# Patient Record
Sex: FEMALE | Race: BLACK OR AFRICAN AMERICAN | NOT HISPANIC OR LATINO | Employment: OTHER | URBAN - METROPOLITAN AREA
[De-identification: names, ages, dates, MRNs, and addresses within clinical notes are randomized per-mention and may not be internally consistent; named-entity substitution may affect disease eponyms.]

---

## 2017-07-10 ENCOUNTER — TELEPHONE (OUTPATIENT)
Dept: CARDIOLOGY | Facility: CLINIC | Age: 72
End: 2017-07-10

## 2017-07-10 ENCOUNTER — APPOINTMENT (OUTPATIENT)
Dept: ULTRASOUND IMAGING | Facility: HOSPITAL | Age: 72
End: 2017-07-10

## 2017-07-10 ENCOUNTER — HOSPITAL ENCOUNTER (EMERGENCY)
Facility: HOSPITAL | Age: 72
Discharge: HOME | End: 2017-07-10
Attending: FAMILY MEDICINE | Admitting: FAMILY MEDICINE

## 2017-07-10 ENCOUNTER — APPOINTMENT (OUTPATIENT)
Dept: GENERAL RADIOLOGY | Facility: HOSPITAL | Age: 72
End: 2017-07-10

## 2017-07-10 VITALS
OXYGEN SATURATION: 97 % | DIASTOLIC BLOOD PRESSURE: 75 MMHG | SYSTOLIC BLOOD PRESSURE: 149 MMHG | HEART RATE: 57 BPM | HEIGHT: 60 IN | TEMPERATURE: 98.1 F | BODY MASS INDEX: 29.06 KG/M2 | RESPIRATION RATE: 16 BRPM | WEIGHT: 148 LBS

## 2017-07-10 DIAGNOSIS — R07.89 ATYPICAL CHEST PAIN: Primary | ICD-10-CM

## 2017-07-10 LAB
ALBUMIN SERPL-MCNC: 4.5 G/DL (ref 3.5–5.2)
ALBUMIN/GLOB SERPL: 1.3 G/DL
ALP SERPL-CCNC: 119 U/L (ref 39–117)
ALT SERPL W P-5'-P-CCNC: 13 U/L (ref 1–33)
ANION GAP SERPL CALCULATED.3IONS-SCNC: 17 MMOL/L
AST SERPL-CCNC: 16 U/L (ref 1–32)
BASOPHILS # BLD AUTO: 0.01 10*3/MM3 (ref 0–0.2)
BASOPHILS NFR BLD AUTO: 0.2 % (ref 0–1.5)
BILIRUB SERPL-MCNC: 0.8 MG/DL (ref 0.1–1.2)
BUN BLD-MCNC: 14 MG/DL (ref 8–23)
BUN/CREAT SERPL: 21.5 (ref 7–25)
CALCIUM SPEC-SCNC: 9.9 MG/DL (ref 8.6–10.5)
CHLORIDE SERPL-SCNC: 102 MMOL/L (ref 98–107)
CO2 SERPL-SCNC: 25 MMOL/L (ref 22–29)
CREAT BLD-MCNC: 0.65 MG/DL (ref 0.57–1)
D DIMER PPP FEU-MCNC: 0.37 MCGFEU/ML (ref 0–0.49)
DEPRECATED RDW RBC AUTO: 43.1 FL (ref 37–54)
EOSINOPHIL # BLD AUTO: 0.04 10*3/MM3 (ref 0–0.7)
EOSINOPHIL NFR BLD AUTO: 0.9 % (ref 0.3–6.2)
ERYTHROCYTE [DISTWIDTH] IN BLOOD BY AUTOMATED COUNT: 13.3 % (ref 11.7–13)
GFR SERPL CREATININE-BSD FRML MDRD: 108 ML/MIN/1.73
GLOBULIN UR ELPH-MCNC: 3.4 GM/DL
GLUCOSE BLD-MCNC: 90 MG/DL (ref 65–99)
HCT VFR BLD AUTO: 38.4 % (ref 35.6–45.5)
HGB BLD-MCNC: 13.1 G/DL (ref 11.9–15.5)
HOLD SPECIMEN: NORMAL
HOLD SPECIMEN: NORMAL
IMM GRANULOCYTES # BLD: 0 10*3/MM3 (ref 0–0.03)
IMM GRANULOCYTES NFR BLD: 0 % (ref 0–0.5)
LIPASE SERPL-CCNC: 10 U/L (ref 13–60)
LYMPHOCYTES # BLD AUTO: 1.54 10*3/MM3 (ref 0.9–4.8)
LYMPHOCYTES NFR BLD AUTO: 33.4 % (ref 19.6–45.3)
MCH RBC QN AUTO: 30 PG (ref 26.9–32)
MCHC RBC AUTO-ENTMCNC: 34.1 G/DL (ref 32.4–36.3)
MCV RBC AUTO: 88.1 FL (ref 80.5–98.2)
MONOCYTES # BLD AUTO: 0.32 10*3/MM3 (ref 0.2–1.2)
MONOCYTES NFR BLD AUTO: 6.9 % (ref 5–12)
NEUTROPHILS # BLD AUTO: 2.7 10*3/MM3 (ref 1.9–8.1)
NEUTROPHILS NFR BLD AUTO: 58.6 % (ref 42.7–76)
PLATELET # BLD AUTO: 241 10*3/MM3 (ref 140–500)
PMV BLD AUTO: 10.5 FL (ref 6–12)
POTASSIUM BLD-SCNC: 4 MMOL/L (ref 3.5–5.2)
PROT SERPL-MCNC: 7.9 G/DL (ref 6–8.5)
RBC # BLD AUTO: 4.36 10*6/MM3 (ref 3.9–5.2)
SODIUM BLD-SCNC: 144 MMOL/L (ref 136–145)
TROPONIN T SERPL-MCNC: <0.01 NG/ML (ref 0–0.03)
WBC NRBC COR # BLD: 4.61 10*3/MM3 (ref 4.5–10.7)
WHOLE BLOOD HOLD SPECIMEN: NORMAL
WHOLE BLOOD HOLD SPECIMEN: NORMAL

## 2017-07-10 PROCEDURE — 83690 ASSAY OF LIPASE: CPT | Performed by: FAMILY MEDICINE

## 2017-07-10 PROCEDURE — 99284 EMERGENCY DEPT VISIT MOD MDM: CPT

## 2017-07-10 PROCEDURE — 84484 ASSAY OF TROPONIN QUANT: CPT | Performed by: FAMILY MEDICINE

## 2017-07-10 PROCEDURE — 85379 FIBRIN DEGRADATION QUANT: CPT | Performed by: FAMILY MEDICINE

## 2017-07-10 PROCEDURE — 93010 ELECTROCARDIOGRAM REPORT: CPT | Performed by: INTERNAL MEDICINE

## 2017-07-10 PROCEDURE — 80053 COMPREHEN METABOLIC PANEL: CPT | Performed by: FAMILY MEDICINE

## 2017-07-10 PROCEDURE — 76705 ECHO EXAM OF ABDOMEN: CPT

## 2017-07-10 PROCEDURE — 93005 ELECTROCARDIOGRAM TRACING: CPT

## 2017-07-10 PROCEDURE — 85025 COMPLETE CBC W/AUTO DIFF WBC: CPT | Performed by: FAMILY MEDICINE

## 2017-07-10 PROCEDURE — 71020 HC CHEST PA AND LATERAL: CPT

## 2017-07-10 RX ORDER — ASPIRIN 325 MG
325 TABLET ORAL ONCE
Status: DISCONTINUED | OUTPATIENT
Start: 2017-07-10 | End: 2017-07-10

## 2017-07-10 RX ORDER — MELOXICAM 15 MG/1
15 TABLET ORAL DAILY
COMMUNITY
End: 2019-11-20

## 2017-07-10 RX ORDER — SODIUM CHLORIDE 0.9 % (FLUSH) 0.9 %
10 SYRINGE (ML) INJECTION AS NEEDED
Status: DISCONTINUED | OUTPATIENT
Start: 2017-07-10 | End: 2017-07-10 | Stop reason: HOSPADM

## 2017-07-10 NOTE — TELEPHONE ENCOUNTER
07/10/17  4:07 PM  Isabel Carlson  1945    Ms. Carlson calls to schedule ER follow-up. I offered her an appts this Wednesday, 7/12 and on Thursday, 7/13 at [place] with Dr. Bob. She declined both of those. I scheduled her to see Trang Che on Tuesday, 7/18 at 1000.    Eileen HAHN RN

## 2017-07-10 NOTE — ED PROVIDER NOTES
EMERGENCY DEPARTMENT ENCOUNTER    CHIEF COMPLAINT  Chief Complaint: Chest Pain  History given by: Patient  History limited by: Nothing  Room Number: 25/25  PMD: Bacilio Dowell MD      HPI:  Pt is a 71 y.o. female who presents to the ED complaining of non-radiating, constant right sided chest discomfort, she describes as a burning sensation, which began around 19:00 last night. The patient states that she woke up with the discomfort this morning so she came to the ED for further evaluation. Patient notes that the pain is not positional or pleuritic in nature. She notes that she was slightly nauseated last night and she may have had some vague sweats.  The pain was constant but now subsiding. She denies any SOA, abdominal pain, vomiting, diarrhea, numbness or tingling. Patient has family h/o CAD (Mom in her 50's).   Duration:  18 hours  Onset: Gradual  Timing: Constant  Location: Right chest wall  Radiation: None  Quality: Burning  Intensity/Severity: Moderate  Progression: No Change  Associated Symptoms: Chest pain, nausea, diaphoresis   Aggravating Factors: Unknown  Alleviating Factors: Time  Previous Episodes: None  Treatment before arrival: None mentioned     PAST MEDICAL HISTORY  Active Ambulatory Problems     Diagnosis Date Noted   • No Active Ambulatory Problems     Resolved Ambulatory Problems     Diagnosis Date Noted   • No Resolved Ambulatory Problems     Past Medical History:   Diagnosis Date   • GERD (gastroesophageal reflux disease)    • Hypertension    • Stroke        PAST SURGICAL HISTORY  Past Surgical History:   Procedure Laterality Date   • PARTIAL HYSTERECTOMY         FAMILY HISTORY  History reviewed. No pertinent family history.    SOCIAL HISTORY  Social History     Social History   • Marital status:      Spouse name: N/A   • Number of children: N/A   • Years of education: N/A     Occupational History   • Not on file.     Social History Main Topics   • Smoking status: Never Smoker   •  Smokeless tobacco: Not on file   • Alcohol use No   • Drug use: No   • Sexual activity: Not on file     Other Topics Concern   • Not on file     Social History Narrative   • No narrative on file       ALLERGIES  Review of patient's allergies indicates no known allergies.    REVIEW OF SYSTEMS  Review of Systems   Constitutional: Positive for diaphoresis. Negative for chills.   HENT: Negative for congestion, rhinorrhea and sore throat.    Eyes: Negative for pain.   Respiratory: Negative for cough and shortness of breath.    Cardiovascular: Positive for chest pain. Negative for palpitations and leg swelling.   Gastrointestinal: Positive for nausea. Negative for abdominal pain, diarrhea and vomiting.   Genitourinary: Negative for difficulty urinating, dysuria, flank pain and frequency.   Musculoskeletal: Negative for myalgias, neck pain and neck stiffness.   Skin: Negative for rash.   Neurological: Negative for dizziness, speech difficulty, weakness, light-headedness, numbness and headaches.   Psychiatric/Behavioral: Negative.    All other systems reviewed and are negative.      PHYSICAL EXAM  ED Triage Vitals   Temp Heart Rate Resp BP SpO2   07/10/17 1135 07/10/17 1127 07/10/17 1127 07/10/17 1133 07/10/17 1127   97.3 °F (36.3 °C) 61 18 144/78 99 %      Temp src Heart Rate Source Patient Position BP Location FiO2 (%)   07/10/17 1135 07/10/17 1127 07/10/17 1133 -- --   Tympanic Monitor Sitting         Physical Exam   Constitutional: She is oriented to person, place, and time and well-developed, well-nourished, and in no distress. No distress.   HENT:   Head: Normocephalic.   Eyes: EOM are normal. Pupils are equal, round, and reactive to light.   Neck: Normal range of motion.   Cardiovascular: Normal rate and regular rhythm.    No murmur heard.  Pulmonary/Chest: Effort normal and breath sounds normal. No respiratory distress.   Abdominal: Soft. There is no tenderness. There is no rebound and no guarding.    Musculoskeletal: Normal range of motion. She exhibits no edema.   Neurological: She is alert and oriented to person, place, and time.   Skin: Skin is warm and dry. No rash noted.   Psychiatric: Mood and affect normal.   Nursing note and vitals reviewed.      LAB RESULTS  Lab Results (last 24 hours)     Procedure Component Value Units Date/Time    CBC & Differential [[id number]] Collected:  07/10/17 1142    Specimen:  Blood Updated:  07/10/17 1153    Narrative:       The following orders were created for panel order CBC & Differential.  Procedure                               Abnormality         Status                     ---------                               -----------         ------                     CBC Auto Differential[[id number]]        Abnormal            Final result                 Please view results for these tests on the individual orders.    Comprehensive Metabolic Panel [[id number]]  (Abnormal) Collected:  07/10/17 1142    Specimen:  Blood Updated:  07/10/17 1216     Glucose 90 mg/dL      BUN 14 mg/dL      Creatinine 0.65 mg/dL      Sodium 144 mmol/L      Potassium 4.0 mmol/L      Chloride 102 mmol/L      CO2 25.0 mmol/L      Calcium 9.9 mg/dL      Total Protein 7.9 g/dL      Albumin 4.50 g/dL      ALT (SGPT) 13 U/L      AST (SGOT) 16 U/L      Alkaline Phosphatase 119 (H) U/L      Total Bilirubin 0.8 mg/dL      eGFR  African Amer 108 mL/min/1.73      Globulin 3.4 gm/dL      A/G Ratio 1.3 g/dL      BUN/Creatinine Ratio 21.5     Anion Gap 17.0 mmol/L     Narrative:       The MDRD GFR formula is only valid for adults with stable renal function between ages 18 and 70.    Troponin [[id number]]  (Normal) Collected:  07/10/17 1142    Specimen:  Blood Updated:  07/10/17 1216     Troponin T <0.010 ng/mL     Narrative:       Troponin T Reference Ranges:  Less than 0.03 ng/mL:    Negative for AMI  0.03 to 0.09 ng/mL:      Indeterminant for AMI  Greater than 0.09 ng/mL: Positive for AMI    CBC Auto Differential  [[id number]]  (Abnormal) Collected:  07/10/17 1142    Specimen:  Blood Updated:  07/10/17 1153     WBC 4.61 10*3/mm3      RBC 4.36 10*6/mm3      Hemoglobin 13.1 g/dL      Hematocrit 38.4 %      MCV 88.1 fL      MCH 30.0 pg      MCHC 34.1 g/dL      RDW 13.3 (H) %      RDW-SD 43.1 fl      MPV 10.5 fL      Platelets 241 10*3/mm3      Neutrophil % 58.6 %      Lymphocyte % 33.4 %      Monocyte % 6.9 %      Eosinophil % 0.9 %      Basophil % 0.2 %      Immature Grans % 0.0 %      Neutrophils, Absolute 2.70 10*3/mm3      Lymphocytes, Absolute 1.54 10*3/mm3      Monocytes, Absolute 0.32 10*3/mm3      Eosinophils, Absolute 0.04 10*3/mm3      Basophils, Absolute 0.01 10*3/mm3      Immature Grans, Absolute 0.00 10*3/mm3     Lipase [[id number]]  (Abnormal) Collected:  07/10/17 1142    Specimen:  Blood Updated:  07/10/17 1321     Lipase 10 (L) U/L     D-dimer, Quantitative [[id number]] Collected:  07/10/17 1142    Specimen:  Blood Updated:  07/10/17 1409          I ordered the above labs and reviewed the results    RADIOLOGY  XR Chest 2 View   Final Result      US Gallbladder    (Results Pending)      12:38  Reviewed CXR - The lungs are well-expanded and clear. The heart size is normal with slight tortuosity of the aorta. No acute abnormality is seen.. Independently viewed by me. Interpreted by radiologist.     13:45  Reviewed US Gallbladder - Negative acute. Independently viewed by me. Interpreted by radiologist.      I ordered the above noted radiological studies. Interpreted by radiologist. Reviewed by me in PACS.       PROCEDURES  Procedures    EKG           EKG time: 11:17  Rhythm/Rate: NSR 60  P waves and AL: Normal  QRS, axis: LVH  ST and T waves: Normal     Interpreted Contemporaneously by me, independently viewed  Unchanged compared to prior 10/15/13      PROGRESS AND CONSULTS  ED Course     11:38  Ordered Labs, EKG and CXR for further evaluation. Also ordered ASA for patient's CP.     12:57  Rechecked the patient and she  is still resting comfortably. Discussed pertinent lab and imaging results, including Labs, EKG and CXR shows nothing acute. Discussed the plan to perform an US of her gallbladder and consult Cardiology. Patient agrees with plan and all questions were addressed.     13:01  Placed call out to Cardiology.    13:55  Discussed case with Dr Bob (Cardiology). Reviewed history, exam, results and treatments.  Discussed concerns and plan of care.  would like the patient to call the office to check for the next available appointment with the NP.     14:03  Rechecked the patient and she is resting comfortably. Discussed the patient's pertinent labs and imaging results, including US gallbladder showed nothing acute. Discussed plan to discharge the patient home and recommended follow up with her Cardiologist's office as directed.  also recommended that she start a daily ASA. Patient agrees with the plan and all questions were addressed.     Latest vital signs   BP- 150/81 HR- 57 Temp- 97.3 °F (36.3 °C) (Tympanic) O2 sat- 97%      MEDICAL DECISION MAKING  Results were reviewed/discussed with the patient and they were also made aware of online access. Pt also made aware that some labs, such as cultures, will not be resulted during ER visit and follow up with PMD is necessary.     MDM  Number of Diagnoses or Management Options  Atypical chest pain:      Amount and/or Complexity of Data Reviewed  Clinical lab tests: reviewed and ordered (Troponin <0.010)  Tests in the radiology section of CPT®: reviewed and ordered ( CXR - The lungs are well-expanded and clear. The heart size is normal with slight tortuosity of the aorta. No acute abnormality is seen.    US Gallbladder - Negative acute)  Tests in the medicine section of CPT®: ordered and reviewed (See EKG)    Patient Progress  Patient progress: stable    HEART Score  History: Slightly suspicious (+0)  ECG: Normal (+0)  Age: Greater than or equal to 65 (+2)  Risk  Factors: 1 - 2 risk factors (+1)  Troponin: Normal limit or lower (+0)  Total: 3        DIAGNOSIS  Final diagnoses:   Atypical chest pain       DISPOSITION  DISCHARGE    Patient discharged in stable condition.    Reviewed implications of results, diagnosis, meds, responsibility to follow up, warning signs and symptoms of possible worsening, potential complications and reasons to return to ER, including worsening chest pain or development of SOA.     Patient/Family voiced understanding of above instructions.    Discussed plan for discharge, as there is no emergent indication for admission.  Pt/family is agreeable and understands need for follow up and repeat testing.  Pt is aware that discharge does not mean that nothing is wrong but it indicates no emergency is present that requires admission and they must continue care with follow-up as given below or physician of their choice.     FOLLOW-UP  Shannen Bob MD  [id number] [id number] [phone]      Call the office for an appt to see the Nurse Practitioner the next openning.  Begin a daily aspirin.         Medication List      Notice     No changes were made to your prescriptions during this visit.          Latest Documented Vital Signs:  As of 2:01 PM  BP- 150/81 HR- 57 Temp- 97.3 °F (36.3 °C) (Tympanic) O2 sat- 97%    --  Documentation assistance provided by dorian Hidalgo for .  Information recorded by the dorian was done at my direction and has been verified and validated by me.           Anselmo Hidalgo  07/10/17 [id number]       Julio César Roche MD  07/10/17 [id number]

## 2017-07-12 ENCOUNTER — PROCEDURE VISIT (OUTPATIENT)
Dept: OBSTETRICS AND GYNECOLOGY | Facility: CLINIC | Age: 72
End: 2017-07-12

## 2017-07-12 ENCOUNTER — OFFICE VISIT (OUTPATIENT)
Dept: OBSTETRICS AND GYNECOLOGY | Facility: CLINIC | Age: 72
End: 2017-07-12

## 2017-07-12 VITALS
HEIGHT: 60 IN | SYSTOLIC BLOOD PRESSURE: 128 MMHG | BODY MASS INDEX: 28.82 KG/M2 | DIASTOLIC BLOOD PRESSURE: 80 MMHG | WEIGHT: 146.8 LBS

## 2017-07-12 DIAGNOSIS — M81.0 AGE-RELATED OSTEOPOROSIS WITHOUT CURRENT PATHOLOGICAL FRACTURE: ICD-10-CM

## 2017-07-12 DIAGNOSIS — Z78.0 POSTMENOPAUSAL: ICD-10-CM

## 2017-07-12 DIAGNOSIS — M81.0 OSTEOPOROSIS: ICD-10-CM

## 2017-07-12 DIAGNOSIS — Z13.820 SCREENING FOR OSTEOPOROSIS: Primary | ICD-10-CM

## 2017-07-12 DIAGNOSIS — M85.80 OSTEOPENIA: ICD-10-CM

## 2017-07-12 DIAGNOSIS — Z01.419 WELL FEMALE EXAM WITH ROUTINE GYNECOLOGICAL EXAM: Primary | ICD-10-CM

## 2017-07-12 PROCEDURE — 77080 DXA BONE DENSITY AXIAL: CPT | Performed by: OBSTETRICS & GYNECOLOGY

## 2017-07-12 PROCEDURE — G0101 CA SCREEN;PELVIC/BREAST EXAM: HCPCS | Performed by: OBSTETRICS & GYNECOLOGY

## 2017-07-12 RX ORDER — ASPIRIN 81 MG/1
81 TABLET ORAL DAILY
COMMUNITY
End: 2020-06-14 | Stop reason: HOSPADM

## 2017-07-12 NOTE — PROGRESS NOTES
Subjective:    Patient Isabel Carlson is a 71 y.o. female.   Chief Complaint   Patient presents with   • Gynecologic Exam     Hyst. Pt is here for annual. mammo done today, Colonoscopy done 5 yrs ago. Dexa due.     CCPatient had an episode of severe indigestion last week was worked up for heart issues increased her acid blocker to 2 a day and it helpedPatient's new problem is she has osteoporosis without any bone fractures cannot use Prolia cannot use Forteo    HPI  's postmenopausal patient is having no major issues      The following portions of the patient's history were reviewed and updated as appropriate: allergies, current medications, past family history, past medical history, past social history, past surgical history and problem list.      Review of Systems   Constitutional: Negative.    HENT: Negative.    Eyes: Negative.    Respiratory: Negative.    Cardiovascular: Negative.    Gastrointestinal: Negative for abdominal distention, abdominal pain, anal bleeding, blood in stool, constipation, diarrhea, nausea, rectal pain and vomiting.        Patient had the indigestion a week ago worked up for heart of her GI issues she doubled up on her acid blocker and it improved the pain   Endocrine: Negative for cold intolerance, heat intolerance, polydipsia, polyphagia and polyuria.   Genitourinary: Negative.  Negative for decreased urine volume, dyspareunia, dysuria, enuresis, flank pain, frequency, genital sores, hematuria, menstrual problem, pelvic pain, urgency, vaginal bleeding, vaginal discharge and vaginal pain.   Musculoskeletal: Negative.    Skin: Negative.    Allergic/Immunologic: Negative.    Neurological: Negative.    Hematological: Negative for adenopathy. Does not bruise/bleed easily.   Psychiatric/Behavioral: Negative for agitation, confusion and sleep disturbance. The patient is not nervous/anxious.          Objective:      Physical Exam   Constitutional: She appears well-developed and well-nourished.  She is not intubated.   HENT:   Head: Hair is normal.   Nose: Nose normal.   Mouth/Throat: Oropharynx is clear and moist.   Eyes: Conjunctivae are normal.   Neck: Normal carotid pulses and no JVD present. No tracheal tenderness, no spinous process tenderness and no muscular tenderness present. Carotid bruit is not present. No rigidity. No edema, no erythema and normal range of motion present. No thyroid mass and no thyromegaly present.   Cardiovascular: Normal rate, regular rhythm, S1 normal and normal heart sounds.  Exam reveals no gallop.    No murmur heard.  Pulmonary/Chest: Effort normal. No accessory muscle usage or stridor. No apnea, no tachypnea and no bradypnea. She is not intubated. No respiratory distress. She has no wheezes. She has no rales. She exhibits no tenderness. Right breast exhibits no inverted nipple, no mass, no nipple discharge, no skin change and no tenderness. Left breast exhibits no inverted nipple, no mass, no nipple discharge, no skin change and no tenderness.   Abdominal: Soft. Bowel sounds are normal. She exhibits no distension and no mass. There is no tenderness. There is no rebound and no guarding. No hernia.   Genitourinary: Vagina normal. Rectal exam shows no external hemorrhoid, no internal hemorrhoid, no fissure, no mass, no tenderness and anal tone normal. There is no rash, tenderness, lesion or injury on the right labia. There is no rash, tenderness, lesion or injury on the left labia. No erythema, tenderness or bleeding in the vagina. No foreign body in the vagina. No signs of injury around the vagina. No vaginal discharge found.   Genitourinary Comments: Uterus has been removed   Musculoskeletal: She exhibits no edema or tenderness.        Right shoulder: She exhibits no tenderness, no swelling, no pain and no spasm.   Lymphadenopathy:        Head (right side): No submental, no submandibular, no tonsillar, no preauricular, no posterior auricular and no occipital adenopathy  present.        Head (left side): No submental, no submandibular, no tonsillar, no preauricular, no posterior auricular and no occipital adenopathy present.     She has no cervical adenopathy.        Right cervical: No superficial cervical, no deep cervical and no posterior cervical adenopathy present.       Left cervical: No superficial cervical, no deep cervical and no posterior cervical adenopathy present.        Right axillary: No pectoral and no lateral adenopathy present.        Left axillary: No pectoral and no lateral adenopathy present.       Right: No inguinal, no supraclavicular and no epitrochlear adenopathy present.        Left: No inguinal, no supraclavicular and no epitrochlear adenopathy present.   Neurological: No cranial nerve deficit. Coordination normal.   Skin: Skin is warm and dry. No abrasion, no bruising, no burn, no lesion, no petechiae, no purpura and no rash noted. Rash is not macular, not maculopapular, not nodular and not urticarial. No cyanosis or erythema. No pallor. Nails show no clubbing.   Psychiatric: She has a normal mood and affect. Her behavior is normal.         Assessment and Plan: Vaccines are discussed patient needs a bone density calcium and vitamin D patient had a colonoscopy no history of diabetes patient does not want hormones  bone density obtained shows osteoporosis the patient had tried Prolia which caused severe leg pain  Forteo also causes severe pain  patient's given Fosamax 70 mg weekly vitamin D discussed calcium discussed patient has had her pneumonia and shingles shot of the bone density of the repeated next year    Isabel was seen today for gynecologic exam.    Diagnoses and all orders for this visit:    Well female exam with routine gynecological exam  -     Pap IG, Rfx HPV ASCU

## 2017-07-14 LAB
CONV .: NORMAL
CYTOLOGIST CVX/VAG CYTO: NORMAL
CYTOLOGY CVX/VAG DOC THIN PREP: NORMAL
DX ICD CODE: NORMAL
HIV 1 & 2 AB SER-IMP: NORMAL
OTHER STN SPEC: NORMAL
PATH REPORT.FINAL DX SPEC: NORMAL
STAT OF ADQ CVX/VAG CYTO-IMP: NORMAL

## 2017-07-18 ENCOUNTER — HOSPITAL ENCOUNTER (OUTPATIENT)
Dept: CARDIOLOGY | Facility: HOSPITAL | Age: 72
Discharge: HOME | End: 2017-07-18
Admitting: NURSE PRACTITIONER

## 2017-07-18 ENCOUNTER — OFFICE VISIT (OUTPATIENT)
Dept: CARDIOLOGY | Facility: CLINIC | Age: 72
End: 2017-07-18

## 2017-07-18 ENCOUNTER — TELEPHONE (OUTPATIENT)
Dept: CARDIOLOGY | Facility: CLINIC | Age: 72
End: 2017-07-18

## 2017-07-18 VITALS
SYSTOLIC BLOOD PRESSURE: 110 MMHG | HEIGHT: 60 IN | DIASTOLIC BLOOD PRESSURE: 68 MMHG | BODY MASS INDEX: 29.06 KG/M2 | WEIGHT: 148 LBS | HEART RATE: 59 BPM

## 2017-07-18 VITALS
SYSTOLIC BLOOD PRESSURE: 110 MMHG | DIASTOLIC BLOOD PRESSURE: 68 MMHG | BODY MASS INDEX: 29.06 KG/M2 | WEIGHT: 148 LBS | HEART RATE: 57 BPM | HEIGHT: 60 IN

## 2017-07-18 DIAGNOSIS — R01.1 HEART MURMUR: ICD-10-CM

## 2017-07-18 DIAGNOSIS — E78.2 MIXED HYPERLIPIDEMIA: ICD-10-CM

## 2017-07-18 DIAGNOSIS — R07.89 BURNING CHEST PAIN: ICD-10-CM

## 2017-07-18 DIAGNOSIS — I34.0 NON-RHEUMATIC MITRAL REGURGITATION: ICD-10-CM

## 2017-07-18 DIAGNOSIS — K21.9 GASTROESOPHAGEAL REFLUX DISEASE, ESOPHAGITIS PRESENCE NOT SPECIFIED: ICD-10-CM

## 2017-07-18 DIAGNOSIS — I49.1 APC (ATRIAL PREMATURE CONTRACTIONS): ICD-10-CM

## 2017-07-18 DIAGNOSIS — R07.89 BURNING CHEST PAIN: Primary | ICD-10-CM

## 2017-07-18 DIAGNOSIS — R14.2 BELCHING: ICD-10-CM

## 2017-07-18 DIAGNOSIS — R94.31 ABNORMAL EKG: ICD-10-CM

## 2017-07-18 DIAGNOSIS — I36.1 NON-RHEUMATIC TRICUSPID VALVE INSUFFICIENCY: ICD-10-CM

## 2017-07-18 DIAGNOSIS — I10 ESSENTIAL HYPERTENSION: ICD-10-CM

## 2017-07-18 DIAGNOSIS — I51.7 LVH (LEFT VENTRICULAR HYPERTROPHY): ICD-10-CM

## 2017-07-18 LAB
BH CV ECHO MEAS - ACS: 1.8 CM
BH CV ECHO MEAS - AO MAX PG: 9.6 MMHG
BH CV ECHO MEAS - AO ROOT AREA (BSA CORRECTED): 2
BH CV ECHO MEAS - AO ROOT AREA: 8.3 CM^2
BH CV ECHO MEAS - AO ROOT DIAM: 3.3 CM
BH CV ECHO MEAS - AO V2 MAX: 154.7 CM/SEC
BH CV ECHO MEAS - BSA(HAYCOCK): 1.7 M^2
BH CV ECHO MEAS - BSA: 1.6 M^2
BH CV ECHO MEAS - BZI_BMI: 28.9 KILOGRAMS/M^2
BH CV ECHO MEAS - BZI_METRIC_HEIGHT: 152.4 CM
BH CV ECHO MEAS - BZI_METRIC_WEIGHT: 67.1 KG
BH CV ECHO MEAS - CONTRAST EF 4CH: 77.3 ML/M^2
BH CV ECHO MEAS - EDV(MOD-SP4): 44 ML
BH CV ECHO MEAS - EDV(TEICH): 64.5 ML
BH CV ECHO MEAS - EF(CUBED): 81.1 %
BH CV ECHO MEAS - EF(MOD-SP4): 60 %
BH CV ECHO MEAS - EF(TEICH): 74.3 %
BH CV ECHO MEAS - ESV(MOD-SP4): 10 ML
BH CV ECHO MEAS - ESV(TEICH): 16.6 ML
BH CV ECHO MEAS - FS: 42.6 %
BH CV ECHO MEAS - IVS/LVPW: 0.96
BH CV ECHO MEAS - IVSD: 0.97 CM
BH CV ECHO MEAS - LAT PEAK E' VEL: 10 CM/SEC
BH CV ECHO MEAS - LV DIASTOLIC VOL/BSA (35-75): 26.8 ML/M^2
BH CV ECHO MEAS - LV MASS(C)D: 117.8 GRAMS
BH CV ECHO MEAS - LV MASS(C)DI: 71.7 GRAMS/M^2
BH CV ECHO MEAS - LV SYSTOLIC VOL/BSA (12-30): 6.1 ML/M^2
BH CV ECHO MEAS - LVIDD: 3.9 CM
BH CV ECHO MEAS - LVIDS: 2.2 CM
BH CV ECHO MEAS - LVLD AP4: 6.3 CM
BH CV ECHO MEAS - LVLS AP4: 4.7 CM
BH CV ECHO MEAS - LVPWD: 1 CM
BH CV ECHO MEAS - MED PEAK E' VEL: 9 CM/SEC
BH CV ECHO MEAS - MR MAX PG: 156.5 MMHG
BH CV ECHO MEAS - MR MAX VEL: 625.5 CM/SEC
BH CV ECHO MEAS - MV A DUR: 0.11 SEC
BH CV ECHO MEAS - MV A MAX VEL: 65.2 CM/SEC
BH CV ECHO MEAS - MV DEC SLOPE: 447.5 CM/SEC^2
BH CV ECHO MEAS - MV DEC TIME: 0.17 SEC
BH CV ECHO MEAS - MV E MAX VEL: 74.8 CM/SEC
BH CV ECHO MEAS - MV E/A: 1.1
BH CV ECHO MEAS - MV P1/2T MAX VEL: 76 CM/SEC
BH CV ECHO MEAS - MV P1/2T: 49.7 MSEC
BH CV ECHO MEAS - MVA P1/2T LCG: 2.9 CM^2
BH CV ECHO MEAS - MVA(P1/2T): 4.4 CM^2
BH CV ECHO MEAS - PULM A REVS DUR: 0.1 SEC
BH CV ECHO MEAS - PULM A REVS VEL: 28.1 CM/SEC
BH CV ECHO MEAS - PULM DIAS VEL: 35.3 CM/SEC
BH CV ECHO MEAS - PULM S/D: 0.83
BH CV ECHO MEAS - PULM SYS VEL: 29.1 CM/SEC
BH CV ECHO MEAS - RAP SYSTOLE: 15 MMHG
BH CV ECHO MEAS - RVSP: 56.4 MMHG
BH CV ECHO MEAS - SI(CUBED): 28.5 ML/M^2
BH CV ECHO MEAS - SI(MOD-SP4): 20.7 ML/M^2
BH CV ECHO MEAS - SI(TEICH): 29.2 ML/M^2
BH CV ECHO MEAS - SV(CUBED): 46.8 ML
BH CV ECHO MEAS - SV(MOD-SP4): 34 ML
BH CV ECHO MEAS - SV(TEICH): 48 ML
BH CV ECHO MEAS - TAPSE (>1.6): 2.3 CM2
BH CV ECHO MEAS - TR MAX VEL: 321.7 CM/SEC
BH CV STRESS BP STAGE 1: NORMAL
BH CV STRESS BP STAGE 2: NORMAL
BH CV STRESS DURATION MIN STAGE 1: 3
BH CV STRESS DURATION MIN STAGE 2: 2
BH CV STRESS DURATION SEC STAGE 1: 0
BH CV STRESS DURATION SEC STAGE 2: 0
BH CV STRESS ECHO POST STRESS EJECTION FRACTION EF: 77 %
BH CV STRESS GRADE STAGE 1: 10
BH CV STRESS GRADE STAGE 2: 12
BH CV STRESS HR STAGE 1: 117
BH CV STRESS HR STAGE 2: 129
BH CV STRESS METS STAGE 1: 5
BH CV STRESS METS STAGE 2: 7.5
BH CV STRESS PROTOCOL 1: NORMAL
BH CV STRESS SPEED STAGE 1: 1.7
BH CV STRESS SPEED STAGE 2: 2.5
BH CV STRESS STAGE 1: 1
BH CV STRESS STAGE 2: 2
BH CV XLRA - RV BASE: 2.6 CM
BH CV XLRA - TDI S': 10 CM/SEC
E/E' RATIO: 7.5
LEFT ATRIUM VOLUME INDEX: 13 ML/M2
MAXIMAL PREDICTED HEART RATE: 147 BPM
PERCENT MAX PREDICTED HR: 87.76 %
STRESS BASELINE BP: NORMAL MMHG
STRESS BASELINE HR: 59 BPM
STRESS PERCENT HR: 103 %
STRESS POST ESTIMATED WORKLOAD: 6 METS
STRESS POST EXERCISE DUR MIN: 5 MIN
STRESS POST EXERCISE DUR SEC: 0 SEC
STRESS POST PEAK BP: NORMAL MMHG
STRESS POST PEAK HR: 129 BPM
STRESS TARGET HR: 125 BPM

## 2017-07-18 PROCEDURE — 93320 DOPPLER ECHO COMPLETE: CPT

## 2017-07-18 PROCEDURE — 93018 CV STRESS TEST I&R ONLY: CPT | Performed by: INTERNAL MEDICINE

## 2017-07-18 PROCEDURE — 93017 CV STRESS TEST TRACING ONLY: CPT

## 2017-07-18 PROCEDURE — 93000 ELECTROCARDIOGRAM COMPLETE: CPT | Performed by: NURSE PRACTITIONER

## 2017-07-18 PROCEDURE — 93325 DOPPLER ECHO COLOR FLOW MAPG: CPT | Performed by: INTERNAL MEDICINE

## 2017-07-18 PROCEDURE — 93350 STRESS TTE ONLY: CPT | Performed by: INTERNAL MEDICINE

## 2017-07-18 PROCEDURE — 93320 DOPPLER ECHO COMPLETE: CPT | Performed by: INTERNAL MEDICINE

## 2017-07-18 PROCEDURE — 99203 OFFICE O/P NEW LOW 30 MIN: CPT | Performed by: NURSE PRACTITIONER

## 2017-07-18 PROCEDURE — 93325 DOPPLER ECHO COLOR FLOW MAPG: CPT

## 2017-07-18 PROCEDURE — 93016 CV STRESS TEST SUPVJ ONLY: CPT | Performed by: INTERNAL MEDICINE

## 2017-07-18 PROCEDURE — 93350 STRESS TTE ONLY: CPT

## 2017-07-18 RX ORDER — PANTOPRAZOLE SODIUM 40 MG/1
40 TABLET, DELAYED RELEASE ORAL DAILY
COMMUNITY
Start: 2017-05-02 | End: 2019-09-17 | Stop reason: SDUPTHER

## 2017-07-18 RX ORDER — AMLODIPINE BESYLATE AND BENAZEPRIL HYDROCHLORIDE 10; 20 MG/1; MG/1
1 CAPSULE ORAL DAILY
COMMUNITY
Start: 2017-07-13 | End: 2019-11-20 | Stop reason: SDUPTHER

## 2017-07-18 RX ORDER — ALENDRONATE SODIUM 70 MG/1
70 TABLET ORAL WEEKLY
Refills: 4 | COMMUNITY
Start: 2017-07-12 | End: 2017-12-02 | Stop reason: SDUPTHER

## 2017-07-18 RX ORDER — GABAPENTIN 300 MG/1
300 CAPSULE ORAL DAILY
COMMUNITY
Start: 2017-05-02 | End: 2019-07-31

## 2017-07-18 NOTE — PROGRESS NOTES
Date of Office Visit: 2017  Encounter Provider: FRANK Crews  Place of Service: [facility] CARDIOLOGY  Patient Name: Isabel Carlson  :1945    Chief Complaint   Patient presents with   • Chest Pain   :     HPI: Isabel Carlson is a 71 y.o. female who presents today for evaluation of chest pain. She reports a past medical history of GERD, essential hypertension (well-controlled) and transient ischemic attack.  Upon review of the patient's records, she presented to [facility] 10/15/2013 with right hand and right face paresthesia.  CT exam showed no acute pathology and MRI showed evidence of a great deal small vessel disease, but no definite acute infarct.  Carotid Dopplers showed plaquing on both internal carotid arteries with no stenosis.  Echocardiogram showed normal ejection fraction, mild left ventricular hypertrophy, grade 1 diastolic dysfunction, right ventricle moderately enlarged, right atrium mildly dilated, left atrium mildly dilated, mild aortic root sclerosis, mild to moderate mitral regurgitation and moderate to severe tricuspid regurgitation.  She was noted to have hyperlipidemia and was recommended to continue with atorvastatin.  She wore a 24-hour Holter monitor which showed no evidence of atrial fibrillation.     She recently had an episode of chest pain.  She said she had eaten macaroni salad, turkey wing, and 3 potatoes.  She started experiencing a right-sided chest burning at 7 PM accompanying with acid reflux, belching, and nausea.  She rated the pain as 6 out of 10.  She did not try any medication to relieve the chest burning.  The pain does not worsen with exertion.  She went to bed and woke up with the same pain the following morning and rated the pain as 7 out of 10.  She presented to the [facility] emergency department for further evaluation.    Upon review of the ED records, blood pressure is 144/78 and heart rate 61.   CMP was normal except for alkaline phosphatase elevated at 119.  CBC was normal except for RDW 13.3 high.  Lipase was low at 10 and quantitative d-dimer was within normal limits..  She ruled out for acute myocardial infarction with normal troponin level.  Check stat x-ray showed no acute abnormality in gallbladder ultrasound was negative.  EKG interpreted as normal sinus rhythm with left ventricular hypertrophy.  The plan care was discussed with Dr. Shannen Bob and she recommended the patient follow-up in our office for further evaluation.    Mrs. Carlson denies any further chest pain.  She remains fatigued and reports occasional ankle edema..  She denies shortness of air, paroxysmal nocturnal dyspnea, orthopnea, cough, palpitations, dizziness, or syncope.      Past Medical History:   Diagnosis Date   • APC (atrial premature contractions) 7/20/2017   • Chest pain    • GERD (gastroesophageal reflux disease)    • Heart murmur 7/20/2017   • Hypertension    • LVH (left ventricular hypertrophy) 7/20/2017   • Mixed hyperlipidemia 7/20/2017   • Non-rheumatic mitral regurgitation 7/20/2017   • Non-rheumatic tricuspid valve insufficiency 7/20/2017   • TIA (transient ischemic attack) 10/15/2013    numbness on right side of face and hand; went to [facility] [place]       Past Surgical History:   Procedure Laterality Date   • APPENDECTOMY     • PARTIAL HYSTERECTOMY         Social History     Social History   • Marital status:      Spouse name: N/A   • Number of children: N/A   • Years of education: N/A     Occupational History   • Not on file.     Social History Main Topics   • Smoking status: Never Smoker   • Smokeless tobacco: Not on file   • Alcohol use No      Comment: occ caffeine use   • Drug use: No   • Sexual activity: No     Other Topics Concern   • Not on file     Social History Narrative       Family History   Problem Relation Age of Onset   • Heart disease Mother    • Hypertension Mother    • Stroke Mother    • Heart  disease Maternal Aunt    • Hypertension Maternal Aunt    • Hypertension Maternal Uncle        Review of Systems   Constitution: Positive for malaise/fatigue. Negative for chills, diaphoresis, fever, night sweats, weight gain and weight loss.   HENT: Positive for ear pain and hearing loss. Negative for nosebleeds, sore throat and tinnitus.    Eyes: Negative for blurred vision, double vision, pain and visual disturbance.   Cardiovascular: Positive for leg swelling. Negative for chest pain, claudication, cyanosis, dyspnea on exertion, irregular heartbeat, near-syncope, orthopnea, palpitations, paroxysmal nocturnal dyspnea and syncope.   Respiratory: Negative for cough, hemoptysis, shortness of breath, snoring and wheezing.    Endocrine: Negative for cold intolerance, heat intolerance and polyuria.   Hematologic/Lymphatic: Negative for bleeding problem. Does not bruise/bleed easily.   Skin: Negative for color change, dry skin, flushing and itching.   Musculoskeletal: Positive for joint pain. Negative for falls, joint swelling, muscle cramps, muscle weakness and myalgias.   Gastrointestinal: Negative for abdominal pain, constipation, heartburn, melena, nausea and vomiting.   Genitourinary: Negative for dysuria and hematuria.   Neurological: Negative for excessive daytime sleepiness, dizziness, light-headedness, loss of balance, numbness, paresthesias, seizures and vertigo.   Psychiatric/Behavioral: Negative for altered mental status, depression, memory loss and substance abuse. The patient does not have insomnia and is not nervous/anxious.    Allergic/Immunologic: Negative for environmental allergies.       No Known Allergies      Current Outpatient Prescriptions:   •  alendronate (FOSAMAX) 70 MG tablet, Take 70 mg by mouth Every 7 (Seven) Days., Disp: , Rfl: 4  •  amLODIPine-benazepril (LOTREL) 10-20 MG per capsule, Daily., Disp: , Rfl:   •  aspirin 81 MG EC tablet, Take 81 mg by mouth Daily., Disp: , Rfl:   •   "gabapentin (NEURONTIN) 300 MG capsule, Take 300 mg by mouth Daily., Disp: , Rfl:   •  meloxicam (MOBIC) 15 MG tablet, Take 15 mg by mouth Daily., Disp: , Rfl:   •  pantoprazole (PROTONIX) 40 MG EC tablet, Take 40 mg by mouth Daily., Disp: , Rfl:      Objective:     Vitals:    07/18/17 1005 07/18/17 1006   BP: 114/68 110/68   BP Location: Left arm Right arm   Pulse: 57    Weight: 148 lb (67.1 kg)    Height: 60\" (152.4 cm)      Body mass index is 28.9 kg/(m^2).    PHYSICAL EXAM:    Vitals Reviewed.   General Appearance: No acute distress, well developed and well nourished.   Eyes: Conjunctiva and lids: No erythema, swelling, or discharge. Sclera non-icteric.   HENT: Atraumatic, normocephalic. External eyes, ears, and nose normal. No hearing loss noted. Mucous membranes normal. Lips not cyanotic. Neck supple with no tenderness.  Respiratory: No signs of respiratory distress. Respiration rhythm and depth normal.   Clear to auscultation. No rales, crackles, rhonchi, or wheezing auscultated.   Cardiovascular:  Jugular Venous Pressure: Normal  Heart Rate and Rhythm: Normal, Heart Sounds: Normal S1 and S2. No S3 or S4 noted.  Murmurs: LLSB grade 2/6 murmur noted. No rubs, thrills, or gallops.   Arterial Pulses: Carotid pulses normal. No carotid bruit noted. Posterior tibialis and dorsalis pedis pulses normal.   Lower Extremities: No edema noted.  Gastrointestinal:  Abdomen soft, non-distended, non-tender. Normal bowel sounds. No hepatomegaly.   Musculoskeletal: Normal movement of extremities  Skin and Nails: General appearance normal. No pallor, cyanosis, diaphoresis. Skin temperature normal. No clubbing of fingernails.   Psychiatric: Patient alert and oriented to person, place, and time. Speech and behavior appropriate. Normal mood and affect.       ECG 12 Lead  Date/Time: 7/18/2017 10:02 AM  Performed by: IZABELA METCALF  Authorized by: IZABELA METCALF   Comparison: compared with previous ECG from 7/10/2017  Similar to " previous ECG  Rhythm: sinus bradycardia  Ectopy: atrial premature contractions  Rate: bradycardic  BPM: 57  Conduction: conduction normal  ST Segments: ST segments normal  T Waves: T waves normal  QRS axis: normal  Other findings: LVH  Q waves: V1, V2 and V3  Clinical impression: abnormal ECG              Assessment:       Diagnosis Plan   1. Burning chest pain  Adult Stress Echo With Color & Doppler    ECG 12 Lead   2. Abnormal EKG  Adult Stress Echo With Color & Doppler    ECG 12 Lead   3. APC (atrial premature contractions)  Adult Stress Echo With Color & Doppler    ECG 12 Lead   4. Heart murmur  Adult Stress Echo With Color & Doppler   5. Non-rheumatic mitral regurgitation     6. Non-rheumatic tricuspid valve insufficiency     7. LVH (left ventricular hypertrophy)     8. Gastroesophageal reflux disease, esophagitis presence not specified     9. Belching  Adult Stress Echo With Color & Doppler   10. Essential hypertension     11. Mixed hyperlipidemia            Plan:       1. Chest Pain: Patient had an episode of chest pain that occurred after eating dinner and continued until the following morning.  She described the pain as a burning sensation and the pain did not worsen with exertion.  Her risk factors for coronary artery disease include hyperlipidemia, hypertension, and family history (mother had coronary artery disease in her 50s).  I've recommended that she have a stress echocardiogram to be completed.  She ruled out for an acute myocardial infarction with normal troponin level in the emergency department.    2.  Abnormal EKG: Her EKG tracing is abnormal showing left ventricular hypertrophy, atrial premature contraction, and Q waves in anterior leads.  Further evaluation with a stress echocardiogram.    3.  Heart Murmur: The patient was noted to have a grade 2/6 left lower sternal border murmur present.  Upon review of her records she had a 2-D echocardiogram in 2013 which revealed moderate to severe  tricuspid valve insufficiency and mild to moderate mitral regurgitation.  We will re-evaluate with a 2-D echocardiogram.    4.  GERD: The patient did describe chest burning and belching.  If her stress echocardiogram comes back normal this may have been related to GERD.  I will then recommend follow-up with her PCP.    5.  Essential Hypertension: Blood pressure is well-controlled today.    6.  Hyperlipidemia: She was noted to have an LDL above 150 and total cholesterol above 250 in 2013.  At that time she was on atorvastatin 10 mg daily.  She is not taking any statin therapy at this time.  I will defer to her primary care physician.    7.  Follow Up: I will plan to follow up on the results of the stress echocardiogram and further recommendations will be made.    Addendum 7/25/17: I ordered a stress echocardiogram which was normal showing no evidence of myocardial ischemia.  Resting 2-D echocardiogram revealed an ejection fraction of 60%, mild to moderate mitral valve regurgitation, mild to moderate tricuspid valve regurgitation, RVSP 56.4 mmHg consistent with severe pulmonary hypertension, and right atrium and ventricle borderline enlarged.  I reviewed the findings with Dr. Spencer.  The patient walked 5 minutes and had borderline EKG changes.  He recommended that she not have a repeat walking stress test in the future.  As far as the severe pontine hypertension, Dr. Spencer S I speak with Dr. Holland about the findings. Dr. Holland recommended a referral to pulmonologist, Dr. Durga Carvalho.  I have placed a ambulatory referral order.  I explained all this to the patient via telephone.    As always, it has been a pleasure to participate in your patient's care.      Sincerely,         FRANK Corley

## 2017-07-18 NOTE — TELEPHONE ENCOUNTER
Stress Echocardiogram Results:    · Calculated EF = 60%  · Mild-to-moderate mitral valve regurgitation is present with a centrally-directed jet noted.  · Mild to moderate tricuspid valve regurgitation is present.  · Estimated right ventricular systolic pressure from tricuspid regurgitation is markedly elevated (>55 mmHg). Calculated right ventricular systolic pressure from tricuspid regurgitation is 56.4 mmHg.  · Right heart structures borderline enlarged  · STRESS ECHO  · Segment augmentation had a normal response to stress. Cavity size behaved normally in response to stress. Left ventricular function is increased hyperdynamic (>70%).  · Normal stress echo with no significant echocardiographic evidence for myocardial ischemia.  · Only walked 5 min with borderline ecg changes    Abnormal study.  I will further discuss results with Dr. Cali Spencer.

## 2017-07-20 PROBLEM — R01.1 HEART MURMUR: Status: ACTIVE | Noted: 2017-07-20

## 2017-07-20 PROBLEM — I36.1 NON-RHEUMATIC TRICUSPID VALVE INSUFFICIENCY: Status: ACTIVE | Noted: 2017-07-20

## 2017-07-20 PROBLEM — R94.31 ABNORMAL EKG: Status: ACTIVE | Noted: 2017-07-20

## 2017-07-20 PROBLEM — K21.9 GASTROESOPHAGEAL REFLUX DISEASE: Status: ACTIVE | Noted: 2017-07-20

## 2017-07-20 PROBLEM — I51.7 LVH (LEFT VENTRICULAR HYPERTROPHY): Status: ACTIVE | Noted: 2017-07-20

## 2017-07-20 PROBLEM — I10 ESSENTIAL HYPERTENSION: Status: ACTIVE | Noted: 2017-07-20

## 2017-07-20 PROBLEM — E78.2 MIXED HYPERLIPIDEMIA: Status: ACTIVE | Noted: 2017-07-20

## 2017-07-20 PROBLEM — I34.0 NON-RHEUMATIC MITRAL REGURGITATION: Status: ACTIVE | Noted: 2017-07-20

## 2017-07-20 PROBLEM — I49.1 APC (ATRIAL PREMATURE CONTRACTIONS): Status: ACTIVE | Noted: 2017-07-20

## 2017-07-21 NOTE — TELEPHONE ENCOUNTER
I discussed the plan of care with Dr. Cali Spencer.  He said the probability of coronary artery disease is low.  With her EKG changes on the recent stress echo he does not recommend doing another she walking stress test in the future.    Her diastolic function was normal.  She was noted to have pulmonary hypertension.  Dr. Spencer recommended the patient follow-up with him about the valves in about 6 months.  He also asked that I speak with Dr. Holland about the newly diagnosed pulmonary hypertension.    I just spoke with patient via telephone about results.  I told her I will speak to her family physician and call her back next week.  I've asked her to follow-up with Dr. Spencer in 6 months.  I've recommended good blood pressure control.  She denies any further chest pain or shortness of breath.

## 2017-07-24 ENCOUNTER — TELEPHONE (OUTPATIENT)
Dept: CARDIOLOGY | Facility: CLINIC | Age: 72
End: 2017-07-24

## 2017-07-24 DIAGNOSIS — I27.20 PULMONARY HYPERTENSION (HCC): Primary | ICD-10-CM

## 2017-07-24 NOTE — TELEPHONE ENCOUNTER
Patient called and has a question.  She would like to know what is causing the b/p in her lungs to be high.  Patient said she does not see Dr. Spencre for 6 months and would like to know what to do in the mean time.  Please advise.  Niki

## 2017-07-25 NOTE — TELEPHONE ENCOUNTER
I discussed the plan of care via phone with Dr. Moy Holland. I explained the findings on the echocardiogram. Dr. Holland agrees that she should be referred to a pulmonologist and he has recommended Dr. Durga Carvalho.     I called the patient and explained the recommendations from Dr. Holland. I explained that the we do not know the etiology of pulmonary hypertension at this time. She verbalized understanding.

## 2017-08-14 ENCOUNTER — HOSPITAL ENCOUNTER (EMERGENCY)
Facility: HOSPITAL | Age: 72
Discharge: HOME | End: 2017-08-14
Attending: EMERGENCY MEDICINE | Admitting: EMERGENCY MEDICINE

## 2017-08-14 ENCOUNTER — APPOINTMENT (OUTPATIENT)
Dept: GENERAL RADIOLOGY | Facility: HOSPITAL | Age: 72
End: 2017-08-14

## 2017-08-14 VITALS
HEART RATE: 72 BPM | TEMPERATURE: 97.7 F | WEIGHT: 145 LBS | HEIGHT: 60 IN | SYSTOLIC BLOOD PRESSURE: 153 MMHG | RESPIRATION RATE: 16 BRPM | DIASTOLIC BLOOD PRESSURE: 79 MMHG | BODY MASS INDEX: 28.47 KG/M2 | OXYGEN SATURATION: 96 %
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
## 2017-08-14 DIAGNOSIS — S93.602A FOOT SPRAIN, LEFT, INITIAL ENCOUNTER: Primary | ICD-10-CM
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
## 2017-08-14 PROCEDURE — 99283 EMERGENCY DEPT VISIT LOW MDM: CPT

## 2017-08-14 PROCEDURE — 73630 X-RAY EXAM OF FOOT: CPT

## 2017-08-14 NOTE — ED PROVIDER NOTES
EMERGENCY DEPARTMENT ENCOUNTER    CHIEF COMPLAINT  Chief Complaint: L foot pain  History given by: pt   History limited by: none   Room Number: 27/27  PMD: Cordell Holland MD      HPI:  Pt is a 72 y.o. female who presents with L foot pain s/p trip and stumble down 2 steps PTA today. Pt denies fall, and states that she rolled her foot, and that she was able to ambulate after her fall. Pt denies ankle or knee pain, or head, neck, or back injuries.     Duration: since just PTA   Timing: constant   Location:L foot   Radiation: none stated   Quality: pain   Intensity/Severity: moderate   Progression: unchanged   Associated Symptoms: none   Aggravating Factors: none stated   Alleviating Factors: none stated   Previous Episodes: none   Treatment before arrival: none     MEDICAL RECORD REVIEW  Pt reports s/p falling down steps, with L foot pain. Pt has a Hx of hypertension.     PAST MEDICAL HISTORY  Active Ambulatory Problems     Diagnosis Date Noted   • Heart murmur 07/20/2017   • Abnormal EKG 07/20/2017   • APC (atrial premature contractions) 07/20/2017   • Non-rheumatic mitral regurgitation 07/20/2017   • Non-rheumatic tricuspid valve insufficiency 07/20/2017   • LVH (left ventricular hypertrophy) 07/20/2017   • Gastroesophageal reflux disease 07/20/2017   • Essential hypertension 07/20/2017   • Mixed hyperlipidemia 07/20/2017     Resolved Ambulatory Problems     Diagnosis Date Noted   • No Resolved Ambulatory Problems     Past Medical History:   Diagnosis Date   • Abnormal stress echocardiogram 07/20/2017   • APC (atrial premature contractions) 7/20/2017   • Chest pain    • GERD (gastroesophageal reflux disease)    • Heart murmur 7/20/2017   • Hypertension    • LVH (left ventricular hypertrophy) 7/20/2017   • Mixed hyperlipidemia 7/20/2017   • Non-rheumatic mitral regurgitation 07/20/2017   • Non-rheumatic tricuspid valve insufficiency 07/20/2017   • Pulmonary hypertension 07/20/2017   • TIA (transient ischemic  attack) 10/15/2013       PAST SURGICAL HISTORY  Past Surgical History:   Procedure Laterality Date   • APPENDECTOMY     • PARTIAL HYSTERECTOMY         FAMILY HISTORY  Family History   Problem Relation Age of Onset   • Heart disease Mother    • Hypertension Mother    • Stroke Mother    • Heart disease Maternal Aunt    • Hypertension Maternal Aunt    • Hypertension Maternal Uncle        SOCIAL HISTORY  Social History     Social History   • Marital status:      Spouse name: N/A   • Number of children: N/A   • Years of education: N/A     Occupational History   • Not on file.     Social History Main Topics   • Smoking status: Never Smoker   • Smokeless tobacco: Not on file   • Alcohol use No      Comment: occ caffeine use   • Drug use: No   • Sexual activity: No     Other Topics Concern   • Not on file     Social History Narrative       ALLERGIES  Review of patient's allergies indicates no known allergies.    REVIEW OF SYSTEMS  Review of Systems   Constitutional: Negative for fever.   HENT: Negative for sore throat.    Eyes: Negative.    Respiratory: Negative for cough and shortness of breath.    Cardiovascular: Negative for chest pain.   Gastrointestinal: Negative for abdominal pain, diarrhea and vomiting.   Genitourinary: Negative for dysuria.   Musculoskeletal: Negative for neck pain.        L foot pain   Skin: Negative for rash.   Allergic/Immunologic: Negative.    Neurological: Negative for weakness, numbness and headaches.   Hematological: Negative.    Psychiatric/Behavioral: Negative.    All other systems reviewed and are negative.      PHYSICAL EXAM  ED Triage Vitals   Temp Heart Rate Resp BP SpO2   08/14/17 1446 08/14/17 1446 08/14/17 1511 08/14/17 1705 08/14/17 1446   97.7 °F (36.5 °C) 72 16 141/81 96 %      Temp src Heart Rate Source Patient Position BP Location FiO2 (%)   -- -- 08/14/17 1705 08/14/17 1741 --     Sitting Right arm        Physical Exam   Constitutional: She is oriented to person, place,  and time and well-developed, well-nourished, and in no distress. No distress.   HENT:   Head: Normocephalic and atraumatic.   Eyes: EOM are normal.   Neck: Normal range of motion.   Pulmonary/Chest: Effort normal. No respiratory distress.   Musculoskeletal: She exhibits tenderness (2nd, 3rd, and 4th dorsal metacarpals ).   Neurological: She is alert and oriented to person, place, and time.   Neurovascularly intact distally   Skin: Skin is warm and dry. No ecchymosis noted. No pallor.   Psychiatric: Mood, memory, affect and judgment normal.   Nursing note and vitals reviewed.      RADIOLOGY  XR Foot 3+ View Left   Final Result   FINDINGS:  1. Joint space narrowing at the first metatarsophalangeal joint.  2. No fracture nor other acute abnormality.       I ordered the above noted radiological studies and reviewed the images on the PACS system.    PROCEDURES      COURSE & MEDICAL DECISION MAKING  Pertinent Labs and Imaging studies that were ordered and reviewed are noted above.  Results were reviewed/discussed with the patient and they were also made aware of online assess.  Pt also made aware that some labs, such as cultures, will not be resulted during ER visit and follow up with PMD is necessary.       PROGRESS AND CONSULTS    Progress Notes:    1906  Discussed plan to d/c pt with an ACE wrap to use to control swelling. Pt understands and agrees with the plan, and all questions were answered.     1909  Ordered ice and elevation to the pt's L foot.     1915  Reviewed pt's history and workup with Dr. Elias.  After a bedside evaluation; Dr Elias agrees with the plan of care    1916  The patient's history, physical exam, and lab findings were discussed with the physician, who also performed a face to face history and physical exam.  I discussed all results and noted any abnormalities with patient.  Discussed absoute need to recheck abnormalities with their family physician.  I answered any of the patient's  "questions.  Discussed plan for discharge, as there is no emergent indication for admission.  Pt is agreeable and understands need for follow up and repeat testing.  Pt is aware that discharge does not mean that nothing is wrong but it indicates no emergency is present and they must continue care with their family physician.  Pt is discharged with instructions to follow up with primary care doctor to have their blood pressure rechecked.     MEDICATIONS GIVEN IN ER  Medications - No data to display    /79  Pulse 72  Temp 97.7 °F (36.5 °C)  Resp 16  Ht 60\" (152.4 cm)  Wt 145 lb (65.8 kg)  SpO2 96%  BMI 28.32 kg/m2      DIAGNOSIS  Final diagnoses:   Foot sprain, left, initial encounter   Fall at home, initial encounter       FOLLOW UP   Cordell Holland MD  [address] [id number] [phone]          Nazario Amanda Jr., MD  [address] [facility] [address]  [place] [id number] [phone]            RX     Medication List      Notice     No changes were made to your prescriptions during this visit.        Documentation assistance provided by dorian Ray for Corina Lopez PA-C.  Information recorded by the dorian was done at my direction and has been verified and validated by me.     Anatoliy Ray  08/14/17 1919       Corina Lopez PA-C  08/14/17 1921       Corina Lopez PA-C  09/30/17 1829    "

## 2017-08-14 NOTE — ED PROVIDER NOTES
I supervised care provided by the midlevel provider.    We have discussed this patient's history, physical exam, and treatment plan.   I have reviewed the note and personally saw and examined the patient and agree with the plan of care.        Pt presents to the ED c/o left foot pain s/p mechanical fall in which pt tripped on the stair steps earlier today. Pt denies head injury, LOC, abd pain, CP, dyspnea, nausea, vomiting, neck pain, and focal weakness/numbness. On physical exam, there is tenderness to the dorsal aspect of the left foot. There is no erythema present. There is no left achilles heel tenderness. Pt's left foot X-Ray shows no acute fracture. Pt was advised to use ACE wrap as needed.              Documentation assistance provided by Tamiko Schmitz. Information recorded by the scribe was done at my direction and has been verified and validated by me.     Entered by Tamiko Schmitz, acting as scribe for Dr. Jada MD.        Tamiko Schmitz  08/14/17 1922       Francisco Elias MD  08/14/17 2537

## 2017-09-07 ENCOUNTER — TELEPHONE (OUTPATIENT)
Dept: CARDIOLOGY | Facility: CLINIC | Age: 72
End: 2017-09-07

## 2017-09-08 ENCOUNTER — HOSPITAL ENCOUNTER (OUTPATIENT)
Dept: NUCLEAR MEDICINE | Facility: HOSPITAL | Age: 72
Discharge: HOME | End: 2017-09-08
Attending: INTERNAL MEDICINE

## 2017-09-08 ENCOUNTER — TRANSCRIBE ORDERS (OUTPATIENT)
Dept: ADMINISTRATIVE | Facility: HOSPITAL | Age: 72
End: 2017-09-08

## 2017-09-08 ENCOUNTER — HOSPITAL ENCOUNTER (OUTPATIENT)
Dept: CT IMAGING | Facility: HOSPITAL | Age: 72
Discharge: HOME | End: 2017-09-08
Attending: INTERNAL MEDICINE | Admitting: INTERNAL MEDICINE

## 2017-09-08 DIAGNOSIS — J84.9 ILD (INTERSTITIAL LUNG DISEASE) (HCC): ICD-10-CM

## 2017-09-08 DIAGNOSIS — I74.9 CHRONIC THROMBOEMBOLIC DISEASE (HCC): Primary | ICD-10-CM

## 2017-09-08 DIAGNOSIS — I74.9 CHRONIC THROMBOEMBOLIC DISEASE (HCC): ICD-10-CM

## 2017-09-08 PROCEDURE — A9540 TC99M MAA: HCPCS | Performed by: INTERNAL MEDICINE

## 2017-09-08 PROCEDURE — 0 XENON XE 133: Performed by: INTERNAL MEDICINE

## 2017-09-08 PROCEDURE — 0 TECHNETIUM ALBUMIN AGGREGATED: Performed by: INTERNAL MEDICINE

## 2017-09-08 PROCEDURE — A9558 XE133 XENON 10MCI: HCPCS | Performed by: INTERNAL MEDICINE

## 2017-09-08 PROCEDURE — 71250 CT THORAX DX C-: CPT

## 2017-09-08 PROCEDURE — 78582 LUNG VENTILAT&PERFUS IMAGING: CPT

## 2017-09-08 RX ADMIN — XENON XE-133 8.3 MILLICURIE: 10 GAS RESPIRATORY (INHALATION) at 14:11

## 2017-09-08 RX ADMIN — Medication 1 DOSE: at 14:10

## 2017-09-08 NOTE — NURSING NOTE
The results of VQ scan called to Dr. Carvalho, very low probability of PE per Dr. Jones. Patient ambulated to car per self.

## 2017-09-11 ENCOUNTER — APPOINTMENT (OUTPATIENT)
Dept: CT IMAGING | Facility: HOSPITAL | Age: 72
End: 2017-09-11
Attending: INTERNAL MEDICINE

## 2017-09-11 ENCOUNTER — APPOINTMENT (OUTPATIENT)
Dept: NUCLEAR MEDICINE | Facility: HOSPITAL | Age: 72
End: 2017-09-11
Attending: INTERNAL MEDICINE

## 2017-09-15 ENCOUNTER — TELEPHONE (OUTPATIENT)
Dept: CARDIOLOGY | Facility: CLINIC | Age: 72
End: 2017-09-15

## 2017-09-15 NOTE — TELEPHONE ENCOUNTER
Trang reviewed patient's last office visit from [facility] and said pt should follow up with Dr. Spencer in October.  Can you please call pt to schedule.  Thanks!  Niki

## 2017-10-06 ENCOUNTER — OFFICE VISIT (OUTPATIENT)
Dept: CARDIOLOGY | Facility: CLINIC | Age: 72
End: 2017-10-06

## 2017-10-06 VITALS
BODY MASS INDEX: 29.06 KG/M2 | DIASTOLIC BLOOD PRESSURE: 78 MMHG | WEIGHT: 148 LBS | HEIGHT: 60 IN | HEART RATE: 80 BPM | SYSTOLIC BLOOD PRESSURE: 130 MMHG

## 2017-10-06 DIAGNOSIS — I34.0 NON-RHEUMATIC MITRAL REGURGITATION: ICD-10-CM

## 2017-10-06 DIAGNOSIS — I10 ESSENTIAL HYPERTENSION: ICD-10-CM

## 2017-10-06 DIAGNOSIS — I27.20 PULMONARY HTN (HCC): Primary | ICD-10-CM

## 2017-10-06 PROCEDURE — 99214 OFFICE O/P EST MOD 30 MIN: CPT | Performed by: INTERNAL MEDICINE

## 2017-10-06 NOTE — PROGRESS NOTES
Date of Office Visit: 10/6/2017  Encounter Provider: Cali Spencer III, MD  Place of Service: [facility] CARDIOLOGY  Patient Name: Isabel Carlson  :1945    CC: Pulmonary HTN     HPI:   Dear Dr. Carvalho,    I had the pleasure of seeing today for the first time this patient.  She comes in to follow-up on your request for a right heart catheterization.    Recently the patient was seen in our cardiac evaluation clinic.  She was seen by Gutierrez MA.  She had a stress echocardiogram; this demonstrated pulmonary hypertension:  Interpretation Summary   · Calculated EF = 60%  · Mild-to-moderate mitral valve regurgitation is present with a centrally-directed jet noted.  · Mild to moderate tricuspid valve regurgitation is present.  · Estimated right ventricular systolic pressure from tricuspid regurgitation is markedly elevated (>55 mmHg). Calculated right ventricular systolic pressure from tricuspid regurgitation is 56.4 mmHg.  · Right heart structures borderline enlarged  · STRESS ECHO  · Segment augmentation had a normal response to stress. Cavity size behaved normally in response to stress. Left ventricular function is increased hyperdynamic (>70%).  · Normal stress echo with no significant echocardiographic evidence for myocardial ischemia.  · Only walked 5 min with borderline ecg aurelio Holland Then arrange for her to see you for evaluation of her pulmonary hypertension.  N/A she was in to see you recently after she had a high-resolution chest CT.  Borderline bronchiectasis was seen.  Sleep apnea with his screening for and no abnormality was seen.  HIV test is been negative.  As per your no right heart catheterization is requested.    Patient states that she continues to get dyspneic when she goes up steps.  No shortness of breath at rest.  No lower extremity edema.  She denies any dizziness or lightheadedness.  No chest pain, pressure, tightness, squeezing, or  heartburn.  She is fatigue consistently.    Past Medical History:   Diagnosis Date   • Abnormal stress echocardiogram 07/20/2017    Patient had borderline EKG changes 5 minutes into walking and Dr. Cali Spencer recommended the patient not to have that same type of stress test in the future   • APC (atrial premature contractions) 7/20/2017   • Chest pain    • GERD (gastroesophageal reflux disease)    • Heart murmur 7/20/2017   • Hypertension    • LVH (left ventricular hypertrophy) 7/20/2017   • Mixed hyperlipidemia 7/20/2017   • Non-rheumatic mitral regurgitation 07/20/2017    Mild to moderate per 2-D echocardiogram   • Non-rheumatic tricuspid valve insufficiency 07/20/2017    Mild to moderate per 2-D echocardiogram   • Pulmonary hypertension 07/20/2017    RVSP 56.4 mmHg; right heart structure borderline enlarged per echocardiogram   • TIA (transient ischemic attack) 10/15/2013    numbness on right side of face and hand; went to [facility]       Past Surgical History:   Procedure Laterality Date   • APPENDECTOMY     • PARTIAL HYSTERECTOMY         Social History     Social History   • Marital status:      Spouse name: N/A   • Number of children: N/A   • Years of education: N/A     Occupational History   • Not on file.     Social History Main Topics   • Smoking status: Never Smoker   • Smokeless tobacco: Not on file   • Alcohol use No      Comment: occ caffeine use   • Drug use: No   • Sexual activity: No     Other Topics Concern   • Not on file     Social History Narrative       Family History   Problem Relation Age of Onset   • Heart disease Mother    • Hypertension Mother    • Stroke Mother    • Heart disease Maternal Aunt    • Hypertension Maternal Aunt    • Hypertension Maternal Uncle        Review of Systems   Constitution: Positive for malaise/fatigue. Negative for chills, diaphoresis, fever, night sweats, weight gain and weight loss.   HENT: Positive for ear pain and hearing loss. Negative for nosebleeds,  sore throat and tinnitus.    Eyes: Negative for blurred vision, double vision, pain and visual disturbance.   Cardiovascular: Positive for leg swelling. Negative for chest pain, claudication, cyanosis, dyspnea on exertion, irregular heartbeat, near-syncope, orthopnea, palpitations, paroxysmal nocturnal dyspnea and syncope.   Respiratory: Negative for cough, hemoptysis, shortness of breath, snoring and wheezing.    Endocrine: Negative for cold intolerance, heat intolerance and polyuria.   Hematologic/Lymphatic: Negative for bleeding problem. Does not bruise/bleed easily.   Skin: Negative for color change, dry skin, flushing and itching.   Musculoskeletal: Positive for joint pain. Negative for falls, joint swelling, muscle cramps, muscle weakness and myalgias.   Gastrointestinal: Negative for abdominal pain, constipation, heartburn, melena, nausea and vomiting.   Genitourinary: Negative for dysuria and hematuria.   Neurological: Negative for excessive daytime sleepiness, dizziness, light-headedness, loss of balance, numbness, paresthesias, seizures and vertigo.   Psychiatric/Behavioral: Negative for altered mental status, depression, memory loss and substance abuse. The patient does not have insomnia and is not nervous/anxious.    Allergic/Immunologic: Negative for environmental allergies.       No Known Allergies      Current Outpatient Prescriptions:   •  alendronate (FOSAMAX) 70 MG tablet, Take 70 mg by mouth 1 (One) Time Per Week., Disp: , Rfl: 4  •  amLODIPine-benazepril (LOTREL) 10-20 MG per capsule, Daily., Disp: , Rfl:   •  aspirin 81 MG EC tablet, Take 81 mg by mouth Daily., Disp: , Rfl:   •  gabapentin (NEURONTIN) 300 MG capsule, Take 300 mg by mouth Daily., Disp: , Rfl:   •  meloxicam (MOBIC) 15 MG tablet, Take 15 mg by mouth Daily., Disp: , Rfl:   •  pantoprazole (PROTONIX) 40 MG EC tablet, Take 40 mg by mouth Daily., Disp: , Rfl:      Objective:     Vitals:    10/06/17 1429   BP: 130/78   Pulse: 80  "  Weight: 148 lb (67.1 kg)   Height: 60\" (152.4 cm)     Body mass index is 28.9 kg/(m^2).    PHYSICAL EXAM:    General Appearance:    Alert, cooperative, in no acute distress   Head:    Normocephalic, without obvious abnormality, atraumatic   Eyes:            Lids and lashes normal, conjunctivae and sclerae normal, no   icterus, no pallor, corneas clear, PERRLA   Ears:    Ears appear intact with no abnormalities noted   Throat:   No oral lesions, no thrush, oral mucosa moist   Neck:   No adenopathy, supple, trachea midline, no thyromegaly, no   carotid bruit, no JVD   Back:     No kyphosis present, no scoliosis present, no skin lesions, erythema or scars, no tenderness to percussion or palpation, range of motion normal   Lungs:     Clear to auscultation,respirations regular, even and unlabored    Heart:    Regular rhythm and normal rate, normal S1 and S2, no murmur, no gallop, no rub, no click   Chest Wall:    No abnormalities observed   Abdomen:     Normal bowel sounds, no masses, no organomegaly, soft        non-tender, non-distended, no guarding, no rebound  tenderness   Extremities:   Moves all extremities well, no edema, no cyanosis, no redness   Pulses:   Pulses palpable and equal bilaterally. Normal radial, carotid, femoral, dorsalis pedis and posterior tibial pulses bilaterally. Normal abdominal aorta   Skin:  Psychiatric:   No bleeding, bruising or rash    Alert and oriented x 3, normal mood and affect       Procedures      Assessment:       Diagnosis Plan   1. Pulmonary HTN  Case Request Cath Lab: Right Heart Cath   2. Essential hypertension     3. Non-rheumatic mitral regurgitation            Plan:         Stress echocardiogram recently performed demonstrated pulmonary hypertension.  He also demonstrated mild to moderate mitral regurgitation and mild right atrial and right ventricular enlargement, but normal left atrial dimensions and normal left atrial pressure by the echocardiogram.  Accordingly we will " arrange for a right heart catheterization to be performed for further evaluation.  Reversibility will be assessed as desired.  Further evaluation and treatment will be predicated on the results.      Current Outpatient Prescriptions:   •  alendronate (FOSAMAX) 70 MG tablet, Take 70 mg by mouth 1 (One) Time Per Week., Disp: , Rfl: 4  •  amLODIPine-benazepril (LOTREL) 10-20 MG per capsule, Daily., Disp: , Rfl:   •  aspirin 81 MG EC tablet, Take 81 mg by mouth Daily., Disp: , Rfl:   •  gabapentin (NEURONTIN) 300 MG capsule, Take 300 mg by mouth Daily., Disp: , Rfl:   •  meloxicam (MOBIC) 15 MG tablet, Take 15 mg by mouth Daily., Disp: , Rfl:   •  pantoprazole (PROTONIX) 40 MG EC tablet, Take 40 mg by mouth Daily., Disp: , Rfl:

## 2017-10-09 PROBLEM — I27.20 PULMONARY HTN (HCC): Status: ACTIVE | Noted: 2017-10-09

## 2017-10-16 ENCOUNTER — LAB (OUTPATIENT)
Dept: LAB | Facility: HOSPITAL | Age: 72
End: 2017-10-16
Attending: INTERNAL MEDICINE

## 2017-10-16 ENCOUNTER — TRANSCRIBE ORDERS (OUTPATIENT)
Dept: CARDIOLOGY | Facility: CLINIC | Age: 72
End: 2017-10-16

## 2017-10-16 DIAGNOSIS — Z01.810 PRE-OPERATIVE CARDIOVASCULAR EXAMINATION: Primary | ICD-10-CM

## 2017-10-16 DIAGNOSIS — I27.20 PHT (PULMONARY HYPERTENSION) (HCC): ICD-10-CM

## 2017-10-16 DIAGNOSIS — Z13.6 SCREENING FOR ISCHEMIC HEART DISEASE: ICD-10-CM

## 2017-10-16 DIAGNOSIS — Z01.810 PRE-OPERATIVE CARDIOVASCULAR EXAMINATION: ICD-10-CM

## 2017-10-16 LAB
ANION GAP SERPL CALCULATED.3IONS-SCNC: 15.2 MMOL/L
BASOPHILS # BLD AUTO: 0.01 10*3/MM3 (ref 0–0.2)
BASOPHILS NFR BLD AUTO: 0.2 % (ref 0–1.5)
BUN BLD-MCNC: 13 MG/DL (ref 8–23)
BUN/CREAT SERPL: 19.7 (ref 7–25)
CALCIUM SPEC-SCNC: 8.4 MG/DL (ref 8.6–10.5)
CHLORIDE SERPL-SCNC: 102 MMOL/L (ref 98–107)
CO2 SERPL-SCNC: 21.8 MMOL/L (ref 22–29)
CREAT BLD-MCNC: 0.66 MG/DL (ref 0.57–1)
DEPRECATED RDW RBC AUTO: 44.3 FL (ref 37–54)
EOSINOPHIL # BLD AUTO: 0.02 10*3/MM3 (ref 0–0.7)
EOSINOPHIL NFR BLD AUTO: 0.5 % (ref 0.3–6.2)
ERYTHROCYTE [DISTWIDTH] IN BLOOD BY AUTOMATED COUNT: 13.3 % (ref 11.7–13)
GFR SERPL CREATININE-BSD FRML MDRD: 106 ML/MIN/1.73
GLUCOSE BLD-MCNC: 86 MG/DL (ref 65–99)
HCT VFR BLD AUTO: 40.3 % (ref 35.6–45.5)
HGB BLD-MCNC: 13.3 G/DL (ref 11.9–15.5)
IMM GRANULOCYTES # BLD: 0 10*3/MM3 (ref 0–0.03)
IMM GRANULOCYTES NFR BLD: 0 % (ref 0–0.5)
LYMPHOCYTES # BLD AUTO: 1.07 10*3/MM3 (ref 0.9–4.8)
LYMPHOCYTES NFR BLD AUTO: 25.7 % (ref 19.6–45.3)
MCH RBC QN AUTO: 30.3 PG (ref 26.9–32)
MCHC RBC AUTO-ENTMCNC: 33 G/DL (ref 32.4–36.3)
MCV RBC AUTO: 91.8 FL (ref 80.5–98.2)
MONOCYTES # BLD AUTO: 0.34 10*3/MM3 (ref 0.2–1.2)
MONOCYTES NFR BLD AUTO: 8.2 % (ref 5–12)
NEUTROPHILS # BLD AUTO: 2.73 10*3/MM3 (ref 1.9–8.1)
NEUTROPHILS NFR BLD AUTO: 65.4 % (ref 42.7–76)
PLATELET # BLD AUTO: 183 10*3/MM3 (ref 140–500)
PMV BLD AUTO: 10.9 FL (ref 6–12)
POTASSIUM BLD-SCNC: 4 MMOL/L (ref 3.5–5.2)
RBC # BLD AUTO: 4.39 10*6/MM3 (ref 3.9–5.2)
SODIUM BLD-SCNC: 139 MMOL/L (ref 136–145)
WBC NRBC COR # BLD: 4.17 10*3/MM3 (ref 4.5–10.7)

## 2017-10-16 PROCEDURE — 36415 COLL VENOUS BLD VENIPUNCTURE: CPT

## 2017-10-16 PROCEDURE — 85025 COMPLETE CBC W/AUTO DIFF WBC: CPT

## 2017-10-16 PROCEDURE — 80048 BASIC METABOLIC PNL TOTAL CA: CPT

## 2017-10-19 NOTE — PERIOPERATIVE NURSING NOTE
CALLED AND LEFT MSG ON VM REMINDING OF ARRIVAL TIME AND NEED TO BE NPO AFTER MN. MAY TAKE AM MEDS WITH A SIP OF WATER EXCLUDING ANY DIABETIC/DIURETIC MEDS. TOLD TO HAVE A RESPONSIBLE  IN THE EVENT OF D/C HOME

## 2017-10-20 ENCOUNTER — HOSPITAL ENCOUNTER (OUTPATIENT)
Facility: HOSPITAL | Age: 72
Setting detail: HOSPITAL OUTPATIENT SURGERY
Discharge: HOME | End: 2017-10-20
Attending: INTERNAL MEDICINE | Admitting: INTERNAL MEDICINE

## 2017-10-20 VITALS
WEIGHT: 143 LBS | TEMPERATURE: 98 F | SYSTOLIC BLOOD PRESSURE: 125 MMHG | HEART RATE: 60 BPM | DIASTOLIC BLOOD PRESSURE: 74 MMHG | RESPIRATION RATE: 16 BRPM | HEIGHT: 60 IN | BODY MASS INDEX: 28.07 KG/M2 | OXYGEN SATURATION: 99 %

## 2017-10-20 DIAGNOSIS — I27.20 PULMONARY HTN (HCC): ICD-10-CM

## 2017-10-20 LAB
HCT VFR BLDA CALC: 35 % (ref 38–51)
HGB BLDA-MCNC: 11.9 G/DL (ref 12–17)
SAO2 % BLDA: 61 % (ref 95–98)

## 2017-10-20 PROCEDURE — 85014 HEMATOCRIT: CPT

## 2017-10-20 PROCEDURE — 93451 RIGHT HEART CATH: CPT | Performed by: INTERNAL MEDICINE

## 2017-10-20 PROCEDURE — 25010000002 FENTANYL CITRATE (PF) 100 MCG/2ML SOLUTION: Performed by: INTERNAL MEDICINE

## 2017-10-20 PROCEDURE — 25010000002 MIDAZOLAM PER 1 MG: Performed by: INTERNAL MEDICINE

## 2017-10-20 PROCEDURE — C1894 INTRO/SHEATH, NON-LASER: HCPCS | Performed by: INTERNAL MEDICINE

## 2017-10-20 PROCEDURE — 85018 HEMOGLOBIN: CPT

## 2017-10-20 RX ORDER — LIDOCAINE HYDROCHLORIDE 20 MG/ML
INJECTION, SOLUTION INFILTRATION; PERINEURAL AS NEEDED
Status: DISCONTINUED | OUTPATIENT
Start: 2017-10-20 | End: 2017-10-20 | Stop reason: HOSPADM

## 2017-10-20 RX ORDER — LIDOCAINE HYDROCHLORIDE 10 MG/ML
0.1 INJECTION, SOLUTION EPIDURAL; INFILTRATION; INTRACAUDAL; PERINEURAL ONCE AS NEEDED
Status: DISCONTINUED | OUTPATIENT
Start: 2017-10-20 | End: 2017-10-20 | Stop reason: HOSPADM

## 2017-10-20 RX ORDER — SODIUM CHLORIDE 0.9 % (FLUSH) 0.9 %
1-10 SYRINGE (ML) INJECTION AS NEEDED
Status: DISCONTINUED | OUTPATIENT
Start: 2017-10-20 | End: 2017-10-20 | Stop reason: HOSPADM

## 2017-10-20 RX ORDER — FENTANYL CITRATE 50 UG/ML
INJECTION, SOLUTION INTRAMUSCULAR; INTRAVENOUS AS NEEDED
Status: DISCONTINUED | OUTPATIENT
Start: 2017-10-20 | End: 2017-10-20 | Stop reason: HOSPADM

## 2017-10-20 RX ORDER — SODIUM CHLORIDE 0.9 % (FLUSH) 0.9 %
1-10 SYRINGE (ML) INJECTION AS NEEDED
Status: CANCELLED | OUTPATIENT
Start: 2017-10-20

## 2017-10-20 RX ORDER — SODIUM CHLORIDE 9 MG/ML
75 INJECTION, SOLUTION INTRAVENOUS CONTINUOUS
Status: DISCONTINUED | OUTPATIENT
Start: 2017-10-20 | End: 2017-10-20 | Stop reason: HOSPADM

## 2017-10-20 RX ORDER — MIDAZOLAM HYDROCHLORIDE 1 MG/ML
INJECTION INTRAMUSCULAR; INTRAVENOUS AS NEEDED
Status: DISCONTINUED | OUTPATIENT
Start: 2017-10-20 | End: 2017-10-20 | Stop reason: HOSPADM

## 2017-10-20 RX ADMIN — SODIUM CHLORIDE 75 ML/HR: 9 INJECTION, SOLUTION INTRAVENOUS at 08:42

## 2017-10-20 NOTE — DISCHARGE INSTRUCTIONS
[facility]  [address] [place] [address]    Coronary Angiogram After Care    Refer to this sheet in the next few weeks. These instructions provide you with information on caring for yourself after your procedure. Your caregiver may also give you more specific instructions. Your treatment has been planned according to current medical practices, but problems sometimes occur. Call your caregiver if you have any problems or questions after your procedure.    Home Care Instructions:  · You may shower the day after the procedure. Remove the bandage (dressing) and gently wash the site with plain soap and water. Gently pat the site dry. You may apply a band aid daily for 2 days if desired.    · Do not apply powder or lotion to the site.  · Do not submerge the affected site in water for 3 to 5 days or until the site is completely healed.   · Do not lift, push or pull anything over 10 pounds for 2 days after your procedure.  · Inspect the site at least twice daily. You may notice some bruising at the site and it may be tender for 1 to 2 weeks.     · Increase your fluid intake for the next 2 days.     · You may drive 24 hours after the procedure unless otherwise instructed by your caregiver.  · Do not operate machinery or power tools for 24 hours.  · A responsible adult should be with you for the first 24 hours after you arrive home. Do not make any important legal decisions or sign legal papers for 24 hours.      Call Your Doctor if:   · You have unusual pain at the site.  · You have redness, warmth, swelling, or pain at the site.  · You have drainage (other than a small amount of blood on the dressing).  · You have chills or a fever > 101.  · You have heavy bleeding from the site, hold pressure on the site for 20 minutes.  If the bleeding stops, apply a fresh bandage and call your cardiologist.  However, if you continue to have bleeding, call 911.        Outpatient Surgery Guidelines,  Adult  Outpatient procedures are those for which the person having the procedure is allowed to go home the same day as the procedure. Various procedures are done on an outpatient basis. You should follow some general guidelines if you will be having an outpatient procedure.  AFTER THE  PROCEDURE  After surgery, you will be taken to a recovery area, where your progress will be monitored. If there are no complications, you will be allowed to go home when you are awake, stable, and taking fluids well. You may have numbness around the surgical site. Healing will take some time. You will have tenderness at the surgical site and may have some swelling and bruising. You may also have some nausea.  HOME CARE INSTRUCTIONS  · Do not drive for 24 hours, or as directed by your health care provider. Do not drive while taking prescription pain medicines.  · Do not drink alcohol for 24 hours.  · Do not make important decisions or sign legal documents for 24 hours.  · Plan on having a responsible adult stay with your for 24 hours following your procedure.  · You may resume a normal diet and activities as directed.  · Do not lift anything heavier than 10 pounds (4.5 kg) or play contact sports until your health care provider says it is okay.  · Only take over-the-counter or prescription medicines as directed by your health care provider.  · Follow up with your health care provider as directed.  SEEK MEDICAL CARE IF:  · You have increased bleeding (more than a small spot) from the surgical site.  · You have redness, swelling, or increasing pain in the wound.  · You see pus coming from the wound.  · You have a fever > 101.  · You notice a bad smell coming from the wound or dressing.  · You feel lightheaded or faint.  · You develop a rash.  · You have trouble breathing.  · You develop allergies.  MAKE SURE YOU:  · Understand these instructions.  · Will watch your condition.  · Will get help right away if you are not doing well or get  worse.

## 2017-10-20 NOTE — H&P (VIEW-ONLY)
Date of Office Visit: 10/6/2017  Encounter Provider: Cali Spencer III, MD  Place of Service: [facility] CARDIOLOGY  Patient Name: Isabel Carlson  :1945    CC: Pulmonary HTN     HPI:   Dear Dr. Carvalho,    I had the pleasure of seeing today for the first time this patient.  She comes in to follow-up on your request for a right heart catheterization.    Recently the patient was seen in our cardiac evaluation clinic.  She was seen by Gutierrez MA.  She had a stress echocardiogram; this demonstrated pulmonary hypertension:  Interpretation Summary   · Calculated EF = 60%  · Mild-to-moderate mitral valve regurgitation is present with a centrally-directed jet noted.  · Mild to moderate tricuspid valve regurgitation is present.  · Estimated right ventricular systolic pressure from tricuspid regurgitation is markedly elevated (>55 mmHg). Calculated right ventricular systolic pressure from tricuspid regurgitation is 56.4 mmHg.  · Right heart structures borderline enlarged  · STRESS ECHO  · Segment augmentation had a normal response to stress. Cavity size behaved normally in response to stress. Left ventricular function is increased hyperdynamic (>70%).  · Normal stress echo with no significant echocardiographic evidence for myocardial ischemia.  · Only walked 5 min with borderline ecg aurelio Holland Then arrange for her to see you for evaluation of her pulmonary hypertension.  N/A she was in to see you recently after she had a high-resolution chest CT.  Borderline bronchiectasis was seen.  Sleep apnea with his screening for and no abnormality was seen.  HIV test is been negative.  As per your no right heart catheterization is requested.    Patient states that she continues to get dyspneic when she goes up steps.  No shortness of breath at rest.  No lower extremity edema.  She denies any dizziness or lightheadedness.  No chest pain, pressure, tightness, squeezing, or  heartburn.  She is fatigue consistently.    Past Medical History:   Diagnosis Date   • Abnormal stress echocardiogram 07/20/2017    Patient had borderline EKG changes 5 minutes into walking and Dr. Cali Spencer recommended the patient not to have that same type of stress test in the future   • APC (atrial premature contractions) 7/20/2017   • Chest pain    • GERD (gastroesophageal reflux disease)    • Heart murmur 7/20/2017   • Hypertension    • LVH (left ventricular hypertrophy) 7/20/2017   • Mixed hyperlipidemia 7/20/2017   • Non-rheumatic mitral regurgitation 07/20/2017    Mild to moderate per 2-D echocardiogram   • Non-rheumatic tricuspid valve insufficiency 07/20/2017    Mild to moderate per 2-D echocardiogram   • Pulmonary hypertension 07/20/2017    RVSP 56.4 mmHg; right heart structure borderline enlarged per echocardiogram   • TIA (transient ischemic attack) 10/15/2013    numbness on right side of face and hand; went to [facility]       Past Surgical History:   Procedure Laterality Date   • APPENDECTOMY     • PARTIAL HYSTERECTOMY         Social History     Social History   • Marital status:      Spouse name: N/A   • Number of children: N/A   • Years of education: N/A     Occupational History   • Not on file.     Social History Main Topics   • Smoking status: Never Smoker   • Smokeless tobacco: Not on file   • Alcohol use No      Comment: occ caffeine use   • Drug use: No   • Sexual activity: No     Other Topics Concern   • Not on file     Social History Narrative       Family History   Problem Relation Age of Onset   • Heart disease Mother    • Hypertension Mother    • Stroke Mother    • Heart disease Maternal Aunt    • Hypertension Maternal Aunt    • Hypertension Maternal Uncle        Review of Systems   Constitution: Positive for malaise/fatigue. Negative for chills, diaphoresis, fever, night sweats, weight gain and weight loss.   HENT: Positive for ear pain and hearing loss. Negative for nosebleeds,  sore throat and tinnitus.    Eyes: Negative for blurred vision, double vision, pain and visual disturbance.   Cardiovascular: Positive for leg swelling. Negative for chest pain, claudication, cyanosis, dyspnea on exertion, irregular heartbeat, near-syncope, orthopnea, palpitations, paroxysmal nocturnal dyspnea and syncope.   Respiratory: Negative for cough, hemoptysis, shortness of breath, snoring and wheezing.    Endocrine: Negative for cold intolerance, heat intolerance and polyuria.   Hematologic/Lymphatic: Negative for bleeding problem. Does not bruise/bleed easily.   Skin: Negative for color change, dry skin, flushing and itching.   Musculoskeletal: Positive for joint pain. Negative for falls, joint swelling, muscle cramps, muscle weakness and myalgias.   Gastrointestinal: Negative for abdominal pain, constipation, heartburn, melena, nausea and vomiting.   Genitourinary: Negative for dysuria and hematuria.   Neurological: Negative for excessive daytime sleepiness, dizziness, light-headedness, loss of balance, numbness, paresthesias, seizures and vertigo.   Psychiatric/Behavioral: Negative for altered mental status, depression, memory loss and substance abuse. The patient does not have insomnia and is not nervous/anxious.    Allergic/Immunologic: Negative for environmental allergies.       No Known Allergies      Current Outpatient Prescriptions:   •  alendronate (FOSAMAX) 70 MG tablet, Take 70 mg by mouth 1 (One) Time Per Week., Disp: , Rfl: 4  •  amLODIPine-benazepril (LOTREL) 10-20 MG per capsule, Daily., Disp: , Rfl:   •  aspirin 81 MG EC tablet, Take 81 mg by mouth Daily., Disp: , Rfl:   •  gabapentin (NEURONTIN) 300 MG capsule, Take 300 mg by mouth Daily., Disp: , Rfl:   •  meloxicam (MOBIC) 15 MG tablet, Take 15 mg by mouth Daily., Disp: , Rfl:   •  pantoprazole (PROTONIX) 40 MG EC tablet, Take 40 mg by mouth Daily., Disp: , Rfl:      Objective:     Vitals:    10/06/17 1429   BP: 130/78   Pulse: 80  "  Weight: 148 lb (67.1 kg)   Height: 60\" (152.4 cm)     Body mass index is 28.9 kg/(m^2).    PHYSICAL EXAM:    General Appearance:    Alert, cooperative, in no acute distress   Head:    Normocephalic, without obvious abnormality, atraumatic   Eyes:            Lids and lashes normal, conjunctivae and sclerae normal, no   icterus, no pallor, corneas clear, PERRLA   Ears:    Ears appear intact with no abnormalities noted   Throat:   No oral lesions, no thrush, oral mucosa moist   Neck:   No adenopathy, supple, trachea midline, no thyromegaly, no   carotid bruit, no JVD   Back:     No kyphosis present, no scoliosis present, no skin lesions, erythema or scars, no tenderness to percussion or palpation, range of motion normal   Lungs:     Clear to auscultation,respirations regular, even and unlabored    Heart:    Regular rhythm and normal rate, normal S1 and S2, no murmur, no gallop, no rub, no click   Chest Wall:    No abnormalities observed   Abdomen:     Normal bowel sounds, no masses, no organomegaly, soft        non-tender, non-distended, no guarding, no rebound  tenderness   Extremities:   Moves all extremities well, no edema, no cyanosis, no redness   Pulses:   Pulses palpable and equal bilaterally. Normal radial, carotid, femoral, dorsalis pedis and posterior tibial pulses bilaterally. Normal abdominal aorta   Skin:  Psychiatric:   No bleeding, bruising or rash    Alert and oriented x 3, normal mood and affect       Procedures      Assessment:       Diagnosis Plan   1. Pulmonary HTN  Case Request Cath Lab: Right Heart Cath   2. Essential hypertension     3. Non-rheumatic mitral regurgitation            Plan:         Stress echocardiogram recently performed demonstrated pulmonary hypertension.  He also demonstrated mild to moderate mitral regurgitation and mild right atrial and right ventricular enlargement, but normal left atrial dimensions and normal left atrial pressure by the echocardiogram.  Accordingly we will " arrange for a right heart catheterization to be performed for further evaluation.  Reversibility will be assessed as desired.  Further evaluation and treatment will be predicated on the results.      Current Outpatient Prescriptions:   •  alendronate (FOSAMAX) 70 MG tablet, Take 70 mg by mouth 1 (One) Time Per Week., Disp: , Rfl: 4  •  amLODIPine-benazepril (LOTREL) 10-20 MG per capsule, Daily., Disp: , Rfl:   •  aspirin 81 MG EC tablet, Take 81 mg by mouth Daily., Disp: , Rfl:   •  gabapentin (NEURONTIN) 300 MG capsule, Take 300 mg by mouth Daily., Disp: , Rfl:   •  meloxicam (MOBIC) 15 MG tablet, Take 15 mg by mouth Daily., Disp: , Rfl:   •  pantoprazole (PROTONIX) 40 MG EC tablet, Take 40 mg by mouth Daily., Disp: , Rfl:

## 2017-10-20 NOTE — PLAN OF CARE
Problem: Patient Care Overview (Adult)  Goal: Plan of Care Review  Outcome: Outcome(s) achieved Date Met:  10/20/17    10/20/17 1157   Coping/Psychosocial Response Interventions   Plan Of Care Reviewed With patient;daughter   Patient Care Overview   Progress improving   Outcome Evaluation   Outcome Summary/Follow up Plan READY FOR DISCHARGE       Goal: Adult Individualization and Mutuality  Outcome: Outcome(s) achieved Date Met:  10/20/17  Goal: Discharge Needs Assessment  Outcome: Outcome(s) achieved Date Met:  10/20/17    Problem: Cardiac Catheterization with/without PCI (Adult)  Goal: Signs and Symptoms of Listed Potential Problems Will be Absent or Manageable (Cardiac Catheterization with/without PCI)  Outcome: Outcome(s) achieved Date Met:  10/20/17

## 2017-12-04 RX ORDER — ALENDRONATE SODIUM 70 MG/1
TABLET ORAL
Qty: 4 TABLET | Refills: 0 | Status: SHIPPED | OUTPATIENT
Start: 2017-12-04 | End: 2018-05-21

## 2017-12-05 RX ORDER — ALENDRONATE SODIUM 70 MG/1
TABLET ORAL
Qty: 4 TABLET | Refills: 0 | Status: SHIPPED | OUTPATIENT
Start: 2017-12-05 | End: 2017-12-30 | Stop reason: SDUPTHER

## 2018-01-03 RX ORDER — ALENDRONATE SODIUM 70 MG/1
TABLET ORAL
Qty: 4 TABLET | Refills: 0 | Status: SHIPPED | OUTPATIENT
Start: 2018-01-03 | End: 2018-05-21

## 2018-03-09 ENCOUNTER — OFFICE VISIT (OUTPATIENT)
Dept: OBSTETRICS AND GYNECOLOGY | Facility: CLINIC | Age: 73
End: 2018-03-09

## 2018-03-09 VITALS
SYSTOLIC BLOOD PRESSURE: 140 MMHG | BODY MASS INDEX: 28.07 KG/M2 | HEIGHT: 60 IN | WEIGHT: 143 LBS | DIASTOLIC BLOOD PRESSURE: 72 MMHG

## 2018-03-09 DIAGNOSIS — M85.80 OSTEOPENIA, UNSPECIFIED LOCATION: ICD-10-CM

## 2018-03-09 DIAGNOSIS — Z78.0 POSTMENOPAUSAL: Primary | ICD-10-CM

## 2018-03-09 PROCEDURE — 99213 OFFICE O/P EST LOW 20 MIN: CPT | Performed by: OBSTETRICS & GYNECOLOGY

## 2018-03-09 RX ORDER — ROSUVASTATIN CALCIUM 20 MG/1
TABLET, COATED ORAL
Refills: 1 | COMMUNITY
Start: 2018-02-08 | End: 2019-07-31

## 2018-03-09 NOTE — PROGRESS NOTES
Subjective:    Patient Isabel Carlson is a 72 y.o. female.   Chief Complaint   Patient presents with   • Med Refill   Patient is not having any bone issues she is due to have a bone density next year she needs to have her mammogram      HPI      The following portions of the patient's history were reviewed and updated as appropriate: allergies, current medications, past family history, past medical history, past social history, past surgical history and problem list.      Review of Systems   Constitutional: Negative.    HENT: Negative.    Eyes: Negative.    Respiratory: Negative.    Cardiovascular: Negative.    Gastrointestinal: Negative for abdominal distention, abdominal pain, anal bleeding, blood in stool, constipation, diarrhea, nausea, rectal pain and vomiting.   Endocrine: Negative for cold intolerance, heat intolerance, polydipsia, polyphagia and polyuria.   Genitourinary: Negative.  Negative for decreased urine volume, dyspareunia, dysuria, enuresis, flank pain, frequency, genital sores, hematuria, menstrual problem, pelvic pain, urgency, vaginal bleeding, vaginal discharge and vaginal pain.   Musculoskeletal: Negative.    Skin: Negative.    Allergic/Immunologic: Negative.    Neurological: Negative.    Hematological: Negative for adenopathy. Does not bruise/bleed easily.   Psychiatric/Behavioral: Negative for agitation, confusion and sleep disturbance. The patient is not nervous/anxious.          Objective:      Physical Exam   Cardiovascular: Normal rate, regular rhythm, normal heart sounds and intact distal pulses.    Pulmonary/Chest: Effort normal and breath sounds normal.         Assessment and Plan: The bones are discussed and Fosamax is discussed patient is given a prescription for Fosamax 70 mg once weekly given a 3 month supply    Patient has been instructed to perform a self breast exam on a weekly basis, a yearly mammogram, pap smear yearly unless instructed otherwise and bone density every 2  years.  I recommended that the patient not smoke, and discussed smoking cessation when appropriate.     There are no diagnoses linked to this encounter.

## 2018-05-21 ENCOUNTER — APPOINTMENT (OUTPATIENT)
Dept: GENERAL RADIOLOGY | Facility: HOSPITAL | Age: 73
End: 2018-05-21

## 2018-05-21 ENCOUNTER — HOSPITAL ENCOUNTER (EMERGENCY)
Facility: HOSPITAL | Age: 73
Discharge: HOME | End: 2018-05-21
Attending: EMERGENCY MEDICINE | Admitting: EMERGENCY MEDICINE

## 2018-05-21 VITALS
RESPIRATION RATE: 16 BRPM | HEART RATE: 81 BPM | BODY MASS INDEX: 29.06 KG/M2 | WEIGHT: 148 LBS | HEIGHT: 60 IN | SYSTOLIC BLOOD PRESSURE: 130 MMHG | DIASTOLIC BLOOD PRESSURE: 83 MMHG | OXYGEN SATURATION: 98 % | TEMPERATURE: 97.6 F

## 2018-05-21 DIAGNOSIS — S62.629A AVULSION FRACTURE OF MIDDLE PHALANX OF FINGER, CLOSED, INITIAL ENCOUNTER: Primary | ICD-10-CM

## 2018-05-21 DIAGNOSIS — S63.632A SPRAIN OF INTERPHALANGEAL JOINT OF RIGHT MIDDLE FINGER, INITIAL ENCOUNTER: ICD-10-CM

## 2018-05-21 PROCEDURE — 73140 X-RAY EXAM OF FINGER(S): CPT

## 2018-05-21 PROCEDURE — 99283 EMERGENCY DEPT VISIT LOW MDM: CPT

## 2018-05-21 RX ORDER — LANSOPRAZOLE 15 MG/1
15 CAPSULE, DELAYED RELEASE ORAL DAILY
COMMUNITY
End: 2019-07-31

## 2018-05-21 NOTE — ED NOTES
Pt states she was hanging something on the wall at home and she slipped and fell. Pt states she caught herself with her hands. Pt c/o pain and swelling in the right 3rd digit. Pt denies hitting her head. Pt denies LOC. Pts finger is swollen.     Christina Acuna RN  05/21/18 5310

## 2018-05-21 NOTE — ED TRIAGE NOTES
"Pt states \"I missed a step coming up the steps and fell onto right hand. I think I broke my middle finger.\"  "

## 2018-05-21 NOTE — ED PROVIDER NOTES
"[facility] EMERGENCY DEPARTMENT ENCOUNTER    CHIEF COMPLAINT  Chief Complaint: Finger injury  History given by: pt  History limited by: N/A  Room Number: 46/46  PMD: Cordell Holland MD      HPI:  Pt is a 72 y.o. female who presents complaining of R middle finger injury with constant associated pain s/p mechanical fall that occurred PTA. Pt states she fell onto her R hand and is concerned she \"jammed\" her finger upon impact. Pt denies any other injuries obtained from the fall. Pt has no other complaints at this time and denies any other pertinent symptoms.     Duration:  PTA  Onset: acute  Timing: constant pain  Location: R middle finger    Radiation: None reported  Quality: injury with pain   Intensity/Severity: mild  Progression: unchanged   Associated Symptoms: None reported   Aggravating Factors: None reported   Alleviating Factors: None reported   Previous Episodes: None reported   Treatment before arrival: None reported     PAST MEDICAL HISTORY  Active Ambulatory Problems     Diagnosis Date Noted   • Heart murmur 07/20/2017   • Abnormal EKG 07/20/2017   • APC (atrial premature contractions) 07/20/2017   • Non-rheumatic mitral regurgitation 07/20/2017   • Non-rheumatic tricuspid valve insufficiency 07/20/2017   • LVH (left ventricular hypertrophy) 07/20/2017   • Gastroesophageal reflux disease 07/20/2017   • Essential hypertension 07/20/2017   • Mixed hyperlipidemia 07/20/2017   • Pulmonary HTN 10/09/2017     Resolved Ambulatory Problems     Diagnosis Date Noted   • No Resolved Ambulatory Problems     Past Medical History:   Diagnosis Date   • Abnormal stress echocardiogram 07/20/2017   • APC (atrial premature contractions) 7/20/2017   • Chest pain    • GERD (gastroesophageal reflux disease)    • Heart murmur 7/20/2017   • Hypertension    • LVH (left ventricular hypertrophy) 7/20/2017   • Mixed hyperlipidemia 7/20/2017   • Non-rheumatic mitral regurgitation 07/20/2017   • Non-rheumatic tricuspid valve insufficiency " 07/20/2017   • Pulmonary hypertension 07/20/2017   • Stroke    • TIA (transient ischemic attack) 10/15/2013       PAST SURGICAL HISTORY  Past Surgical History:   Procedure Laterality Date   • APPENDECTOMY     • CARDIAC CATHETERIZATION N/A 10/20/2017    Procedure: Right Heart Cath;  Surgeon: Christopher Kan MD;  Location:  [facility];  Service:    • PARTIAL HYSTERECTOMY         FAMILY HISTORY  Family History   Problem Relation Age of Onset   • Heart disease Mother    • Hypertension Mother    • Stroke Mother    • Heart disease Maternal Aunt    • Hypertension Maternal Aunt    • Hypertension Maternal Uncle        SOCIAL HISTORY  Social History     Social History   • Marital status:      Spouse name: N/A   • Number of children: N/A   • Years of education: N/A     Occupational History   • Not on file.     Social History Main Topics   • Smoking status: Never Smoker   • Smokeless tobacco: Never Used   • Alcohol use No      Comment: rarely   • Drug use: No   • Sexual activity: No     Other Topics Concern   • Not on file     Social History Narrative   • No narrative on file       ALLERGIES  Patient has no known allergies.    REVIEW OF SYSTEMS  Review of Systems   Constitutional: Negative for fever.   Respiratory: Negative for shortness of breath.    Cardiovascular: Negative for chest pain.   Musculoskeletal: Positive for arthralgias (R middle finger injury with pain ).       PHYSICAL EXAM  ED Triage Vitals   Temp Heart Rate Resp BP SpO2   05/21/18 1051 05/21/18 1013 05/21/18 1051 05/21/18 1051 05/21/18 1013   97.6 °F (36.4 °C) 81 16 130/83 98 %      Temp src Heart Rate Source Patient Position BP Location FiO2 (%)   05/21/18 1051 -- -- -- --   Tympanic           Physical Exam   Constitutional: She is oriented to person, place, and time. No distress.   HENT:   Head: Normocephalic and atraumatic.   Eyes: EOM are normal.   Neck: Normal range of motion.   Cardiovascular: Normal rate, regular rhythm  "and intact distal pulses.    Pulmonary/Chest: Effort normal and breath sounds normal. No respiratory distress.   Musculoskeletal: She exhibits tenderness.   tenderness to palpation to the dorsum PIP joint to R middle finger   FROM of all fingers to R hand   Pt was able to perform extension/ flexion   Good cap refill    Neurological: She is alert and oriented to person, place, and time. She has normal sensation and normal strength.   Motor/ sensory intact    Skin: Skin is warm and dry.   Psychiatric: Affect normal.   Nursing note and vitals reviewed.    RADIOLOGY  XR Finger 2+ View Right   Final Result       Possible tiny avulsion fracture fragment dorsally adjacent to the base   of the third middle phalanx.       This report was finalized on 5/21/2018 12:08 PM by Dr. Varinder Benitez M.D.               I ordered the above noted radiological studies. Interpreted by radiologist. Reviewed by me in PACS.       PROCEDURES  Procedures      PROGRESS AND CONSULTS      1054  Triage ordered R Finger XR for further evaluation.     1230  Evaluated pt and discussed radiology results with the pt. Discussed there may an avulsion fracture present. Radiologist also expressed concern for a potential tendon injury. Discussed that pt's current episode does not require any surgical intervention at this time, but recommend pt f/u with hand specialist for further evaluation. Recommend pt apply ice to the affected site. Discussed plan to apply splint and discharge pt. Pt understands and agrees to plan, all questions addressed at this time.     1233  Ordered \"Obtain and Apply\" splint to affected site and discussed with RN to apply aluminum foam splint to R 3rd finger in full extension.    MEDICAL DECISION MAKING  Results were reviewed/discussed with the patient and they were also made aware of online access. Pt also made aware that some labs, such as cultures, will not be resulted during ER visit and follow up with PMD is necessary. "     MDM  Number of Diagnoses or Management Options  Avulsion fracture of middle phalanx of finger, closed, initial encounter:      Amount and/or Complexity of Data Reviewed  Tests in the radiology section of CPT®: ordered and reviewed (XR Finger 2+ View Right  Final Result     Possible tiny avulsion fracture fragment dorsally adjacent to the base of the third middle phalanx.  )  Independent visualization of images, tracings, or specimens: yes           DIAGNOSIS  Final diagnoses:   Avulsion fracture of middle phalanx of finger, closed, initial encounter       DISPOSITION  DISCHARGE    Patient discharged in stable condition.    Reviewed implications of results, diagnosis, meds, responsibility to follow up, warning signs and symptoms of possible worsening, potential complications and reasons to return to ER.    Patient/Family voiced understanding of above instructions.    Discussed plan for discharge, as there is no emergent indication for admission. Patient referred to primary care provider for BP management due to today's BP. Pt/family is agreeable and understands need for follow up and repeat testing.  Pt is aware that discharge does not mean that nothing is wrong but it indicates no emergency is present that requires admission and they must continue care with follow-up as given below or physician of their choice.     FOLLOW-UP  Eric Sinclair MD  [address] [place] [address]  [phone]    Schedule an appointment as soon as possible for a visit in 1 week  EVEN IF WELL         Medication List      Stop    alendronate 70 MG tablet  Commonly known as:  FOSAMAX              Latest Documented Vital Signs:  As of 12:38 PM  BP- 130/83 HR- 81 Temp- 97.6 °F (36.4 °C) (Tympanic) O2 sat- 98%    --  Documentation assistance provided by dorian Williamson for Dr. Sow.  Information recorded by the dorian was done at my direction and has been verified and validated by me.     Benson  Clay  05/21/18 1239       Trang Sow MD  05/24/18 1128

## 2018-05-21 NOTE — DISCHARGE INSTRUCTIONS
You are advised to follow closely with [facility] in approx 1 week for recheck, final results of imaging testing, and further testing/treatment as needed.    Keep splint clean and dry  Ice/elevate finger at least 3 times daily  Take mobic OR aleve - please do not take both for your pain.    Please return to the emergency department immediately with chest pain different than usual for you, shortness of air, abdominal pain, persistent vomiting/fever, blood in emesis or stool, lightheadedness/fainting, problems with speech, one sided weakness/numbness, new incontinence, problems with vision, new weakness/numbness or for worsening of symptoms or other concerns.

## 2019-07-31 ENCOUNTER — PROCEDURE VISIT (OUTPATIENT)
Dept: OBSTETRICS AND GYNECOLOGY | Facility: CLINIC | Age: 74
End: 2019-07-31

## 2019-07-31 ENCOUNTER — OFFICE VISIT (OUTPATIENT)
Dept: OBSTETRICS AND GYNECOLOGY | Facility: CLINIC | Age: 74
End: 2019-07-31

## 2019-07-31 ENCOUNTER — APPOINTMENT (OUTPATIENT)
Dept: WOMENS IMAGING | Facility: HOSPITAL | Age: 74
End: 2019-07-31

## 2019-07-31 VITALS
DIASTOLIC BLOOD PRESSURE: 80 MMHG | HEIGHT: 60 IN | SYSTOLIC BLOOD PRESSURE: 120 MMHG | BODY MASS INDEX: 28.27 KG/M2 | WEIGHT: 144 LBS

## 2019-07-31 DIAGNOSIS — Z78.0 POST-MENOPAUSAL: ICD-10-CM

## 2019-07-31 DIAGNOSIS — Z00.00 PREVENTATIVE HEALTH CARE: Primary | ICD-10-CM

## 2019-07-31 DIAGNOSIS — Z01.419 ENCOUNTER FOR GYNECOLOGICAL EXAMINATION WITHOUT ABNORMAL FINDING: Primary | ICD-10-CM

## 2019-07-31 DIAGNOSIS — Z78.0 MENOPAUSE: ICD-10-CM

## 2019-07-31 DIAGNOSIS — M81.0 OSTEOPOROSIS OF LUMBAR SPINE: ICD-10-CM

## 2019-07-31 DIAGNOSIS — Z12.31 VISIT FOR SCREENING MAMMOGRAM: Primary | ICD-10-CM

## 2019-07-31 PROCEDURE — 77067 SCR MAMMO BI INCL CAD: CPT | Performed by: OBSTETRICS & GYNECOLOGY

## 2019-07-31 PROCEDURE — 77067 SCR MAMMO BI INCL CAD: CPT | Performed by: RADIOLOGY

## 2019-07-31 PROCEDURE — 77080 DXA BONE DENSITY AXIAL: CPT | Performed by: OBSTETRICS & GYNECOLOGY

## 2019-07-31 PROCEDURE — G0101 CA SCREEN;PELVIC/BREAST EXAM: HCPCS | Performed by: OBSTETRICS & GYNECOLOGY

## 2019-07-31 RX ORDER — CETIRIZINE HYDROCHLORIDE 10 MG/1
10 TABLET ORAL DAILY PRN
Refills: 4 | COMMUNITY
Start: 2019-05-31

## 2019-07-31 NOTE — PROGRESS NOTES
Matty Carlson is a 73 y.o. female for annual gyn exam    History of Present Illness   73-year-old postmenopausal black female presents for routine gynecologic exam.  She is status post NINFA with right S&O at age 32 for ovarian cysts.  She does not use hormone replacement therapy.  She has a history of osteoporosis and has tried multiple regimens but had issues with both dysphagia and joint problems.  She now just uses vitamin D supplements and weightbearing exercise.  She is current on her screening mammograms and colonoscopies.  A bone density assessment was performed today in the office which showed improvement of the osteoporosis of her spine to -3.3 and her hip revealed osteopenia.  She has no complaints.  Is no family history of breast or colon cancer.      The following portions of the patient's history were reviewed and updated as appropriate: allergies, current medications, past family history, past medical history, past social history, past surgical history and problem list.      Review of Systems   Constitutional: Negative for activity change, appetite change, fatigue and unexpected weight change.   HENT: Negative.    Eyes: Negative.    Respiratory: Negative.    Cardiovascular: Negative.    Gastrointestinal: Negative for abdominal distention, abdominal pain, anal bleeding, constipation, diarrhea, nausea and vomiting.   Endocrine: Negative for cold intolerance, heat intolerance, polydipsia, polyphagia and polyuria.   Genitourinary: Negative for difficulty urinating, dysuria, flank pain, frequency, hematuria, pelvic pain, urgency, vaginal bleeding and vaginal discharge.   Musculoskeletal: Negative.    Skin: Negative.    Allergic/Immunologic: Negative.    Neurological: Negative.    Hematological: Negative.    Psychiatric/Behavioral: Negative.            Physical Exam   Constitutional: She is oriented to person, place, and time. Vital signs are normal. She appears well-developed and  well-nourished. She is cooperative.   HENT:   Head: Normocephalic.   Eyes: Conjunctivae are normal. Pupils are equal, round, and reactive to light.   Neck: Normal range of motion. Neck supple. No tracheal deviation present. No thyromegaly present.   Cardiovascular: Normal rate, regular rhythm and normal heart sounds. Exam reveals no gallop and no friction rub.   No murmur heard.  Pulmonary/Chest: Effort normal and breath sounds normal. No respiratory distress. She has no wheezes. Right breast exhibits no inverted nipple, no mass, no nipple discharge, no skin change and no tenderness. Left breast exhibits no inverted nipple, no mass, no nipple discharge, no skin change and no tenderness. Breasts are symmetrical. There is no breast swelling.   Abdominal: Soft. Bowel sounds are normal. She exhibits no distension, no ascites and no mass. There is no hepatosplenomegaly. There is no tenderness. There is no rebound, no guarding and no CVA tenderness. No hernia. Hernia confirmed negative in the right inguinal area and confirmed negative in the left inguinal area.   Genitourinary: Rectal exam shows no fissure, no mass, no tenderness and anal tone normal. Pelvic exam was performed with patient supine. No labial fusion. There is no rash, tenderness, lesion or injury on the right labia. There is no rash, tenderness, lesion or injury on the left labia. Left adnexum displays no mass, no tenderness and no fullness. No erythema, tenderness or bleeding in the vagina. No foreign body in the vagina. No signs of injury around the vagina. No vaginal discharge found.   Genitourinary Comments: There is vulvovaginal atrophy.  The vaginal cuff is well-healed.  Bimanual exam is without mass or tenderness or any induration.   Musculoskeletal: Normal range of motion. She exhibits no edema or deformity.   Lymphadenopathy:     She has no cervical adenopathy.        Right: No inguinal adenopathy present.        Left: No inguinal adenopathy  present.   Neurological: She is alert and oriented to person, place, and time. She has normal reflexes. No cranial nerve deficit. Coordination normal.   Skin: Skin is warm and dry. No rash noted. No erythema.   Psychiatric: She has a normal mood and affect. Her behavior is normal.         Isabel was seen today for gynecologic exam.    Diagnoses and all orders for this visit:    Encounter for gynecological examination without abnormal finding  -     Pap IG, Rfx HPV ASCU    Menopause    Osteoporosis of lumbar spine    The patient is doing well.  We discussed the pros and cons of bisphosphonate therapy and I recommended because of the improvement seen that she just continue her nutritional supplementation and weightbearing exercise for bone health.  Periodic gynecologic exams were recommended.

## 2019-08-02 LAB
CONV .: NORMAL
CYTOLOGIST CVX/VAG CYTO: NORMAL
CYTOLOGY CVX/VAG DOC CYTO: NORMAL
CYTOLOGY CVX/VAG DOC THIN PREP: NORMAL
DX ICD CODE: NORMAL
HIV 1 & 2 AB SER-IMP: NORMAL
OTHER STN SPEC: NORMAL
STAT OF ADQ CVX/VAG CYTO-IMP: NORMAL

## 2019-09-17 RX ORDER — PANTOPRAZOLE SODIUM 40 MG/1
TABLET, DELAYED RELEASE ORAL
Qty: 90 TABLET | Refills: 0 | Status: SHIPPED | OUTPATIENT
Start: 2019-09-17 | End: 2019-11-20 | Stop reason: SDUPTHER

## 2019-11-11 RX ORDER — PANTOPRAZOLE SODIUM 40 MG/1
TABLET, DELAYED RELEASE ORAL
Qty: 90 TABLET | Refills: 0 | OUTPATIENT
Start: 2019-11-11

## 2019-11-20 ENCOUNTER — OFFICE VISIT (OUTPATIENT)
Dept: FAMILY MEDICINE CLINIC | Facility: CLINIC | Age: 74
End: 2019-11-20

## 2019-11-20 VITALS
WEIGHT: 144 LBS | DIASTOLIC BLOOD PRESSURE: 70 MMHG | TEMPERATURE: 97.6 F | HEART RATE: 74 BPM | SYSTOLIC BLOOD PRESSURE: 132 MMHG | BODY MASS INDEX: 28.12 KG/M2 | OXYGEN SATURATION: 98 %

## 2019-11-20 DIAGNOSIS — M15.9 PRIMARY OSTEOARTHRITIS INVOLVING MULTIPLE JOINTS: ICD-10-CM

## 2019-11-20 DIAGNOSIS — I10 ESSENTIAL HYPERTENSION: Primary | Chronic | ICD-10-CM

## 2019-11-20 DIAGNOSIS — E78.2 MIXED HYPERLIPIDEMIA: ICD-10-CM

## 2019-11-20 DIAGNOSIS — E66.3 OVERWEIGHT (BMI 25.0-29.9): ICD-10-CM

## 2019-11-20 DIAGNOSIS — K21.9 GASTROESOPHAGEAL REFLUX DISEASE, ESOPHAGITIS PRESENCE NOT SPECIFIED: ICD-10-CM

## 2019-11-20 PROBLEM — M19.90 OSTEOARTHRITIS: Status: ACTIVE | Noted: 2019-11-20

## 2019-11-20 PROCEDURE — 99214 OFFICE O/P EST MOD 30 MIN: CPT | Performed by: INTERNAL MEDICINE

## 2019-11-20 RX ORDER — ROSUVASTATIN CALCIUM 20 MG/1
20 TABLET, COATED ORAL DAILY
Qty: 90 TABLET | Refills: 1 | Status: SHIPPED | OUTPATIENT
Start: 2019-11-20 | End: 2020-03-11 | Stop reason: SDUPTHER

## 2019-11-20 RX ORDER — FLUTICASONE PROPIONATE 50 MCG
2 SPRAY, SUSPENSION (ML) NASAL DAILY PRN
COMMUNITY
Start: 2019-05-21

## 2019-11-20 RX ORDER — PANTOPRAZOLE SODIUM 40 MG/1
40 TABLET, DELAYED RELEASE ORAL DAILY
Qty: 90 TABLET | Refills: 3 | Status: SHIPPED | OUTPATIENT
Start: 2019-11-20 | End: 2020-03-11 | Stop reason: SDUPTHER

## 2019-11-20 RX ORDER — ROSUVASTATIN CALCIUM 20 MG/1
20 TABLET, COATED ORAL DAILY
Refills: 1 | COMMUNITY
Start: 2019-08-27 | End: 2019-11-20 | Stop reason: SDUPTHER

## 2019-11-20 RX ORDER — GABAPENTIN 300 MG/1
300 CAPSULE ORAL 3 TIMES DAILY
COMMUNITY
End: 2019-11-20 | Stop reason: SDUPTHER

## 2019-11-20 RX ORDER — GABAPENTIN 300 MG/1
300 CAPSULE ORAL 3 TIMES DAILY
Qty: 270 CAPSULE | Refills: 1 | Status: SHIPPED | OUTPATIENT
Start: 2019-11-20 | End: 2020-03-24

## 2019-11-20 RX ORDER — AMLODIPINE BESYLATE AND BENAZEPRIL HYDROCHLORIDE 10; 20 MG/1; MG/1
1 CAPSULE ORAL DAILY
Qty: 90 CAPSULE | Refills: 1 | Status: SHIPPED | OUTPATIENT
Start: 2019-11-20 | End: 2020-03-11 | Stop reason: SDUPTHER

## 2019-11-20 RX ORDER — ACYCLOVIR 400 MG/1
400 TABLET ORAL
COMMUNITY
End: 2020-03-24

## 2019-11-20 RX ORDER — DICLOFENAC SODIUM 75 MG/1
75 TABLET, DELAYED RELEASE ORAL 2 TIMES DAILY
COMMUNITY
End: 2020-03-11

## 2019-11-20 NOTE — PATIENT INSTRUCTIONS
Exercising to Lose Weight  Exercise is structured, repetitive physical activity to improve fitness and health. Getting regular exercise is important for everyone. It is especially important if you are overweight. Being overweight increases your risk of heart disease, stroke, diabetes, high blood pressure, and several types of cancer. Reducing your calorie intake and exercising can help you lose weight.  Exercise is usually categorized as moderate or vigorous intensity. To lose weight, most people need to do a certain amount of moderate-intensity or vigorous-intensity exercise each week.  Moderate-intensity exercise    Moderate-intensity exercise is any activity that gets you moving enough to burn at least three times more energy (calories) than if you were sitting.  Examples of moderate exercise include:  · Walking a mile in 15 minutes.  · Doing light yard work.  · Biking at an easy pace.  Most people should get at least 150 minutes (2 hours and 30 minutes) a week of moderate-intensity exercise to maintain their body weight.  Vigorous-intensity exercise  Vigorous-intensity exercise is any activity that gets you moving enough to burn at least six times more calories than if you were sitting. When you exercise at this intensity, you should be working hard enough that you are not able to carry on a conversation.  Examples of vigorous exercise include:  · Running.  · Playing a team sport, such as football, basketball, and soccer.  · Jumping rope.  Most people should get at least 75 minutes (1 hour and 15 minutes) a week of vigorous-intensity exercise to maintain their body weight.  How can exercise affect me?  When you exercise enough to burn more calories than you eat, you lose weight. Exercise also reduces body fat and builds muscle. The more muscle you have, the more calories you burn. Exercise also:  · Improves mood.  · Reduces stress and tension.  · Improves your overall fitness, flexibility, and  endurance.  · Increases bone strength.  The amount of exercise you need to lose weight depends on:  · Your age.  · The type of exercise.  · Any health conditions you have.  · Your overall physical ability.  Talk to your health care provider about how much exercise you need and what types of activities are safe for you.  What actions can I take to lose weight?  Nutrition    · Make changes to your diet as told by your health care provider or diet and nutrition specialist (dietitian). This may include:  ? Eating fewer calories.  ? Eating more protein.  ? Eating less unhealthy fats.  ? Eating a diet that includes fresh fruits and vegetables, whole grains, low-fat dairy products, and lean protein.  ? Avoiding foods with added fat, salt, and sugar.  · Drink plenty of water while you exercise to prevent dehydration or heat stroke.  Activity  · Choose an activity that you enjoy and set realistic goals. Your health care provider can help you make an exercise plan that works for you.  · Exercise at a moderate or vigorous intensity most days of the week.  ? The intensity of exercise may vary from person to person. You can tell how intense a workout is for you by paying attention to your breathing and heartbeat. Most people will notice their breathing and heartbeat get faster with more intense exercise.  · Do resistance training twice each week, such as:  ? Push-ups.  ? Sit-ups.  ? Lifting weights.  ? Using resistance bands.  · Getting short amounts of exercise can be just as helpful as long structured periods of exercise. If you have trouble finding time to exercise, try to include exercise in your daily routine.  ? Get up, stretch, and walk around every 30 minutes throughout the day.  ? Go for a walk during your lunch break.  ? Park your car farther away from your destination.  ? If you take public transportation, get off one stop early and walk the rest of the way.  ? Make phone calls while standing up and walking  around.  ? Take the stairs instead of elevators or escalators.  · Wear comfortable clothes and shoes with good support.  · Do not exercise so much that you hurt yourself, feel dizzy, or get very short of breath.  Where to find more information  · U.[place]. Department of Health and Human Services: www.hhs.gov  · Centers for Disease Control and Prevention (CDC): www.cdc.gov  Contact a health care provider:  · Before starting a new exercise program.  · If you have questions or concerns about your weight.  · If you have a medical problem that keeps you from exercising.  Get help right away if you have any of the following while exercising:  · Injury.  · Dizziness.  · Difficulty breathing or shortness of breath that does not go away when you stop exercising.  · Chest pain.  · Rapid heartbeat.  Summary  · Being overweight increases your risk of heart disease, stroke, diabetes, high blood pressure, and several types of cancer.  · Losing weight happens when you burn more calories than you eat.  · Reducing the amount of calories you eat in addition to getting regular moderate or vigorous exercise each week helps you lose weight.  This information is not intended to replace advice given to you by your health care provider. Make sure you discuss any questions you have with your health care provider.  Document Released: 01/20/2012 Document Revised: 12/31/2018 Document Reviewed: 12/31/2018  [email or web address] Interactive Patient Education © 2019 [email or web address]      MyPlate from [employer]    MyPlate is an outline of a general healthy diet based on the 2010 Dietary Guidelines for Americans, from the U.S. Department of Agriculture (USDA). It sets guidelines for how much food you should eat from each food group based on your age, sex, and level of physical activity.  What are tips for following MyPlate?  To follow MyPlate recommendations:  · Eat a wide variety of fruits and vegetables, grains, and protein foods.  · Serve smaller portions and eat less  food throughout the day.  · Limit portion sizes to avoid overeating.  · Enjoy your food.  · Get at least 150 minutes of exercise every week. This is about 30 minutes each day, 5 or more days per week.  It can be difficult to have every meal look like MyPlate. Think about MyPlate as eating guidelines for an entire day, rather than each individual meal.  Fruits and vegetables  · Make half of your plate fruits and vegetables.  · Eat many different colors of fruits and vegetables each day.  · For a 2,000 calorie daily food plan, eat:  ? 2½ cups of vegetables every day.  ? 2 cups of fruit every day.  · 1 cup is equal to:  ? 1 cup raw or cooked vegetables.  ? 1 cup raw fruit.  ? 1 medium-sized orange, apple, or banana.  ? 1 cup 100% fruit or vegetable juice.  ? 2 cups raw leafy greens, such as lettuce, spinach, or kale.  ? ½ cup dried fruit.  Grains  · One fourth of your plate should be grains.  · Make at least half of the grains you eat each day whole grains.  · For a 2,000 calorie daily food plan, eat 6 oz of grains every day.  · 1 oz is equal to:  ? 1 slice bread.  ? 1 cup cereal.  ? ½ cup cooked rice, cereal, or pasta.  Protein  · One fourth of your plate should be protein.  · Eat a wide variety of protein foods, including meat, poultry, fish, eggs, beans, nuts, and tofu.  · For a 2,000 calorie daily food plan, eat 5½ oz of protein every day.  · 1 oz is equal to:  ? 1 oz meat, poultry, or fish.  ? ¼ cup cooked beans.  ? 1 egg.  ? ½ oz nuts or seeds.  ? 1 Tbsp peanut butter.  Dairy  · Drink fat-free or low-fat (1%) milk.  · Eat or drink dairy as a side to meals.  · For a 2,000 calorie daily food plan, eat or drink 3 cups of dairy every day.  · 1 cup is equal to:  ? 1 cup milk, yogurt, cottage cheese, or soy milk (soy beverage).  ? 2 oz processed cheese.  ? 1½ oz natural cheese.  Fats, oils, salt, and sugars  · Only small amounts of oils are recommended.  · Avoid foods that are high in calories and low in nutritional  value (empty calories), like foods high in fat or added sugars.  · Choose foods that are low in salt (sodium). Choose foods that have less than 140 milligrams (mg) of sodium per serving.  · Drink water instead of sugary drinks. Drink enough water each day to keep your urine pale yellow.  Where to find support  · Work with your health care provider or a nutrition specialist (dietitian) to develop a customized eating plan that is right for you.  · Download an alejandro (mobile application) to help you track your daily food intake.  Where to find more information  · Go to ChooseMyPlate.gov for more information.  · Learn more and log your daily food intake according to MyPlate using [employer]'s [employer]: www.[employer].tver.[employer].com.gov  Summary  · MyPlate is a general guideline for healthy eating from the USDA. It is based on the 2010 Dietary Guidelines for Americans.  · In general, fruits and vegetables should take up ½ of your plate, grains should take up ¼ of your plate, and protein should take up ¼ of your plate.  This information is not intended to replace advice given to you by your health care provider. Make sure you discuss any questions you have with your health care provider.  Document Released: 01/06/2009 Document Revised: 03/19/2018 Document Reviewed: 03/19/2018  [employer] Interactive Patient Education © 2019 [employer]

## 2019-11-20 NOTE — PROGRESS NOTES
Subjective   Isabel Carlson is a 74 y.o. female.     Chief Complaint   Patient presents with   • Hyperlipidemia   • Hypertension       History of Present Illness   Follow-up for cholesterol.  Currently has been feeling well without any myalgias, muscle aches, weakness, numbness, chest pain, short of breath or other issues.  Currently is adherent with medication regimen and denies medication side effects. Is due for lab follow-up.  Follow-up for hypertension.  Currently has been feeling well and asymptomatic without any headaches,vision changes, cough, chest pain, shortness of breath, swelling, focal neurologic deficit, memory loss or syncope.  Has been taking the medications regularly and adherent with the regimen, Denies medication side effects and no significant interval events.    Follow-up for GERD.  Patient is currently using the Protonix but uses it 14 days on 14 days off when she stops after a few days she starts to have the reflux symptoms return and eventually has to go back on her Protonix.  Complains of chronic pain in the hips and joints on for which she has been taking gabapentin for many many years for over 15 years actually.  If she does not take it she actually feels worse.  She is following up with Dr. Roach of orthopedics and has been getting injections of steroids and gel in the knee but continues to have problems.  She does not want surgery.  Current providers:   Dr Roach - orthopedics    The following portions of the patient's history were reviewed and updated as appropriate: allergies, current medications, past family history, past medical history, past social history, past surgical history and problem list.    Past Medical History:   Diagnosis Date   • Abnormal stress echocardiogram 07/20/2017    Patient had borderline EKG changes 5 minutes into walking and Dr. Cali Spencer recommended the patient not to have that same type of stress test in the future   • Adhesive capsulitis of right shoulder     • Age-related osteoporosis without fracture    • APC (atrial premature contractions) 7/20/2017   • Arthritis    • BMI 27.0-27.9,adult    • Carpal tunnel syndrome    • Cataract    • Chest pain    • Chronic hip pain    • Encounter for immunization    • Fatigue    • Hearing loss    • Heart murmur 7/20/2017   • Itching    • Left knee pain    • Left serous otitis media    • LVH (left ventricular hypertrophy) 7/20/2017   • Medicare annual wellness visit, subsequent    • Mixed hyperlipidemia 7/20/2017   • Non-rheumatic mitral regurgitation 07/20/2017    Mild to moderate per 2-D echocardiogram   • Non-rheumatic tricuspid valve insufficiency 07/20/2017    Mild to moderate per 2-D echocardiogram   • Osteoarthritis    • Osteoarthritis of both knees    • Osteoporosis    • Shoulder pain    • Stroke (CMS/HCC)    • TIA (transient ischemic attack) 10/15/2013    numbness on right side of face and hand; went to [facility]   • Visit for screening mammogram        Past Surgical History:   Procedure Laterality Date   • APPENDECTOMY     • CARDIAC CATHETERIZATION N/A 10/20/2017    Procedure: Right Heart Cath;  Surgeon: Christopher Kan MD;  Location: [facility] INVASIVE LOCATION;  Service:    • COLONOSCOPY     • PARTIAL HYSTERECTOMY         Family History   Problem Relation Age of Onset   • Heart disease Mother    • Hypertension Mother    • Stroke Mother    • Arthritis Mother    • Heart disease Maternal Aunt    • Hypertension Maternal Aunt    • Hypertension Maternal Uncle        Social History     Socioeconomic History   • Marital status:      Spouse name: Not on file   • Number of children: Not on file   • Years of education: Not on file   • Highest education level: Not on file   Tobacco Use   • Smoking status: Never Smoker   • Smokeless tobacco: Never Used   Substance and Sexual Activity   • Alcohol use: No     Comment: rarely   • Drug use: No   • Sexual activity: No       Current Outpatient Medications   Medication Sig  Dispense Refill   • acyclovir (ZOVIRAX) 400 MG tablet Take 400 mg by mouth 5 (Five) Times a Day.     • amLODIPine-benazepril (LOTREL) 10-20 MG per capsule Take 1 capsule by mouth Daily. 90 capsule 1   • aspirin 81 MG EC tablet Take 81 mg by mouth Daily.     • cetirizine (zyrTEC) 10 MG tablet Take  by mouth Daily.  4   • diclofenac (VOLTAREN) 75 MG EC tablet Take 75 mg by mouth 2 (Two) Times a Day.     • fluticasone (FLONASE) 50 MCG/ACT nasal spray 2 sprays into the nostril(s) as directed by provider.     • gabapentin (NEURONTIN) 300 MG capsule Take 1 capsule by mouth 3 (Three) Times a Day. 270 capsule 1   • pantoprazole (PROTONIX) 40 MG EC tablet Take 1 tablet by mouth Daily. 90 tablet 3   • rosuvastatin (CRESTOR) 20 MG tablet Take 1 tablet by mouth Daily. 90 tablet 1     No current facility-administered medications for this visit.        Review of Systems   Constitutional: Negative for activity change, appetite change, fatigue, fever, unexpected weight gain and unexpected weight loss.   HENT: Negative for nosebleeds, rhinorrhea, trouble swallowing and voice change.    Eyes: Negative for visual disturbance.   Respiratory: Negative for cough, chest tightness and wheezing.         Short of breath with stairs but stable and chronic   Cardiovascular: Negative for chest pain, palpitations and leg swelling.   Gastrointestinal: Negative for abdominal pain, blood in stool, constipation, diarrhea, nausea, vomiting, GERD and indigestion.   Genitourinary: Negative for dysuria, frequency and hematuria.   Musculoskeletal: Negative for arthralgias, back pain and myalgias.   Skin: Negative for rash and bruise.   Neurological: Negative for dizziness, tremors, weakness, light-headedness, numbness, headache and memory problem.   Hematological: Negative for adenopathy. Does not bruise/bleed easily.   Psychiatric/Behavioral: Negative for sleep disturbance and depressed mood. The patient is not nervous/anxious.        Objective    Vitals:    11/20/19 1533   BP: 132/70   Pulse: 74   Temp: 97.6 °F (36.4 °C)   SpO2: 98%     Body mass index is 28.12 kg/m².  Physical Exam   Constitutional: She is oriented to person, place, and time. She appears well-developed and well-nourished. No distress.   HENT:   Head: Normocephalic and atraumatic.   Right Ear: External ear normal.   Left Ear: External ear normal.   Nose: Nose normal.   Mouth/Throat: Oropharynx is clear and moist.   Eyes: Conjunctivae and EOM are normal. Pupils are equal, round, and reactive to light.   Neck: Normal range of motion. Neck supple. No tracheal deviation present. No thyromegaly present.   Cardiovascular: Normal rate, regular rhythm and intact distal pulses. Exam reveals no gallop and no friction rub.   Murmur heard.   Systolic murmur is present with a grade of 1/6.  Apical 1+systolic murmur   Pulmonary/Chest: Effort normal and breath sounds normal. No respiratory distress.   Abdominal: Soft. Bowel sounds are normal. She exhibits no mass. There is no tenderness. There is no guarding.   Musculoskeletal: Normal range of motion. She exhibits no edema.   Lymphadenopathy:     She has no cervical adenopathy.   Neurological: She is alert and oriented to person, place, and time. She displays normal reflexes. She exhibits normal muscle tone.   Skin: Skin is warm and dry. Capillary refill takes less than 2 seconds. No rash noted. She is not diaphoretic.   Psychiatric: She has a normal mood and affect. Her behavior is normal. Judgment and thought content normal.   Nursing note and vitals reviewed.      No results found for this or any previous visit (from the past 2016 hour(s)).  Assessment/Plan   Isabel was seen today for hyperlipidemia and hypertension.    Diagnoses and all orders for this visit:    Essential hypertension  -     amLODIPine-benazepril (LOTREL) 10-20 MG per capsule; Take 1 capsule by mouth Daily.  -     Comprehensive Metabolic Panel  -     Lipid Panel    Mixed  hyperlipidemia  -     rosuvastatin (CRESTOR) 20 MG tablet; Take 1 tablet by mouth Daily.  -     Comprehensive Metabolic Panel  -     Lipid Panel    Gastroesophageal reflux disease, esophagitis presence not specified  -     pantoprazole (PROTONIX) 40 MG EC tablet; Take 1 tablet by mouth Daily.    Primary osteoarthritis involving multiple joints  -     gabapentin (NEURONTIN) 300 MG capsule; Take 1 capsule by mouth 3 (Three) Times a Day.    Discussed and reviewed all medications and past history.  Blood pressure currently is controlled using the amlodipine benazepril 10 per 20 and recommend to continue this once a day.  We will check her complete metabolic panel and lipid panel secondary to her medications and continue the rosuvastatin.  GERD symptoms persist and patient requires a continue on the medication.  Chronic pain controlled with gabapentin.  Follow-up in 3 to 6 months at which time we will do the Medicare wellness visit.    Discussed the knee OA and follow up with orthopedics.  Continue the gabapentin for chronic pain as given unchanged.  Controlled substance agreement reviewed and signed in office.  SONAL run and reviewed.  Risks of the medication include but are not limited to fatigue, somnolence, increased risk of falls, constipation, allergic reaction, dependence, and addiction.

## 2019-12-17 ENCOUNTER — TELEPHONE (OUTPATIENT)
Dept: FAMILY MEDICINE CLINIC | Facility: CLINIC | Age: 74
End: 2019-12-17

## 2019-12-18 NOTE — TELEPHONE ENCOUNTER
Called patient but not able to leave message due to mail box full. Patient needs to come in for her labs, order is in cart.

## 2019-12-31 LAB
ALBUMIN SERPL-MCNC: 4.4 G/DL (ref 3.5–5.2)
ALBUMIN/GLOB SERPL: 1.9 G/DL
ALP SERPL-CCNC: 82 U/L (ref 39–117)
ALT SERPL-CCNC: 17 U/L (ref 1–33)
AST SERPL-CCNC: 14 U/L (ref 1–32)
BILIRUB SERPL-MCNC: 0.8 MG/DL (ref 0.2–1.2)
BUN SERPL-MCNC: 12 MG/DL (ref 8–23)
BUN/CREAT SERPL: 20 (ref 7–25)
CALCIUM SERPL-MCNC: 9.2 MG/DL (ref 8.6–10.5)
CHLORIDE SERPL-SCNC: 108 MMOL/L (ref 98–107)
CHOLEST SERPL-MCNC: 137 MG/DL (ref 0–200)
CO2 SERPL-SCNC: 26.2 MMOL/L (ref 22–29)
CREAT SERPL-MCNC: 0.6 MG/DL (ref 0.57–1)
GLOBULIN SER CALC-MCNC: 2.3 GM/DL
GLUCOSE SERPL-MCNC: 97 MG/DL (ref 65–99)
HDLC SERPL-MCNC: 62 MG/DL (ref 40–60)
LDLC SERPL CALC-MCNC: 61 MG/DL (ref 0–100)
POTASSIUM SERPL-SCNC: 4.7 MMOL/L (ref 3.5–5.2)
PROT SERPL-MCNC: 6.7 G/DL (ref 6–8.5)
SODIUM SERPL-SCNC: 147 MMOL/L (ref 136–145)
TRIGL SERPL-MCNC: 71 MG/DL (ref 0–150)
VLDLC SERPL CALC-MCNC: 14.2 MG/DL

## 2020-03-03 ENCOUNTER — HOSPITAL ENCOUNTER (EMERGENCY)
Facility: HOSPITAL | Age: 75
Discharge: HOME | End: 2020-03-03
Attending: EMERGENCY MEDICINE | Admitting: EMERGENCY MEDICINE

## 2020-03-03 VITALS
OXYGEN SATURATION: 97 % | HEIGHT: 60 IN | WEIGHT: 150 LBS | SYSTOLIC BLOOD PRESSURE: 158 MMHG | TEMPERATURE: 98.8 F | BODY MASS INDEX: 29.45 KG/M2 | HEART RATE: 82 BPM | RESPIRATION RATE: 16 BRPM | DIASTOLIC BLOOD PRESSURE: 79 MMHG

## 2020-03-03 DIAGNOSIS — S01.511A LIP LACERATION, INITIAL ENCOUNTER: Primary | ICD-10-CM

## 2020-03-03 PROCEDURE — 25010000002 TDAP 5-2.5-18.5 LF-MCG/0.5 SUSPENSION: Performed by: PHYSICIAN ASSISTANT

## 2020-03-03 PROCEDURE — 90471 IMMUNIZATION ADMIN: CPT | Performed by: PHYSICIAN ASSISTANT

## 2020-03-03 PROCEDURE — 90715 TDAP VACCINE 7 YRS/> IM: CPT | Performed by: PHYSICIAN ASSISTANT

## 2020-03-03 PROCEDURE — 99283 EMERGENCY DEPT VISIT LOW MDM: CPT

## 2020-03-03 PROCEDURE — 25010000003 LIDOCAINE 1 % SOLUTION: Performed by: PHYSICIAN ASSISTANT

## 2020-03-03 RX ORDER — LIDOCAINE HYDROCHLORIDE 10 MG/ML
10 INJECTION, SOLUTION INFILTRATION; PERINEURAL ONCE
Status: COMPLETED | OUTPATIENT
Start: 2020-03-03 | End: 2020-03-03

## 2020-03-03 RX ADMIN — LIDOCAINE HYDROCHLORIDE 10 ML: 10 INJECTION, SOLUTION INFILTRATION; PERINEURAL at 18:23

## 2020-03-03 RX ADMIN — TETANUS TOXOID, REDUCED DIPHTHERIA TOXOID AND ACELLULAR PERTUSSIS VACCINE, ADSORBED 0.5 ML: 5; 2.5; 8; 8; 2.5 SUSPENSION INTRAMUSCULAR at 18:24

## 2020-03-03 NOTE — ED PROVIDER NOTES
EMERGENCY DEPARTMENT ENCOUNTER    Room Number:  34/34  PCP: Cordell Holland MD  Historian: patient      HPI:  Chief Complaint: lip laceration  Context: Isabel Carlson is a 74 y.o. female who presents to the ED c/o a laceration to her upper lip that was sustained during an unwitnessed mechanical fall PTA. Patient tripped in her yard. She admits to hitting her head but denies LOC. She denies dental pain, neck pain, hip pain, back pain and abdominal pain. Last Tdap was 7 years ago. No other complaints.     Pain Location: upper lip  Radiation: none  Character: laceration  Duration: a few hours  Severity: moderate  Progression: unchanged  Aggravating Factors: none  Alleviating Factors: none      ALLERGIES  Patient has no known allergies.    PAST MEDICAL HISTORY  Active Ambulatory Problems     Diagnosis Date Noted   • Heart murmur 07/20/2017   • Abnormal EKG 07/20/2017   • APC (atrial premature contractions) 07/20/2017   • Non-rheumatic mitral regurgitation 07/20/2017   • Non-rheumatic tricuspid valve insufficiency 07/20/2017   • LVH (left ventricular hypertrophy) 07/20/2017   • Gastroesophageal reflux disease 07/20/2017   • Essential hypertension 07/20/2017   • Mixed hyperlipidemia 07/20/2017   • Pulmonary HTN (CMS/HCC) 10/09/2017   • Osteoporosis of lumbar spine 07/31/2019   • Menopause 07/31/2019   • Osteoarthritis 11/20/2019     Resolved Ambulatory Problems     Diagnosis Date Noted   • No Resolved Ambulatory Problems     Past Medical History:   Diagnosis Date   • Abnormal stress echocardiogram 07/20/2017   • Adhesive capsulitis of right shoulder    • Age-related osteoporosis without fracture    • Arthritis    • BMI 27.0-27.9,adult    • Carpal tunnel syndrome    • Cataract    • Chest pain    • Chronic hip pain    • Encounter for immunization    • Fatigue    • Hearing loss    • Itching    • Left knee pain    • Left serous otitis media    • Medicare annual wellness visit, subsequent    • Osteoarthritis of both knees     • Osteoporosis    • Shoulder pain    • Stroke (CMS/HCC)    • TIA (transient ischemic attack) 10/15/2013   • Visit for screening mammogram        PAST SURGICAL HISTORY  Past Surgical History:   Procedure Laterality Date   • APPENDECTOMY     • CARDIAC CATHETERIZATION N/A 10/20/2017    Procedure: Right Heart Cath;  Surgeon: Christopher Kan MD;  Location:  [facility];  Service:    • COLONOSCOPY     • SUBTOTAL HYSTERECTOMY         FAMILY HISTORY  Family History   Problem Relation Age of Onset   • Heart disease Mother    • Hypertension Mother    • Stroke Mother    • Arthritis Mother    • Heart disease Maternal Aunt    • Hypertension Maternal Aunt    • Hypertension Maternal Uncle        SOCIAL HISTORY  Social History     Socioeconomic History   • Marital status:      Spouse name: Not on file   • Number of children: Not on file   • Years of education: Not on file   • Highest education level: Not on file   Tobacco Use   • Smoking status: Never Smoker   • Smokeless tobacco: Never Used   Substance and Sexual Activity   • Alcohol use: No     Comment: rarely   • Drug use: No   • Sexual activity: Never           REVIEW OF SYSTEMS  Review of Systems   Constitutional: Negative for fever.   HENT: Negative for sore throat.    Eyes: Negative.    Respiratory: Negative for cough and shortness of breath.    Cardiovascular: Negative for chest pain.   Gastrointestinal: Negative for abdominal pain, diarrhea and vomiting.   Genitourinary: Negative for dysuria.   Musculoskeletal: Negative for neck pain.   Skin: Positive for wound (upper lip laceration). Negative for rash.   Allergic/Immunologic: Negative.    Neurological: Negative for weakness, numbness and headaches.   Hematological: Negative.    Psychiatric/Behavioral: Negative.    All other systems reviewed and are negative.        PHYSICAL EXAM  ED Triage Vitals [03/03/20 1520]   Temp Heart Rate Resp BP SpO2   98.8 °F (37.1 °C) 88 16 -- 99 %       Physical  Exam   Constitutional: She is oriented to person, place, and time. No distress.   HENT:   Head: Normocephalic.   1.5cm laceration to upper inner lip. No dental abnormalities.   Eyes: Pupils are equal, round, and reactive to light. EOM are normal.   Neck: Normal range of motion. Neck supple.   Cardiovascular: Normal rate, regular rhythm and normal heart sounds.   Pulmonary/Chest: Effort normal and breath sounds normal. No respiratory distress.   Abdominal: Soft. There is no tenderness. There is no rebound and no guarding.   Musculoskeletal: Normal range of motion. She exhibits no edema.   Neurological: She is alert and oriented to person, place, and time. She has normal sensation and normal strength.   Skin: Skin is warm and dry. No rash noted.   1.5cm laceration to upper inner lip   Psychiatric: Mood and affect normal.   Nursing note and vitals reviewed.        MEDICATIONS GIVEN IN ER  Medications   lidocaine (XYLOCAINE) 1 % injection 10 mL (has no administration in time range)   Tdap (BOOSTRIX) injection 0.5 mL (has no administration in time range)          PROCEDURES  Laceration Repair  Date/Time: 3/3/2020 5:44 PM  Performed by: Anali Cheng PA-C  Authorized by: Herb Bautista MD     Consent:     Consent obtained:  Verbal    Consent given by:  Patient  Anesthesia (see MAR for exact dosages):     Anesthesia method:  Local infiltration    Local anesthetic:  Lidocaine 1% WITH epi  Laceration details:     Location:  Lip    Lip location:  Upper interior lip    Length (cm):  1.5  Repair type:     Repair type:  Simple  Pre-procedure details:     Preparation:  Patient was prepped and draped in usual sterile fashion  Exploration:     Wound exploration: wound explored through full range of motion and entire depth of wound probed and visualized    Treatment:     Irrigation solution:  Sterile saline    Irrigation method:  Syringe  Skin repair:     Repair method:  Sutures    Suture size:  5-0    Wound skin closure  "material used: Vicryl.    Suture technique:  Simple interrupted    Number of sutures:  5  Approximation:     Approximation:  Close    Vermilion border: well-aligned    Post-procedure details:     Dressing:  Open (no dressing)    Patient tolerance of procedure:  Tolerated well, no immediate complications        PROGRESS AND CONSULTS    Progress Notes:       1725. Discussed plan to discharge pending laceration repair. Advised patient to follow-up in 7-10 days for suture removal.      1811. Reviewed pt's history and workup with Dr. Herb Bautista (ER physician). After a bedside evaluation, Dr. Bautista agrees with the plan of care.      Disposition vitals:  /84 (BP Location: Right arm, Patient Position: Sitting)   Pulse 88   Temp 98.8 °F (37.1 °C) (Tympanic)   Resp 16   Ht 152.4 cm (60\")   Wt 68 kg (150 lb)   SpO2 95%   BMI 29.29 kg/m²       DIAGNOSIS  Final diagnoses:   Lip laceration, initial encounter       DISPOSITION  DISCHARGE    Patient discharged in stable condition.    Reviewed implications of results, diagnosis, meds, responsibility to follow up, warning signs and symptoms of possible worsening, potential complications and reasons to return to ER.    Patient/Family voiced understanding of above instructions.    Discussed plan for discharge, as there is no emergent indication for admission. Patient referred to primary care provider for BP management due to today's BP. Pt/family is agreeable and understands need for follow up and repeat testing.  Pt is aware that discharge does not mean that nothing is wrong but it indicates no emergency is present that requires admission and they must continue care with follow-up as given below or physician of their choice.     FOLLOW-UP  Cordell Holland MD  [address] [address] [address] [phone]    Schedule an appointment as soon as possible for a visit in 1 week  For suture removal    [facility] " [address] [id number] [phone]    As needed, If symptoms worsen         Medication List      No changes were made to your prescriptions during this visit.           --  Documentation assistance provided by dorian Araujo for Ms. Anali Cheng PA-C.  Information recorded by the scribe was done at my direction and has been verified and validated by me.       Theresa Araujo  03/03/20 1815       Anali Cheng PA-C  03/03/20 2030

## 2020-03-03 NOTE — ED PROVIDER NOTES
Pt presents to the ED c/o  lip laceration after mechanical fall.  She states she tripped in her yard.  There was no loss of consciousness.  This was not a syncopal episode.     On exam,   There is a 1.5 cm laceration to the buccal mucosa of the upper lip.  It does not involve the vermilion border.  There is no malocclusion or dental fracture.     Plan: Laceration repair by physician assistant.     Attestation:  The DELIA and I have discussed this patient's history, physical exam, and treatment plan.  I have reviewed the documentation and personally had a face to face interaction with the patient. I affirm the documentation and agree with the treatment and plan.  The attached note describes my personal findings.       Herb Bautista MD  03/03/20 0170

## 2020-03-11 ENCOUNTER — OFFICE VISIT (OUTPATIENT)
Dept: FAMILY MEDICINE CLINIC | Facility: CLINIC | Age: 75
End: 2020-03-11

## 2020-03-11 VITALS
BODY MASS INDEX: 29.72 KG/M2 | WEIGHT: 151.4 LBS | HEART RATE: 62 BPM | RESPIRATION RATE: 16 BRPM | OXYGEN SATURATION: 95 % | TEMPERATURE: 98.4 F | HEIGHT: 60 IN | SYSTOLIC BLOOD PRESSURE: 142 MMHG | DIASTOLIC BLOOD PRESSURE: 82 MMHG

## 2020-03-11 DIAGNOSIS — E78.2 MIXED HYPERLIPIDEMIA: ICD-10-CM

## 2020-03-11 DIAGNOSIS — I10 ESSENTIAL HYPERTENSION: Chronic | ICD-10-CM

## 2020-03-11 DIAGNOSIS — M15.9 PRIMARY OSTEOARTHRITIS INVOLVING MULTIPLE JOINTS: ICD-10-CM

## 2020-03-11 DIAGNOSIS — S01.511A LIP LACERATION, INITIAL ENCOUNTER: Primary | ICD-10-CM

## 2020-03-11 DIAGNOSIS — K21.9 GASTROESOPHAGEAL REFLUX DISEASE, ESOPHAGITIS PRESENCE NOT SPECIFIED: ICD-10-CM

## 2020-03-11 PROCEDURE — 99214 OFFICE O/P EST MOD 30 MIN: CPT | Performed by: NURSE PRACTITIONER

## 2020-03-11 RX ORDER — TRAMADOL HYDROCHLORIDE 50 MG/1
TABLET ORAL
COMMUNITY
Start: 2020-02-08 | End: 2020-03-24 | Stop reason: SDUPTHER

## 2020-03-11 RX ORDER — MELOXICAM 7.5 MG/1
7.5 TABLET ORAL DAILY PRN
Qty: 90 TABLET | Refills: 0 | Status: SHIPPED | OUTPATIENT
Start: 2020-03-11 | End: 2020-07-14

## 2020-03-11 RX ORDER — AMLODIPINE BESYLATE AND BENAZEPRIL HYDROCHLORIDE 10; 20 MG/1; MG/1
1 CAPSULE ORAL DAILY
Qty: 90 CAPSULE | Refills: 1 | Status: SHIPPED | OUTPATIENT
Start: 2020-03-11 | End: 2020-07-14 | Stop reason: SDUPTHER

## 2020-03-11 RX ORDER — ROSUVASTATIN CALCIUM 20 MG/1
20 TABLET, COATED ORAL DAILY
Qty: 90 TABLET | Refills: 1 | Status: SHIPPED | OUTPATIENT
Start: 2020-03-11 | End: 2020-07-14 | Stop reason: SDUPTHER

## 2020-03-11 RX ORDER — PANTOPRAZOLE SODIUM 40 MG/1
40 TABLET, DELAYED RELEASE ORAL DAILY
Qty: 90 TABLET | Refills: 1 | Status: SHIPPED | OUTPATIENT
Start: 2020-03-11 | End: 2020-07-14 | Stop reason: SDUPTHER

## 2020-03-11 NOTE — PROGRESS NOTES
Matty Carlson is a 74 y.o. female.     Chief Complaint   Patient presents with   • Med Refill   • Follow-up     from a fall/stitches     This is my first time seeing this patient.   History of Present Illness   Lip laceration: Patient was evaluated at [facility], [place], operated by [facility] emergency department on 3/3/2020.  Treatment included laceration repair and Tdap.  Patient denies any fever, pain or drainage.    Hypertension: Patient here for follow-up of essential hypertension. Blood pressure is well controlled at home.She is not exercising and is adherent to low salt diet. Home monitoring: The patient is not checking blood pressure at home. Symptoms: none. Medications: The patient is adherent with their medication regimen. Medication(s): ACE Inhibitor and Calcium Channel Blocker. The patient is due for nothing at this time.    Hyperlipidemia: Symptoms: none. Medications:The patient is adherent with their medication regimen. Medication(s): statin. Her last labs showed total cholesterol 137, HDL 62, LDL 61,  triglycerides 71. The patient is due for nothing at this time.    GERD: The patient reports doing well.  The patient is currently asymptomatic.  Medications include pantoprazole.  The patient is adherent to her medication regimen.    The following portions of the patient's history were reviewed and updated as appropriate: allergies, current medications, past family history, past medical history, past social history, past surgical history and problem list.    Past Medical History:   Diagnosis Date   • Abnormal stress echocardiogram 07/20/2017    Patient had borderline EKG changes 5 minutes into walking and Dr. Cali Spencer recommended the patient not to have that same type of stress test in the future   • Adhesive capsulitis of right shoulder    • Age-related osteoporosis without fracture    • APC (atrial premature contractions) 7/20/2017   • Arthritis    • BMI 27.0-27.9,adult    • Carpal tunnel syndrome    • Cataract    •  Chest pain    • Chronic hip pain    • Encounter for immunization    • Fatigue    • Hearing loss    • Heart murmur 7/20/2017   • Itching    • Left knee pain    • Left serous otitis media    • LVH (left ventricular hypertrophy) 7/20/2017   • Medicare annual wellness visit, subsequent    • Mixed hyperlipidemia 7/20/2017   • Non-rheumatic mitral regurgitation 07/20/2017    Mild to moderate per 2-D echocardiogram   • Non-rheumatic tricuspid valve insufficiency 07/20/2017    Mild to moderate per 2-D echocardiogram   • Osteoarthritis    • Osteoarthritis of both knees    • Osteoporosis    • Shoulder pain    • Stroke (CMS/HCC)    • TIA (transient ischemic attack) 10/15/2013    numbness on right side of face and hand; went to [facility]   • Visit for screening mammogram        Past Surgical History:   Procedure Laterality Date   • APPENDECTOMY     • CARDIAC CATHETERIZATION N/A 10/20/2017    Procedure: Right Heart Cath;  Surgeon: Christopher Kan MD;  Location: [facility] CATH INVASIVE LOCATION;  Service:    • COLONOSCOPY     • SUBTOTAL HYSTERECTOMY         Family History   Problem Relation Age of Onset   • Heart disease Mother    • Hypertension Mother    • Stroke Mother    • Arthritis Mother    • Heart disease Maternal Aunt    • Hypertension Maternal Aunt    • Hypertension Maternal Uncle        Social History     Socioeconomic History   • Marital status:      Spouse name: Not on file   • Number of children: Not on file   • Years of education: Not on file   • Highest education level: Not on file   Tobacco Use   • Smoking status: Never Smoker   • Smokeless tobacco: Never Used   Substance and Sexual Activity   • Alcohol use: No     Comment: rarely   • Drug use: No   • Sexual activity: Never       Review of Systems   Constitutional: Negative for fever.   Cardiovascular: Negative for chest pain and leg swelling.   Gastrointestinal: Negative for GERD.   Musculoskeletal: Negative for myalgias.       Objective   Vitals:     "03/11/20 1512   BP: 142/82   BP Location: Left arm   Patient Position: Sitting   Pulse: 62   Resp: 16   Temp: 98.4 °F (36.9 °C)   TempSrc: Temporal   SpO2: 95%   Weight: 68.7 kg (151 lb 6.4 oz)   Height: 152.4 cm (60\")      Body mass index is 29.57 kg/m².  Physical Exam   Constitutional: She is oriented to person, place, and time. She appears well-developed and well-nourished.   HENT:   3 stitches at upper inner lip   Cardiovascular: Normal rate, regular rhythm and normal heart sounds.   Pulmonary/Chest: Effort normal and breath sounds normal.   Abdominal: Soft. Bowel sounds are normal.   Neurological: She is alert and oriented to person, place, and time.   Psychiatric: She has a normal mood and affect.   Nursing note and vitals reviewed.        Assessment/Plan   Isabel was seen today for med refill and follow-up.    Diagnoses and all orders for this visit:    Lip laceration, initial encounter  Comments:  - Stitches will continue to dissolve on their own.     Mixed hyperlipidemia  -     rosuvastatin (CRESTOR) 20 MG tablet; Take 1 tablet by mouth Daily.    Essential hypertension  -     amLODIPine-benazepril (LOTREL) 10-20 MG per capsule; Take 1 capsule by mouth Daily.    Gastroesophageal reflux disease, esophagitis presence not specified  -     pantoprazole (PROTONIX) 40 MG EC tablet; Take 1 tablet by mouth Daily.    Primary osteoarthritis involving multiple joints  -     meloxicam (MOBIC) 7.5 MG tablet; Take 1 tablet by mouth Daily As Needed for Moderate Pain .    Return in about 1 week (around 3/18/2020) for Recheck, Medicare Wellness.             "

## 2020-03-24 ENCOUNTER — OFFICE VISIT (OUTPATIENT)
Dept: FAMILY MEDICINE CLINIC | Facility: CLINIC | Age: 75
End: 2020-03-24

## 2020-03-24 VITALS
HEIGHT: 60 IN | SYSTOLIC BLOOD PRESSURE: 128 MMHG | HEART RATE: 60 BPM | OXYGEN SATURATION: 98 % | BODY MASS INDEX: 30.04 KG/M2 | TEMPERATURE: 98.4 F | DIASTOLIC BLOOD PRESSURE: 72 MMHG | WEIGHT: 153 LBS

## 2020-03-24 DIAGNOSIS — M15.9 PRIMARY OSTEOARTHRITIS INVOLVING MULTIPLE JOINTS: ICD-10-CM

## 2020-03-24 DIAGNOSIS — S01.511D LIP LACERATION, SUBSEQUENT ENCOUNTER: Primary | ICD-10-CM

## 2020-03-24 PROBLEM — S01.511A LIP LACERATION: Status: ACTIVE | Noted: 2020-03-24

## 2020-03-24 PROCEDURE — 99214 OFFICE O/P EST MOD 30 MIN: CPT | Performed by: INTERNAL MEDICINE

## 2020-03-24 RX ORDER — TRAMADOL HYDROCHLORIDE 50 MG/1
50 TABLET ORAL NIGHTLY PRN
Qty: 90 TABLET | Refills: 1 | Status: SHIPPED | OUTPATIENT
Start: 2020-03-24 | End: 2020-10-13

## 2020-03-24 NOTE — PROGRESS NOTES
Subjective   Isabel Carlson is a 74 y.o. female.     Chief Complaint   Patient presents with   • Wound Check       History of Present Illness   Lip laceration: Patient was evaluated at [facility] emergency department on 3/3/2020.  Treatment included laceration repair and Tdap.  Patient denies any fever, pain, bad taste, swelling or drainage.    Complains of chronic pain in the hips and joints on for which she has been taking gabapentin for many many years for over 15 years actually.  If she does not take it she actually feels worse.  She is following up with Dr. Marley jackson of orthopedics and has been getting injections of steroids and gel in the knee in the past but continues to have problems.  She does not want surgery.    The following portions of the patient's history were reviewed and updated as appropriate: allergies, current medications, past family history, past medical history, past social history, past surgical history and problem list.    Depression Screen:  PHQ-2/PHQ-9 Depression Screening 11/20/2019   Little interest or pleasure in doing things 0   Feeling down, depressed, or hopeless 0   Trouble falling or staying asleep, or sleeping too much 0   Feeling tired or having little energy 0   Poor appetite or overeating 0   Feeling bad about yourself - or that you are a failure or have let yourself or your family down 0   Trouble concentrating on things, such as reading the newspaper or watching television 0   Moving or speaking so slowly that other people could have noticed. Or the opposite - being so fidgety or restless that you have been moving around a lot more than usual 0   Thoughts that you would be better off dead, or of hurting yourself in some way 0   Total Score 0       Past Medical History:   Diagnosis Date   • Abnormal stress echocardiogram 07/20/2017    Patient had borderline EKG changes 5 minutes into walking and Dr. Cali Spencer recommended the patient not to have that same type of stress test in  the future   • Adhesive capsulitis of right shoulder    • Age-related osteoporosis without fracture    • APC (atrial premature contractions) 7/20/2017   • Arthritis    • BMI 27.0-27.9,adult    • Carpal tunnel syndrome    • Cataract    • Chest pain    • Chronic hip pain    • Encounter for immunization    • Fatigue    • Hearing loss    • Heart murmur 7/20/2017   • Itching    • Left knee pain    • Left serous otitis media    • LVH (left ventricular hypertrophy) 7/20/2017   • Medicare annual wellness visit, subsequent    • Mixed hyperlipidemia 7/20/2017   • Non-rheumatic mitral regurgitation 07/20/2017    Mild to moderate per 2-D echocardiogram   • Non-rheumatic tricuspid valve insufficiency 07/20/2017    Mild to moderate per 2-D echocardiogram   • Osteoarthritis    • Osteoarthritis of both knees    • Osteoporosis    • Shoulder pain    • Stroke (CMS/HCC)    • TIA (transient ischemic attack) 10/15/2013    numbness on right side of face and hand; went to [facility]   • Visit for screening mammogram        Past Surgical History:   Procedure Laterality Date   • APPENDECTOMY     • CARDIAC CATHETERIZATION N/A 10/20/2017    Procedure: Right Heart Cath;  Surgeon: Christopher Kan MD;  Location: [facility] INVASIVE LOCATION;  Service:    • COLONOSCOPY     • SUBTOTAL HYSTERECTOMY         Family History   Problem Relation Age of Onset   • Heart disease Mother    • Hypertension Mother    • Stroke Mother    • Arthritis Mother    • Heart disease Maternal Aunt    • Hypertension Maternal Aunt    • Hypertension Maternal Uncle        Social History     Socioeconomic History   • Marital status:      Spouse name: Not on file   • Number of children: Not on file   • Years of education: Not on file   • Highest education level: Not on file   Tobacco Use   • Smoking status: Never Smoker   • Smokeless tobacco: Never Used   Substance and Sexual Activity   • Alcohol use: No     Comment: rarely   • Drug use: No   • Sexual activity: Never        Current Outpatient Medications   Medication Sig Dispense Refill   • amLODIPine-benazepril (LOTREL) 10-20 MG per capsule Take 1 capsule by mouth Daily. 90 capsule 1   • aspirin 81 MG EC tablet Take 81 mg by mouth Daily.     • cetirizine (zyrTEC) 10 MG tablet Take  by mouth Daily.  4   • fluticasone (FLONASE) 50 MCG/ACT nasal spray 2 sprays into the nostril(s) as directed by provider.     • meloxicam (MOBIC) 7.5 MG tablet Take 1 tablet by mouth Daily As Needed for Moderate Pain . 90 tablet 0   • pantoprazole (PROTONIX) 40 MG EC tablet Take 1 tablet by mouth Daily. 90 tablet 1   • rosuvastatin (CRESTOR) 20 MG tablet Take 1 tablet by mouth Daily. 90 tablet 1   • traMADol (ULTRAM) 50 MG tablet Take 1 tablet by mouth At Night As Needed for Moderate Pain . 90 tablet 1     No current facility-administered medications for this visit.        Review of Systems   Constitutional: Negative for activity change, appetite change, fatigue, fever, unexpected weight gain and unexpected weight loss.   HENT: Negative for nosebleeds, rhinorrhea, trouble swallowing and voice change.    Eyes: Negative for visual disturbance.   Respiratory: Negative for cough, chest tightness, shortness of breath and wheezing.    Cardiovascular: Negative for chest pain, palpitations and leg swelling.   Gastrointestinal: Negative for abdominal pain, blood in stool, constipation, diarrhea, nausea, vomiting, GERD and indigestion.   Genitourinary: Negative for dysuria, frequency and hematuria.   Musculoskeletal: Negative for arthralgias, back pain and myalgias.   Skin: Negative for rash and bruise.   Neurological: Negative for dizziness, tremors, weakness, light-headedness, numbness, headache and memory problem.   Hematological: Negative for adenopathy. Does not bruise/bleed easily.   Psychiatric/Behavioral: Negative for sleep disturbance and depressed mood. The patient is not nervous/anxious.        Objective   /72 (BP Location: Right arm, Patient  "Position: Sitting)   Pulse 60   Temp 98.4 °F (36.9 °C)   Ht 152.4 cm (60\")   Wt 69.4 kg (153 lb)   SpO2 98%   BMI 29.88 kg/m²     Physical Exam   Constitutional: She is oriented to person, place, and time. She appears well-developed and well-nourished. No distress.   HENT:   Head: Normocephalic and atraumatic.   Right Ear: External ear normal.   Left Ear: External ear normal.   Nose: Nose normal.   Mouth/Throat: Oropharynx is clear and moist.   Upper lip with inner laceration healed with two remaining absorbale sutures.   Eyes: Pupils are equal, round, and reactive to light. Conjunctivae and EOM are normal.   Neck: Normal range of motion. Neck supple. No tracheal deviation present. No thyromegaly present.   Cardiovascular: Normal rate, regular rhythm, normal heart sounds and intact distal pulses. Exam reveals no gallop and no friction rub.   No murmur heard.  Apical 1+systolic murmur    Pulmonary/Chest: Effort normal and breath sounds normal. No respiratory distress.   Abdominal: Soft. Bowel sounds are normal. She exhibits no mass. There is no tenderness. There is no guarding.   Musculoskeletal: Normal range of motion. She exhibits no edema.   Lymphadenopathy:     She has no cervical adenopathy.   Neurological: She is alert and oriented to person, place, and time. She displays normal reflexes. She exhibits normal muscle tone.   Skin: Skin is warm and dry. Capillary refill takes less than 2 seconds. No rash noted. She is not diaphoretic.   Psychiatric: She has a normal mood and affect. Her behavior is normal. Judgment and thought content normal.   Nursing note and vitals reviewed.      No results found for this or any previous visit (from the past 2016 hour(s)).  Assessment/Plan   Isabel was seen today for wound check.    Diagnoses and all orders for this visit:    Lip laceration, subsequent encounter    Primary osteoarthritis involving multiple joints  -     traMADol (ULTRAM) 50 MG tablet; Take 1 tablet by " mouth At Night As Needed for Moderate Pain .    Lip laceration is healed she only has 2 sutures remaining which appear to be nearly absorbed and almost out.  She is to not change anything and discussed that the sutures will likely come out without any problems and she should have any problems.  If she does she is to return.    Patient with a primary osteoarthritis involving multiple joints especially in her knees.  She continues with the pain is using only the tramadol once at night to help her sleep and that is all.  Recommend to continue with this and the follow-up with orthopedic doctor she is now starting to lean toward needing the replacement surgery of her knee and I recommended that she follow-up with the orthopedic specialist.  SONAL run and reviewed.  Risks of the medication include but are not limited to fatigue, somnolence, increased risk of falls, constipation, allergic reaction, dependence, and addiction.

## 2020-05-20 ENCOUNTER — PREP FOR SURGERY (OUTPATIENT)
Dept: OTHER | Facility: HOSPITAL | Age: 75
End: 2020-05-20

## 2020-05-20 DIAGNOSIS — M17.12 PRIMARY OSTEOARTHRITIS OF LEFT KNEE: Primary | ICD-10-CM

## 2020-05-20 RX ORDER — CEFAZOLIN SODIUM 2 G/100ML
2 INJECTION, SOLUTION INTRAVENOUS ONCE
Status: CANCELLED | OUTPATIENT
Start: 2020-06-12 | End: 2020-05-20

## 2020-05-20 RX ORDER — OXYCODONE HCL 10 MG/1
20 TABLET, FILM COATED, EXTENDED RELEASE ORAL ONCE
Status: CANCELLED | OUTPATIENT
Start: 2020-06-12 | End: 2020-05-20

## 2020-05-20 RX ORDER — ACETAMINOPHEN 500 MG
1000 TABLET ORAL ONCE
Status: CANCELLED | OUTPATIENT
Start: 2020-06-12 | End: 2020-05-20

## 2020-05-21 NOTE — H&P
JEFFERSON HANLEY is a 74 year old female whose main reason for today's visit is pain with giving way, popping and grinding in the left knee which has been present for 3 years.    She has been seen and evaluatedIn the past by Dr. Glynn Roach who is now retired.  The patient denies an injury.  The patient is on pain meds(TRAMADOL HCL TABLET (TRAMADOL HCL TABS), GABAPENTIN CAPSULE (GABAPENTIN CAPS)).  The patient states that she is having a dull, throbbing and achy pain which she rates at a 8 on a scale from 1-10.  The pain occurs at rest and with activity.  The pain occurs on the left side only.  The pain is not radiating.  The patient states that rest makes it better, while activity and bending makes it worse.    Treatments which sometimes gave relief include joint injections.  Treatments which did not give relief include medications:TRAMADOL HCL TABLET (TRAMADOL HCL TABS) for 3 months.    she works part time in the lunch room at a school.  She denies any history of MRSA, DVT.  Denies any cardiac problems.    Review of Systems:  Positive for: Easy Bruisability, Eyes or Vision Problems, Insomnia and Joint Pain.    Patient denies: Abdominal Pain, Bleeding, Chest Pain, Convulsions/Seizure, Decreased Motion, Depression, Difficulty Swallowing, Emotional Disturbances, Fecal Incontinence, Fever/Chills, Headaches, Increased Thirst, Increased Hunger, Nausea/Vomiting, Night Sweats, Poor Balance, Persistent Cough, Rash, Shortness of Breath, Shortness of Breath While Lying down, Skin Problems, Urinary Retention and Weakness.  Allergies:  * no known allergies  Medications:  tramadol hcl tablet (tramadol hcl tabs)   gabapentin capsule (gabapentin caps)   lotrel capsule (amlodipine besy-benazepril hcl caps)   Patient History of:  ARTHRITIS  HEART MURMUR  BLOOD CLOTS/EMBOLISM - NEGATIVE  BLADDER INFECTIONS  HYPERTENSION  Surgical History:  Negative  Known Family History of:  htn-mother, brother, sister  anesthetic reaction-mother  heart  disease-mother  Social History:  DAYAN CARLIN is a single 74 year old female.  She has never used tobacco products.  She has fallen more than once or has been injured because of the fall that occured in the last 12 months.      Past medical, social, family histories and ROS reviewed today with the patient and changes documented in the chart (05/18/2020).  PCP Dr. GUMARO HIDALGO MD, VERITO GUZMAN    Physical Exam  Height:  60 in.    Weight:  150 lbs.     BMI:  29.40      Gait: antalgic               Ambulation: patient ambulates without assistive devices      Mental/HEENT/Cardio/Skin  The patient's general appearance is well developed, well nourished, no acute distress.  Orientation is alert and oriented x 3.  The patient's mood is normal.  A head exam reveals normocephalic/atraumatic.  An eye exam reveals pupils equal.  Pulmonary exam shows normal air exchange, no labored breathing, or shortness of breath.  A skin exam shows normal temperature and color in the area of examination.      Left Knee  Skin is normal.  There is quad atrophy.  Effusion is 1+.  No warmth.  No erythema.  Normal sensation.  Capillary refill is normal.  Reflexes are normal.  Range of motion of the knee is 5 to <120 degrees of flexion.  There is moderate tenderness in the medial patella femoral.  Moderate patellar crepitation.  The leg lengths are equal.  Pes planus is negative.  PTTI is negative.        Imaging/Diagnostic Studies  X-rays of the Left Knee [standing AP;Lateral;Merchants] were ordered and reviewed today.      X-rays of the left knee show there is severe narrowing of the medial joint spaces and patella femoral joint space.  Medial and patellar femoral spurring.  Bone-on-bone arthritis.  Impression  Left knee primary osteoarthritis    Plan  Mrs. Carlin has had long standing knee pain with confirmed osteoarthritis of the left knee.  she has failed injections and medications and is indicated for a LEFT total knee arthroplasty.  Options  and alternatives were discussed in detail with the patient.  The patient has reached a point of disability and has failed nonoperative management.  The patient is indicated for a LEFT total knee arthroplasty.  Likely,  Risks and benefits of the procedure including but not limited to infection, DVT, pulmonary embolism, future loosening of the implants, possibility of injury to nerves vessels and tendons, periprosthetic fractures have been discussed in detail.  Despite the risks involved,  The patient would like to proceed.  The patient  is being scheduled for a LEFT  total knee arthroplasty at [facility] on JUNE 12, 2020.   I will request for Medical and cardiac clearance from Dr. Rashard Holland MD and Dr Deven MD , Cardiology.  Postoperative DVT prophylaxis - Patient has no high risk factors  Plan for Aspirin.   Preoperative antibiotic prophylaxis - Plan for SCIP protocol with Cephazolin weight based.   Surgery will be scheduled for postoperative observation  due to medical comorbidities.      We discussed the benefits of surgical intervention, as well as alternative treatments.  Potential surgical risks and complications include but are not limited to DVT, infection, neurovascular injury, fracture, implant wear, failure, possible need for revision surgery, loss of motion, dislocation, limb length changes.  Sufficient opportunity was given to discuss the condition and treatment plan with the doctor, and all questions were answered for the patient.  Nonsurgical measures such as injections, medications, or physical therapy may not offer significant relief to this patient.  The discussion lasted 30 minutes.      Isabel should follow up with RAINA GEE MD post op.

## 2020-05-26 ENCOUNTER — TELEPHONE (OUTPATIENT)
Dept: FAMILY MEDICINE CLINIC | Facility: CLINIC | Age: 75
End: 2020-05-26

## 2020-05-26 NOTE — TELEPHONE ENCOUNTER
PATIENT CALLED IN AND STATED SHE IS SCHEDULED TO HAVE KNEE REPLACEMENT SURGERY AT [facility] ON June 12 TH .   THE SURGEON IS YULIYA .  HIS PHONE NUMBER [phone]  PATIENT CALL BACK [phone] CELL - [phone]

## 2020-06-01 ENCOUNTER — TRANSCRIBE ORDERS (OUTPATIENT)
Dept: PREADMISSION TESTING | Facility: HOSPITAL | Age: 75
End: 2020-06-01

## 2020-06-01 DIAGNOSIS — M15.9 PRIMARY OSTEOARTHRITIS INVOLVING MULTIPLE JOINTS: ICD-10-CM

## 2020-06-01 DIAGNOSIS — Z01.818 OTHER SPECIFIED PRE-OPERATIVE EXAMINATION: Primary | ICD-10-CM

## 2020-06-02 ENCOUNTER — HOSPITAL ENCOUNTER (OUTPATIENT)
Dept: GENERAL RADIOLOGY | Facility: HOSPITAL | Age: 75
Discharge: HOME | End: 2020-06-02
Admitting: ORTHOPAEDIC SURGERY

## 2020-06-02 ENCOUNTER — APPOINTMENT (OUTPATIENT)
Dept: PREADMISSION TESTING | Facility: HOSPITAL | Age: 75
End: 2020-06-02

## 2020-06-02 ENCOUNTER — HOSPITAL ENCOUNTER (OUTPATIENT)
Dept: GENERAL RADIOLOGY | Facility: HOSPITAL | Age: 75
Discharge: HOME | End: 2020-06-02

## 2020-06-02 VITALS
BODY MASS INDEX: 30.23 KG/M2 | DIASTOLIC BLOOD PRESSURE: 85 MMHG | RESPIRATION RATE: 18 BRPM | WEIGHT: 154 LBS | TEMPERATURE: 97.9 F | HEIGHT: 60 IN | HEART RATE: 82 BPM | SYSTOLIC BLOOD PRESSURE: 161 MMHG | OXYGEN SATURATION: 96 %

## 2020-06-02 DIAGNOSIS — M17.12 PRIMARY OSTEOARTHRITIS OF LEFT KNEE: ICD-10-CM

## 2020-06-02 LAB
ALBUMIN SERPL-MCNC: 4.5 G/DL (ref 3.5–5.2)
ALBUMIN/GLOB SERPL: 1.6 G/DL
ALP SERPL-CCNC: 104 U/L (ref 39–117)
ALT SERPL W P-5'-P-CCNC: 18 U/L (ref 1–33)
ANION GAP SERPL CALCULATED.3IONS-SCNC: 7.2 MMOL/L (ref 5–15)
APTT PPP: 35.1 SECONDS (ref 22.7–35.4)
AST SERPL-CCNC: 17 U/L (ref 1–32)
BASOPHILS # BLD AUTO: 0.02 10*3/MM3 (ref 0–0.2)
BASOPHILS NFR BLD AUTO: 0.6 % (ref 0–1.5)
BILIRUB SERPL-MCNC: 0.8 MG/DL (ref 0.2–1.2)
BUN BLD-MCNC: 13 MG/DL (ref 8–23)
BUN/CREAT SERPL: 22 (ref 7–25)
CALCIUM SPEC-SCNC: 9.1 MG/DL (ref 8.6–10.5)
CHLORIDE SERPL-SCNC: 101 MMOL/L (ref 98–107)
CO2 SERPL-SCNC: 25.8 MMOL/L (ref 22–29)
CREAT BLD-MCNC: 0.59 MG/DL (ref 0.57–1)
DEPRECATED RDW RBC AUTO: 42.7 FL (ref 37–54)
EOSINOPHIL # BLD AUTO: 0.07 10*3/MM3 (ref 0–0.4)
EOSINOPHIL NFR BLD AUTO: 2 % (ref 0.3–6.2)
ERYTHROCYTE [DISTWIDTH] IN BLOOD BY AUTOMATED COUNT: 12.9 % (ref 12.3–15.4)
GFR SERPL CREATININE-BSD FRML MDRD: 120 ML/MIN/1.73
GLOBULIN UR ELPH-MCNC: 2.8 GM/DL
GLUCOSE BLD-MCNC: 97 MG/DL (ref 65–99)
HCT VFR BLD AUTO: 38.3 % (ref 34–46.6)
HGB BLD-MCNC: 12.6 G/DL (ref 12–15.9)
IMM GRANULOCYTES # BLD AUTO: 0.01 10*3/MM3 (ref 0–0.05)
IMM GRANULOCYTES NFR BLD AUTO: 0.3 % (ref 0–0.5)
INR PPP: 0.98 (ref 0.9–1.1)
LYMPHOCYTES # BLD AUTO: 1.45 10*3/MM3 (ref 0.7–3.1)
LYMPHOCYTES NFR BLD AUTO: 41.7 % (ref 19.6–45.3)
MCH RBC QN AUTO: 29.7 PG (ref 26.6–33)
MCHC RBC AUTO-ENTMCNC: 32.9 G/DL (ref 31.5–35.7)
MCV RBC AUTO: 90.3 FL (ref 79–97)
MONOCYTES # BLD AUTO: 0.34 10*3/MM3 (ref 0.1–0.9)
MONOCYTES NFR BLD AUTO: 9.8 % (ref 5–12)
NEUTROPHILS # BLD AUTO: 1.59 10*3/MM3 (ref 1.7–7)
NEUTROPHILS NFR BLD AUTO: 45.6 % (ref 42.7–76)
NRBC BLD AUTO-RTO: 0 /100 WBC (ref 0–0.2)
PLATELET # BLD AUTO: 246 10*3/MM3 (ref 140–450)
PMV BLD AUTO: 9.8 FL (ref 6–12)
POTASSIUM BLD-SCNC: 3.6 MMOL/L (ref 3.5–5.2)
PROT SERPL-MCNC: 7.3 G/DL (ref 6–8.5)
PROTHROMBIN TIME: 12.7 SECONDS (ref 11.7–14.2)
RBC # BLD AUTO: 4.24 10*6/MM3 (ref 3.77–5.28)
SODIUM BLD-SCNC: 134 MMOL/L (ref 136–145)
WBC NRBC COR # BLD: 3.48 10*3/MM3 (ref 3.4–10.8)

## 2020-06-02 PROCEDURE — 93005 ELECTROCARDIOGRAM TRACING: CPT

## 2020-06-02 PROCEDURE — 36415 COLL VENOUS BLD VENIPUNCTURE: CPT

## 2020-06-02 PROCEDURE — 73560 X-RAY EXAM OF KNEE 1 OR 2: CPT

## 2020-06-02 PROCEDURE — 93010 ELECTROCARDIOGRAM REPORT: CPT | Performed by: INTERNAL MEDICINE

## 2020-06-02 PROCEDURE — 85610 PROTHROMBIN TIME: CPT | Performed by: ORTHOPAEDIC SURGERY

## 2020-06-02 PROCEDURE — 85730 THROMBOPLASTIN TIME PARTIAL: CPT | Performed by: ORTHOPAEDIC SURGERY

## 2020-06-02 PROCEDURE — 80053 COMPREHEN METABOLIC PANEL: CPT | Performed by: ORTHOPAEDIC SURGERY

## 2020-06-02 PROCEDURE — 71046 X-RAY EXAM CHEST 2 VIEWS: CPT

## 2020-06-02 PROCEDURE — 85025 COMPLETE CBC W/AUTO DIFF WBC: CPT | Performed by: ORTHOPAEDIC SURGERY

## 2020-06-02 RX ORDER — MELOXICAM 7.5 MG/1
TABLET ORAL
Qty: 90 TABLET | Refills: 0 | OUTPATIENT
Start: 2020-06-02

## 2020-06-02 RX ORDER — CHLORHEXIDINE GLUCONATE 500 MG/1
CLOTH TOPICAL
Status: ON HOLD | COMMUNITY
End: 2020-06-12

## 2020-06-02 ASSESSMENT — KOOS JR
KOOS JR SCORE: 47.487
KOOS JR SCORE: 16

## 2020-06-02 NOTE — DISCHARGE INSTRUCTIONS
ARRIVE DAY OF SURGERY AT 11:00 AM TO MAIN SURGERY      Take the following medications the morning of surgery:    NONE    General Instructions:  • Do not eat solid food after midnight the night before surgery.  • You may drink clear liquids day of surgery but must stop at least one hour before your hospital arrival time.  • It is beneficial for you to have a clear drink that contains carbohydrates the day of surgery.  We suggest a 12 to 20 ounce bottle of Gatorade or Powerade for non-diabetic patients or a 12 to 20 ounce bottle of G2 or Powerade Zero for diabetic patients. (Pediatric patients, are not advised to drink a 12 to 20 ounce carbohydrate drink)    Clear liquids are liquids you can see through.  Nothing red in color.     Plain water                               Sports drinks  Sodas                                   Gelatin (Jell-O)  Fruit juices without pulp such as white grape juice and apple juice  Popsicles that contain no fruit or yogurt  Tea or coffee (no cream or milk added)  Gatorade / Powerade  G2 / Powerade Zero    • Infants may have breast milk up to four hours before surgery.  • Infants drinking formula may drink formula up to six hours before surgery.   • Patients who avoid smoking, chewing tobacco and alcohol for 4 weeks prior to surgery have a reduced risk of post-operative complications.  Quit smoking as many days before surgery as you can.  • Do not smoke, use chewing tobacco or drink alcohol the day of surgery.   • If applicable bring your C-PAP/ BI-PAP machine.  • Bring any papers given to you in the doctor’s office.  • Wear clean comfortable clothes.  • Do not wear contact lenses, false eyelashes or make-up.  Bring a case for your glasses.   • Bring crutches or walker if applicable.  • Remove all piercings.  Leave jewelry and any other valuables at home.  • Hair extensions with metal clips must be removed prior to surgery.  • The Pre-Admission Testing nurse will instruct you to bring  medications if unable to obtain an accurate list in Pre-Admission Testing.            Preventing a Surgical Site Infection:  • For 2 to 3 days before surgery, avoid shaving with a razor because the razor can irritate skin and make it easier to develop an infection.    • Any areas of open skin can increase the risk of a post-operative wound infection by allowing bacteria to enter and travel throughout the body.  Notify your surgeon if you have any skin wounds / rashes even if it is not near the expected surgical site.  The area will need assessed to determine if surgery should be delayed until it is healed.  • The night prior to surgery shower using a fresh bar of anti-bacterial soap (such as Dial) and clean washcloth.  Sleep in a clean bed with clean clothing.  Do not allow pets to sleep with you.  • Shower on the morning of surgery using a fresh bar of anti-bacterial soap (such as Dial) and clean washcloth.  Dry with a clean towel and dress in clean clothing.  • Ask your surgeon if you will be receiving antibiotics prior to surgery.  • Make sure you, your family, and all healthcare providers clean their hands with soap and water or an alcohol based hand  before caring for you or your wound.    Day of surgery:  Your arrival time is approximately two hours before your scheduled surgery time.  Upon arrival, a Pre-op nurse and Anesthesiologist will review your health history, obtain vital signs, and answer questions you may have.  The only belongings needed at this time will be a list of your home medications and if applicable your C-PAP/BI-PAP machine.  If you are staying overnight your family can leave the rest of your belongings in the car and bring them to your room later.  A Pre-op nurse will start an IV and you may receive medication in preparation for surgery, including something to help you relax.  Your family will be able to see you in the Pre-op area.  Two visitors at a time will be allowed in the  Pre-op room.  While you are in surgery your family should notify the waiting room  if they leave the waiting room area and provide a contact phone number.    Please be aware that surgery does come with discomfort.  We want to make every effort to control your discomfort so please discuss any uncontrolled symptoms with your nurse.   Your doctor will most likely have prescribed pain medications.      If you are going home after surgery you will receive individualized written care instructions before being discharged.  A responsible adult must drive you to and from the hospital on the day of your surgery and stay with you for 24 hours.    If you are staying overnight following surgery, you will be transported to your hospital room following the recovery period.  [facility] has all private rooms.    If you have any questions please call Pre-Admission Testing at [phone].  Deductibles and co-payments are collected on the day of service. Please be prepared to pay the required co-pay, deductible or deposit on the day of service as defined by your plan.    Self-Quarantine Prior to Surgery    • Stay at home. Do not leave your home after you have been given the Covid test for your upcoming surgery.   • Wash your hands often with soap and water for at least 20 seconds especially after you have been in a public place, or after blowing your nose, coughing, or sneezing.  • If soap and water are not readily available, use a hand  that contains at least 60% alcohol. Cover all surfaces of your hands and rub them together until they feel dry.  • Avoid touching your eyes, nose, and mouth with unwashed hands.  • Take everyday precautions to keep space between yourself and others (stay 6 feet away, which is about two arm lengths).  Remember that some people without symptoms may be able to spread virus.  • You should stay in a specific room away from others if possible.  Stay at least 6 feet away  from others in the home if you cannot have a dedicated room to yourself. Do not have visitors.   • Wear a mask around others in your home if you are unable to stay in a separate room or 6 feet apart. If you are unable to wear a mask, have your family member wear a mask if they must be within 6 feet of you.   • Do not snuggle with your pet. While the CDC says there is no evidence that pets can spread COVID-19 or be infected from humans, it is probably best to avoid “petting, snuggling, being kissed or licked and sharing food (during self-quarantine)”, according to the CDC.   • Do not share anything - Have your own utensils, drinking glass, dishes, towels and bedding.   • Do not share a bathroom with others, if possible.   • Clean and disinfect frequently touched services daily. This includes tables, doorknobs, light switches, countertops, handles, desks, phones, keyboards, toilets, faucets, and sinks.  • Do not travel prior to surgery.    Call your surgeon immediately if you experience any of the following symptoms:  • Sore Throat      • Shortness of Breath or difficulty breathing  • Cough  • Chills  • Body soreness or muscle pain  • Headache  • Fever  • New loss of taste or smell  • Do not arrive for your surgery ill.  Your procedure will need to be rescheduled to another time.  You will need to call your physician before the day of surgery to avoid any unnecessary exposure to hospital staff as well as other patients.      CHLORHEXIDINE CLOTH INSTRUCTIONS  The morning of surgery follow these instructions using the Chlorhexidine cloths you've been given.  These steps reduce bacteria on the body.  Do not use the cloths near your eyes, ears mouth, genitalia or on open wounds.  Throw the cloths away after use but do not try to flush them down a toilet.      • Open and remove one cloth at a time from the package.    • Leave the cloth unfolded and begin the bathing.  • Massage the skin with the cloths using gentle  pressure to remove bacteria.  Do not scrub harshly.   • Follow the steps below with one 2% CHG cloth per area (6 total cloths).  • One cloth for neck, shoulders and chest.  • One cloth for both arms, hands, fingers and underarms (do underarms last).  • One cloth for the abdomen followed by groin.  • One cloth for right leg and foot including between the toes.  • One cloth for left leg and foot including between the toes.  • The last cloth is to be used for the back of the neck, back and buttocks.    Allow the CHG to air dry 3 minutes on the skin which will give it time to work and decrease the chance of irritation.  The skin may feel sticky until it is dry.  Do not rinse with water or any other liquid or you will lose the beneficial effects of the CHG.  If mild skin irritation occurs, do rinse the skin to remove the CHG.  Report this to the nurse at time of admission.  Do not apply lotions, creams, ointments, deodorants or perfumes after using the clothes. Dress in clean clothes before coming to the hospital.    BACTROBAN NASAL OINTMENT  There are many germs normally in your nose. Bactroban is an ointment that will help reduce these germs. Please follow these instructions for Bactroban use:      ____The day before surgery in the morning  Date________    ____The day before surgery in the evening              Date________    ____The day of surgery in the morning    Date________    **Squirt ½ package of Bactroban Ointment onto a cotton applicator and apply to inside of 1st nostril.  Squirt the remaining Bactroban and apply to the inside of the other nostril.

## 2020-06-09 ENCOUNTER — LAB (OUTPATIENT)
Dept: LAB | Facility: HOSPITAL | Age: 75
End: 2020-06-09

## 2020-06-09 DIAGNOSIS — Z01.818 OTHER SPECIFIED PRE-OPERATIVE EXAMINATION: ICD-10-CM

## 2020-06-09 PROCEDURE — U0004 COV-19 TEST NON-CDC HGH THRU: HCPCS

## 2020-06-10 LAB
REF LAB TEST METHOD: NORMAL
SARS-COV-2 RNA RESP QL NAA+PROBE: NOT DETECTED

## 2020-06-12 ENCOUNTER — APPOINTMENT (OUTPATIENT)
Dept: GENERAL RADIOLOGY | Facility: HOSPITAL | Age: 75
End: 2020-06-12

## 2020-06-12 ENCOUNTER — ANESTHESIA EVENT (OUTPATIENT)
Dept: PERIOP | Facility: HOSPITAL | Age: 75
End: 2020-06-12

## 2020-06-12 ENCOUNTER — HOSPITAL ENCOUNTER (INPATIENT)
Facility: HOSPITAL | Age: 75
LOS: 2 days | Discharge: HOME HEALTH | End: 2020-06-14
Attending: ORTHOPAEDIC SURGERY | Admitting: ORTHOPAEDIC SURGERY

## 2020-06-12 ENCOUNTER — ANESTHESIA (OUTPATIENT)
Dept: PERIOP | Facility: HOSPITAL | Age: 75
End: 2020-06-12

## 2020-06-12 DIAGNOSIS — M17.12 PRIMARY OSTEOARTHRITIS OF LEFT KNEE: ICD-10-CM

## 2020-06-12 DIAGNOSIS — Z96.652 STATUS POST LEFT KNEE REPLACEMENT: Primary | ICD-10-CM

## 2020-06-12 PROCEDURE — C9290 INJ, BUPIVACAINE LIPOSOME: HCPCS | Performed by: ORTHOPAEDIC SURGERY

## 2020-06-12 PROCEDURE — 25010000002 ONDANSETRON PER 1 MG: Performed by: ANESTHESIOLOGY

## 2020-06-12 PROCEDURE — 25010000002 HYDROMORPHONE PER 4 MG: Performed by: ORTHOPAEDIC SURGERY

## 2020-06-12 PROCEDURE — 25010000002 FENTANYL CITRATE (PF) 100 MCG/2ML SOLUTION: Performed by: ANESTHESIOLOGY

## 2020-06-12 PROCEDURE — 25010000002 PROPOFOL 10 MG/ML EMULSION: Performed by: ANESTHESIOLOGY

## 2020-06-12 PROCEDURE — 25010000002 HYDROMORPHONE PER 4 MG: Performed by: ANESTHESIOLOGY

## 2020-06-12 PROCEDURE — 25010000002 ROPIVACAINE PER 1 MG: Performed by: ORTHOPAEDIC SURGERY

## 2020-06-12 PROCEDURE — 25010000002 ROPIVACAINE PER 1 MG: Performed by: ANESTHESIOLOGY

## 2020-06-12 PROCEDURE — 25010000003 CEFAZOLIN IN DEXTROSE 2-4 GM/100ML-% SOLUTION: Performed by: ORTHOPAEDIC SURGERY

## 2020-06-12 PROCEDURE — 0SRD0J9 REPLACEMENT OF LEFT KNEE JOINT WITH SYNTHETIC SUBSTITUTE, CEMENTED, OPEN APPROACH: ICD-10-PCS | Performed by: ORTHOPAEDIC SURGERY

## 2020-06-12 PROCEDURE — C1713 ANCHOR/SCREW BN/BN,TIS/BN: HCPCS | Performed by: ORTHOPAEDIC SURGERY

## 2020-06-12 PROCEDURE — 76942 ECHO GUIDE FOR BIOPSY: CPT

## 2020-06-12 PROCEDURE — 25010000002 ONDANSETRON PER 1 MG: Performed by: ORTHOPAEDIC SURGERY

## 2020-06-12 PROCEDURE — 25010000002 FENTANYL CITRATE (PF) 100 MCG/2ML SOLUTION: Performed by: NURSE ANESTHETIST, CERTIFIED REGISTERED

## 2020-06-12 PROCEDURE — C1776 JOINT DEVICE (IMPLANTABLE): HCPCS | Performed by: ORTHOPAEDIC SURGERY

## 2020-06-12 PROCEDURE — 25010000002 PROMETHAZINE PER 50 MG: Performed by: HOSPITALIST

## 2020-06-12 PROCEDURE — 73560 X-RAY EXAM OF KNEE 1 OR 2: CPT

## 2020-06-12 PROCEDURE — 25010000003 BUPIVACAINE LIPOSOME 1.3 % SUSPENSION 20 ML VIAL: Performed by: ORTHOPAEDIC SURGERY

## 2020-06-12 PROCEDURE — 25010000002 MIDAZOLAM PER 1 MG: Performed by: ANESTHESIOLOGY

## 2020-06-12 DEVICE — DEV CONTRL TISS STRATAFIX SYMM PDS PLUS VIL CT-1 60CM: Type: IMPLANTABLE DEVICE | Site: KNEE | Status: FUNCTIONAL

## 2020-06-12 DEVICE — CMT BONE SIMPLEX/P FULL DOSE 10/PK: Type: IMPLANTABLE DEVICE | Site: KNEE | Status: FUNCTIONAL

## 2020-06-12 DEVICE — INSRT TIB/KN TRIATHLON CONDY/STBL X3 SZ3 9MM: Type: IMPLANTABLE DEVICE | Site: KNEE | Status: FUNCTIONAL

## 2020-06-12 DEVICE — BASEPLT TIB TRIATH CMT NO3: Type: IMPLANTABLE DEVICE | Site: KNEE | Status: FUNCTIONAL

## 2020-06-12 DEVICE — CMT BONE SIMPLEX/P 1/2DOSE 10PK: Type: IMPLANTABLE DEVICE | Site: KNEE | Status: FUNCTIONAL

## 2020-06-12 DEVICE — PAT TRIATH SYMM X3 8X27MM: Type: IMPLANTABLE DEVICE | Site: KNEE | Status: FUNCTIONAL

## 2020-06-12 DEVICE — DEV CONTRL TISS STRATAFIX PDS PLS OS6 REV SZ1 18IN 45CM: Type: IMPLANTABLE DEVICE | Site: KNEE | Status: FUNCTIONAL

## 2020-06-12 DEVICE — TOTL KN HI DEMAND STRYKER: Type: IMPLANTABLE DEVICE | Site: KNEE | Status: FUNCTIONAL

## 2020-06-12 DEVICE — COMP FEM TRIATH CR NO3 LT: Type: IMPLANTABLE DEVICE | Site: KNEE | Status: FUNCTIONAL

## 2020-06-12 RX ORDER — HYDROCODONE BITARTRATE AND ACETAMINOPHEN 7.5; 325 MG/1; MG/1
1 TABLET ORAL ONCE AS NEEDED
Status: DISCONTINUED | OUTPATIENT
Start: 2020-06-12 | End: 2020-06-12 | Stop reason: HOSPADM

## 2020-06-12 RX ORDER — PANTOPRAZOLE SODIUM 40 MG/1
40 TABLET, DELAYED RELEASE ORAL NIGHTLY
Status: DISCONTINUED | OUTPATIENT
Start: 2020-06-12 | End: 2020-06-14 | Stop reason: HOSPADM

## 2020-06-12 RX ORDER — HYDROMORPHONE HYDROCHLORIDE 1 MG/ML
0.5 INJECTION, SOLUTION INTRAMUSCULAR; INTRAVENOUS; SUBCUTANEOUS
Status: DISCONTINUED | OUTPATIENT
Start: 2020-06-12 | End: 2020-06-14 | Stop reason: HOSPADM

## 2020-06-12 RX ORDER — ASPIRIN 81 MG/1
81 TABLET ORAL EVERY 12 HOURS SCHEDULED
Status: DISCONTINUED | OUTPATIENT
Start: 2020-06-12 | End: 2020-06-14 | Stop reason: HOSPADM

## 2020-06-12 RX ORDER — EPHEDRINE SULFATE 50 MG/ML
5 INJECTION, SOLUTION INTRAVENOUS ONCE AS NEEDED
Status: DISCONTINUED | OUTPATIENT
Start: 2020-06-12 | End: 2020-06-12 | Stop reason: HOSPADM

## 2020-06-12 RX ORDER — UREA 10 %
1 LOTION (ML) TOPICAL NIGHTLY PRN
Status: DISCONTINUED | OUTPATIENT
Start: 2020-06-12 | End: 2020-06-14 | Stop reason: HOSPADM

## 2020-06-12 RX ORDER — ONDANSETRON 2 MG/ML
INJECTION INTRAMUSCULAR; INTRAVENOUS AS NEEDED
Status: DISCONTINUED | OUTPATIENT
Start: 2020-06-12 | End: 2020-06-12 | Stop reason: SURG

## 2020-06-12 RX ORDER — MAGNESIUM HYDROXIDE 1200 MG/15ML
LIQUID ORAL AS NEEDED
Status: DISCONTINUED | OUTPATIENT
Start: 2020-06-12 | End: 2020-06-12 | Stop reason: HOSPADM

## 2020-06-12 RX ORDER — FENTANYL CITRATE 50 UG/ML
50 INJECTION, SOLUTION INTRAMUSCULAR; INTRAVENOUS
Status: DISCONTINUED | OUTPATIENT
Start: 2020-06-12 | End: 2020-06-12 | Stop reason: HOSPADM

## 2020-06-12 RX ORDER — MELOXICAM 15 MG/1
7.5 TABLET ORAL DAILY PRN
Status: DISCONTINUED | OUTPATIENT
Start: 2020-06-12 | End: 2020-06-14 | Stop reason: HOSPADM

## 2020-06-12 RX ORDER — LABETALOL HYDROCHLORIDE 5 MG/ML
5 INJECTION, SOLUTION INTRAVENOUS
Status: DISCONTINUED | OUTPATIENT
Start: 2020-06-12 | End: 2020-06-12 | Stop reason: HOSPADM

## 2020-06-12 RX ORDER — ONDANSETRON 2 MG/ML
4 INJECTION INTRAMUSCULAR; INTRAVENOUS ONCE AS NEEDED
Status: DISCONTINUED | OUTPATIENT
Start: 2020-06-12 | End: 2020-06-12 | Stop reason: HOSPADM

## 2020-06-12 RX ORDER — NALOXONE HCL 0.4 MG/ML
0.2 VIAL (ML) INJECTION AS NEEDED
Status: DISCONTINUED | OUTPATIENT
Start: 2020-06-12 | End: 2020-06-12 | Stop reason: HOSPADM

## 2020-06-12 RX ORDER — ROPIVACAINE HYDROCHLORIDE 5 MG/ML
INJECTION, SOLUTION EPIDURAL; INFILTRATION; PERINEURAL AS NEEDED
Status: DISCONTINUED | OUTPATIENT
Start: 2020-06-12 | End: 2020-06-12 | Stop reason: HOSPADM

## 2020-06-12 RX ORDER — FENTANYL CITRATE 50 UG/ML
INJECTION, SOLUTION INTRAMUSCULAR; INTRAVENOUS AS NEEDED
Status: DISCONTINUED | OUTPATIENT
Start: 2020-06-12 | End: 2020-06-12 | Stop reason: SURG

## 2020-06-12 RX ORDER — OXYCODONE AND ACETAMINOPHEN 7.5; 325 MG/1; MG/1
1 TABLET ORAL ONCE AS NEEDED
Status: DISCONTINUED | OUTPATIENT
Start: 2020-06-12 | End: 2020-06-12 | Stop reason: HOSPADM

## 2020-06-12 RX ORDER — ONDANSETRON 2 MG/ML
4 INJECTION INTRAMUSCULAR; INTRAVENOUS EVERY 6 HOURS PRN
Status: DISCONTINUED | OUTPATIENT
Start: 2020-06-12 | End: 2020-06-14 | Stop reason: HOSPADM

## 2020-06-12 RX ORDER — DIPHENHYDRAMINE HCL 25 MG
25 CAPSULE ORAL
Status: DISCONTINUED | OUTPATIENT
Start: 2020-06-12 | End: 2020-06-12 | Stop reason: HOSPADM

## 2020-06-12 RX ORDER — PROPOFOL 10 MG/ML
VIAL (ML) INTRAVENOUS AS NEEDED
Status: DISCONTINUED | OUTPATIENT
Start: 2020-06-12 | End: 2020-06-12 | Stop reason: SURG

## 2020-06-12 RX ORDER — ONDANSETRON 4 MG/1
4 TABLET, FILM COATED ORAL EVERY 6 HOURS PRN
Status: DISCONTINUED | OUTPATIENT
Start: 2020-06-12 | End: 2020-06-14 | Stop reason: HOSPADM

## 2020-06-12 RX ORDER — GLYCOPYRROLATE 0.2 MG/ML
INJECTION INTRAMUSCULAR; INTRAVENOUS AS NEEDED
Status: DISCONTINUED | OUTPATIENT
Start: 2020-06-12 | End: 2020-06-12 | Stop reason: SURG

## 2020-06-12 RX ORDER — FLUTICASONE PROPIONATE 50 MCG
2 SPRAY, SUSPENSION (ML) NASAL 2 TIMES DAILY
Status: DISCONTINUED | OUTPATIENT
Start: 2020-06-12 | End: 2020-06-14 | Stop reason: HOSPADM

## 2020-06-12 RX ORDER — LIDOCAINE HYDROCHLORIDE AND EPINEPHRINE BITARTRATE 20; .01 MG/ML; MG/ML
INJECTION, SOLUTION SUBCUTANEOUS
Status: COMPLETED | OUTPATIENT
Start: 2020-06-12 | End: 2020-06-12

## 2020-06-12 RX ORDER — SODIUM CHLORIDE 0.9 % (FLUSH) 0.9 %
10 SYRINGE (ML) INJECTION EVERY 12 HOURS SCHEDULED
Status: DISCONTINUED | OUTPATIENT
Start: 2020-06-12 | End: 2020-06-12 | Stop reason: HOSPADM

## 2020-06-12 RX ORDER — SODIUM CHLORIDE, SODIUM LACTATE, POTASSIUM CHLORIDE, CALCIUM CHLORIDE 600; 310; 30; 20 MG/100ML; MG/100ML; MG/100ML; MG/100ML
9 INJECTION, SOLUTION INTRAVENOUS CONTINUOUS
Status: DISCONTINUED | OUTPATIENT
Start: 2020-06-12 | End: 2020-06-12 | Stop reason: HOSPADM

## 2020-06-12 RX ORDER — FLUMAZENIL 0.1 MG/ML
0.2 INJECTION INTRAVENOUS AS NEEDED
Status: DISCONTINUED | OUTPATIENT
Start: 2020-06-12 | End: 2020-06-12 | Stop reason: HOSPADM

## 2020-06-12 RX ORDER — HYDRALAZINE HYDROCHLORIDE 20 MG/ML
5 INJECTION INTRAMUSCULAR; INTRAVENOUS
Status: DISCONTINUED | OUTPATIENT
Start: 2020-06-12 | End: 2020-06-12 | Stop reason: HOSPADM

## 2020-06-12 RX ORDER — ROPIVACAINE HYDROCHLORIDE 2 MG/ML
INJECTION, SOLUTION EPIDURAL; INFILTRATION; PERINEURAL
Status: COMPLETED | OUTPATIENT
Start: 2020-06-12 | End: 2020-06-12

## 2020-06-12 RX ORDER — PROMETHAZINE HYDROCHLORIDE 25 MG/1
25 SUPPOSITORY RECTAL ONCE AS NEEDED
Status: DISCONTINUED | OUTPATIENT
Start: 2020-06-12 | End: 2020-06-12 | Stop reason: HOSPADM

## 2020-06-12 RX ORDER — PROMETHAZINE HYDROCHLORIDE 25 MG/ML
6.25 INJECTION, SOLUTION INTRAMUSCULAR; INTRAVENOUS
Status: DISCONTINUED | OUTPATIENT
Start: 2020-06-12 | End: 2020-06-12 | Stop reason: HOSPADM

## 2020-06-12 RX ORDER — CEFAZOLIN SODIUM 2 G/100ML
2 INJECTION, SOLUTION INTRAVENOUS ONCE
Status: COMPLETED | OUTPATIENT
Start: 2020-06-12 | End: 2020-06-12

## 2020-06-12 RX ORDER — DOCUSATE SODIUM 100 MG/1
100 CAPSULE, LIQUID FILLED ORAL 2 TIMES DAILY
Status: DISCONTINUED | OUTPATIENT
Start: 2020-06-12 | End: 2020-06-14 | Stop reason: HOSPADM

## 2020-06-12 RX ORDER — CETIRIZINE HYDROCHLORIDE 10 MG/1
10 TABLET ORAL DAILY PRN
Status: DISCONTINUED | OUTPATIENT
Start: 2020-06-12 | End: 2020-06-14 | Stop reason: HOSPADM

## 2020-06-12 RX ORDER — SODIUM CHLORIDE 0.9 % (FLUSH) 0.9 %
10 SYRINGE (ML) INJECTION AS NEEDED
Status: DISCONTINUED | OUTPATIENT
Start: 2020-06-12 | End: 2020-06-12 | Stop reason: HOSPADM

## 2020-06-12 RX ORDER — PROMETHAZINE HYDROCHLORIDE 25 MG/ML
12.5 INJECTION, SOLUTION INTRAMUSCULAR; INTRAVENOUS EVERY 4 HOURS PRN
Status: DISCONTINUED | OUTPATIENT
Start: 2020-06-12 | End: 2020-06-14 | Stop reason: HOSPADM

## 2020-06-12 RX ORDER — CEFAZOLIN SODIUM 2 G/100ML
2 INJECTION, SOLUTION INTRAVENOUS EVERY 8 HOURS
Status: COMPLETED | OUTPATIENT
Start: 2020-06-12 | End: 2020-06-13

## 2020-06-12 RX ORDER — PROMETHAZINE HYDROCHLORIDE 25 MG/1
25 TABLET ORAL ONCE AS NEEDED
Status: DISCONTINUED | OUTPATIENT
Start: 2020-06-12 | End: 2020-06-12 | Stop reason: HOSPADM

## 2020-06-12 RX ORDER — BISACODYL 5 MG/1
10 TABLET, DELAYED RELEASE ORAL DAILY PRN
Status: DISCONTINUED | OUTPATIENT
Start: 2020-06-13 | End: 2020-06-14 | Stop reason: HOSPADM

## 2020-06-12 RX ORDER — LIDOCAINE HYDROCHLORIDE 20 MG/ML
INJECTION, SOLUTION INFILTRATION; PERINEURAL AS NEEDED
Status: DISCONTINUED | OUTPATIENT
Start: 2020-06-12 | End: 2020-06-12 | Stop reason: SURG

## 2020-06-12 RX ORDER — HYDROCODONE BITARTRATE AND ACETAMINOPHEN 7.5; 325 MG/1; MG/1
1 TABLET ORAL EVERY 4 HOURS PRN
Status: DISCONTINUED | OUTPATIENT
Start: 2020-06-12 | End: 2020-06-13

## 2020-06-12 RX ORDER — TRANEXAMIC ACID 100 MG/ML
INJECTION, SOLUTION INTRAVENOUS AS NEEDED
Status: DISCONTINUED | OUTPATIENT
Start: 2020-06-12 | End: 2020-06-12 | Stop reason: SURG

## 2020-06-12 RX ORDER — MIDAZOLAM HYDROCHLORIDE 1 MG/ML
2 INJECTION INTRAMUSCULAR; INTRAVENOUS
Status: DISCONTINUED | OUTPATIENT
Start: 2020-06-12 | End: 2020-06-12 | Stop reason: HOSPADM

## 2020-06-12 RX ORDER — ACETAMINOPHEN 325 MG/1
650 TABLET ORAL ONCE AS NEEDED
Status: DISCONTINUED | OUTPATIENT
Start: 2020-06-12 | End: 2020-06-12 | Stop reason: HOSPADM

## 2020-06-12 RX ORDER — ROCURONIUM BROMIDE 10 MG/ML
INJECTION, SOLUTION INTRAVENOUS AS NEEDED
Status: DISCONTINUED | OUTPATIENT
Start: 2020-06-12 | End: 2020-06-12 | Stop reason: SURG

## 2020-06-12 RX ORDER — MIDAZOLAM HYDROCHLORIDE 1 MG/ML
1 INJECTION INTRAMUSCULAR; INTRAVENOUS
Status: DISCONTINUED | OUTPATIENT
Start: 2020-06-12 | End: 2020-06-12 | Stop reason: HOSPADM

## 2020-06-12 RX ORDER — SODIUM CHLORIDE 9 MG/ML
100 INJECTION, SOLUTION INTRAVENOUS CONTINUOUS
Status: DISCONTINUED | OUTPATIENT
Start: 2020-06-12 | End: 2020-06-13

## 2020-06-12 RX ORDER — HYDROCODONE BITARTRATE AND ACETAMINOPHEN 7.5; 325 MG/1; MG/1
2 TABLET ORAL EVERY 4 HOURS PRN
Status: DISCONTINUED | OUTPATIENT
Start: 2020-06-12 | End: 2020-06-13

## 2020-06-12 RX ORDER — ACETAMINOPHEN 325 MG/1
325 TABLET ORAL EVERY 4 HOURS PRN
Status: DISCONTINUED | OUTPATIENT
Start: 2020-06-12 | End: 2020-06-14 | Stop reason: HOSPADM

## 2020-06-12 RX ORDER — FAMOTIDINE 10 MG/ML
20 INJECTION, SOLUTION INTRAVENOUS ONCE
Status: COMPLETED | OUTPATIENT
Start: 2020-06-12 | End: 2020-06-12

## 2020-06-12 RX ORDER — HYDROMORPHONE HYDROCHLORIDE 1 MG/ML
0.5 INJECTION, SOLUTION INTRAMUSCULAR; INTRAVENOUS; SUBCUTANEOUS
Status: DISCONTINUED | OUTPATIENT
Start: 2020-06-12 | End: 2020-06-12 | Stop reason: HOSPADM

## 2020-06-12 RX ORDER — NALOXONE HCL 0.4 MG/ML
0.1 VIAL (ML) INJECTION
Status: DISCONTINUED | OUTPATIENT
Start: 2020-06-12 | End: 2020-06-14 | Stop reason: HOSPADM

## 2020-06-12 RX ORDER — PROMETHAZINE HYDROCHLORIDE 25 MG/1
25 TABLET ORAL EVERY 6 HOURS PRN
Status: DISCONTINUED | OUTPATIENT
Start: 2020-06-12 | End: 2020-06-14 | Stop reason: HOSPADM

## 2020-06-12 RX ORDER — OXYCODONE HCL 10 MG/1
20 TABLET, FILM COATED, EXTENDED RELEASE ORAL ONCE
Status: COMPLETED | OUTPATIENT
Start: 2020-06-12 | End: 2020-06-12

## 2020-06-12 RX ORDER — PROMETHAZINE HYDROCHLORIDE 25 MG/ML
12.5 INJECTION, SOLUTION INTRAMUSCULAR; INTRAVENOUS ONCE AS NEEDED
Status: DISCONTINUED | OUTPATIENT
Start: 2020-06-12 | End: 2020-06-12 | Stop reason: HOSPADM

## 2020-06-12 RX ORDER — ACETAMINOPHEN 500 MG
1000 TABLET ORAL ONCE
Status: COMPLETED | OUTPATIENT
Start: 2020-06-12 | End: 2020-06-12

## 2020-06-12 RX ORDER — DIPHENHYDRAMINE HYDROCHLORIDE 50 MG/ML
12.5 INJECTION INTRAMUSCULAR; INTRAVENOUS
Status: DISCONTINUED | OUTPATIENT
Start: 2020-06-12 | End: 2020-06-12 | Stop reason: HOSPADM

## 2020-06-12 RX ADMIN — ROCURONIUM BROMIDE 40 MG: 10 INJECTION, SOLUTION INTRAVENOUS at 15:03

## 2020-06-12 RX ADMIN — MIDAZOLAM 1 MG: 1 INJECTION INTRAMUSCULAR; INTRAVENOUS at 12:13

## 2020-06-12 RX ADMIN — OXYCODONE HYDROCHLORIDE 20 MG: 10 TABLET, FILM COATED, EXTENDED RELEASE ORAL at 11:19

## 2020-06-12 RX ADMIN — SODIUM CHLORIDE, POTASSIUM CHLORIDE, SODIUM LACTATE AND CALCIUM CHLORIDE 9 ML/HR: 600; 310; 30; 20 INJECTION, SOLUTION INTRAVENOUS at 12:14

## 2020-06-12 RX ADMIN — FENTANYL CITRATE 50 MCG: 50 INJECTION INTRAMUSCULAR; INTRAVENOUS at 14:58

## 2020-06-12 RX ADMIN — SUGAMMADEX 200 MG: 100 INJECTION, SOLUTION INTRAVENOUS at 16:43

## 2020-06-12 RX ADMIN — ONDANSETRON 4 MG: 2 INJECTION INTRAMUSCULAR; INTRAVENOUS at 20:16

## 2020-06-12 RX ADMIN — PROPOFOL 150 MG: 10 INJECTION, EMULSION INTRAVENOUS at 15:00

## 2020-06-12 RX ADMIN — ONDANSETRON HYDROCHLORIDE 4 MG: 2 SOLUTION INTRAMUSCULAR; INTRAVENOUS at 16:19

## 2020-06-12 RX ADMIN — CEFAZOLIN SODIUM 2 G: 2 INJECTION, SOLUTION INTRAVENOUS at 23:03

## 2020-06-12 RX ADMIN — PROMETHAZINE HYDROCHLORIDE 12.5 MG: 25 INJECTION INTRAMUSCULAR; INTRAVENOUS at 23:02

## 2020-06-12 RX ADMIN — CEFAZOLIN SODIUM 2 G: 2 INJECTION, SOLUTION INTRAVENOUS at 14:55

## 2020-06-12 RX ADMIN — HYDROMORPHONE HYDROCHLORIDE 0.5 MG: 1 INJECTION, SOLUTION INTRAMUSCULAR; INTRAVENOUS; SUBCUTANEOUS at 17:53

## 2020-06-12 RX ADMIN — FENTANYL CITRATE 25 MCG: 50 INJECTION INTRAMUSCULAR; INTRAVENOUS at 16:46

## 2020-06-12 RX ADMIN — FENTANYL CITRATE 25 MCG: 50 INJECTION INTRAMUSCULAR; INTRAVENOUS at 16:21

## 2020-06-12 RX ADMIN — TRANEXAMIC ACID 1000 MG: 100 INJECTION, SOLUTION INTRAVENOUS at 15:17

## 2020-06-12 RX ADMIN — ROPIVACAINE HYDROCHLORIDE 25 ML: 2 INJECTION, SOLUTION EPIDURAL; INFILTRATION at 12:32

## 2020-06-12 RX ADMIN — FAMOTIDINE 20 MG: 10 INJECTION, SOLUTION INTRAVENOUS at 12:13

## 2020-06-12 RX ADMIN — GLYCOPYRROLATE 0.2 MG: 0.2 INJECTION INTRAMUSCULAR; INTRAVENOUS at 15:38

## 2020-06-12 RX ADMIN — HYDROMORPHONE HYDROCHLORIDE 0.5 MG: 1 INJECTION, SOLUTION INTRAMUSCULAR; INTRAVENOUS; SUBCUTANEOUS at 17:44

## 2020-06-12 RX ADMIN — ACETAMINOPHEN 1000 MG: 500 TABLET, FILM COATED ORAL at 11:19

## 2020-06-12 RX ADMIN — FENTANYL CITRATE 100 MCG: 50 INJECTION, SOLUTION INTRAMUSCULAR; INTRAVENOUS at 12:12

## 2020-06-12 RX ADMIN — LIDOCAINE HYDROCHLORIDE AND EPINEPHRINE 15 ML: 20; 10 INJECTION, SOLUTION INFILTRATION; PERINEURAL at 12:32

## 2020-06-12 RX ADMIN — FENTANYL CITRATE 50 MCG: 50 INJECTION, SOLUTION INTRAMUSCULAR; INTRAVENOUS at 17:32

## 2020-06-12 RX ADMIN — SODIUM CHLORIDE, POTASSIUM CHLORIDE, SODIUM LACTATE AND CALCIUM CHLORIDE: 600; 310; 30; 20 INJECTION, SOLUTION INTRAVENOUS at 16:46

## 2020-06-12 RX ADMIN — LIDOCAINE HYDROCHLORIDE 60 MG: 20 INJECTION, SOLUTION INFILTRATION; PERINEURAL at 14:57

## 2020-06-12 RX ADMIN — HYDROMORPHONE HYDROCHLORIDE 0.5 MG: 1 INJECTION, SOLUTION INTRAMUSCULAR; INTRAVENOUS; SUBCUTANEOUS at 23:02

## 2020-06-12 RX ADMIN — FENTANYL CITRATE 50 MCG: 50 INJECTION, SOLUTION INTRAMUSCULAR; INTRAVENOUS at 17:21

## 2020-06-12 NOTE — OP NOTE
Patient: Isabel Carlson  YOB: 1945  Medical Record Number: [id number]  Attending Physician: Jose Leary,*  Primary Care Physician: Cordell Holland MD    Date of Service: 6/12/2020    Surgeon: Jose Leary MD        DATE OF PROCEDURE: 6/12/2020    Pre-op Diagnosis:   Primary osteoarthritis of left knee [M17.12]    Post-Op Diagnosis Codes:  Primary osteoarthritis of left knee [M17.12]    PROCEDURE PERFORMED: LEFT TOTAL KNEE ARTHROPLASTY by utilizing a   Medial parapatellar Approach   [employer] Triathlon   Primary tibial base plate size # 3,   Cruciate Retaining femur size # 3,   Tibial-bearing insert  CS size # 3, 9 mm thick   Asymmetric patella size #  27, 8 mm thickness  ([place]).     The tibia , femur and the patella were cemented in utilizing Simplex  cement.     Implant Name Type Inv. Item Serial No.  Lot No. LRB No. Used   SUT CONTRL TISS STRATAFIX PDS PLS OS6 REV SZ1 18IN 45CM - [id number] Implant SUT CONTRL TISS STRATAFIX PDS PLS OS6 REV SZ1 18IN 45CM  [employer]  DIV [employer] J [employer] J  Left 1   SUT CONTRL TISS STRATAFIX SYMM PDS PLUS EUN CT-1 60CM - [id number] Implant SUT CONTRL TISS STRATAFIX SYMM PDS PLUS EUN CT-1 60CM  [employer]  DIV OF J [employer] J  Left 1   CMT BONE SIMPLEX/P FULL DOSE 10/PK - [id number] Implant CMT BONE SIMPLEX/P FULL DOSE 10/PK  [employer] OLEGAR[employer] [id number] Left 1   CMT BONE SIMPLEX/P 1/2DOSE 10PK - [id number] Implant CMT BONE SIMPLEX/P 1/2DOSE 10PK  [employer] OLEGAR[employer] [id number] Left 1   BASEPLT TIB TRIATH CMT NO3 - [id number] Implant BASEPLT TIB TRIATH CMT NO3  [employer] OLEGAR[employer] [id number] Left 1   PAT TRIATH SYMM X3 8X27MM - [id number] Implant PAT TRIATH SYMM X3 8X27MM  DELF[employer] OLEGARIO [id number] Left 1   COMP FEM TRIATH CR NO3 LT - [id number] Implant COMP FEM TRIATH CR NO3 LT  DELF[employer] OLEGAR[employer] [id number] Left 1   INSRT TIB TRIATH C/S X3 SZ3 9MM - [id number] Implant INSRT TIB TRIATH C/S X3 SZ3 9MM  DE[employer] OLEGAR[employer] [id number] Left 1       SURGEON: Jose Leary MD      ASSISTANT: Kennedy Michael MD Fellow    JOSE [place]        The services of a skilled  first assist were necessary for performing the procedure safely and expeditiously.  The first assist was present for the entire duration of the case and helped with positioning, retraction and closure of the incision.      ANESTHESIA: General with Block  Anesthesiologist: Rodrigue Hill MD; Nazario Miller MD; Aguilar Simms MD  CRNA: Joe Mcguire CRNA  General anaesthesia with a regional adductor  Canal block  and intraoperative periarticular  Exparel and ropivacain3 injection.    Estimated Blood Loss: 100ml    Specimens: * No orders in the log *    COMPLICATIONS: Nil.     DRAINS: * No LDAs found *    INDICATIONS: The patient is a 74 y.o. female  who is known to me from progressively  worsening  knee pain  over the past several months. The patient has reached the point of disability and is having difficulty with activities of daily living including, but not limited to: difficulty getting up from a chair, difficulty ambulating distances, difficulty with stairs, and complains of night pain . Having failed nonoperative management, treatment options and alternatives were discussed in detail with the patient who is indicated for a total knee arthroplasty.     Likely risks and benefits of the procedure, including but not limited to infection, DVT, pulmonary embolism, future loosening of the implants, possibility of injury to tendons, ligaments, nerves or vessels, possibility of numbness, foot drop and periprosthetic fractures have been discussed in detail. Despite the risks involved, the patient elected to proceed and informed consent was obtained and the patient was scheduled for surgery. The patient was seen in the preoperative holding area and the operative site was marked.     DESCRIPTION OF PROCEDURE:   The patient was transferred to [facility] operating room. Preoperative antibiotics in the form of  Kefzol  intravenously was infused prior to the incision and prior to the tourniquet placement according to the SCIP protocol. A surgical time out was done with the team and the correct patient, surgical side and site were identified. After achieving adequate general anesthesia, a well-padded tourniquet was placed over the proximal aspect of the operative thigh. The operative leg was prepped and draped in the usual sterile fashion. Tourniquet was elevated to a pressure of 250 mmHg.     Trannexamic acid was given intravenously prior to incision.      A skin incision was made vertically oriented centering over the patella anteriorly. Skin and subcutaneous tissue were incised and a medial parapatellar approach was developed. There was a large effusion. The patella was subluxed and the knee was inspected. There was complete loss of articular cartilage on the medial distal femoral condyle with corresponding areas of loss of articular cartilage in the medial tibial plateau. There were osteophytes in the notch and also medial tibia. There were extensive osteophytes around the patella and there was complete loss of articular cartilage in the trochlear groove of the femur and also corresponding areas of the patella.      The patella measured 22 mm in thickness; 8 mm  of patella was resected and lug holes were drilled for an asymmetric patella  27 mm diameter, 8 mm thick. The trial button was found to be seated satisfactorily.    A distal femoral cut was completed utilizing an intramedullary alignment julia resecting 9 mm of distal femur with 5 degrees of valgus. The distal femoral cut was found to be satisfactory.     Attention was then directed to the proximal tibia. Extramedullary alignment julia was utilized and medial tibial thickness of  4 mm stylus was utilized amounting to 9 mm on the lateral side. The proximal tibial cut was made with 3° posterior slope and neutral varus valgus. The proximal tibial cut was found to be  satisfactory.     Attention was again directed to the distal femur. The distal femur was sized maintaining a posterior referencing. This was done along with verification of  Ankita’s line and epicondylar axis maintaining correct rotational alignment and a 4-in-1 cut was completed for the distal femur utilizing a size # 3 cutting block. Anterior, posterior and chamfer cuts were completed.     The PCL was  robust  intact and supple and it was planned to retain it.     Posterior osteophytes were resected using curved osteotomes from the distal femur. Medial and lateral meniscectomy was completed. Proximal tibia was sized and a trial reduction was performed. Reduction was found to be satisfactory with range of motion from 0° to 120° with the patella tracking well.     Having been satisfied with the trials, the bony surfaces were prepared for cementation after punching the proximal tibia, maintaining the correct rotational alignment.  Exparel was infiltrated into the posterior capsule medially and laterally. Followed by this, the tibial component was cemented into position followed by the femoral component, followed by patella and  seating of the 9 mm thick trial liner.  Simplex cement was utilized ,  Excess cement was removed. The components were held without any movement. The rest of the Exparel was infiltrated into the periarticular tissue.     Again, range of motion was checked and the range was satisfactory from 0° to 120° with excellent stability throughout the range of motion. Good medial and lateral tissue tension, and soft tissue balancing. The patella was tracking well. The trial liner was replaced with a CS size 3, 9  mm thick liner. Having been satisfied with this, the joint was thoroughly irrigated with saline. Soft tissue hemostasis was secured.     The sponge count and needle count was found to be correct. Arthrotomy was closed with Ethibond sutures followed by closure of the incision in layers with  Vicryl sutures and staples. Sterile dressings were placed and the patient was transferred to the recovery room in stable condition.     The patient tolerated the procedure well and is being admitted for postoperative antibiotics according to the SCIP protocol for 2 more doses in the first twenty four hours.   Aspirin  will be started  on postoperative day # 1. The patient will be mobilized in am with physical therapy.    I discussed the satisfactory performance of the procedure with the patient's family and discussed with them The postoperative management.     Jose Leary M.D.    6/12/2020    CC: Cordell Holland MD; MD Sapphire Cooney, Jose REINA,*

## 2020-06-12 NOTE — ANESTHESIA POSTPROCEDURE EVALUATION
"Patient: Isabel Carlson    Procedure Summary     Date:  06/12/20 Room / Location:  [facility] OR [address] [place] [address] MAIN OR    Anesthesia Start:  1455 Anesthesia Stop:  1705    Procedure:  TOTAL KNEE ARTHROPLASTY (Left Knee) Diagnosis:       Primary osteoarthritis of left knee      (Primary osteoarthritis of left knee [M17.12])    Surgeon:  Jose Leary MD Provider:  Nazario Miller MD    Anesthesia Type:  general with block ASA Status:  3          Anesthesia Type: general with block    Vitals  Vitals Value Taken Time   /88 6/12/2020  6:05 PM   Temp 36.3 °C (97.3 °F) 6/12/2020  5:02 PM   Pulse 80 6/12/2020  6:09 PM   Resp 12 6/12/2020  6:05 PM   SpO2 98 % 6/12/2020  6:10 PM   Vitals shown include unvalidated device data.        Post Anesthesia Care and Evaluation    Patient location during evaluation: PACU  Patient participation: complete - patient participated  Level of consciousness: awake  Pain management: adequate  Airway patency: patent  Anesthetic complications: No anesthetic complications    Cardiovascular status: acceptable  Respiratory status: acceptable  Hydration status: acceptable    Comments: /79 (BP Location: Left arm, Patient Position: Lying)   Pulse 62   Temp 36.2 °C (97.1 °F) (Oral)   Resp 12   Ht 152.4 cm (60\")   Wt 68 kg (150 lb)   SpO2 99%   BMI 29.29 kg/m²         "

## 2020-06-12 NOTE — DISCHARGE PLACEMENT REQUEST
"Isabel Carlin (74 y.o. Female)     Date of Birth Social Security Number Address Home Phone MRN    1945  [address] [address] [phone] [id number]    Mormon Marital Status          Sabianist        Admission Date Admission Type Admitting Provider Attending Provider Department, Room/Bed    6/12/20 Elective Jose Leary MD Yakkanti, Madhusudhan R, MD [facility] MAIN OR, [facility] Main OR/MAIN OR    Discharge Date Discharge Disposition Discharge Destination                       Attending Provider:  Jose Leary MD    Allergies:  No Known Allergies    Isolation:  None   Infection:  None   Code Status:  Not on file    Ht:  152.4 cm (60\")   Wt:  68 kg (150 lb)    Admission Cmt:  None   Principal Problem:  Primary osteoarthritis of left knee [M17.12]                 Active Insurance as of 6/12/2020     Primary Coverage     Payor Plan Insurance Group Employer/Plan Group    MEDICARE MEDICARE A & B      Payor Plan Address Payor Plan Phone Number Payor Plan Fax Number Effective Dates    [address] [phone]  8/1/2008 - None Entered    [facility] [id number]       Subscriber Name Subscriber Birth Date Member ID       ISABEL CARLIN 1945 [id number]           Secondary Coverage     Payor Plan Insurance Group Employer/Plan Group    [employer] SUP AAR HEALTH CARE OPTIONS      Payor Plan Address Payor Plan Phone Number Payor Plan Fax Number Effective Dates    [facility] [phone]  1/1/2017 - None Entered    [address]       [facility] [id number]       Subscriber Name Subscriber Birth Date Member ID       ISABEL CARLIN 1945 [id number]                 Emergency Contacts      (Rel.) Home Phone Work Phone Mobile Phone    Allyson Krishnan (Daughter) -- -- [phone]    Fern Johnson (Daughter) [phone] -- [phone]            "

## 2020-06-12 NOTE — PLAN OF CARE
Problem: Patient Care Overview  Goal: Plan of Care Review  Outcome: Ongoing (interventions implemented as appropriate)  Flowsheets (Taken 6/12/2020 1841)  Progress: improving  Plan of Care Reviewed With: patient  Outcome Summary: still rating pain high (7) but very drowsy, does arouse to voice, voided per bedpan in PACU, VSS, NVI, dressing c/d/i

## 2020-06-12 NOTE — ANESTHESIA PROCEDURE NOTES
Airway  Urgency: elective    Date/Time: 6/12/2020 3:05 PM  Airway not difficult    General Information and Staff    Patient location during procedure: OR  Anesthesiologist: Nazario Miller MD    Indications and Patient Condition  Indications for airway management: airway protection    Preoxygenated: yes  Mask difficulty assessment: 1 - vent by mask    Final Airway Details  Final airway type: endotracheal airway      Successful airway: ETT  Cuffed: yes   Successful intubation technique: direct laryngoscopy  Endotracheal tube insertion site: oral  Blade: Thomason  Blade size: 2  ETT size (mm): 7.0  Cormack-Lehane Classification: grade I - full view of glottis  Placement verified by: chest auscultation and capnometry   Measured from: teeth  ETT/EBT  to teeth (cm): 19  Number of attempts at approach: 1    Additional Comments  Pre oxygenated  Easy mask vent  Atraumatic intubation  + etco2  Breath sounds equal bilaterally

## 2020-06-12 NOTE — ANESTHESIA PREPROCEDURE EVALUATION
Anesthesia Evaluation     Patient summary reviewed and Nursing notes reviewed   no history of anesthetic complications:  NPO Solid Status: > 6 hours  NPO Liquid Status: > 6 hours           Airway   Mallampati: II  TM distance: >3 FB  Neck ROM: full  no difficulty expected and No difficulty expected  Dental - normal exam     Pulmonary - negative pulmonary ROS and normal exam    breath sounds clear to auscultation  (-) rhonchi, decreased breath sounds, wheezes, rales, stridor  Cardiovascular - normal exam    NYHA Classification: I  ECG reviewed  Rhythm: regular  Rate: normal    (+) hypertension, valvular problems/murmurs MR and TI, hyperlipidemia,   (-) murmur, weak pulses, friction rub, systolic click, carotid bruits, JVD, peripheral edema      Neuro/Psych  (+) TIA, CVA,     GI/Hepatic/Renal/Endo    (+)  GERD,      Musculoskeletal     (+) back pain,   Abdominal  - normal exam    Abdomen: soft.   Substance History - negative use     OB/GYN negative ob/gyn ROS         Other   arthritis,                      Anesthesia Plan    ASA 3     general with block     intravenous induction     Anesthetic plan, all risks, benefits, and alternatives have been provided, discussed and informed consent has been obtained with: patient.    Plan discussed with CRNA.

## 2020-06-12 NOTE — ANESTHESIA PROCEDURE NOTES
Peripheral Block      Patient reassessed immediately prior to procedure    Patient location during procedure: holding area  Reason for block: at surgeon's request and post-op pain management  Performed by  Anesthesiologist: Herb Shen MD  Preanesthetic Checklist  Completed: patient identified, site marked, surgical consent, pre-op evaluation, timeout performed, IV checked, risks and benefits discussed and monitors and equipment checked  Prep:  Sterile barriers:cap, gloves, mask and sterile barriers  Prep: ChloraPrep  Patient monitoring: blood pressure monitoring, continuous pulse oximetry and EKG  Procedure  Sedation:yes  Performed under: local infiltration  Guidance:ultrasound guided  ULTRASOUND INTERPRETATION. Using ultrasound guidance a 21 G gauge needle was placed in close proximity to the nerve, at which point, under ultrasound guidance anesthetic was injected in the area of the nerve and spread of the anesthesia was seen on ultrasound in close proximity thereto.  There were no abnormalities seen on ultrasound; a digital image was taken; and the patient tolerated the procedure with no complications. Images:still images obtained    Laterality:left  Block Type:adductor canal block and iPack  Injection Technique:single-shot  Needle Type:echogenic  Needle Gauge:21 G  Resistance on Injection: none    Medications Used: ropivacaine (NAROPIN) 0.2 % injection, 25 mL  lidocaine-EPINEPHrine (XYLOCAINE W/EPI) 2 %-1:[id number] injection, 15 mL  Med admintered at 6/12/2020 12:32 PM      Post Assessment  Injection Assessment: negative aspiration for heme, no paresthesia on injection and incremental injection  Patient Tolerance:comfortable throughout block  Complications:no  Additional Notes  Ultrasound interpretation note:  Under ultrasound guidance, needle seen near nerves, local seen spreading around.  No abnormalities noted.  Block for postop pain per surgeon request.

## 2020-06-13 PROBLEM — M17.12 PRIMARY OSTEOARTHRITIS OF LEFT KNEE: Status: ACTIVE | Noted: 2020-06-13

## 2020-06-13 LAB
ANION GAP SERPL CALCULATED.3IONS-SCNC: 10.2 MMOL/L (ref 5–15)
BASOPHILS # BLD AUTO: 0.01 10*3/MM3 (ref 0–0.2)
BASOPHILS NFR BLD AUTO: 0.1 % (ref 0–1.5)
BUN BLD-MCNC: 7 MG/DL (ref 8–23)
BUN/CREAT SERPL: 11.3 (ref 7–25)
CALCIUM SPEC-SCNC: 8.4 MG/DL (ref 8.6–10.5)
CHLORIDE SERPL-SCNC: 102 MMOL/L (ref 98–107)
CO2 SERPL-SCNC: 25.8 MMOL/L (ref 22–29)
CREAT BLD-MCNC: 0.62 MG/DL (ref 0.57–1)
DEPRECATED RDW RBC AUTO: 40.2 FL (ref 37–54)
EOSINOPHIL # BLD AUTO: 0 10*3/MM3 (ref 0–0.4)
EOSINOPHIL NFR BLD AUTO: 0 % (ref 0.3–6.2)
ERYTHROCYTE [DISTWIDTH] IN BLOOD BY AUTOMATED COUNT: 12.3 % (ref 12.3–15.4)
GFR SERPL CREATININE-BSD FRML MDRD: 113 ML/MIN/1.73
GLUCOSE BLD-MCNC: 151 MG/DL (ref 65–99)
HCT VFR BLD AUTO: 33.6 % (ref 34–46.6)
HGB BLD-MCNC: 11.2 G/DL (ref 12–15.9)
IMM GRANULOCYTES # BLD AUTO: 0.03 10*3/MM3 (ref 0–0.05)
IMM GRANULOCYTES NFR BLD AUTO: 0.4 % (ref 0–0.5)
LYMPHOCYTES # BLD AUTO: 0.63 10*3/MM3 (ref 0.7–3.1)
LYMPHOCYTES NFR BLD AUTO: 8.4 % (ref 19.6–45.3)
MCH RBC QN AUTO: 29.6 PG (ref 26.6–33)
MCHC RBC AUTO-ENTMCNC: 33.3 G/DL (ref 31.5–35.7)
MCV RBC AUTO: 88.9 FL (ref 79–97)
MONOCYTES # BLD AUTO: 0.6 10*3/MM3 (ref 0.1–0.9)
MONOCYTES NFR BLD AUTO: 8 % (ref 5–12)
NEUTROPHILS # BLD AUTO: 6.27 10*3/MM3 (ref 1.7–7)
NEUTROPHILS NFR BLD AUTO: 83.1 % (ref 42.7–76)
NRBC BLD AUTO-RTO: 0 /100 WBC (ref 0–0.2)
PLATELET # BLD AUTO: 192 10*3/MM3 (ref 140–450)
PMV BLD AUTO: 10 FL (ref 6–12)
POTASSIUM BLD-SCNC: 3.8 MMOL/L (ref 3.5–5.2)
RBC # BLD AUTO: 3.78 10*6/MM3 (ref 3.77–5.28)
SODIUM BLD-SCNC: 138 MMOL/L (ref 136–145)
WBC NRBC COR # BLD: 7.54 10*3/MM3 (ref 3.4–10.8)

## 2020-06-13 PROCEDURE — 80048 BASIC METABOLIC PNL TOTAL CA: CPT | Performed by: ORTHOPAEDIC SURGERY

## 2020-06-13 PROCEDURE — 25010000003 CEFAZOLIN IN DEXTROSE 2-4 GM/100ML-% SOLUTION: Performed by: ORTHOPAEDIC SURGERY

## 2020-06-13 PROCEDURE — 97535 SELF CARE MNGMENT TRAINING: CPT

## 2020-06-13 PROCEDURE — 85025 COMPLETE CBC W/AUTO DIFF WBC: CPT | Performed by: ORTHOPAEDIC SURGERY

## 2020-06-13 PROCEDURE — 25010000002 ONDANSETRON PER 1 MG: Performed by: ORTHOPAEDIC SURGERY

## 2020-06-13 PROCEDURE — 97162 PT EVAL MOD COMPLEX 30 MIN: CPT

## 2020-06-13 PROCEDURE — 97110 THERAPEUTIC EXERCISES: CPT

## 2020-06-13 PROCEDURE — 97530 THERAPEUTIC ACTIVITIES: CPT

## 2020-06-13 PROCEDURE — 97166 OT EVAL MOD COMPLEX 45 MIN: CPT

## 2020-06-13 RX ORDER — OXYCODONE HYDROCHLORIDE AND ACETAMINOPHEN 5; 325 MG/1; MG/1
2 TABLET ORAL EVERY 4 HOURS PRN
Status: DISCONTINUED | OUTPATIENT
Start: 2020-06-13 | End: 2020-06-14 | Stop reason: HOSPADM

## 2020-06-13 RX ORDER — ASPIRIN 81 MG/1
81 TABLET ORAL EVERY 12 HOURS SCHEDULED
Qty: 60 TABLET | Refills: 0 | Status: CANCELLED | OUTPATIENT
Start: 2020-06-13 | End: 2020-07-13

## 2020-06-13 RX ORDER — OXYCODONE HYDROCHLORIDE AND ACETAMINOPHEN 5; 325 MG/1; MG/1
1 TABLET ORAL EVERY 4 HOURS PRN
Status: DISCONTINUED | OUTPATIENT
Start: 2020-06-13 | End: 2020-06-14 | Stop reason: HOSPADM

## 2020-06-13 RX ORDER — ONDANSETRON 4 MG/1
4 TABLET, FILM COATED ORAL EVERY 6 HOURS PRN
Qty: 30 TABLET | Refills: 0 | Status: CANCELLED | OUTPATIENT
Start: 2020-06-13

## 2020-06-13 RX ORDER — PSEUDOEPHEDRINE HCL 30 MG
100 TABLET ORAL 2 TIMES DAILY
Qty: 30 EACH | Refills: 0 | Status: CANCELLED | OUTPATIENT
Start: 2020-06-13 | End: 2020-06-21

## 2020-06-13 RX ORDER — HYDROCODONE BITARTRATE AND ACETAMINOPHEN 7.5; 325 MG/1; MG/1
1 TABLET ORAL EVERY 4 HOURS PRN
Qty: 42 TABLET | Refills: 0 | Status: CANCELLED | OUTPATIENT
Start: 2020-06-13 | End: 2020-06-20

## 2020-06-13 RX ORDER — BISACODYL 5 MG/1
10 TABLET, DELAYED RELEASE ORAL DAILY PRN
Qty: 20 TABLET | Refills: 0 | Status: CANCELLED | OUTPATIENT
Start: 2020-06-13 | End: 2020-06-23

## 2020-06-13 RX ADMIN — ASPIRIN 81 MG: 81 TABLET, COATED ORAL at 08:02

## 2020-06-13 RX ADMIN — CEFAZOLIN SODIUM 2 G: 2 INJECTION, SOLUTION INTRAVENOUS at 05:54

## 2020-06-13 RX ADMIN — ASPIRIN 81 MG: 81 TABLET, COATED ORAL at 20:38

## 2020-06-13 RX ADMIN — DOCUSATE SODIUM 100 MG: 100 CAPSULE, LIQUID FILLED ORAL at 20:38

## 2020-06-13 RX ADMIN — ONDANSETRON 4 MG: 2 INJECTION INTRAMUSCULAR; INTRAVENOUS at 02:55

## 2020-06-13 RX ADMIN — OXYCODONE AND ACETAMINOPHEN 2 TABLET: 5; 325 TABLET ORAL at 20:38

## 2020-06-13 RX ADMIN — ASPIRIN 81 MG: 81 TABLET, COATED ORAL at 02:55

## 2020-06-13 RX ADMIN — HYDROCODONE BITARTRATE AND ACETAMINOPHEN 1 TABLET: 7.5; 325 TABLET ORAL at 02:55

## 2020-06-13 RX ADMIN — PANTOPRAZOLE SODIUM 40 MG: 40 TABLET, DELAYED RELEASE ORAL at 20:38

## 2020-06-13 RX ADMIN — PANTOPRAZOLE SODIUM 40 MG: 40 TABLET, DELAYED RELEASE ORAL at 02:56

## 2020-06-13 RX ADMIN — FLUTICASONE PROPIONATE 2 SPRAY: 50 SPRAY, METERED NASAL at 20:39

## 2020-06-13 RX ADMIN — OXYCODONE AND ACETAMINOPHEN 2 TABLET: 5; 325 TABLET ORAL at 16:42

## 2020-06-13 RX ADMIN — HYDROCODONE BITARTRATE AND ACETAMINOPHEN 2 TABLET: 7.5; 325 TABLET ORAL at 07:54

## 2020-06-13 RX ADMIN — FLUTICASONE PROPIONATE 2 SPRAY: 50 SPRAY, METERED NASAL at 08:02

## 2020-06-13 RX ADMIN — AMLODIPINE BESYLATE: 2.5 TABLET ORAL at 08:02

## 2020-06-13 RX ADMIN — DOCUSATE SODIUM 100 MG: 100 CAPSULE, LIQUID FILLED ORAL at 08:02

## 2020-06-13 RX ADMIN — HYDROCODONE BITARTRATE AND ACETAMINOPHEN 2 TABLET: 7.5; 325 TABLET ORAL at 12:42

## 2020-06-13 NOTE — PLAN OF CARE
Problem: Patient Care Overview  Goal: Plan of Care Review  Outcome: Ongoing (interventions implemented as appropriate)  Flowsheets (Taken 6/13/2020 1041)  Plan of Care Reviewed With: patient  Outcome Summary: Pt is s/p L TKA 6/12/20. She is independent with mobility at baseline without AD, lives alone in a home with 8 JEREMY. She currently presents with post-op pain and weakness. Today she was able to perform STS at RW with Min A and gait x 26 ft with RW CG/Rodolfo. Skilled PT indicated to address functional deficits and maximize level of independence prior to discharge. Anticipate home with assist and HHPT and RW after discharge.    ..Patient was wearing a face mask during this therapy encounter. Therapist used appropriate personal protective equipment including mask and gloves.  Mask used was standard procedure mask. Appropriate PPE was worn during the entire therapy session. Hand hygiene was completed before and after therapy session. Patient is not in enhanced droplet precautions.

## 2020-06-13 NOTE — PROGRESS NOTES
Patient: Isabel Carlson  YOB: 1945     Date of Admission: 6/12/2020 10:47 AM Medical Record Number: [id number]     Attending Physician: Jose Leary,Shiloh    Procedure(s):  TOTAL KNEE ARTHROPLASTY Post Operative Day Number: 1    Subjective : No new orthopaedic complaints     Pain Relief: some relief with present medication.     Systemic Complaints: PONV  Vitals:    06/12/20 1805 06/12/20 1823 06/12/20 2318 06/13/20 0322   BP: 166/88 151/79 155/85 145/74   BP Location:  Left arm Left arm Left arm   Patient Position:  Lying Lying Lying   Pulse: 61 62 84 79   Resp: 12 12 16 16   Temp:  97.1 °F (36.2 °C) 97.3 °F (36.3 °C) 97.8 °F (36.6 °C)   TempSrc:  Oral Oral Skin   SpO2: 99% 99% 98% 98%   Weight:       Height:           Physical Exam: 74 y.o. female    General Appearance:       Alert, cooperative, in no acute distress                  Extremities:    Dressing Clean, Dry and Intact         Incision healthy without signs or symptoms of infections         No clinical sign of DVT        Able to do good movements of digits    Pulses:   Pulses palpable and equal bilaterally           Diagnostic Tests:     Results from last 7 days   Lab Units 06/13/20  0453   WBC 10*3/mm3 7.54   HEMOGLOBIN g/dL 11.2*   HEMATOCRIT % 33.6*   PLATELETS 10*3/mm3 192     Results from last 7 days   Lab Units 06/13/20  0453   SODIUM mmol/L 138   POTASSIUM mmol/L 3.8   CHLORIDE mmol/L 102   CO2 mmol/L 25.8   BUN mg/dL 7*   CREATININE mg/dL 0.62   GLUCOSE mg/dL 151*   CALCIUM mg/dL 8.4*         No results found for: CRP  No results found for: SEDRATE  No results found for: URICACID  No results found for: CRYSTAL  Microbiology Results (last 10 days)     Procedure Component Value - Date/Time    COVID PRE-OP / PRE-PROCEDURE SCREENING ORDER (NO ISOLATION) - Swab, Nasopharynx [[id number]] Collected:  06/09/20 0942    Lab Status:  Final result Specimen:  Swab from Nasopharynx Updated:  06/10/20 4172    Narrative:       The  following orders were created for panel order COVID PRE-OP / PRE-PROCEDURE SCREENING ORDER (NO ISOLATION) - Swab, Nasopharynx.  Procedure                               Abnormality         Status                     ---------                               -----------         ------                     COVID-19,BIOTAP, NP/OP S...[[id number]]                      Final result                 Please view results for these tests on the individual orders.    COVID-19,BIOTAP, NP/OP SWAB IN TRANSPORT MEDIA OR SALINE 24-36 HR TAT - Swab, Nasopharynx [[id number]] Collected:  06/09/20 0913    Lab Status:  Final result Specimen:  Swab from Nasopharynx Updated:  06/10/20 1431     Reference Lab Report --     COVID19 Not Detected    Narrative:       Testing performed at:  [facility]  [address] [place] [address]        No radiology results for the last 7 days          Current Medications:  Scheduled Meds:  amLODIPine-lisinopril 2.5-10 mg combo dose  Oral Q24H   aspirin 81 mg Oral Q12H   ceFAZolin 2 g Intravenous Q8H   docusate sodium 100 mg Oral BID   fluticasone 2 spray Nasal BID   pantoprazole 40 mg Oral Nightly     Continuous Infusions:  sodium chloride 100 mL/hr Last Rate: 100 mL/hr (06/12/20 4238)     PRN Meds:.•  acetaminophen  •  bisacodyl  •  cetirizine  •  HYDROcodone-acetaminophen  •  HYDROcodone-acetaminophen  •  HYDROmorphone **AND** naloxone  •  melatonin  •  meloxicam  •  ondansetron **OR** ondansetron  •  promethazine **OR** promethazine **OR** promethazine    Assessment:    Procedure(s):  TOTAL KNEE ARTHROPLASTY    Patient Active Problem List   Diagnosis   • Heart murmur   • Abnormal EKG   • APC (atrial premature contractions)   • Non-rheumatic mitral regurgitation   • Non-rheumatic tricuspid valve insufficiency   • LVH (left ventricular hypertrophy)   • Gastroesophageal reflux disease   • Essential hypertension   • Mixed hyperlipidemia   • Pulmonary HTN (CMS/HCC)   • Osteoporosis of lumbar  spine   • Menopause   • Osteoarthritis   • Lip laceration   • Status post left knee replacement       PLAN:   Continues current post-op course  Anticoagulation: Aspirin started  Dressing Change PRN  Mobilize with PT as tolerated per protocol  She was unable to mobilize yesterday due to PONV. We will see how she does with PT today.      Weight Bearing: WBAT  Discharge Plan: OK to plan for discharge in  today to home and home health  from orthopadic perspective.      FRANK Simon    Date: 6/13/2020    Time: 06:23     I have examined this patient .  She is slow to mobilize will hold off on discharge today   I have reviewed the labs, done the physical examination.  I agree with above evaluation and plan and  Note by FRANK Michelle MD        2:37 PM

## 2020-06-13 NOTE — DISCHARGE SUMMARY
Orthopedic Discharge Summary      Patient: Isabel Carlson   YOB: 1945    Medical Record Number: [id number]    Attending Physician: Jose Leary,*    Consulting Physician(s):   Consulting Physician(s)     Provider Relationship Specialty    Earl Keller MD Consulting Physician Internal Medicine          Date of Admission: 6/12/2020 10:47 AM   Date of Discharge:      Admitting Diagnosis: Primary osteoarthritis of left knee [M17.12]  Primary osteoarthritis of left knee [M17.12]    Procedures Performed  Procedure(s):  TOTAL KNEE ARTHROPLASTY       Patient Active Problem List   Diagnosis   • Heart murmur   • Abnormal EKG   • APC (atrial premature contractions)   • Non-rheumatic mitral regurgitation   • Non-rheumatic tricuspid valve insufficiency   • LVH (left ventricular hypertrophy)   • Gastroesophageal reflux disease   • Essential hypertension   • Mixed hyperlipidemia   • Pulmonary HTN (CMS/HCC)   • Osteoporosis of lumbar spine   • Menopause   • Osteoarthritis   • Lip laceration   • Status post left knee replacement        No Known Allergies       Discharge Medications      New Medications      Instructions Start Date   bisacodyl 5 MG EC tablet  Commonly known as:  DULCOLAX   10 mg, Oral, Daily PRN      docusate sodium 100 MG capsule   100 mg, Oral, 2 Times Daily      ondansetron 4 MG tablet  Commonly known as:  ZOFRAN   4 mg, Oral, Every 6 Hours PRN      oxyCODONE-acetaminophen 5-325 MG per tablet  Commonly known as:  PERCOCET   1 tablet, Oral, Every 4 Hours PRN      promethazine 25 MG tablet  Commonly known as:  PHENERGAN   25 mg, Oral, Every 6 Hours PRN         Changes to Medications      Instructions Start Date   aspirin 81 MG EC tablet  What changed:  when to take this   81 mg, Oral, Every 12 Hours Scheduled      pantoprazole 40 MG EC tablet  Commonly known as:  PROTONIX  What changed:  when to take this   40 mg, Oral, Daily         Continue These Medications      Instructions Start  Date   amLODIPine-benazepril 10-20 MG per capsule  Commonly known as:  LOTREL   1 capsule, Oral, Daily      cetirizine 10 MG tablet  Commonly known as:  zyrTEC   10 mg, Oral, Daily PRN      fluticasone 50 MCG/ACT nasal spray  Commonly known as:  FLONASE   2 sprays, Nasal, Daily PRN      meloxicam 7.5 MG tablet  Commonly known as:  Mobic   7.5 mg, Oral, Daily PRN      rosuvastatin 20 MG tablet  Commonly known as:  CRESTOR   20 mg, Oral, Daily      traMADol 50 MG tablet  Commonly known as:  ULTRAM   50 mg, Oral, Nightly PRN                Past Medical History:   Diagnosis Date   • Abnormal stress echocardiogram 07/20/2017    Patient had borderline EKG changes 5 minutes into walking and Dr. Cali Spencer recommended the patient not to have that same type of stress test in the future   • Adhesive capsulitis of right shoulder    • Age-related osteoporosis without fracture    • APC (atrial premature contractions) 7/20/2017   • Arthritis    • Carpal tunnel syndrome    • Cataract    • Chronic hip pain    • Fatigue    • Frequent episodes of pneumonia     PT STATES SEVERAL TIMES   • Heart murmur 7/20/2017   • Left knee pain    • LVH (left ventricular hypertrophy) 7/20/2017   • Mixed hyperlipidemia 7/20/2017   • Non-rheumatic mitral regurgitation 07/20/2017    Mild to moderate per 2-D echocardiogram   • Non-rheumatic tricuspid valve insufficiency 07/20/2017    Mild to moderate per 2-D echocardiogram   • Osteoarthritis    • Osteoarthritis of both knees    • Osteoporosis    • Shoulder pain    • Stroke (CMS/HCC)    • TIA (transient ischemic attack) 10/15/2013    numbness on right side of face and hand; went to [facility]        Past Surgical History:   Procedure Laterality Date   • APPENDECTOMY     • CARDIAC CATHETERIZATION N/A 10/20/2017    Procedure: Right Heart Cath;  Surgeon: Christopher Kan MD;  Location: [facility] INVASIVE LOCATION;  Service:    • COLONOSCOPY     • SUBTOTAL HYSTERECTOMY          Social History      Occupational History   • Not on file   Tobacco Use   • Smoking status: Never Smoker   • Smokeless tobacco: Never Used   Substance and Sexual Activity   • Alcohol use: No     Comment: rarely   • Drug use: No   • Sexual activity: Defer      Social History     Social History Narrative   • Not on file        Family History   Problem Relation Age of Onset   • Heart disease Mother    • Hypertension Mother    • Stroke Mother    • Arthritis Mother    • Heart disease Maternal Aunt    • Hypertension Maternal Aunt    • Hypertension Maternal Uncle    • Malig Hyperthermia Neg Hx        Physical Exam: 74 y.o. female  General Appearance:    Alert, cooperative, in no acute distress                      Vitals:    06/12/20 1805 06/12/20 1823 06/12/20 2318 06/13/20 0322   BP: 166/88 151/79 155/85 145/74   BP Location:  Left arm Left arm Left arm   Patient Position:  Lying Lying Lying   Pulse: 61 62 84 79   Resp: 12 12 16 16   Temp:  97.1 °F (36.2 °C) 97.3 °F (36.3 °C) 97.8 °F (36.6 °C)   TempSrc:  Oral Oral Skin   SpO2: 99% 99% 98% 98%   Weight:       Height:            Hospital Course:  74 y.o. female admitted to [facility] services of Michael Learyandrewsarah REINA,[place] with Osteoarthritis knee  on 6/12/2020 and underwent a LEFT total knee arthroplasty Per FRANK Simon. Antibiotic and VTE prophylaxis were per [facility]IP protocols and included  Kefzol  every 8 hours and Aspirin daily . Post-operatively the patient transferred to the post-operative floor where the patient underwent mobilization therapy that included active as well as passive ROM exercises. Opioids were titrated to achieve appropriate pain management to allow for participation in mobilization exercises. Vital signs are now stable. The incision is intact without signs or symptoms of infection. Operative extremity neurovascular status remains intact.     Appropriate education re: incision care, activity levels, medications, and follow up visits was completed and  all questions were answered. The patient is now deemed stable for discharge.      DISCHARGE DISPOSITION AND PLAN:  The  Patient is being discharged home with home health for PT  2-3 X per week for 2-3 weeks and nursing care as needed.       DIAGNOSTIC TESTS:     Admission on 06/12/2020   Component Date Value Ref Range Status   • Glucose 06/13/2020 151* 65 - 99 mg/dL Final   • BUN 06/13/2020 7* 8 - 23 mg/dL Final   • Creatinine 06/13/2020 0.62  0.57 - 1.00 mg/dL Final   • Sodium 06/13/2020 138  136 - 145 mmol/L Final   • Potassium 06/13/2020 3.8  3.5 - 5.2 mmol/L Final   • Chloride 06/13/2020 102  98 - 107 mmol/L Final   • CO2 06/13/2020 25.8  22.0 - 29.0 mmol/L Final   • Calcium 06/13/2020 8.4* 8.6 - 10.5 mg/dL Final   • eGFR  African Amer 06/13/2020 113  >60 mL/min/1.73 Final   • BUN/Creatinine Ratio 06/13/2020 11.3  7.0 - 25.0 Final   • Anion Gap 06/13/2020 10.2  5.0 - 15.0 mmol/L Final   • WBC 06/13/2020 7.54  3.40 - 10.80 10*3/mm3 Final   • RBC 06/13/2020 3.78  3.77 - 5.28 10*6/mm3 Final   • Hemoglobin 06/13/2020 11.2* 12.0 - 15.9 g/dL Final   • Hematocrit 06/13/2020 33.6* 34.0 - 46.6 % Final   • MCV 06/13/2020 88.9  79.0 - 97.0 fL Final   • MCH 06/13/2020 29.6  26.6 - 33.0 pg Final   • MCHC 06/13/2020 33.3  31.5 - 35.7 g/dL Final   • RDW 06/13/2020 12.3  12.3 - 15.4 % Final   • RDW-SD 06/13/2020 40.2  37.0 - 54.0 fl Final   • MPV 06/13/2020 10.0  6.0 - 12.0 fL Final   • Platelets 06/13/2020 192  140 - 450 10*3/mm3 Final   • Neutrophil % 06/13/2020 83.1* 42.7 - 76.0 % Final   • Lymphocyte % 06/13/2020 8.4* 19.6 - 45.3 % Final   • Monocyte % 06/13/2020 8.0  5.0 - 12.0 % Final   • Eosinophil % 06/13/2020 0.0* 0.3 - 6.2 % Final   • Basophil % 06/13/2020 0.1  0.0 - 1.5 % Final   • Immature Grans % 06/13/2020 0.4  0.0 - 0.5 % Final   • Neutrophils, Absolute 06/13/2020 6.27  1.70 - 7.00 10*3/mm3 Final   • Lymphocytes, Absolute 06/13/2020 0.63* 0.70 - 3.10 10*3/mm3 Final   • Monocytes, Absolute 06/13/2020 0.60  0.10 -  0.90 10*3/mm3 Final   • Eosinophils, Absolute 06/13/2020 0.00  0.00 - 0.40 10*3/mm3 Final   • Basophils, Absolute 06/13/2020 0.01  0.00 - 0.20 10*3/mm3 Final   • Immature Grans, Absolute 06/13/2020 0.03  0.00 - 0.05 10*3/mm3 Final   • nRBC 06/13/2020 0.0  0.0 - 0.2 /100 WBC Final       No results found for: URICACID  No results found for: CRYSTAL  Microbiology Results (last 10 days)     Procedure Component Value - Date/Time    COVID PRE-OP / PRE-PROCEDURE SCREENING ORDER (NO ISOLATION) - Swab, Nasopharynx [[id number]] Collected:  06/09/20 0913    Lab Status:  Final result Specimen:  Swab from Nasopharynx Updated:  06/10/20 1431    Narrative:       The following orders were created for panel order COVID PRE-OP / PRE-PROCEDURE SCREENING ORDER (NO ISOLATION) - Swab, Nasopharynx.  Procedure                               Abnormality         Status                     ---------                               -----------         ------                     COVID-19,BIOTAP, NP/OP S...[[id number]]                      Final result                 Please view results for these tests on the individual orders.    COVID-19,BIOTAP, NP/OP SWAB IN TRANSPORT MEDIA OR SALINE 24-36 HR TAT - Swab, Nasopharynx [[id number]] Collected:  06/09/20 0913    Lab Status:  Final result Specimen:  Swab from Nasopharynx Updated:  06/10/20 1431     Reference Lab Report --     COVID19 Not Detected    Narrative:       Testing performed at:  [facility]  [address] [place] [address]        No radiology results for the last 7 days    Discharge and Follow up Instructions:     Total Knee Joint Replacement Discharge Instructions:    I. ACTIVITIES:  1. Exercises:  ? Complete exercise program as taught by the hospital physical therapist 2 times per day  ? Exercise program will be advanced by your home health physical therapist  ? During the day be up ambulating every 2 hours (while awake) for short distances  ? Complete the ankle pump  exercises at least 10 times per hour (while awake)  ? Elevate legs most of the day the first week post operatively and thereafter elevate legs when in bed and for at least 30 minutes during the day.   ? Caution must be taken to avoid pillow placement under the bend of the knee as this can led to flexion contractures of the knee. Pillow placement under the heel is encouraged.  ? Use cold packs 20-30 minutes approximately 5 times per day. This should be done before and after completing your exercises and at any time you are experiencing pain/ stiffness in your operative extremity.      2. Activities of Daily Living:  ? No tub baths, hot tubs, or swimming pools for 4 weeks  ? May shower and let water run over the incision on post-operative day #5 if no drainage. Do not scrub or rub the incision. Simply let the water run over the incision and pat dry.    II. Restrictions  ? Do not cross legs or kneel  ? Your surgeon will discuss with you when you will be able to drive again. Usual guidelines are you are to be off pain medications prior to driving.  ? Weight bearing is as tolerated  ? First week stay inside on even terrain. May go up and down stairs one stair at a time utilizing the hand rail.  ? After one week, you may venture outside.    III. Precautions:  ? Everyone that comes near you should wash their hands  ? No elective dental, genital-urinary, or colon procedures or surgical procedures for 12 weeks after surgery unless absolutely necessary.  ?  If dental work or surgical procedure is deemed absolutely necessary, you will need to contact your surgeon as you will need to take antibiotics 1 hour prior to any dental work (including teeth cleanings).  ? Please discuss with your surgeon prophylactic antibiotics as the length of time this intervention will be necessary for you varies with each patient’s health history and situation.  ? Avoid sick people. If you must be around someone who is ill, they should wear a  mask.  ? Avoid visits to the Emergency Room or Urgent Care. If you feel you need to go to the emergency room, please notify your surgeon.    ? Stockings are to be worn for one week after surgery and are to be placed on in the morning and removed at night. Observe your skin when stocking is removed for any problems. Monitor the stockings to ensure that any swelling is not causing the stockings to become too tight. In this case, remove stockings immediately.    IV. INCISION CARE:  ? Wash your hands prior to dressing changes  ? Change the dressing as needed to keep incision clean and dry. Utilize dry gauze and paper tape. Avoid touching the side of the gauze that goes against the incision with your hands.  ? No creams or ointments to the incision  ? May remove dressing once the incision is free of drainage  ? Do not touch or pick at the incision  ? Check incision every day and notify surgeon immediately if any of the following signs or symptoms are noted:  o Increase in redness  o Increase in swelling around the incision and of the entire extremity  o Increase in pain  o Drainage oozing from the incision  o Pulling apart of the edges of the incision  o Increase in overall body temperature (greater than 100.5 degrees)    ?  You have absorbable sutures with steristrips, please do not remove the steri strips for 14 days, you can shower on them 6 days after surgery.      V. Medications:   1. Anticoagulants: You will be discharged on an anticoagulant. This is a prophylactic medication that helps prevent blood clots during your post-operative period.  You will be on Aspirin  81 mg twice daily for 30 days. If you were on Aspirin 81 mg prior to surgery you can go back to home dose once the 30 days are completed.     ? While taking the anticoagulant, you should avoid taking any additional aspirin, ibuprofen (Advil or Motrin), Aleve (Naprosyn) or other non-steroidal anti-inflammatory medications.   ? Notify surgeon immediately  if any mayank bleeding is noted in the urine, stool, emesis, or from the nose or the incision. Blood in the stool will often appear as black rather than red. Blood in urine may appear as pink. Blood in emesis may appear as brown/black like coffee grounds.  ? You will need to apply pressure for longer periods of time to any cuts or abrasions to stop bleeding  ? Avoid alcohol while taking anticoagulants    2. Stool Softeners: You will be at greater risk of constipation after surgery due to being less mobile and the pain medications.   ? Take stool softeners as instructed by your surgeon while on pain medications. Over the counter Colace 100 mg 1-2 capsules twice daily.   ? If stools become too loose or too frequent, please decreases the dosage or stop the stool softener.  ? If constipation occurs despite use of stool softeners, you are to continue the stool softeners and add a laxative (Milk of Magnesia 1 ounce daily as needed).  ? Dulcolax oral tabs or suppository, or a fleets enema can also be utilized for constipation and can be obtained over the counter.   ? If above interventions are unsuccessful in inducing bowel movements, please contact your surgeon's office / family physician's office.  ? Drink plenty of fluids, and eat fruits and vegetables during your recovery time    3. Pain Medications utilized after surgery are narcotics and the law requires that the following information be given to all patients that are prescribed narcotics:  ? CLASSIFICATION: Pain medications are called Opioids and are narcotics  ? LEGALITIES: It is illegal to share narcotics with others and to drive within 24 hours of taking narcotics  ? POTENTIAL SIDE EFFECTS: Potential side effects of opioids include: nausea, vomiting, itching, dizziness, drowsiness, dry mouth, constipation, and difficulty urinating.  ? POTENTIAL ADVERSE EFFECTS:   o Opioid tolerance can develop with use of pain medications and this simply means that it requires  more and more of the medication to control pain; however, this is seen more in patients that use opioids for longer periods of time.  o Opioid dependence can develop with use of Opioids and this simply means that to stop the medication can cause withdrawal symptoms; however, this is seen with patients that use Opioids for longer periods of time.  o Opioid addiction can develop with use of Opioids and the incidence of this is very unlikely in patients who take the medications as ordered and stop the medications as instructed.  o Opioid overdose can be dangerous, but is unlikely when the medication is taken as ordered and stopped when ordered. It is important not to mix opioids with alcohol or with and type of sedative such as Benadryl as this can lead to over sedation and respiratory difficulty.  ? DOSAGE:   o Pain medications will need to be taken consistently for the first week to decrease pain and promote adequate pain relief and participation in physical therapy.  o After the initial surgical pain begins to resolve, you may begin to decrease the pain medication. By the end of 6 weeks, you should be off of pain medications.  o Refills will not be given by the office during evening hours, on weekends, or after 6 weeks post-op.  o To seek refills on pain medications during the initial 6 week post-operative period, you must call the office 48 hours in advance to request the refill. The office will then notify you when to  the prescription. DO NOT wait until you are out of the medication to request a refill.    V. FOLLOW-UP VISITS:  ? You will need to follow up in the office with your surgeon on June 30, 2020.  Please call this number [phone] to schedule this appointment.  ? If you have any concerns or suspected complications prior to your follow up visit, please call your surgeons office. Do not wait until your appointment time if you suspect complications. These will need to be addressed in the office  promptly.      Date:     FRANK Simon    CC: Cordell Holland MD; FRANK Simon Madhusudhan R,*

## 2020-06-13 NOTE — PLAN OF CARE
Problem: Patient Care Overview  Goal: Plan of Care Review  6/13/2020 1512 by Ladonna Goncalves PT  Outcome: Ongoing (interventions implemented as appropriate)  Flowsheets (Taken 6/13/2020 1508)  Progress: no change  Plan of Care Reviewed With: patient;family  Outcome Summary: Pt seen a second time this afternoon. She required Min A x 2 for bed mobility. Increased difficulty with gait this afternoon due to pain and weakness, required verbal cues to not lean on RW due to unsafe. Unable to attempt stairs at this time.

## 2020-06-13 NOTE — PLAN OF CARE
Problem: Patient Care Overview  Goal: Plan of Care Review  Outcome: Ongoing (interventions implemented as appropriate)  Flowsheets (Taken 6/13/2020 0136)  Progress: improving  Plan of Care Reviewed With: patient  Note:   VSS. Pain increased, med changes per MD order. Dressing c/d/I. Up to chair and worked with PT today. Voiding per BRP. Discussed bp monitoring r/t HTN. Plans to d/c home with HH.

## 2020-06-13 NOTE — THERAPY TREATMENT NOTE
Patient Name: Isabel Carlson  : 1945    MRN: [id number]                              Today's Date: 2020       Admit Date: 2020    Visit Dx:     ICD-10-CM ICD-9-CM   1. Primary osteoarthritis of left knee M17.12 715.16     Patient Active Problem List   Diagnosis   • Heart murmur   • Abnormal EKG   • APC (atrial premature contractions)   • Non-rheumatic mitral regurgitation   • Non-rheumatic tricuspid valve insufficiency   • LVH (left ventricular hypertrophy)   • Gastroesophageal reflux disease   • Essential hypertension   • Mixed hyperlipidemia   • Pulmonary HTN (CMS/HCC)   • Osteoporosis of lumbar spine   • Menopause   • Osteoarthritis   • Lip laceration   • Status post left knee replacement   • Primary osteoarthritis of left knee     Past Medical History:   Diagnosis Date   • Abnormal stress echocardiogram 2017    Patient had borderline EKG changes 5 minutes into walking and Dr. Cali Spencer recommended the patient not to have that same type of stress test in the future   • Adhesive capsulitis of right shoulder    • Age-related osteoporosis without fracture    • APC (atrial premature contractions) 2017   • Arthritis    • Carpal tunnel syndrome    • Cataract    • Chronic hip pain    • Fatigue    • Frequent episodes of pneumonia     PT STATES SEVERAL TIMES   • Heart murmur 2017   • Left knee pain    • LVH (left ventricular hypertrophy) 2017   • Mixed hyperlipidemia 2017   • Non-rheumatic mitral regurgitation 2017    Mild to moderate per 2-D echocardiogram   • Non-rheumatic tricuspid valve insufficiency 2017    Mild to moderate per 2-D echocardiogram   • Osteoarthritis    • Osteoarthritis of both knees    • Osteoporosis    • Shoulder pain    • Stroke (CMS/HCC)    • TIA (transient ischemic attack) 10/15/2013    numbness on right side of face and hand; went to [facility]     Past Surgical History:   Procedure Laterality Date   • APPENDECTOMY     • CARDIAC  CATHETERIZATION N/A 10/20/2017    Procedure: Right Heart Cath;  Surgeon: Christopher Kan MD;  Location:  [facility];  Service:    • COLONOSCOPY     • SUBTOTAL HYSTERECTOMY       General Information     Row Name 06/13/20 1453 06/13/20 1035       PT Evaluation Time/Intention    Document Type  therapy note (daily note)  -MW  evaluation  -MW    Mode of Treatment  physical therapy  -MW  physical therapy  -MW    Row Name 06/13/20 1453 06/13/20 1035       General Information    Patient Profile Reviewed?  yes  -MW  yes  -MW    Prior Level of Function  --  independent:;gait  -MW    Existing Precautions/Restrictions  fall  -MW  (S) fall knee immobilizer until able to perform SLR  -MW    Row Name 06/13/20 1035          Home Main Entrance    Number of Stairs, Main Entrance  eight  -MW     Stair Railings, Main Entrance  (S) other (see comments) Able to hold onto pillars on each side  -MW     Row Name 06/13/20 1453 06/13/20 1035       Cognitive Assessment/Intervention- PT/OT    Orientation Status (Cognition)  oriented x 3  -MW  oriented x 3  -MW    Row Name 06/13/20 1453 06/13/20 1035       Safety Issues, Functional Mobility    Safety Issues Affecting Function (Mobility)  --  ability to follow commands;awareness of need for assistance  -MW    Impairments Affecting Function (Mobility)  endurance/activity tolerance;pain;strength  -MW  endurance/activity tolerance;pain;strength  -MW    Comment, Safety Issues/Impairments (Mobility)  Gait belt used for safety.  -MW  Gait belt used for safety.  -MW      User Key  (r) = Recorded By, (t) = Taken By, (c) = Cosigned By    Initials Name Provider Type    MW Ladonna Goncalves, PT Physical Therapist        Mobility     Row Name 06/13/20 1454 06/13/20 1038       Bed Mobility Assessment/Treatment    Bed Mobility Assessment/Treatment  supine-sit;sit-supine  -MW  --    Supine-Sit [place] (Bed Mobility)  minimum assist (75% patient effort);verbal cues;2 person assist  -MW   --    Sit-Supine [place] (Bed Mobility)  minimum assist (75% patient effort);moderate assist (50% patient effort);2 person assist  -  --    Assistive Device (Bed Mobility)  bed rails;head of bed elevated  -  --    Comment (Bed Mobility)  --  Up in chair  -    Row Name 06/13/20 1454          Transfer Assessment/Treatment    Comment (Transfers)  STS at EOB and toilet  -     Row Name 06/13/20 1454 06/13/20 1038       Sit-Stand Transfer    Sit-Stand [place] (Transfers)  minimum assist (75% patient effort);verbal cues;contact guard  -  minimum assist (75% patient effort);verbal cues  -    Assistive Device (Sit-Stand Transfers)  walker, front-wheeled  -  walker, front-wheeled  -    Row Name 06/13/20 1454 06/13/20 1038       Gait/Stairs Assessment/Training    Gait/Stairs Assessment/Training  gait/ambulation assistive device;gait/ambulation independence  -  gait/ambulation assistive device;gait/ambulation independence  -    [place] Level (Gait)  contact guard;minimum assist (75% patient effort);verbal cues  -  contact guard;minimum assist (75% patient effort);verbal cues  -    Assistive Device (Gait)  walker, front-wheeled  -  walker, front-wheeled  -    Distance in Feet (Gait)  30', 15'  -MW  26  -MW    Pattern (Gait)  step-to  -MW  step-to  -MW    Deviations/Abnormal Patterns (Gait)  left sided deviations;gait speed decreased  -MW  left sided deviations;gait speed decreased  -MW    Bilateral Gait Deviations  forward flexed posture  -  forward flexed posture  -    Left Sided Gait Deviations  weight shift ability decreased  -MW  weight shift ability decreased  -MW    Comment (Gait/Stairs)  Increased pain during ambulation, verbal cues to not lean on RW. Unable to attempt stairs due to unsafe at this time.   -  Ambulates very slowly with increased WB through BUE. Further gait limited by pain and decreased endurance.   -    Row Name 06/13/20 1454 06/13/20 1038       Mobility  Assessment/Intervention    Extremity Weight-bearing Status  left lower extremity  -MW  left lower extremity  -MW    Left Lower Extremity (Weight-bearing Status)  weight-bearing as tolerated (WBAT)  -MW  weight-bearing as tolerated (WBAT)  -      User Key  (r) = Recorded By, (t) = Taken By, (c) = Cosigned By    Initials Name Provider Type    Ladonna Gunter, PT Physical Therapist        Obj/Interventions     [facility] Name 06/13/20 1040          General ROM    GENERAL ROM COMMENTS  Impaired L knee  -MW     [facility] Name 06/13/20 1040          MMT (Manual Muscle Testing)    General MMT Comments  Impaired LLE, partial leg raise  -[facility] Name 06/13/20 1040          Therapeutic Exercise    Sets/Reps (Therapeutic Exercise)  Therex TKA protocol x 10  -MW     [facility] Name 06/13/20 1040          Static Standing Balance    Level of [place] (Supported Standing, Static Balance)  contact guard assist  -     Assistive Device Utilized (Supported Standing, Static Balance)  walker, rolling  -MW     Row Name 06/13/20 1040          Dynamic Standing Balance    Level of [place], Reaches Outside Midline (Standing, Dynamic Balance)  contact guard assist;minimal assist, 75% patient effort  -     Assistive Device Utilized (Supported Standing, Dynamic Balance)  walker, rolling  -[facility] Name 06/13/20 1040          Sensory Assessment/Intervention    Sensory General Assessment  no sensation deficits identified  -       User Key  (r) = Recorded By, (t) = Taken By, (c) = Cosigned By    Initials Name Provider Type    Ladonna Gunter PT Physical Therapist        Goals/Plan     Row Name 06/13/20 1045          Bed Mobility Goal 1 (PT)    Activity/Assistive Device (Bed Mobility Goal 1, PT)  bed mobility activities, all  -MW     [place] Level/Cues Needed (Bed Mobility Goal 1, PT)  conditional independence  -MW     Time Frame (Bed Mobility Goal 1, PT)  3 days  -[facility] Name 06/13/20 1045          Transfer Goal 1 (PT)     Activity/Assistive Device (Transfer Goal 1, PT)  transfers, all;walker, rolling  -MW     [place] Level/Cues Needed (Transfer Goal 1, PT)  supervision required  -MW     Time Frame (Transfer Goal 1, PT)  3 days  -MW     Row Name 06/13/20 1045          Gait Training Goal 1 (PT)    Activity/Assistive Device (Gait Training Goal 1, PT)  gait (walking locomotion);assistive device use;walker, rolling  -MW     [place] Level (Gait Training Goal 1, PT)  supervision required  -MW     Distance (Gait Goal 1, PT)  150  -MW     Time Frame (Gait Training Goal 1, PT)  3 days  -MW     Row Name 06/13/20 1045          ROM Goal 1 (PT)    ROM Goal 1 (PT)  0-90  -MW     Time Frame (ROM Goal 1, PT)  3 days  -MW     Row Name 06/13/20 1045          Stairs Goal 1 (PT)    Activity/Assistive Device (Stairs Goal 1, PT)  stairs, all skills  -MW     [place] Level/Cues Needed (Stairs Goal 1, PT)  supervision required;contact guard assist  -MW     Number of Stairs (Stairs Goal 1, PT)  8  -MW     Time Frame (Stairs Goal 1, PT)  3 days  -MW       User Key  (r) = Recorded By, (t) = Taken By, (c) = Cosigned By    Initials Name Provider Type    Ladonna Gunter, PT Physical Therapist        Clinical Impression     Row Name 06/13/20 1508 06/13/20 1041       Pain Assessment    Additional Documentation  Pain Scale: Numbers Pre/Post-Treatment (Group)  -MW  Pain Scale: Numbers Pre/Post-Treatment (Group)  -MW    Row Name 06/13/20 1508 06/13/20 1041       Pain Scale: Numbers Pre/Post-Treatment    Pain Scale: Numbers, Pretreatment  7/10  -MW  7/10  -MW    Pain Scale: Numbers, Post-Treatment  8/10  -MW  8/10  -MW    Pain Location - Side  Left  -MW  Left  -MW    Pain Location  knee  -MW  knee  -MW    Pain Intervention(s)  Repositioned;Ambulation/increased activity;Rest;Other (Comment) RN notified  -MW  Repositioned;Ambulation/increased activity;Rest  -MW    Row Name 06/13/20 1508 06/13/20 1041       Plan of Care Review    Plan of Care Reviewed With   patient;family  -MW  patient  -MW    Progress  no change  -MW  --    Outcome Summary  Pt seen a second time this afternoon. She required Min A x 2 for bed mobility. Increased difficulty with gait this afternoon, required verbal cues to not lean on RW due to unsafe. Unable to attempt stairs at this time.   -MW  Pt is s/p L TKA 6/12/20. She is independent with mobility at baseline without AD, lives alone in a home with 8 JEREMY. She currently presents with post-op pain and weakness. Today she was able to perform STS at RW with Min A and gait x 26 ft with RW CG/[place]. Skilled PT indicated to address functional deficits and maximize level of independence prior to discharge. Anticipate home with assist and HHPT and RW after discharge.   -    Row Name 06/13/20 1041          Physical Therapy Clinical Impression    Patient/Family Goals Statement (PT Clinical Impression)  Pt reports less nausea after eating something.  -MW     Criteria for Skilled Interventions Met (PT Clinical Impression)  yes;treatment indicated  -MW     Rehab Potential (PT Clinical Summary)  good, to achieve stated therapy goals  -MW     Predicted Duration of Therapy (PT)  3-5 days  -     Row Name 06/13/20 1508 06/13/20 1041       Vital Signs    O2 Delivery Pre Treatment  --  room air  -MW    O2 Delivery Intra Treatment  --  room air  -MW    O2 Delivery Post Treatment  --  room air  -MW    Pre Patient Position  Supine  -MW  Sitting  -MW    Intra Patient Position  Standing  -MW  Standing  -MW    Post Patient Position  Supine  -MW  Sitting  -MW    Row Name 06/13/20 1508 06/13/20 1041       Positioning and Restraints    Pre-Treatment Position  in bed  -MW  sitting in chair/recliner  -MW    Post Treatment Position  bed  -MW  chair  -MW    In Bed  notified nsg;supine;with family/caregiver;exit alarm on;encouraged to call for assist;call light within reach  -MW  --    In Chair  --  reclined;sitting;call light within reach;encouraged to call for assist;exit  alarm on  -MW      User Key  (r) = Recorded By, (t) = Taken By, (c) = Cosigned By    Initials Name Provider Type    Ladonna Gunter PT Physical Therapist        Outcome Measures     Row Name 06/13/20 1047          How much help from another person do you currently need...    Turning from your back to your side while in flat bed without using bedrails?  3  -MW     Moving from lying on back to sitting on the side of a flat bed without bedrails?  3  -MW     Moving to and from a bed to a chair (including a wheelchair)?  3  -MW     Standing up from a chair using your arms (e.g., wheelchair, bedside chair)?  3  -MW     Climbing 3-5 steps with a railing?  2  -MW     To walk in hospital room?  3  -MW     AM-PAC 6 Clicks Score (PT)  17  -MW     Row Name 06/13/20 1047          Functional Assessment    Outcome Measure Options  AM-PAC 6 Clicks Basic Mobility (PT)  -MW       User Key  (r) = Recorded By, (t) = Taken By, (c) = Cosigned By    Initials Name Provider Type    Ladonna Gunter PT Physical Therapist        Physical Therapy Education                 Title: PT OT SLP Therapies (Done)     Topic: Physical Therapy (Done)     Point: Mobility training (Done)     Description:   Instruct learner(s) on safety and technique for assisting patient out of bed, chair or wheelchair.  Instruct in the proper use of assistive devices, such as walker, crutches, cane or brace.              Patient Friendly Description:   It's important to get you on your feet again, but we need to do so in a way that is safe for you. Falling has serious consequences, and your personal safety is the most important thing of all.        When it's time to get out of bed, one of us or a family member will sit next to you on the bed to give you support.     If your doctor or nurse tells you to use a walker, crutches, a cane, or a brace, be sure you use it every time you get out of bed, even if you think you don't need it.    Learning Progress Summary            Patient Acceptance, E, VU,NR by  at 6/13/2020 1512    Acceptance, E, VU,NR by  at 6/13/2020 1048    Comment:  Safety during mobility, use of AD, HEP   Family Acceptance, E, VU,NR by  at 6/13/2020 1512                   Point: Home exercise program (Done)     Description:   Instruct learner(s) on appropriate technique for monitoring, assisting and/or progressing patient with therapeutic exercises and activities.              Learning Progress Summary           Patient Acceptance, E, VU,NR by  at 6/13/2020 1048    Comment:  Safety during mobility, use of AD, HEP                   Point: Body mechanics (Done)     Description:   Instruct learner(s) on proper positioning and spine alignment for patient and/or caregiver during mobility tasks and/or exercises.              Learning Progress Summary           Patient Acceptance, E, VU,NR by  at 6/13/2020 1512    Acceptance, E, VU,NR by  at 6/13/2020 1048    Comment:  Safety during mobility, use of AD, HEP   Family Acceptance, E, VU,NR by  at 6/13/2020 1512                   Point: Precautions (Done)     Description:   Instruct learner(s) on prescribed precautions during mobility and gait tasks              Learning Progress Summary           Patient Acceptance, E, VU,NR by  at 6/13/2020 1512    Acceptance, E, VU,NR by  at 6/13/2020 1048    Comment:  Safety during mobility, use of AD, HEP   Family Acceptance, E, VU,NR by  at 6/13/2020 1512                               User Key     Initials Effective Dates Name Provider Type Discipline     09/17/19 -  Ladonna Goncalves, PT Physical Therapist PT              PT Recommendation and Plan  Planned Therapy Interventions (PT Eval): balance training, bed mobility training, gait training, home exercise program, patient/family education, ROM (range of motion), stair training, strengthening, transfer training  Outcome Summary/Treatment Plan (PT)  Anticipated Equipment Needs at Discharge (PT): front wheeled  walker  Anticipated Discharge Disposition (PT): home with assist, home with home health  Plan of Care Reviewed With: patient, family  Progress: no change  Outcome Summary: Pt seen a second time this afternoon. She required Min A x 2 for bed mobility. Increased difficulty with gait this afternoon, required verbal cues to not lean on RW due to unsafe. Unable to attempt stairs at this time.      Time Calculation:   PT Charges     Row Name 06/13/20 1513 06/13/20 1050          Time Calculation    Start Time  1439  -MW  0926  -MW     Stop Time  1502  -MW  0956  -MW     Time Calculation (min)  23 min  -MW  30 min  -MW     PT Received On  06/13/20  -MW  06/13/20  -MW     PT - Next Appointment  06/14/20  -MW  06/13/20  -MW     PT Goal Re-Cert Due Date  --  06/20/20  -MW        Time Calculation- PT    Total Timed Code Minutes- PT  23 minute(s)  -MW  28 minute(s)  -MW       User Key  (r) = Recorded By, (t) = Taken By, (c) = Cosigned By    Initials Name Provider Type    Ladonna Gunter, PT Physical Therapist        Therapy Charges for Today     Code Description Service Date Service Provider Modifiers Qty    69030913669 HC PT EVAL MOD COMPLEXITY 2 6/13/2020 Ladonna Goncalves, PT GP 1    98711171067 HC PT THER PROC EA 15 MIN 6/13/2020 Ladonna Goncalves, PT GP 1    79165720818 HC PT THER PROC EA 15 MIN 6/13/2020 Ladonna Goncalves, PT GP 1    61151877689 HC PT THER SUPP EA 15 MIN 6/13/2020 Ladonna Goncalves, PT GP 1    89551103872 HC PT THERAPEUTIC ACT EA 15 MIN 6/13/2020 Ladonna Goncalves, PT GP 1          PT G-Codes  Outcome Measure Options: AM-PAC 6 Clicks Daily Activity (OT)  AM-PAC 6 Clicks Score (PT): 17  AM-PAC 6 Clicks Score (OT): 18    Ladonna Goncalves PT  6/13/2020

## 2020-06-13 NOTE — THERAPY EVALUATION
Acute Care - Occupational Therapy Initial Evaluation  [facility]     Patient Name: Isabel Carlson  : 1945  MRN: [id number]  Today's Date: 2020             Admit Date: 2020       ICD-10-CM ICD-9-CM   1. Primary osteoarthritis of left knee M17.12 715.16     Patient Active Problem List   Diagnosis   • Heart murmur   • Abnormal EKG   • APC (atrial premature contractions)   • Non-rheumatic mitral regurgitation   • Non-rheumatic tricuspid valve insufficiency   • LVH (left ventricular hypertrophy)   • Gastroesophageal reflux disease   • Essential hypertension   • Mixed hyperlipidemia   • Pulmonary HTN (CMS/HCC)   • Osteoporosis of lumbar spine   • Menopause   • Osteoarthritis   • Lip laceration   • Status post left knee replacement   • Primary osteoarthritis of left knee     Past Medical History:   Diagnosis Date   • Abnormal stress echocardiogram 2017    Patient had borderline EKG changes 5 minutes into walking and Dr. Cali Spencer recommended the patient not to have that same type of stress test in the future   • Adhesive capsulitis of right shoulder    • Age-related osteoporosis without fracture    • APC (atrial premature contractions) 2017   • Arthritis    • Carpal tunnel syndrome    • Cataract    • Chronic hip pain    • Fatigue    • Frequent episodes of pneumonia     PT STATES SEVERAL TIMES   • Heart murmur 2017   • Left knee pain    • LVH (left ventricular hypertrophy) 2017   • Mixed hyperlipidemia 2017   • Non-rheumatic mitral regurgitation 2017    Mild to moderate per 2-D echocardiogram   • Non-rheumatic tricuspid valve insufficiency 2017    Mild to moderate per 2-D echocardiogram   • Osteoarthritis    • Osteoarthritis of both knees    • Osteoporosis    • Shoulder pain    • Stroke (CMS/HCC)    • TIA (transient ischemic attack) 10/15/2013    numbness on right side of face and hand; went to [facility]     Past Surgical History:   Procedure Laterality Date   •  APPENDECTOMY     • CARDIAC CATHETERIZATION N/A 10/20/2017    Procedure: Right Heart Cath;  Surgeon: Christopher Kan MD;  Location: [facility] CATH INVASIVE LOCATION;  Service:    • COLONOSCOPY     • SUBTOTAL HYSTERECTOMY            OT ASSESSMENT FLOWSHEET (last 12 hours)      Occupational Therapy Evaluation     Row Name 06/13/20 1032                   OT Evaluation Time/Intention    Document Type  evaluation  -SK        Mode of Treatment  individual therapy;occupational therapy  -SK        Patient Effort  good  -SK        Comment  pt motivated to get up and moving   -SK           General Information    Patient Profile Reviewed?  yes  -SK        Patient Observations  alert;cooperative;agree to therapy  -SK        Patient/Family Observations  sitting up in chair no signs of distress noted   -SK        Prior Level of Function  independent:  -SK        Existing Precautions/Restrictions  fall  -SK        Barriers to Rehab  none identified  -SK           Cognitive Assessment/Intervention- PT/OT    Orientation Status (Cognition)  oriented x 3  -SK        Follows Commands (Cognition)  WNL  -SK           Safety Issues, Functional Mobility    Impairments Affecting Function (Mobility)  endurance/activity tolerance;pain  -SK        Comment, Safety Issues/Impairments (Mobility)  gait belt and non slip socks used   -SK           Bed Mobility Assessment/Treatment    Comment (Bed Mobility)  up in chair when OT arrived   -SK           Functional Mobility    Functional Mobility- Ind. Level  contact guard assist;verbal cues required  -SK        Functional Mobility- Device  rolling walker  -SK        Functional Mobility-Distance (Feet)  30  -SK        Functional Mobility- Comment  functional mobility to and from bathroom, vcs for walker safety   -SK           Transfer Assessment/Treatment    Transfer Assessment/Treatment  sit-stand transfer;stand-sit transfer;toilet transfer;shower transfer  -SK           Sit-Stand Transfer     Sit-Stand Mora (Transfers)  contact guard;verbal cues  -SK        Assistive Device (Sit-Stand Transfers)  walker, front-wheeled  -SK           Stand-Sit Transfer    Stand-Sit Mora (Transfers)  contact guard;verbal cues vcs to reach back for chair arm   -SK        Assistive Device (Stand-Sit Transfers)  walker, front-wheeled  -SK           Toilet Transfer    Type (Toilet Transfer)  sit-stand;stand-sit  -SK        Mora Level (Toilet Transfer)  contact guard  -SK        Assistive Device (Toilet Transfer)  commode, bedside with drop arms;raised toilet seat;grab bars/safety frame  -SK           ADL Assessment/Intervention    BADL Assessment/Intervention  bathing;upper body dressing;lower body dressing;grooming;toileting  -SK           Upper Body Dressing Assessment/Training    Upper Body Dressing Mora Level  doff;don;set up;front opening garment  -SK        Upper Body Dressing Position  supported sitting  -SK           Lower Body Dressing Assessment/Training    Lower Body Dressing Mora Level  doff;don;socks;dependent (less than 25% patient effort);pants/bottoms;moderate assist (50% patient effort)  -SK        Lower Body Dressing Position  supported sitting;supported standing  -SK        Comment (Lower Body Dressing)  assist with socks due to increased pain   -SK           Grooming Assessment/Training    Mora Level (Grooming)  grooming skills;wash face, hands;oral care regimen;set up  -SK        Grooming Position  supported sitting  -SK           Toileting Assessment/Training    Mora Level (Toileting)  toileting skills;adjust/manage clothing;change pad/brief;perform perineal hygiene;contact guard assist  -SK        Assistive Devices (Toileting)  commode;grab bar/safety frame;raised toilet seat  -SK        Toileting Position  supported sitting;supported standing  -SK        Comment (Toileting)  increased time to complete   -SK           General ROM    GENERAL ROM  COMMENTS  BUE WFL   -SK           MMT (Manual Muscle Testing)    General MMT Comments  grossly 4/5  -SK           Sensory Assessment/Intervention    Sensory General Assessment  no sensation deficits identified  -SK           Positioning and Restraints    Pre-Treatment Position  sitting in chair/recliner  -SK        Post Treatment Position  chair  -SK        In Chair  reclined;with family/caregiver;encouraged to call for assist;call light within reach;exit alarm on  -SK           Pain Scale: Numbers Pre/Post-Treatment    Pain Scale: Numbers, Pretreatment  5/10  -SK        Pain Scale: Numbers, Post-Treatment  7/10  -SK        Pain Location - Side  Left  -SK        Pain Location  knee  -SK        Pain Intervention(s)  Cold applied;Repositioned;Rest  -SK           Wound 06/12/20 1334 Left knee Incision    Wound - Properties Group Date first assessed: 06/12/20  - Time first assessed: 1334  - Side: Left  - Location: knee  -AH Primary Wound Type: Incision  -       Plan of Care Review    Plan of Care Reviewed With  patient  -SK           Clinical Impression (OT)    OT Diagnosis  Pt presents to OT with decreased ind with ADLs, decreased functional mobility, increased pain with ADLs.     -SK        Functional Level at Time of Evaluation (OT Eval)  Pt CGA for sit to stand, toileting activity and toilet transfer.  Dep to don socks due to pain.  set-up seated for grooming.  -SK        Patient/Family Goals Statement (OT Eval)  return home and to PLOF   -SK        Criteria for Skilled Therapeutic Interventions Met (OT Eval)  yes  -SK        Rehab Potential (OT Eval)  good, to achieve stated therapy goals  -SK        Therapy Frequency (OT Eval)  5 times/wk  -SK        Care Plan Review (OT)  evaluation/treatment results reviewed;care plan/treatment goals reviewed;patient/other agree to care plan  -SK        Anticipated Discharge Disposition (OT)  home with assist;home with home health  -SK           Planned OT Interventions     Planned Therapy Interventions (OT Eval)  activity tolerance training;BADL retraining;ROM/therapeutic exercise;strengthening exercise;transfer/mobility retraining  -SK           OT Goals    Transfer Goal Selection (OT)  transfer, OT goal 1  -SK        Bathing Goal Selection (OT)  bathing, OT goal 1  -SK        Dressing Goal Selection (OT)  dressing, OT goal 1  -SK           Transfer Goal 1 (OT)    Activity/Assistive Device (Transfer Goal 1, OT)  transfers, all;sit-to-stand/stand-to-sit;toilet;shower chair;commode;walker, rolling  -SK        Spencer Level/Cues Needed (Transfer Goal 1, OT)  supervision required  -SK        Time Frame (Transfer Goal 1, OT)  1 week  -SK        Progress/Outcome (Transfer Goal 1, OT)  goal ongoing  -SK           Bathing Goal 1 (OT)    Activity/Assistive Device (Bathing Goal 1, OT)  bathing skills, all;upper body bathing;lower body bathing;shower chair;long-handled sponge;hand-held shower spray hose  -SK        Spencer Level/Cues Needed (Bathing Goal 1, OT)  standby assist  -SK        Time Frame (Bathing Goal 1, OT)  1 week  -SK        Progress/Outcomes (Bathing Goal 1, OT)  goal ongoing  -SK           Dressing Goal 1 (OT)    Activity/Assistive Device (Dressing Goal 1, OT)  dressing skills, all;lower body dressing;sock-aid;reacher  -SK        Spencer/Cues Needed (Dressing Goal 1, OT)  minimum assist (75% or more patient effort)  -SK        Time Frame (Dressing Goal 1, OT)  1 week  -SK        Progress/Outcome (Dressing Goal 1, OT)  goal ongoing  -SK          User Key  (r) = Recorded By, (t) = Taken By, (c) = Cosigned By    Initials Name Effective Dates    Francesca Gilman RN 12/07/17 -     SK Tamanna Ortiz OT 02/25/19 -          Occupational Therapy Education                 Title: PT OT SLP Therapies (Done)     Topic: Occupational Therapy (Done)     Point: ADL training (Done)     Description:   Instruct learner(s) on proper safety adaptation and remediation techniques  during self care or transfers.   Instruct in proper use of assistive devices.              Learning Progress Summary           Patient Acceptance, E, VU by SK at 6/13/2020 1303    Comment:  edu pt on OT, DME., AE, ADLs, safety                   Point: Home exercise program (Done)     Description:   Instruct learner(s) on appropriate technique for monitoring, assisting and/or progressing therapeutic exercises/activities.              Learning Progress Summary           Patient Acceptance, E, VU by SK at 6/13/2020 1303    Comment:  edu pt on OT, DME., AE, ADLs, safety                   Point: Precautions (Done)     Description:   Instruct learner(s) on prescribed precautions during self-care and functional transfers.              Learning Progress Summary           Patient Acceptance, E, VU by SK at 6/13/2020 1303    Comment:  edu pt on OT, DME., AE, ADLs, safety                   Point: Body mechanics (Done)     Description:   Instruct learner(s) on proper positioning and spine alignment during self-care, functional mobility activities and/or exercises.              Learning Progress Summary           Patient Acceptance, E, VU by SK at 6/13/2020 1303    Comment:  edu pt on OT, DME., AE, ADLs, safety                               User Key     Initials Effective Dates Name Provider Type Discipline    SK 02/25/19 -  Tamanna Ortiz, ARMANDO Occupational Therapist OT                  OT Recommendation and Plan  Outcome Summary/Treatment Plan (OT)  Anticipated Discharge Disposition (OT): home with assist, home with home health  Planned Therapy Interventions (OT Eval): activity tolerance training, BADL retraining, ROM/therapeutic exercise, strengthening exercise, transfer/mobility retraining  Therapy Frequency (OT Eval): 5 times/wk  Plan of Care Review  Plan of Care Reviewed With: patient  Plan of Care Reviewed With: patient  Outcome Summary: Pt 73 yo s/p L TKA.  Pt presents to OT with decreased ind with ADLs, decreased  functional mobility, increased pain with ADLs.   Pt CGA for sit to stand, toileting activity and toilet transfer.  Dep to don socks due to pain.  set-up seated for grooming.  Pt will benefit from skilled OT to address deficits and increase ind with adls.  Anticipate home with son for assist and HHOT.    Outcome Measures     Row Name 06/13/20 1300             How much help from another is currently needed...    Putting on and taking off regular lower body clothing?  2  -SK      Bathing (including washing, rinsing, and drying)  3  -SK      Toileting (which includes using toilet bed pan or urinal)  3  -SK      Putting on and taking off regular upper body clothing  3  -SK      Taking care of personal grooming (such as brushing teeth)  3  -SK      Eating meals  4  -SK      AM-PAC 6 Clicks Score (OT)  18  -SK         Functional Assessment    Outcome Measure Options  AM-PAC 6 Clicks Daily Activity (OT)  -SK        User Key  (r) = Recorded By, (t) = Taken By, (c) = Cosigned By    Initials Name Provider Type    Tamanna Aquino OT Occupational Therapist          Time Calculation:   Time Calculation- OT     Row Name 06/13/20 1303             Time Calculation- OT    OT Start Time  0958  -SK      OT Stop Time  1032  -SK      OT Time Calculation (min)  34 min  -SK      Total Timed Code Minutes- OT  25 minute(s)  -SK      OT Received On  06/13/20  -SK      OT Goal Re-Cert Due Date  06/27/20  -SK        User Key  (r) = Recorded By, (t) = Taken By, (c) = Cosigned By    Initials Name Provider Type    Tamanna Aquino OT Occupational Therapist        Therapy Charges for Today     Code Description Service Date Service Provider Modifiers Qty    [id number]  OT EVAL MOD COMPLEXITY 2 6/13/2020 Tamanna Ortiz OT GO 1    [id number]  OT SELF CARE/MGMT/TRAIN EA 15 MIN 6/13/2020 Tamanna Ortiz OT GO 2               Tamanna Ortiz OT  6/13/2020

## 2020-06-13 NOTE — THERAPY EVALUATION
Patient Name: Isabel Carlson  : 1945    MRN: [id number]                              Today's Date: 2020       Admit Date: 2020    Visit Dx:     ICD-10-CM ICD-9-CM   1. Primary osteoarthritis of left knee M17.12 715.16     Patient Active Problem List   Diagnosis   • Heart murmur   • Abnormal EKG   • APC (atrial premature contractions)   • Non-rheumatic mitral regurgitation   • Non-rheumatic tricuspid valve insufficiency   • LVH (left ventricular hypertrophy)   • Gastroesophageal reflux disease   • Essential hypertension   • Mixed hyperlipidemia   • Pulmonary HTN (CMS/HCC)   • Osteoporosis of lumbar spine   • Menopause   • Osteoarthritis   • Lip laceration   • Status post left knee replacement     Past Medical History:   Diagnosis Date   • Abnormal stress echocardiogram 2017    Patient had borderline EKG changes 5 minutes into walking and Dr. Cali Spencer recommended the patient not to have that same type of stress test in the future   • Adhesive capsulitis of right shoulder    • Age-related osteoporosis without fracture    • APC (atrial premature contractions) 2017   • Arthritis    • Carpal tunnel syndrome    • Cataract    • Chronic hip pain    • Fatigue    • Frequent episodes of pneumonia     PT STATES SEVERAL TIMES   • Heart murmur 2017   • Left knee pain    • LVH (left ventricular hypertrophy) 2017   • Mixed hyperlipidemia 2017   • Non-rheumatic mitral regurgitation 2017    Mild to moderate per 2-D echocardiogram   • Non-rheumatic tricuspid valve insufficiency 2017    Mild to moderate per 2-D echocardiogram   • Osteoarthritis    • Osteoarthritis of both knees    • Osteoporosis    • Shoulder pain    • Stroke (CMS/HCC)    • TIA (transient ischemic attack) 10/15/2013    numbness on right side of face and hand; went to [facility]     Past Surgical History:   Procedure Laterality Date   • APPENDECTOMY     • CARDIAC CATHETERIZATION N/A 10/20/2017    Procedure: Right  Heart Cath;  Surgeon: Christopher Kan MD;  Location:  [facility];  Service:    • COLONOSCOPY     • SUBTOTAL HYSTERECTOMY       General Information     Row Name 06/13/20 1035          PT Evaluation Time/Intention    Document Type  evaluation  -     Mode of Treatment  physical therapy  -     Row Name 06/13/20 1035          General Information    Patient Profile Reviewed?  yes  -MW     Prior Level of Function  independent:;gait  -MW     Existing Precautions/Restrictions  (S) fall knee immobilizer until able to perform SLR  -MW     Row Name 06/13/20 1035          Home Main Entrance    Number of Stairs, Main Entrance  eight  -MW     Stair Railings, Main Entrance  (S) other (see comments) Able to hold onto pillars on each side  -     Row Name 06/13/20 1035          Cognitive Assessment/Intervention- PT/OT    Orientation Status (Cognition)  oriented x 3  -MW     Row Name 06/13/20 1035          Safety Issues, Functional Mobility    Safety Issues Affecting Function (Mobility)  ability to follow commands;awareness of need for assistance  -     Impairments Affecting Function (Mobility)  endurance/activity tolerance;pain;strength  -     Comment, Safety Issues/Impairments (Mobility)  Gait belt used for safety.  -       User Key  (r) = Recorded By, (t) = Taken By, (c) = Cosigned By    Initials Name Provider Type     Ladonna Goncalves, PT Physical Therapist        Mobility     Row Name 06/13/20 1038          Bed Mobility Assessment/Treatment    Comment (Bed Mobility)  Up in chair  -[facility] Name 06/13/20 1038          Sit-Stand Transfer    Sit-Stand [place] (Transfers)  minimum assist (75% patient effort);verbal cues  -     Assistive Device (Sit-Stand Transfers)  walker, front-wheeled  -     Row Name 06/13/20 1038          Gait/Stairs Assessment/Training    Gait/Stairs Assessment/Training  gait/ambulation assistive device;gait/ambulation independence  -     [place] Level (Gait)   contact guard;minimum assist (75% patient effort);verbal cues  -     Assistive Device (Gait)  walker, front-wheeled  -     Distance in Feet (Gait)  26  -MW     Pattern (Gait)  step-to  -MW     Deviations/Abnormal Patterns (Gait)  left sided deviations;gait speed decreased  -MW     Bilateral Gait Deviations  forward flexed posture  -MW     Left Sided Gait Deviations  weight shift ability decreased  -MW     Comment (Gait/Stairs)  Ambulates very slowly with increased WB through BUE. Further gait limited by pain and decreased endurance.   -     Row Name 06/13/20 1038          Mobility Assessment/Intervention    Extremity Weight-bearing Status  left lower extremity  -MW     Left Lower Extremity (Weight-bearing Status)  weight-bearing as tolerated (WBAT)  -       User Key  (r) = Recorded By, (t) = Taken By, (c) = Cosigned By    Initials Name Provider Type    MW Ladonna Goncalves, PT Physical Therapist        Obj/Interventions     Row Name 06/13/20 1040          General ROM    GENERAL ROM COMMENTS  Impaired L knee  -MW     Row Name 06/13/20 1040          MMT (Manual Muscle Testing)    General MMT Comments  Impaired LLE, partial leg raise  -MW     Row Name 06/13/20 1040          Therapeutic Exercise    Sets/Reps (Therapeutic Exercise)  Therex TKA protocol x 10  -MW     [facility] Name 06/13/20 1040          Static Standing Balance    Level of [place] (Supported Standing, Static Balance)  contact guard assist  -     Assistive Device Utilized (Supported Standing, Static Balance)  walker, rolling  -[facility] Name 06/13/20 1040          Dynamic Standing Balance    Level of [place], Reaches Outside Midline (Standing, Dynamic Balance)  contact guard assist;minimal assist, 75% patient effort  -     Assistive Device Utilized (Supported Standing, Dynamic Balance)  walker, rolling  -     Row Name 06/13/20 1040          Sensory Assessment/Intervention    Sensory General Assessment  no sensation deficits identified  -        User Key  (r) = Recorded By, (t) = Taken By, (c) = Cosigned By    Initials Name Provider Type    MW Ladonna Goncalves, PT Physical Therapist        Goals/Plan     Row Name 06/13/20 1045          Bed Mobility Goal 1 (PT)    Activity/Assistive Device (Bed Mobility Goal 1, PT)  bed mobility activities, all  -MW     [place] Level/Cues Needed (Bed Mobility Goal 1, PT)  conditional independence  -MW     Time Frame (Bed Mobility Goal 1, PT)  3 days  -MW     Row Name 06/13/20 1045          Transfer Goal 1 (PT)    Activity/Assistive Device (Transfer Goal 1, PT)  transfers, all;walker, rolling  -MW     [place] Level/Cues Needed (Transfer Goal 1, PT)  supervision required  -MW     Time Frame (Transfer Goal 1, PT)  3 days  -MW     Row Name 06/13/20 1045          Gait Training Goal 1 (PT)    Activity/Assistive Device (Gait Training Goal 1, PT)  gait (walking locomotion);assistive device use;walker, rolling  -MW     [place] Level (Gait Training Goal 1, PT)  supervision required  -MW     Distance (Gait Goal 1, PT)  150  -MW     Time Frame (Gait Training Goal 1, PT)  3 days  -MW     Row Name 06/13/20 1045          ROM Goal 1 (PT)    ROM Goal 1 (PT)  0-90  -MW     Time Frame (ROM Goal 1, PT)  3 days  -MW     Row Name 06/13/20 1045          Stairs Goal 1 (PT)    Activity/Assistive Device (Stairs Goal 1, PT)  stairs, all skills  -MW     [place] Level/Cues Needed (Stairs Goal 1, PT)  supervision required;contact guard assist  -MW     Number of Stairs (Stairs Goal 1, PT)  8  -MW     Time Frame (Stairs Goal 1, PT)  3 days  -MW       User Key  (r) = Recorded By, (t) = Taken By, (c) = Cosigned By    Initials Name Provider Type    MW Ladonna Goncalves, PT Physical Therapist        Clinical Impression     Row Name 06/13/20 1041          Pain Assessment    Additional Documentation  Pain Scale: Numbers Pre/Post-Treatment (Group)  -MW     Row Name 06/13/20 1041          Pain Scale: Numbers Pre/Post-Treatment    Pain Scale:  Numbers, Pretreatment  7/10  -MW     Pain Scale: Numbers, Post-Treatment  8/10  -MW     Pain Location - Side  Left  -MW     Pain Location  knee  -MW     Pain Intervention(s)  Repositioned;Ambulation/increased activity;Rest  -     Row Name 06/13/20 1041          Plan of Care Review    Plan of Care Reviewed With  patient  -MW     Outcome Summary  Pt is s/p L TKA 6/12/20. She is independent with mobility at baseline without AD, lives alone in a home with 8 JEREMY. She currently presents with post-op pain and weakness. Today she was able to perform STS at RW with Min A and gait x 26 ft with RW CG/Rodolfo. Skilled PT indicated to address functional deficits and maximize level of independence prior to discharge. Anticipate home with assist and HHPT and RW after discharge.   -     Row Name 06/13/20 1041          Physical Therapy Clinical Impression    Patient/Family Goals Statement (PT Clinical Impression)  Pt reports less nausea after eating something.  -MW     Criteria for Skilled Interventions Met (PT Clinical Impression)  yes;treatment indicated  -MW     Rehab Potential (PT Clinical Summary)  good, to achieve stated therapy goals  -     Predicted Duration of Therapy (PT)  3-5 days  -     Row Name 06/13/20 1041          Vital Signs    O2 Delivery Pre Treatment  room air  -MW     O2 Delivery Intra Treatment  room air  -MW     O2 Delivery Post Treatment  room air  -MW     Pre Patient Position  Sitting  -MW     Intra Patient Position  Standing  -MW     Post Patient Position  Sitting  -     Row Name 06/13/20 1041          Positioning and Restraints    Pre-Treatment Position  sitting in chair/recliner  -MW     Post Treatment Position  chair  -MW     In Chair  reclined;sitting;call light within reach;encouraged to call for assist;exit alarm on  -       User Key  (r) = Recorded By, (t) = Taken By, (c) = Cosigned By    Initials Name Provider Type    Ladonna Gunter, PT Physical Therapist        Outcome Measures     Row  Name 06/13/20 1047          How much help from another person do you currently need...    Turning from your back to your side while in flat bed without using bedrails?  3  -MW     Moving from lying on back to sitting on the side of a flat bed without bedrails?  3  -MW     Moving to and from a bed to a chair (including a wheelchair)?  3  -MW     Standing up from a chair using your arms (e.g., wheelchair, bedside chair)?  3  -MW     Climbing 3-5 steps with a railing?  2  -MW     To walk in hospital room?  3  -MW     AM-PAC 6 Clicks Score (PT)  17  -MW     Row Name 06/13/20 1047          Functional Assessment    Outcome Measure Options  AM-PAC 6 Clicks Basic Mobility (PT)  -MW       User Key  (r) = Recorded By, (t) = Taken By, (c) = Cosigned By    Initials Name Provider Type    Ladonna Gunter, PT Physical Therapist        Physical Therapy Education                 Title: PT OT SLP Therapies (Done)     Topic: Physical Therapy (Done)     Point: Mobility training (Done)     Description:   Instruct learner(s) on safety and technique for assisting patient out of bed, chair or wheelchair.  Instruct in the proper use of assistive devices, such as walker, crutches, cane or brace.              Patient Friendly Description:   It's important to get you on your feet again, but we need to do so in a way that is safe for you. Falling has serious consequences, and your personal safety is the most important thing of all.        When it's time to get out of bed, one of us or a family member will sit next to you on the bed to give you support.     If your doctor or nurse tells you to use a walker, crutches, a cane, or a brace, be sure you use it every time you get out of bed, even if you think you don't need it.    Learning Progress Summary           Patient Acceptance, EMANINDER,NR by TAO at 6/13/2020 1048    Comment:  Safety during mobility, use of AD, HEP                   Point: Home exercise program (Done)     Description:   Instruct  learner(s) on appropriate technique for monitoring, assisting and/or progressing patient with therapeutic exercises and activities.              Learning Progress Summary           Patient Acceptance, E, VU,NR by  at 6/13/2020 1048    Comment:  Safety during mobility, use of AD, HEP                   Point: Body mechanics (Done)     Description:   Instruct learner(s) on proper positioning and spine alignment for patient and/or caregiver during mobility tasks and/or exercises.              Learning Progress Summary           Patient Acceptance, E, VU,NR by  at 6/13/2020 1048    Comment:  Safety during mobility, use of AD, HEP                   Point: Precautions (Done)     Description:   Instruct learner(s) on prescribed precautions during mobility and gait tasks              Learning Progress Summary           Patient Acceptance, E, VU,NR by  at 6/13/2020 1048    Comment:  Safety during mobility, use of AD, HEP                               User Key     Initials Effective Dates Name Provider Type Discipline     09/17/19 -  Ladonna Goncalves, PT Physical Therapist PT              PT Recommendation and Plan  Planned Therapy Interventions (PT Eval): balance training, bed mobility training, gait training, home exercise program, patient/family education, ROM (range of motion), stair training, strengthening, transfer training  Outcome Summary/Treatment Plan (PT)  Anticipated Equipment Needs at Discharge (PT): front wheeled walker  Anticipated Discharge Disposition (PT): home with assist, home with home health  Plan of Care Reviewed With: patient  Outcome Summary: Pt is s/p L TKA 6/12/20. She is independent with mobility at baseline without AD, lives alone in a home with 8 JEREMY. She currently presents with post-op pain and weakness. Today she was able to perform STS at RW with Min A and gait x 26 ft with RW CG/Rodolfo. Skilled PT indicated to address functional deficits and maximize level of independence prior to  discharge. Anticipate home with assist and HHPT and RW after discharge.      Time Calculation:   PT Charges     Row Name 06/13/20 1050             Time Calculation    Start Time  0926  -MW      Stop Time  0956  -MW      Time Calculation (min)  30 min  -MW      PT Received On  06/13/20  -MW      PT - Next Appointment  06/14/20  -MW      PT Goal Re-Cert Due Date  06/20/20  -MW         Time Calculation- PT    Total Timed Code Minutes- PT  28 minute(s)  -MW        User Key  (r) = Recorded By, (t) = Taken By, (c) = Cosigned By    Initials Name Provider Type    Ladonna Gunter, PT Physical Therapist        Therapy Charges for Today     Code Description Service Date Service Provider Modifiers Qty    37621649300 HC PT EVAL MOD COMPLEXITY 2 6/13/2020 Ladonna Goncalves, PT GP 1    46530737801 HC PT THER PROC EA 15 MIN 6/13/2020 Ladonna Goncalves PT GP 1          PT G-Codes  Outcome Measure Options: AM-PAC 6 Clicks Basic Mobility (PT)  AM-PAC 6 Clicks Score (PT): 17    Ladonna Goncalves PT  6/13/2020

## 2020-06-13 NOTE — PLAN OF CARE
Problem: Patient Care Overview  Goal: Plan of Care Review  Flowsheets  Taken 6/13/2020 1041 by Ladonna Goncalves PT  Plan of Care Reviewed With: patient  Taken 6/13/2020 1245 by Tamanna Ortiz OT  Outcome Summary: Pt 75 yo s/p L TKA.  Pt presents to OT with decreased ind with ADLs, decreased functional mobility, increased pain with ADLs.   Pt CGA for sit to stand, toileting activity and toilet transfer.  Dep to don socks due to pain.  set-up seated for grooming.  Pt will benefit from skilled OT to address deficits and increase ind with adls.  Anticipate home with son for assist and HHOT.     Patient was not wearing a face mask during this therapy encounter. Therapist used appropriate personal protective equipment including mask and gloves.  Mask used was standard procedure mask. Appropriate PPE was worn during the entire therapy session. Hand hygiene was completed before and after therapy session. Patient is not in enhanced droplet precautions.

## 2020-06-13 NOTE — CONSULTS
"Internal medicine consult    Primary care physician      Chief complaint  Status post left total knee arthroplasty    Reason for consult  Follow medical problems    History of present illness  74-year-old -American female with history of hypertension osteoarthritis and gastroesophageal of disease electively admitted to orthopedic surgery for left total knee replacement secondary to worsening osteoarthritis who failed outpatient treatment and underwent left total knee replacement secondary disabling pain.  Patient underwent surgery yesterday without complication followed by some itching at the IV site and nausea and her pain at the surgical site.  I am asked to follow the patient for medical problem.  Patient denies any headache dizziness chest pain shortness of breath abdominal pain vomiting diarrhea.    Past medical history  Hypertension  Osteoarthritis  Gastroesophageal of disease    Positive history  None    Social history  Denies any tobacco alcohol or drug abuse    Family history  Noncontributory    Medications  No known medical allergies  Home medications reviewed    Review of systems  As above    Physical examination  Blood pressure 132/69, pulse 79, temperature 97.8 °F (36.6 °C), temperature source Skin, resp. rate 16, height 152.4 cm (60\"), weight 68 kg (150 lb), SpO2 99 %.    HEENT unremarkable  Neck supple  Lungs decreased breath sounds bilaterally otherwise clear  Heart S1-S2 no murmur gallop rub  Abdomen soft nontender bowel sounds positive  Extremities trace edema  Neuro no focal deficit  Psych normal mood and behavior    LABS  Lab Results (last 24 hours)     Procedure Component Value Units Date/Time    Basic Metabolic Panel [[id number]]  (Abnormal) Collected:  06/13/20 0453    Specimen:  Blood Updated:  06/13/20 0533     Glucose 151 mg/dL      BUN 7 mg/dL      Creatinine 0.62 mg/dL      Sodium 138 mmol/L      Potassium 3.8 mmol/L      Chloride 102 mmol/L      CO2 25.8 mmol/L      " Calcium 8.4 mg/dL      eGFR  Alice Amer 113 mL/min/1.73      BUN/Creatinine Ratio 11.3     Anion Gap 10.2 mmol/L     Narrative:       GFR Normal >60  Chronic Kidney Disease <60  Kidney Failure <15      CBC & Differential [[id number]] Collected:  06/13/20 0453    Specimen:  Blood Updated:  06/13/20 0512    Narrative:       The following orders were created for panel order CBC & Differential.  Procedure                               Abnormality         Status                     ---------                               -----------         ------                     CBC Auto Differential[[id number]]        Abnormal            Final result                 Please view results for these tests on the individual orders.    CBC Auto Differential [[id number]]  (Abnormal) Collected:  06/13/20 0453    Specimen:  Blood Updated:  06/13/20 0512     WBC 7.54 10*3/mm3      RBC 3.78 10*6/mm3      Hemoglobin 11.2 g/dL      Hematocrit 33.6 %      MCV 88.9 fL      MCH 29.6 pg      MCHC 33.3 g/dL      RDW 12.3 %      RDW-SD 40.2 fl      MPV 10.0 fL      Platelets 192 10*3/mm3      Neutrophil % 83.1 %      Lymphocyte % 8.4 %      Monocyte % 8.0 %      Eosinophil % 0.0 %      Basophil % 0.1 %      Immature Grans % 0.4 %      Neutrophils, Absolute 6.27 10*3/mm3      Lymphocytes, Absolute 0.63 10*3/mm3      Monocytes, Absolute 0.60 10*3/mm3      Eosinophils, Absolute 0.00 10*3/mm3      Basophils, Absolute 0.01 10*3/mm3      Immature Grans, Absolute 0.03 10*3/mm3      nRBC 0.0 /100 WBC         Imaging Results (Last 24 Hours)     Procedure Component Value Units Date/Time    XR Knee 1 or 2 View Left [[id number]] Collected:  06/12/20 1751     Updated:  06/12/20 1859    Narrative:       TWO-VIEW PORTABLE LEFT KNEE     HISTORY: Knee replacement for osteoarthritis.     FINDINGS: The patient has had recent total knee replacement and the  alignment appears satisfactory.     This report was finalized on 6/12/2020 6:56 PM by Dr. Kendall Beltran M.D.              Current Facility-Administered Medications:   •  acetaminophen (TYLENOL) tablet 325 mg, 325 mg, Oral, Q4H PRN, Jose Leary MD  •  amLODIPine (NORVASC) 2.5 mg, lisinopril (PRINIVIL,ZESTRIL) 10 mg, , Oral, Q24H, Jose Leary MD  •  aspirin EC tablet 81 mg, 81 mg, Oral, Q12H, Jose Leary MD, 81 mg at 06/13/20 0802  •  bisacodyl (DULCOLAX) EC tablet 10 mg, 10 mg, Oral, Daily PRN, Jose Leary MD  •  cetirizine (zyrTEC) tablet 10 mg, 10 mg, Oral, Daily PRN, Jose Leary MD  •  docusate sodium (COLACE) capsule 100 mg, 100 mg, Oral, BID, Jose Leary MD, 100 mg at 06/13/20 0802  •  fluticasone (FLONASE) 50 MCG/ACT nasal spray 2 spray, 2 spray, Nasal, BID, Jose Leary MD, 2 spray at 06/13/20 0802  •  HYDROcodone-acetaminophen (NORCO) 7.5-325 MG per tablet 1 tablet, 1 tablet, Oral, Q4H PRN, Jose Leary MD, 1 tablet at 06/13/20 0255  •  HYDROcodone-acetaminophen (NORCO) 7.5-325 MG per tablet 2 tablet, 2 tablet, Oral, Q4H PRN, Jose Leary MD, 2 tablet at 06/13/20 0754  •  HYDROmorphone (DILAUDID) injection 0.5 mg, 0.5 mg, Intravenous, Q2H PRN, 0.5 mg at 06/12/20 2302 **AND** naloxone (NARCAN) injection 0.1 mg, 0.1 mg, Intravenous, Q5 Min PRN, Jose Leary MD  •  melatonin tablet 1 mg, 1 mg, Oral, Nightly PRN, Jose Leary MD  •  meloxicam (MOBIC) tablet 7.5 mg, 7.5 mg, Oral, Daily PRN, Jose Leary MD  •  ondansetron (ZOFRAN) tablet 4 mg, 4 mg, Oral, Q6H PRN **OR** ondansetron (ZOFRAN) injection 4 mg, 4 mg, Intravenous, Q6H PRN, Jose Leary MD, 4 mg at 06/13/20 0255  •  pantoprazole (PROTONIX) EC tablet 40 mg, 40 mg, Oral, Nightly, Jose eLary MD, 40 mg at 06/13/20 0256  •  promethazine (PHENERGAN) injection 12.5 mg, 12.5 mg, Intravenous, Q4H PRN **OR** promethazine (PHENERGAN) tablet 25 mg, 25 mg, Oral, Q6H PRN **OR** promethazine (PHENERGAN)  injection 12.5 mg, 12.5 mg, Intramuscular, Q4H PRN, Julian Keller MD, 12.5 mg at 06/12/20 2302  •  sodium chloride 0.9 % infusion, 100 mL/hr, Intravenous, Continuous, Jose Leary MD, Last Rate: 100 mL/hr at 06/12/20 2256, 100 mL/hr at 06/12/20 2256     ASSESSMENT  Status post left total knee replacement  Severe osteoarthritis  Hypertension  Gastroesophageal of disease    PLAN  Agree with current care  Postop care  Resume home medications  Symptomatic treatment for itching and nausea  Stress ulcer DVT prophylaxis  Supportive care  Repeat labs in a.m.  Check hemoglobin A1c TSH lipid profile  Discussed with nursing staff and family  PT OT  Patient is full code  We will follow with  and further recommendation current hospital course    JULIAN KELLER MD

## 2020-06-13 NOTE — PLAN OF CARE
"  Problem: Patient Care Overview  Goal: Plan of Care Review  Outcome: Ongoing (interventions implemented as appropriate)  Flowsheets (Taken 6/13/2020 0451)  Progress: improving  Plan of Care Reviewed With: patient  Outcome Summary: POD 0 LTKA. VSS, PONV persists amid pain control issues, as pain medication exacerbates nausea. prn phenergan and zofran have been effective in alleviating PONV - able to take PO pain medication at 0252 along with prn zofran, client states that she is 'starting to feel better\". unable to get OOB and meet ambulation goals this shift r/t ongoing PONV, weakness, dizziness, and fatigue. discussed goals for POD 1 and d/c goals with client. in bed resting with IVF infusing, will continue to monitor.  Goal: Individualization and Mutuality  Outcome: Ongoing (interventions implemented as appropriate)  Goal: Discharge Needs Assessment  Outcome: Ongoing (interventions implemented as appropriate)  Goal: Interprofessional Rounds/Family Conf  Outcome: Ongoing (interventions implemented as appropriate)     Problem: Fall Risk (Adult)  Goal: Absence of Fall  Outcome: Ongoing (interventions implemented as appropriate)  Flowsheets (Taken 6/13/2020 0451)  Absence of Fall: achieves outcome     Problem: Knee Arthroplasty (Total, Partial) (Adult)  Goal: Signs and Symptoms of Listed Potential Problems Will be Absent, Minimized or Managed (Knee Arthroplasty)  Outcome: Ongoing (interventions implemented as appropriate)  Flowsheets (Taken 6/13/2020 0451)  Problems Assessed (Knee Arthroplasty): all  Problems Present (Knee Arthroplasty): pain; postoperative nausea and vomiting; functional deficit; situational response  Goal: Anesthesia/Sedation Recovery  Outcome: Ongoing (interventions implemented as appropriate)  Flowsheets (Taken 6/13/2020 0451)  Anesthesia/Sedation Recovery: progressing toward baseline; other (see comments) (continued PONV)     "

## 2020-06-14 ENCOUNTER — READMISSION MANAGEMENT (OUTPATIENT)
Dept: CALL CENTER | Facility: HOSPITAL | Age: 75
End: 2020-06-14

## 2020-06-14 VITALS
SYSTOLIC BLOOD PRESSURE: 120 MMHG | BODY MASS INDEX: 29.45 KG/M2 | OXYGEN SATURATION: 96 % | WEIGHT: 150 LBS | HEART RATE: 78 BPM | HEIGHT: 60 IN | DIASTOLIC BLOOD PRESSURE: 63 MMHG | RESPIRATION RATE: 16 BRPM | TEMPERATURE: 98 F

## 2020-06-14 PROBLEM — M17.12 PRIMARY OSTEOARTHRITIS OF LEFT KNEE: Status: RESOLVED | Noted: 2020-06-13 | Resolved: 2020-06-14

## 2020-06-14 LAB
ALBUMIN SERPL-MCNC: 4 G/DL (ref 3.5–5.2)
ALBUMIN/GLOB SERPL: 1.6 G/DL
ALP SERPL-CCNC: 78 U/L (ref 39–117)
ALT SERPL W P-5'-P-CCNC: 38 U/L (ref 1–33)
ANION GAP SERPL CALCULATED.3IONS-SCNC: 11.1 MMOL/L (ref 5–15)
AST SERPL-CCNC: 44 U/L (ref 1–32)
BASOPHILS # BLD AUTO: 0.01 10*3/MM3 (ref 0–0.2)
BASOPHILS NFR BLD AUTO: 0.1 % (ref 0–1.5)
BILIRUB SERPL-MCNC: 0.9 MG/DL (ref 0.2–1.2)
BUN BLD-MCNC: 14 MG/DL (ref 8–23)
BUN/CREAT SERPL: 11.5 (ref 7–25)
CALCIUM SPEC-SCNC: 8.4 MG/DL (ref 8.6–10.5)
CHLORIDE SERPL-SCNC: 100 MMOL/L (ref 98–107)
CHOLEST SERPL-MCNC: 110 MG/DL (ref 0–200)
CO2 SERPL-SCNC: 25.9 MMOL/L (ref 22–29)
CREAT BLD-MCNC: 1.22 MG/DL (ref 0.57–1)
DEPRECATED RDW RBC AUTO: 39 FL (ref 37–54)
EOSINOPHIL # BLD AUTO: 0 10*3/MM3 (ref 0–0.4)
EOSINOPHIL NFR BLD AUTO: 0 % (ref 0.3–6.2)
ERYTHROCYTE [DISTWIDTH] IN BLOOD BY AUTOMATED COUNT: 12.2 % (ref 12.3–15.4)
GFR SERPL CREATININE-BSD FRML MDRD: 52 ML/MIN/1.73
GLOBULIN UR ELPH-MCNC: 2.5 GM/DL
GLUCOSE BLD-MCNC: 150 MG/DL (ref 65–99)
HBA1C MFR BLD: 5.26 % (ref 4.8–5.6)
HCT VFR BLD AUTO: 30.2 % (ref 34–46.6)
HDLC SERPL-MCNC: 68 MG/DL (ref 40–60)
HGB BLD-MCNC: 10.1 G/DL (ref 12–15.9)
IMM GRANULOCYTES # BLD AUTO: 0.05 10*3/MM3 (ref 0–0.05)
IMM GRANULOCYTES NFR BLD AUTO: 0.6 % (ref 0–0.5)
LDLC SERPL CALC-MCNC: 34 MG/DL (ref 0–100)
LDLC/HDLC SERPL: 0.5 {RATIO}
LYMPHOCYTES # BLD AUTO: 0.72 10*3/MM3 (ref 0.7–3.1)
LYMPHOCYTES NFR BLD AUTO: 8.8 % (ref 19.6–45.3)
MCH RBC QN AUTO: 29.9 PG (ref 26.6–33)
MCHC RBC AUTO-ENTMCNC: 33.4 G/DL (ref 31.5–35.7)
MCV RBC AUTO: 89.3 FL (ref 79–97)
MONOCYTES # BLD AUTO: 0.83 10*3/MM3 (ref 0.1–0.9)
MONOCYTES NFR BLD AUTO: 10.2 % (ref 5–12)
NEUTROPHILS # BLD AUTO: 6.56 10*3/MM3 (ref 1.7–7)
NEUTROPHILS NFR BLD AUTO: 80.3 % (ref 42.7–76)
NRBC BLD AUTO-RTO: 0 /100 WBC (ref 0–0.2)
NT-PROBNP SERPL-MCNC: 101.2 PG/ML (ref 5–1800)
PLATELET # BLD AUTO: 207 10*3/MM3 (ref 140–450)
PMV BLD AUTO: 10 FL (ref 6–12)
POTASSIUM BLD-SCNC: 3.9 MMOL/L (ref 3.5–5.2)
PROT SERPL-MCNC: 6.5 G/DL (ref 6–8.5)
RBC # BLD AUTO: 3.38 10*6/MM3 (ref 3.77–5.28)
SODIUM BLD-SCNC: 137 MMOL/L (ref 136–145)
TRIGL SERPL-MCNC: 40 MG/DL (ref 0–150)
TSH SERPL DL<=0.05 MIU/L-ACNC: 0.23 UIU/ML (ref 0.27–4.2)
VLDLC SERPL-MCNC: 8 MG/DL (ref 5–40)
WBC NRBC COR # BLD: 8.17 10*3/MM3 (ref 3.4–10.8)

## 2020-06-14 PROCEDURE — 80061 LIPID PANEL: CPT | Performed by: HOSPITALIST

## 2020-06-14 PROCEDURE — 97530 THERAPEUTIC ACTIVITIES: CPT

## 2020-06-14 PROCEDURE — 97110 THERAPEUTIC EXERCISES: CPT

## 2020-06-14 PROCEDURE — 97116 GAIT TRAINING THERAPY: CPT

## 2020-06-14 PROCEDURE — 80053 COMPREHEN METABOLIC PANEL: CPT | Performed by: HOSPITALIST

## 2020-06-14 PROCEDURE — 83036 HEMOGLOBIN GLYCOSYLATED A1C: CPT | Performed by: HOSPITALIST

## 2020-06-14 PROCEDURE — 63710000001 ONDANSETRON PER 8 MG: Performed by: ORTHOPAEDIC SURGERY

## 2020-06-14 PROCEDURE — 85025 COMPLETE CBC W/AUTO DIFF WBC: CPT | Performed by: ORTHOPAEDIC SURGERY

## 2020-06-14 PROCEDURE — 83880 ASSAY OF NATRIURETIC PEPTIDE: CPT | Performed by: HOSPITALIST

## 2020-06-14 PROCEDURE — 84443 ASSAY THYROID STIM HORMONE: CPT | Performed by: HOSPITALIST

## 2020-06-14 RX ORDER — BISACODYL 5 MG/1
10 TABLET, DELAYED RELEASE ORAL DAILY PRN
Qty: 10 TABLET | Refills: 0 | Status: SHIPPED | OUTPATIENT
Start: 2020-06-14 | End: 2020-06-22

## 2020-06-14 RX ORDER — PROMETHAZINE HYDROCHLORIDE 25 MG/1
25 TABLET ORAL EVERY 6 HOURS PRN
Qty: 30 TABLET | Refills: 0 | Status: SHIPPED | OUTPATIENT
Start: 2020-06-14 | End: 2021-06-09

## 2020-06-14 RX ORDER — ONDANSETRON 4 MG/1
4 TABLET, FILM COATED ORAL EVERY 6 HOURS PRN
Qty: 30 TABLET | Refills: 0 | Status: SHIPPED | OUTPATIENT
Start: 2020-06-14 | End: 2021-06-09

## 2020-06-14 RX ORDER — OXYCODONE HYDROCHLORIDE AND ACETAMINOPHEN 5; 325 MG/1; MG/1
1 TABLET ORAL EVERY 4 HOURS PRN
Qty: 42 TABLET | Refills: 0 | Status: SHIPPED | OUTPATIENT
Start: 2020-06-14 | End: 2020-06-23

## 2020-06-14 RX ORDER — PSEUDOEPHEDRINE HCL 30 MG
100 TABLET ORAL 2 TIMES DAILY
Qty: 30 EACH | Refills: 0 | Status: SHIPPED | OUTPATIENT
Start: 2020-06-14 | End: 2020-06-29

## 2020-06-14 RX ORDER — ASPIRIN 81 MG/1
81 TABLET ORAL EVERY 12 HOURS SCHEDULED
Qty: 58 TABLET | Refills: 0 | Status: SHIPPED | OUTPATIENT
Start: 2020-06-14 | End: 2020-07-14

## 2020-06-14 RX ADMIN — AMLODIPINE BESYLATE: 2.5 TABLET ORAL at 09:12

## 2020-06-14 RX ADMIN — OXYCODONE AND ACETAMINOPHEN 2 TABLET: 5; 325 TABLET ORAL at 09:12

## 2020-06-14 RX ADMIN — OXYCODONE AND ACETAMINOPHEN 2 TABLET: 5; 325 TABLET ORAL at 00:48

## 2020-06-14 RX ADMIN — ONDANSETRON HYDROCHLORIDE 4 MG: 4 TABLET, FILM COATED ORAL at 11:01

## 2020-06-14 RX ADMIN — OXYCODONE AND ACETAMINOPHEN 2 TABLET: 5; 325 TABLET ORAL at 04:58

## 2020-06-14 RX ADMIN — ASPIRIN 81 MG: 81 TABLET, COATED ORAL at 09:12

## 2020-06-14 RX ADMIN — DOCUSATE SODIUM 100 MG: 100 CAPSULE, LIQUID FILLED ORAL at 09:12

## 2020-06-14 NOTE — PROGRESS NOTES
Discharge Planning Assessment  [facility]     Patient Name: Isabel Carlson  MRN: [id number]  Today's Date: 6/14/2020    Admit Date: 6/12/2020    Discharge Needs Assessment     Row Name 06/14/20 1531       Living Environment    Lives With  alone    Current Living Arrangements  home/apartment/condo    Primary Care Provided by  self    Provides Primary Care For  no one    Family Caregiver if Needed  child(raven), adult    Able to Return to Prior Arrangements  yes       Resource/Environmental Concerns    Resource/Environmental Concerns  none    Transportation Concerns  car, none       Transition Planning    Patient/Family Anticipates Transition to  home    Transportation Anticipated  family or friend will provide       Discharge Needs Assessment    Concerns to be Addressed  discharge planning    Equipment Currently Used at Home  none    Anticipated Changes Related to Illness  none    Equipment Needed After Discharge  walker, rolling        Discharge Plan     Row Name 06/14/20 1532       Plan    Plan  Home with [facility] to follow. RN will provide with walker from P.T. supply on unit. Daughter will assist pt. as needed after DC..........Naga KNIGHT     Patient/Family in Agreement with Plan  yes    Plan Comments  Spoke with patient, introduced self and role. Pt. lives alone in a ss house and was IADL's prior to admit. States family will be available to assist her as needed after DC. Pt. denies using assistive devices or DME prior to admit, wishes to use [facility] as discussed with MD office prior to surgery. S/W [facility], accepted and they will follow at home. Pt. states will need rolling walker at DC, S/W RN who states they will provide from PT supply on unit. Denies further needs/concerns...........Naga KNIGHT            Home Medical Care      Service Provider Request Status Selected Services Address Phone Number Fax Number    [facility] Accepted N/A [address], [address],  [facility] [address] [phone]        Expected Discharge Date and Time     Expected Discharge Date Expected Discharge Time    Jun 13, 2020           Functional Status     Row Name 06/14/20 1531       Functional Status    Usual Activity Tolerance  good    Current Activity Tolerance  moderate       Functional Status, IADL    Medications  independent    Meal Preparation  independent    Housekeeping  independent    Laundry  independent    Shopping  independent;assistive person            Ira Sadler RN

## 2020-06-14 NOTE — PLAN OF CARE
Problem: Patient Care Overview  Goal: Plan of Care Review  Outcome: Ongoing (interventions implemented as appropriate)  Flowsheets (Taken 6/14/2020 3275)  Progress: improving  Plan of Care Reviewed With: patient  Note:   VSS. Pain controlled with PO pain med. Dressing c/d/I. Up to chair and worked with PT. Ambulates with assist and walker. Voiding per BRP. Nausea resolved with zofran. Emesis x1. Pt states feels better. Discussed bp med and monitoring r/t HTN. Discharging home with HH.

## 2020-06-14 NOTE — PLAN OF CARE
Problem: Patient Care Overview  Goal: Plan of Care Review  6/14/2020 1614 by Ladonna Goncalves PT  Outcome: Ongoing (interventions implemented as appropriate)  Flowsheets (Taken 6/14/2020 1611)  Progress: improving  Plan of Care Reviewed With: patient;daughter  Outcome Summary: Patient improved with mobility this afternoon. She was able to ambulate x 100 ft with RW and negotiate 2 sets of 4 steps with CGA. RW ordered and possible discharge home today.   ..Patient was wearing a face mask during this therapy encounter. Therapist used appropriate personal protective equipment including mask and gloves.  Mask used was standard procedure mask. Appropriate PPE was worn during the entire therapy session. Hand hygiene was completed before and after therapy session. Patient is not in enhanced droplet precautions.

## 2020-06-14 NOTE — PLAN OF CARE
Problem: Patient Care Overview  Goal: Plan of Care Review  Outcome: Ongoing (interventions implemented as appropriate)  Flowsheets (Taken 6/14/2020 0217)  Progress: improving  Plan of Care Reviewed With: patient  Outcome Summary: HTN welkl controlled. pain well controlled.     Problem: Fall Risk (Adult)  Goal: Absence of Fall  Outcome: Ongoing (interventions implemented as appropriate)  Flowsheets (Taken 6/14/2020 0217)  Absence of Fall: making progress toward outcome

## 2020-06-14 NOTE — PROGRESS NOTES
Patient: Isabel Carlson  YOB: 1945     Date of Admission: 6/12/2020 10:47 AM Medical Record Number: [id number]     Attending Physician: Jose Leary,Shiloh    Procedure(s):  TOTAL KNEE ARTHROPLASTY Post Operative Day Number: 2    Subjective : No new orthopaedic complaints     Pain Relief: some relief with present medication.     Systemic Complaints: No Complaints  Vitals:    06/13/20 1500 06/13/20 1936 06/13/20 2304 06/14/20 0321   BP: 140/76 148/78 105/68 98/62   BP Location: Left arm Left arm Left arm Left arm   Patient Position: Lying Sitting Lying Lying   Pulse: 87 81 91 79   Resp: 16 16 16 16   Temp: 98 °F (36.7 °C) 98.2 °F (36.8 °C) 98.4 °F (36.9 °C) 97.8 °F (36.6 °C)   TempSrc: Skin Skin Skin Skin   SpO2: 95% 97% 90% 91%   Weight:       Height:           Physical Exam: 74 y.o. female    General Appearance:       Alert, cooperative, in no acute distress                  Extremities:    Dressing Clean, Dry and Intact         Incision healthy without signs or symptoms of infections         No clinical sign of DVT        Able to do good movements of digits    Pulses:   Pulses palpable and equal bilaterally           Diagnostic Tests:     Results from last 7 days   Lab Units 06/14/20  0454 06/13/20  0453   WBC 10*3/mm3 8.17 7.54   HEMOGLOBIN g/dL 10.1* 11.2*   HEMATOCRIT % 30.2* 33.6*   PLATELETS 10*3/mm3 207 192     Results from last 7 days   Lab Units 06/14/20  0454 06/13/20  0453   SODIUM mmol/L 137 138   POTASSIUM mmol/L 3.9 3.8   CHLORIDE mmol/L 100 102   CO2 mmol/L 25.9 25.8   BUN mg/dL 14 7*   CREATININE mg/dL 1.22* 0.62   GLUCOSE mg/dL 150* 151*   CALCIUM mg/dL 8.4* 8.4*         No results found for: CRP  No results found for: SEDRATE  No results found for: URICACID  No results found for: CRYSTAL  Microbiology Results (last 10 days)     Procedure Component Value - Date/Time    COVID PRE-OP / PRE-PROCEDURE SCREENING ORDER (NO ISOLATION) - Swab, Nasopharynx [[id number]] Collected:   06/09/20 0913    Lab Status:  Final result Specimen:  Swab from Nasopharynx Updated:  06/10/20 1431    Narrative:       The following orders were created for panel order COVID PRE-OP / PRE-PROCEDURE SCREENING ORDER (NO ISOLATION) - Swab, Nasopharynx.  Procedure                               Abnormality         Status                     ---------                               -----------         ------                     COVID-19,BIOTAP, NP/OP S...[[id number]]                      Final result                 Please view results for these tests on the individual orders.    COVID-19,BIOTAP, NP/OP SWAB IN TRANSPORT MEDIA OR SALINE 24-36 HR TAT - Swab, Nasopharynx [[id number]] Collected:  06/09/20 0913    Lab Status:  Final result Specimen:  Swab from Nasopharynx Updated:  06/10/20 1431     Reference Lab Report --     COVID19 Not Detected    Narrative:       Testing performed at:  [facility]  [address] [place] [address]        No radiology results for the last 7 days          Current Medications:  Scheduled Meds:  amLODIPine-lisinopril 2.5-10 mg combo dose  Oral Q24H   aspirin 81 mg Oral Q12H   docusate sodium 100 mg Oral BID   fluticasone 2 spray Nasal BID   pantoprazole 40 mg Oral Nightly     Continuous Infusions:   PRN Meds:.•  acetaminophen  •  bisacodyl  •  cetirizine  •  HYDROmorphone **AND** naloxone  •  melatonin  •  meloxicam  •  ondansetron **OR** ondansetron  •  oxyCODONE-acetaminophen **OR** oxyCODONE-acetaminophen  •  promethazine **OR** promethazine **OR** promethazine    Assessment:    Procedure(s):  TOTAL KNEE ARTHROPLASTY    Patient Active Problem List   Diagnosis   • Heart murmur   • Abnormal EKG   • APC (atrial premature contractions)   • Non-rheumatic mitral regurgitation   • Non-rheumatic tricuspid valve insufficiency   • LVH (left ventricular hypertrophy)   • Gastroesophageal reflux disease   • Essential hypertension   • Mixed hyperlipidemia   • Pulmonary HTN (CMS/HCC)     • Osteoporosis of lumbar spine   • Menopause   • Osteoarthritis   • Lip laceration   • Status post left knee replacement   • Primary osteoarthritis of left knee       PLAN:   Continues current post-op course  Anticoagulation: Aspirin started  Dressing Change PRN  Mobilize with PT as tolerated per protocol  Cr increase to 1.22  Pain control better with percocet but had nausea today morning    Weight Bearing: WBAT  Discharge Plan: OK to plan for discharge in  today to home and home health  from orthopadic perspective.      FRANK Simon    Date: 6/14/2020    Time: 06:47     I have examined this patient .   I have reviewed the labs, done the physical examination.  I agree with above evaluation and plan and  Note by FRANK Michelle MD        2:32 PM

## 2020-06-14 NOTE — DISCHARGE INSTR - ACTIVITY
Total Knee Joint Replacement Discharge Instructions:     I. ACTIVITIES:  1. Exercises:  · Complete exercise program as taught by the hospital physical therapist 2 times per day  · Exercise program will be advanced by your home health physical therapist  · During the day be up ambulating every 2 hours (while awake) for short distances  · Complete the ankle pump exercises at least 10 times per hour (while awake)  · Elevate legs most of the day the first week post operatively and thereafter elevate legs when in bed and for at least 30 minutes during the day.   · Caution must be taken to avoid pillow placement under the bend of the knee as this can led to flexion contractures of the knee. Pillow placement under the heel is encouraged.  · Use cold packs 20-30 minutes approximately 5 times per day. This should be done before and after completing your exercises and at any time you are experiencing pain/ stiffness in your operative extremity.        2. Activities of Daily Living:  · No tub baths, hot tubs, or swimming pools for 4 weeks  · May shower and let water run over the incision on post-operative day #5 if no drainage. Do not scrub or rub the incision. Simply let the water run over the incision and pat dry.     II. Restrictions  · Do not cross legs or kneel  · Your surgeon will discuss with you when you will be able to drive again. Usual guidelines are you are to be off pain medications prior to driving.  · Weight bearing is as tolerated  · First week stay inside on even terrain. May go up and down stairs one stair at a time utilizing the hand rail.  · After one week, you may venture outside.     III. Precautions:  · Everyone that comes near you should wash their hands  · No elective dental, genital-urinary, or colon procedures or surgical procedures for 12 weeks after surgery unless absolutely necessary.  ·  If dental work or surgical procedure is deemed absolutely necessary, you will need to contact your surgeon as  you will need to take antibiotics 1 hour prior to any dental work (including teeth cleanings).  · Please discuss with your surgeon prophylactic antibiotics as the length of time this intervention will be necessary for you varies with each patient’s health history and situation.  · Avoid sick people. If you must be around someone who is ill, they should wear a mask.  · Avoid visits to the Emergency Room or Urgent Care. If you feel you need to go to the emergency room, please notify your surgeon.    · Stockings are to be worn for one week after surgery and are to be placed on in the morning and removed at night. Observe your skin when stocking is removed for any problems. Monitor the stockings to ensure that any swelling is not causing the stockings to become too tight. In this case, remove stockings immediately.     IV. INCISION CARE:  · Wash your hands prior to dressing changes  · Change the dressing as needed to keep incision clean and dry. Utilize dry gauze and paper tape. Avoid touching the side of the gauze that goes against the incision with your hands.  · No creams or ointments to the incision  · May remove dressing once the incision is free of drainage  · Do not touch or pick at the incision  · Check incision every day and notify surgeon immediately if any of the following signs or symptoms are noted:  ? Increase in redness  ? Increase in swelling around the incision and of the entire extremity  ? Increase in pain  ? Drainage oozing from the incision  ? Pulling apart of the edges of the incision  ? Increase in overall body temperature (greater than 100.5 degrees)     ·  You have absorbable sutures with steristrips, please do not remove the steri strips for 14 days, you can shower on them 6 days after surgery.        V. Medications:   1. Anticoagulants: You will be discharged on an anticoagulant. This is a prophylactic medication that helps prevent blood clots during your post-operative period.  You will be on  Aspirin  81 mg twice daily for 30 days. If you were on Aspirin 81 mg prior to surgery you can go back to home dose once the 30 days are completed.      · While taking the anticoagulant, you should avoid taking any additional aspirin, ibuprofen (Advil or Motrin), Aleve (Naprosyn) or other non-steroidal anti-inflammatory medications.   · Notify surgeon immediately if any mayank bleeding is noted in the urine, stool, emesis, or from the nose or the incision. Blood in the stool will often appear as black rather than red. Blood in urine may appear as pink. Blood in emesis may appear as brown/black like coffee grounds.  · You will need to apply pressure for longer periods of time to any cuts or abrasions to stop bleeding  · Avoid alcohol while taking anticoagulants     2. Stool Softeners: You will be at greater risk of constipation after surgery due to being less mobile and the pain medications.   · Take stool softeners as instructed by your surgeon while on pain medications. Over the counter Colace 100 mg 1-2 capsules twice daily.   · If stools become too loose or too frequent, please decreases the dosage or stop the stool softener.  · If constipation occurs despite use of stool softeners, you are to continue the stool softeners and add a laxative (Milk of Magnesia 1 ounce daily as needed).  · Dulcolax oral tabs or suppository, or a fleets enema can also be utilized for constipation and can be obtained over the counter.   · If above interventions are unsuccessful in inducing bowel movements, please contact your surgeon's office / family physician's office.  · Drink plenty of fluids, and eat fruits and vegetables during your recovery time     3. Pain Medications utilized after surgery are narcotics and the law requires that the following information be given to all patients that are prescribed narcotics:  · CLASSIFICATION: Pain medications are called Opioids and are narcotics  · LEGALITIES: It is illegal to share narcotics  with others and to drive within 24 hours of taking narcotics  · POTENTIAL SIDE EFFECTS: Potential side effects of opioids include: nausea, vomiting, itching, dizziness, drowsiness, dry mouth, constipation, and difficulty urinating.  · POTENTIAL ADVERSE EFFECTS:   ? Opioid tolerance can develop with use of pain medications and this simply means that it requires more and more of the medication to control pain; however, this is seen more in patients that use opioids for longer periods of time.  ? Opioid dependence can develop with use of Opioids and this simply means that to stop the medication can cause withdrawal symptoms; however, this is seen with patients that use Opioids for longer periods of time.  ? Opioid addiction can develop with use of Opioids and the incidence of this is very unlikely in patients who take the medications as ordered and stop the medications as instructed.  ? Opioid overdose can be dangerous, but is unlikely when the medication is taken as ordered and stopped when ordered. It is important not to mix opioids with alcohol or with and type of sedative such as Benadryl as this can lead to over sedation and respiratory difficulty.  · DOSAGE:   ? Pain medications will need to be taken consistently for the first week to decrease pain and promote adequate pain relief and participation in physical therapy.  ? After the initial surgical pain begins to resolve, you may begin to decrease the pain medication. By the end of 6 weeks, you should be off of pain medications.  ? Refills will not be given by the office during evening hours, on weekends, or after 6 weeks post-op.  ? To seek refills on pain medications during the initial 6 week post-operative period, you must call the office 48 hours in advance to request the refill. The office will then notify you when to  the prescription. DO NOT wait until you are out of the medication to request a refill.     V. FOLLOW-UP VISITS:  · You will need to  follow up in the office with your surgeon on June 30, 2020.  Please call this number [phone] to schedule this appointment.  · If you have any concerns or suspected complications prior to your follow up visit, please call your surgeons office. Do not wait until your appointment time if you suspect complications. These will need to be addressed in the office promptly

## 2020-06-14 NOTE — PLAN OF CARE
"  Problem: Patient Care Overview  Goal: Plan of Care Review  Outcome: Ongoing (interventions implemented as appropriate)  Flowsheets (Taken 6/14/2020 1105)  Progress: no change  Plan of Care Reviewed With: patient;family  Outcome Summary: Pt agreeable to PT tx this AM with no c/o pain at rest in bed. She required CG/Min A for supine to sit. She was able to ambulate to restroom, very slowly with CGA using RW. PT returned later due to increased time needed in restroom. At this point she was sitting in recliner but was unable to continue with further PT due to feeling \"woozy;\" and nauseous. Emphasized importance of PT for discharge placement due to very little progress thus far and possible need for Rehab. Will F/U this PM to see if she feels better and shows progress.     "

## 2020-06-14 NOTE — OUTREACH NOTE
Prep Survey      Responses   [facility] patient discharged from?  [place]   Is LACE score < 7 ?  No   Eligibility  [facility]   Date of Admission  06/12/20   Date of Discharge  06/14/20   Discharge Disposition  Home or Self Care   Discharge diagnosis  TOTAL KNEE ARTHROPLASTY   COVID-19 Test Status  Negative   Does the patient have one of the following disease processes/diagnoses(primary or secondary)?  Total Joint Replacement   Does the patient have Home health ordered?  Yes   What is the Home health agency?   [facility]     Is there a DME ordered?  Yes   What DME was ordered?   walker from [facility] supply    Prep survey completed?  Yes          Juana Tracey RN

## 2020-06-14 NOTE — DISCHARGE PLACEMENT REQUEST
"Isabel Carlin (74 y.o. Female)     Date of Birth Social Security Number Address Home Phone MRN    1945  [address] [address] [phone] [id number]    Synagogue Marital Status          Confucianism        Admission Date Admission Type Admitting Provider Attending Provider Department, Room/Bed    6/12/20 Elective Jose Leary MD Yakkanti, Madhusudhan R, MD [address], P891[address]1    Discharge Date Discharge Disposition Discharge Destination         Home-Health Care [facility]              Attending Provider:  Jose Leary MD    Allergies:  No Known Allergies    Isolation:  None   Infection:  None   Code Status:  CPR    Ht:  152.4 cm (60\")   Wt:  68 kg (150 lb)    Admission Cmt:  None   Principal Problem:  Primary osteoarthritis of left knee [M17.12]                 Active Insurance as of 6/12/2020     Primary Coverage     Payor Plan Insurance Group Employer/Plan Group    MEDICARE MEDICARE A & B      Payor Plan Address Payor Plan Phone Number Payor Plan Fax Number Effective Dates    [address] [phone]  8/1/2008 - None Entered    [facility] [address]       Subscriber Name Subscriber Birth Date Member ID       ISABEL CARLIN 1945 [id number]           Secondary Coverage     Payor Plan Insurance Group Employer/Plan Group    [employer] SUP AAR HEALTH CARE OPTIONS      Payor Plan Address Payor Plan Phone Number Payor Plan Fax Number Effective Dates    [facility] [phone]  1/1/2017 - None Entered    [address]       [facility] [address]       Subscriber Name Subscriber Birth Date Member ID       ISABEL CARLIN 1945 [id number]                 Emergency Contacts      (Rel.) Home Phone Work Phone Mobile Phone    Allyson Krishnan (Daughter) -- -- [phone]    Fern Johnson (Daughter) [phone] -- [phone]            "

## 2020-06-14 NOTE — PROGRESS NOTES
"Daily progress note    Chief complaint  Doing better  No new complaints  Family at bedside    History of present illness  74-year-old -American female with history of hypertension osteoarthritis and gastroesophageal of disease electively admitted to orthopedic surgery for left total knee replacement secondary to worsening osteoarthritis who failed outpatient treatment and underwent left total knee replacement secondary disabling pain.  Patient underwent surgery yesterday without complication followed by some itching at the IV site and nausea and her pain at the surgical site.  I am asked to follow the patient for medical problem.  Patient denies any headache dizziness chest pain shortness of breath abdominal pain vomiting diarrhea.    Review of systems  As above    Physical examination  Blood pressure 120/63, pulse 78, temperature 98 °F (36.7 °C), temperature source Skin, resp. rate 16, height 152.4 cm (60\"), weight 68 kg (150 lb), SpO2 96 %.    HEENT unremarkable  Neck supple  Lungs decreased breath sounds bilaterally otherwise clear  Heart S1-S2 no murmur gallop rub  Abdomen soft nontender bowel sounds positive  Extremities trace edema  Neuro no focal deficit  Psych normal mood and behavior    LABS  Lab Results (last 24 hours)     Procedure Component Value Units Date/Time    TSH [[id number]]  (Abnormal) Collected:  06/14/20 0454    Specimen:  Blood Updated:  06/14/20 0603     TSH 0.232 uIU/mL     BNP [[id number]]  (Normal) Collected:  06/14/20 0454    Specimen:  Blood Updated:  06/14/20 0603     proBNP 101.2 pg/mL     Narrative:       Among patients with dyspnea, NT-proBNP is highly sensitive for the detection of acute congestive heart failure. In addition NT-proBNP of <300 pg/ml effectively rules out acute congestive heart failure with 99% negative predictive value.    Results may be falsely decreased if patient taking Biotin.      Comprehensive Metabolic Panel [[id number]]  (Abnormal) Collected:  06/14/20 " 0454    Specimen:  Blood Updated:  06/14/20 0554     Glucose 150 mg/dL      BUN 14 mg/dL      Creatinine 1.22 mg/dL      Sodium 137 mmol/L      Potassium 3.9 mmol/L      Chloride 100 mmol/L      CO2 25.9 mmol/L      Calcium 8.4 mg/dL      Total Protein 6.5 g/dL      Albumin 4.00 g/dL      ALT (SGPT) 38 U/L      AST (SGOT) 44 U/L      Alkaline Phosphatase 78 U/L      Total Bilirubin 0.9 mg/dL      eGFR  African Amer 52 mL/min/1.73      Globulin 2.5 gm/dL      A/G Ratio 1.6 g/dL      BUN/Creatinine Ratio 11.5     Anion Gap 11.1 mmol/L     Narrative:       GFR Normal >60  Chronic Kidney Disease <60  Kidney Failure <15      Lipid Panel [[id number]]  (Abnormal) Collected:  06/14/20 0454    Specimen:  Blood Updated:  06/14/20 0553     Total Cholesterol 110 mg/dL      Triglycerides 40 mg/dL      HDL Cholesterol 68 mg/dL      LDL Cholesterol  34 mg/dL      VLDL Cholesterol 8 mg/dL      LDL/HDL Ratio 0.50    Narrative:       Cholesterol Reference Ranges  (U.S. Department of Health and Human Services ATP III Classifications)    Desirable          <200 mg/dL  Borderline High    200-239 mg/dL  High Risk          >240 mg/dL      Triglyceride Reference Ranges  (U.S. Department of Health and Human Services ATP III Classifications)    Normal           <150 mg/dL  Borderline High  150-199 mg/dL  High             200-499 mg/dL  Very High        >500 mg/dL    HDL Reference Ranges  (U.S. Department of Health and Human Services ATP III Classifcations)    Low     <40 mg/dl (major risk factor for CHD)  High    >60 mg/dl ('negative' risk factor for CHD)        LDL Reference Ranges  (U.S. Department of Health and Human Services ATP III Classifcations)    Optimal          <100 mg/dL  Near Optimal     100-129 mg/dL  Borderline High  130-159 mg/dL  High             160-189 mg/dL  Very High        >189 mg/dL    Hemoglobin A1c [[id number]]  (Normal) Collected:  06/14/20 0454    Specimen:  Blood Updated:  06/14/20 0537     Hemoglobin A1C 5.26 %      Narrative:       Hemoglobin A1C Ranges:    Increased Risk for Diabetes  5.7% to 6.4%  Diabetes                     >= 6.5%  Diabetic Goal                < 7.0%    CBC & Differential [[id number]] Collected:  06/14/20 0454    Specimen:  Blood Updated:  06/14/20 0528    Narrative:       The following orders were created for panel order CBC & Differential.  Procedure                               Abnormality         Status                     ---------                               -----------         ------                     CBC Auto Differential[[id number]]        Abnormal            Final result                 Please view results for these tests on the individual orders.    CBC Auto Differential [[id number]]  (Abnormal) Collected:  06/14/20 0454    Specimen:  Blood Updated:  06/14/20 0528     WBC 8.17 10*3/mm3      RBC 3.38 10*6/mm3      Hemoglobin 10.1 g/dL      Hematocrit 30.2 %      MCV 89.3 fL      MCH 29.9 pg      MCHC 33.4 g/dL      RDW 12.2 %      RDW-SD 39.0 fl      MPV 10.0 fL      Platelets 207 10*3/mm3      Neutrophil % 80.3 %      Lymphocyte % 8.8 %      Monocyte % 10.2 %      Eosinophil % 0.0 %      Basophil % 0.1 %      Immature Grans % 0.6 %      Neutrophils, Absolute 6.56 10*3/mm3      Lymphocytes, Absolute 0.72 10*3/mm3      Monocytes, Absolute 0.83 10*3/mm3      Eosinophils, Absolute 0.00 10*3/mm3      Basophils, Absolute 0.01 10*3/mm3      Immature Grans, Absolute 0.05 10*3/mm3      nRBC 0.0 /100 WBC         Imaging Results (Last 24 Hours)     ** No results found for the last 24 hours. **          Current Facility-Administered Medications:   •  acetaminophen (TYLENOL) tablet 325 mg, 325 mg, Oral, Q4H PRN, Jose Leary MD  •  amLODIPine (NORVASC) 2.5 mg, lisinopril (PRINIVIL,ZESTRIL) 10 mg, , Oral, Q24H, Jose Leary MD  •  aspirin EC tablet 81 mg, 81 mg, Oral, Q12H, Jose Leary MD, 81 mg at 06/14/20 0912  •  bisacodyl (DULCOLAX) EC tablet 10 mg, 10 mg, Oral,  Daily PRN, Jose Leary MD  •  cetirizine (zyrTEC) tablet 10 mg, 10 mg, Oral, Daily PRN, Jose Leary MD  •  docusate sodium (COLACE) capsule 100 mg, 100 mg, Oral, BID, Jose Leray MD, 100 mg at 06/14/20 0912  •  fluticasone (FLONASE) 50 MCG/ACT nasal spray 2 spray, 2 spray, Nasal, BID, Jose Leary MD, 2 spray at 06/13/20 2039  •  HYDROmorphone (DILAUDID) injection 0.5 mg, 0.5 mg, Intravenous, Q2H PRN, 0.5 mg at 06/12/20 2302 **AND** naloxone (NARCAN) injection 0.1 mg, 0.1 mg, Intravenous, Q5 Min PRN, Jose Leary MD  •  melatonin tablet 1 mg, 1 mg, Oral, Nightly PRN, Jose Leary MD  •  meloxicam (MOBIC) tablet 7.5 mg, 7.5 mg, Oral, Daily PRN, Jose Leary MD  •  ondansetron (ZOFRAN) tablet 4 mg, 4 mg, Oral, Q6H PRN, 4 mg at 06/14/20 1101 **OR** ondansetron (ZOFRAN) injection 4 mg, 4 mg, Intravenous, Q6H PRN, Jose Leary MD, 4 mg at 06/13/20 0255  •  oxyCODONE-acetaminophen (PERCOCET) 5-325 MG per tablet 1 tablet, 1 tablet, Oral, Q4H PRN **OR** oxyCODONE-acetaminophen (PERCOCET) 5-325 MG per tablet 2 tablet, 2 tablet, Oral, Q4H PRN, Jose Leary MD, 2 tablet at 06/14/20 0912  •  pantoprazole (PROTONIX) EC tablet 40 mg, 40 mg, Oral, Nightly, Jose Leary MD, 40 mg at 06/13/20 2038  •  promethazine (PHENERGAN) injection 12.5 mg, 12.5 mg, Intravenous, Q4H PRN **OR** promethazine (PHENERGAN) tablet 25 mg, 25 mg, Oral, Q6H PRN **OR** promethazine (PHENERGAN) injection 12.5 mg, 12.5 mg, Intramuscular, Q4H PRN, Julian Keller MD, 12.5 mg at 06/12/20 2302     ASSESSMENT  Status post left total knee replacement  Severe osteoarthritis  Hypertension  Gastroesophageal of disease    PLAN  CPM  Postop care  Home medications  Symptomatic treatment for itching and nausea  Stress ulcer DVT prophylaxis  Supportive care  Discussed with nursing staff and family  PT OT  We will follow with Dr.Y JULIAN KELLER MD

## 2020-06-14 NOTE — THERAPY TREATMENT NOTE
Patient Name: Iasbel Carlson  : 1945    MRN: [id number]                              Today's Date: 2020       Admit Date: 2020    Visit Dx:     ICD-10-CM ICD-9-CM   1. Status post left knee replacement Z96.652 V43.65   2. Primary osteoarthritis of left knee M17.12 715.16     Patient Active Problem List   Diagnosis   • Heart murmur   • Abnormal EKG   • APC (atrial premature contractions)   • Non-rheumatic mitral regurgitation   • Non-rheumatic tricuspid valve insufficiency   • LVH (left ventricular hypertrophy)   • Gastroesophageal reflux disease   • Essential hypertension   • Mixed hyperlipidemia   • Pulmonary HTN (CMS/HCC)   • Osteoporosis of lumbar spine   • Menopause   • Osteoarthritis   • Lip laceration   • Status post left knee replacement     Past Medical History:   Diagnosis Date   • Abnormal stress echocardiogram 2017    Patient had borderline EKG changes 5 minutes into walking and Dr. Cali Spencer recommended the patient not to have that same type of stress test in the future   • Adhesive capsulitis of right shoulder    • Age-related osteoporosis without fracture    • APC (atrial premature contractions) 2017   • Arthritis    • Carpal tunnel syndrome    • Cataract    • Chronic hip pain    • Fatigue    • Frequent episodes of pneumonia     PT STATES SEVERAL TIMES   • Heart murmur 2017   • Left knee pain    • LVH (left ventricular hypertrophy) 2017   • Mixed hyperlipidemia 2017   • Non-rheumatic mitral regurgitation 2017    Mild to moderate per 2-D echocardiogram   • Non-rheumatic tricuspid valve insufficiency 2017    Mild to moderate per 2-D echocardiogram   • Osteoarthritis    • Osteoarthritis of both knees    • Osteoporosis    • Shoulder pain    • Stroke (CMS/HCC)    • TIA (transient ischemic attack) 10/15/2013    numbness on right side of face and hand; went [facility]     Past Surgical History:   Procedure Laterality Date   • APPENDECTOMY     •  "CARDIAC CATHETERIZATION N/A 10/20/2017    Procedure: Right Heart Cath;  Surgeon: Christopher Kan MD;  Location: [facility] CATH INVASIVE LOC[facility];  Service:    • COLONOSCOPY     • SUBTOTAL HYSTERECTOMY       General Information     Row Name 06/14/20 1609 06/14/20 1101       PT Evaluation Time/Intention    Document Type  therapy note (daily note)  -MW  therapy note (daily note)  -MW    Mode of Treatment  physical therapy  -MW  physical therapy  -    Row Name 06/14/20 1609 06/14/20 1101       General Information    Patient Profile Reviewed?  yes  -MW  yes  -MW    Existing Precautions/Restrictions  fall  -MW  fall  -MW    Row Name 06/14/20 1609 06/14/20 1101       Cognitive Assessment/Intervention- PT/OT    Orientation Status (Cognition)  oriented x 3  -MW  oriented x 3  -MW    Cognitive Assessment/Intervention Comment  --  C/o feeling \"woozy\" and nauseous from meds  -MW    Row Name 06/14/20 1609 06/14/20 1101       Safety Issues, Functional Mobility    Impairments Affecting Function (Mobility)  endurance/activity tolerance;pain;strength  -MW  endurance/activity tolerance;pain;strength  -MW    Comment, Safety Issues/Impairments (Mobility)  Gait belt used for safety.  -MW  Gait belt used for safety.  -MW      User Key  (r) = Recorded By, (t) = Taken By, (c) = Cosigned By    Initials Name Provider Type    MW Ladonna Goncalves, PT Physical Therapist        Mobility     Row Name 06/14/20 1609 06/14/20 1102       Bed Mobility Assessment/Treatment    Bed Mobility Assessment/Treatment  sit-supine  -MW  supine-sit  -MW    Supine-Sit [place] (Bed Mobility)  minimum assist (75% patient effort)  -  minimum assist (75% patient effort);verbal cues  -    Row Name 06/14/20 1609 06/14/20 1102       Sit-Stand Transfer    Sit-Stand [place] (Transfers)  contact guard;minimum assist (75% patient effort)  -  minimum assist (75% patient effort);verbal cues;contact guard  -    Assistive Device (Sit-Stand Transfers)  walker, " "front-wheeled  -MW  walker, front-wheeled  -MW    Row Name 06/14/20 1609 06/14/20 1102       Gait/Stairs Assessment/Training    Gait/Stairs Assessment/Training  gait/ambulation assistive device;gait/ambulation independence  -MW  gait/ambulation assistive device;gait/ambulation independence  -MW    [place] Level (Gait)  contact guard;verbal cues  -MW  contact guard;verbal cues  -MW    Assistive Device (Gait)  walker, front-wheeled  -MW  walker, front-wheeled  -MW    Distance in Feet (Gait)  100  -MW  12  -MW    Pattern (Gait)  step-to;step-through  -MW  step-to  -MW    Deviations/Abnormal Patterns (Gait)  left sided deviations;gait speed decreased  -MW  left sided deviations;gait speed decreased  -MW    Bilateral Gait Deviations  forward flexed posture  -MW  forward flexed posture  -MW    Left Sided Gait Deviations  weight shift ability decreased  -MW  weight shift ability decreased  -MW    Negotiation (Stairs)  stairs independence  -MW  --    [place] Level (Stairs)  contact guard  -MW  --    Handrail Location (Stairs)  left side (ascending);right side (ascending)  -MW  --    Number of Steps (Stairs)  4 x 2 reps  -MW  --    Ascending Technique (Stairs)  step-to-step  -MW  --    Descending Technique (Stairs)  step-to-step  -MW  --    Comment (Gait/Stairs)  Able to increase gait tolerance and add stairs, continues with very slow gait   -MW  Ambulates very slowly with increased WB through arms. Ambualted from bed to toilet. Unable to ambulate further after extensive time on toilet due to c/o \"woozy\" and nauseous.  -MW    Row Name 06/14/20 1609 06/14/20 1102       Mobility Assessment/Intervention    Extremity Weight-bearing Status  left lower extremity  -MW  left lower extremity  -MW    Left Lower Extremity (Weight-bearing Status)  weight-bearing as tolerated (WBAT)  -MW  weight-bearing as tolerated (WBAT)  -MW      User Key  (r) = Recorded By, (t) = Taken By, (c) = Cosigned By    Initials Name Provider Type    " "Ladonna Gunter, PT Physical Therapist        Obj/Interventions    No documentation.       Goals/Plan    No documentation.       Clinical Impression     Row Name 06/14/20 1611 06/14/20 1105       Pain Assessment    Additional Documentation  Pain Scale: Numbers Pre/Post-Treatment (Group)  -MW  Pain Scale: Numbers Pre/Post-Treatment (Group)  -MW    Row Name 06/14/20 1611 06/14/20 1105       Pain Scale: Numbers Pre/Post-Treatment    Pain Scale: Numbers, Pretreatment  5/10  -MW  0/10 - no pain  -MW    Pain Scale: Numbers, Post-Treatment  5/10  -MW  0/10 - no pain  -MW    Pain Location - Side  Left  -MW  Left  -MW    Pain Location  knee  -MW  knee  -MW    Pre/Post Treatment Pain Comment  --  No pain at rest, increased pain with WB  -MW    Pain Intervention(s)  Repositioned;Ambulation/increased activity;Rest  -MW  Repositioned;Ambulation/increased activity;Rest  -MW    Row Name 06/14/20 1611 06/14/20 1105       Plan of Care Review    Plan of Care Reviewed With  patient;daughter  -MW  patient;family  -MW    Progress  improving  -MW  no change  -MW    Outcome Summary  Patient improved with mobility this afternoon. She was able to ambulate x 100 ft with RW and negotiate 2 sets of 4 steps with CGA. RW ordered and possible discharge home today.  -  Pt agreeable to PT tx today with no c/o pain at rest in bed. She required CG/Min A for supine to sit. She was able to ambulate to restroom, very slowly with CGA using RW. PT returned later due to increased time needed in restroom. At this point she was sitting in recliner but was unable to continue with further PT due to feeling \"woozy;\" and nauseous. Emphasized importance of PT for discharge placement due to very little progress thus far and possible need for Rehab. Will F/U this PM to see if she feels better and shows progress.  -    Row Name 06/14/20 1611 06/14/20 1105       Vital Signs    Pre Patient Position  Sitting  -MW  Supine  -MW    Intra Patient Position  Standing  " -MW  Standing  -MW    Post Patient Position  Supine  -MW  Sitting  -MW    Row Name 06/14/20 1611 06/14/20 1105       Positioning and Restraints    Pre-Treatment Position  sitting in chair/recliner  -MW  in bed  -MW    Post Treatment Position  bed  -MW  chair  -MW    In Bed  notified nsg;supine;call light within reach;encouraged to call for assist;exit alarm on;with family/caregiver  -MW  --    In Chair  --  notified nsg;reclined;sitting;call light within reach;encouraged to call for assist;exit alarm on;with family/caregiver  -MW      User Key  (r) = Recorded By, (t) = Taken By, (c) = Cosigned By    Initials Name Provider Type    Ladonna Gunter PT Physical Therapist        Outcome Measures     Row Name 06/14/20 1613 06/14/20 1110       How much help from another person do you currently need...    Turning from your back to your side while in flat bed without using bedrails?  3  -MW  3  -MW    Moving from lying on back to sitting on the side of a flat bed without bedrails?  3  -MW  3  -MW    Moving to and from a bed to a chair (including a wheelchair)?  3  -MW  3  -MW    Standing up from a chair using your arms (e.g., wheelchair, bedside chair)?  3  -MW  3  -MW    Climbing 3-5 steps with a railing?  3  -MW  2  -MW    To walk in hospital room?  3  -MW  3  -MW    AM-PAC 6 Clicks Score (PT)  18  -MW  17  -MW    Row Name 06/14/20 1613 06/14/20 1110       Functional Assessment    Outcome Measure Options  AM-PAC 6 Clicks Basic Mobility (PT)  -MW  AM-PAC 6 Clicks Basic Mobility (PT)  -MW      User Key  (r) = Recorded By, (t) = Taken By, (c) = Cosigned By    Initials Name Provider Type    Ladonna Gunter PT Physical Therapist        Physical Therapy Education                 Title: PT OT SLP Therapies (Done)     Topic: Physical Therapy (Done)     Point: Mobility training (Done)     Description:   Instruct learner(s) on safety and technique for assisting patient out of bed, chair or wheelchair.  Instruct in the proper  use of assistive devices, such as walker, crutches, cane or brace.              Patient Friendly Description:   It's important to get you on your feet again, but we need to do so in a way that is safe for you. Falling has serious consequences, and your personal safety is the most important thing of all.        When it's time to get out of bed, one of us or a family member will sit next to you on the bed to give you support.     If your doctor or nurse tells you to use a walker, crutches, a cane, or a brace, be sure you use it every time you get out of bed, even if you think you don't need it.    Learning Progress Summary           Patient Acceptance, E, VU by  at 6/14/2020 1614    Acceptance, E, VU,NR by  at 6/14/2020 1110    Comment:  Safety with mobility, use of AD, progress with POC    Acceptance, E, VU,NR by  at 6/13/2020 1512    Acceptance, E, VU,NR by  at 6/13/2020 1048    Comment:  Safety during mobility, use of AD, HEP   Family Acceptance, E, VU by  at 6/14/2020 1614    Acceptance, E, VU,NR by  at 6/14/2020 1110    Comment:  Safety with mobility, use of AD, progress with POC    Acceptance, E, VU,NR by  at 6/13/2020 1512                   Point: Home exercise program (Done)     Description:   Instruct learner(s) on appropriate technique for monitoring, assisting and/or progressing patient with therapeutic exercises and activities.              Learning Progress Summary           Patient Acceptance, E, VU,NR by  at 6/13/2020 1048    Comment:  Safety during mobility, use of AD, HEP                   Point: Body mechanics (Done)     Description:   Instruct learner(s) on proper positioning and spine alignment for patient and/or caregiver during mobility tasks and/or exercises.              Learning Progress Summary           Patient Acceptance, E, VU by  at 6/14/2020 1614    Acceptance, E, VU,NR by  at 6/14/2020 1110    Comment:  Safety with mobility, use of AD, progress with POC     Acceptance, E, VU,NR by  at 6/13/2020 1512    Acceptance, E, VU,NR by  at 6/13/2020 1048    Comment:  Safety during mobility, use of AD, HEP   Family Acceptance, E, VU by  at 6/14/2020 1614    Acceptance, E, VU,NR by  at 6/14/2020 1110    Comment:  Safety with mobility, use of AD, progress with POC    Acceptance, E, VU,NR by  at 6/13/2020 1512                   Point: Precautions (Done)     Description:   Instruct learner(s) on prescribed precautions during mobility and gait tasks              Learning Progress Summary           Patient Acceptance, E, VU by  at 6/14/2020 1614    Acceptance, E, VU,NR by  at 6/13/2020 1512    Acceptance, E, VU,NR by  at 6/13/2020 1048    Comment:  Safety during mobility, use of AD, HEP   Family Acceptance, E, VU by  at 6/14/2020 1614    Acceptance, E, VU,NR by  at 6/13/2020 1512                               User Key     Initials Effective Dates Name Provider Type Discipline     09/17/19 -  Ladonna Goncalves, PT Physical Therapist PT              PT Recommendation and Plan  Planned Therapy Interventions (PT Eval): balance training, bed mobility training, gait training, home exercise program, patient/family education, ROM (range of motion), stair training, strengthening, transfer training  Outcome Summary/Treatment Plan (PT)  Anticipated Equipment Needs at Discharge (PT): front wheeled walker  Anticipated Discharge Disposition (PT): home with assist, home with home health  Plan of Care Reviewed With: patient, daughter  Progress: improving  Outcome Summary: Patient improved with mobility this afternoon. She was able to ambulate x 100 ft with RW and negotiate 2 sets of 4 steps with CGA. RW ordered and possible discharge home today.     Time Calculation:   PT Charges     Row Name 06/14/20 1615 06/14/20 1112          Time Calculation    Start Time  1440  -MW  0953  -MW     Stop Time  1507  -MW  1029  -MW     Time Calculation (min)  27 min  -MW  36 min  -MW     PT  Received On  06/14/20  -MW  06/14/20  -MW     PT - Next Appointment  06/15/20  -MW  06/14/20  -MW        Time Calculation- PT    Total Timed Code Minutes- PT  24 minute(s)  -MW  25 minute(s)  -MW       User Key  (r) = Recorded By, (t) = Taken By, (c) = Cosigned By    Initials Name Provider Type    MW Ladonna Goncalves, PT Physical Therapist        Therapy Charges for Today     Code Description Service Date Service Provider Modifiers Qty    68237870449 HC PT EVAL MOD COMPLEXITY 2 6/13/2020 Ladonna Goncalves, PT GP 1    52585012188 HC PT THER PROC EA 15 MIN 6/13/2020 Ladonna Goncalves, PT GP 1    07393462435 HC PT THER PROC EA 15 MIN 6/13/2020 Ladonna Goncalves, PT GP 1    20026543625 HC PT THER SUPP EA 15 MIN 6/13/2020 Ladonna Goncalves, PT GP 1    02024170568 HC PT THERAPEUTIC ACT EA 15 MIN 6/13/2020 Ladonna Goncalves, PT GP 1    64610529889 HC PT THER SUPP EA 15 MIN 6/14/2020 Ladonna Goncalves, PT GP 1    94135360925 HC PT THER PROC EA 15 MIN 6/14/2020 Ladonna Goncalves, PT GP 1    94205306052 HC PT THERAPEUTIC ACT EA 15 MIN 6/14/2020 Ladonna Goncalves, PT GP 1    38337789947 HC GAIT TRAINING EA 15 MIN 6/14/2020 Ladonna Goncalves, PT GP 1    56977219503 HC PT THER PROC EA 15 MIN 6/14/2020 Ladonna Goncalves, PT GP 1    73548919012 HC PT THER SUPP EA 15 MIN 6/14/2020 Ladonna Goncalves, PT GP 1          PT G-Codes  Outcome Measure Options: AM-PAC 6 Clicks Basic Mobility (PT)  AM-PAC 6 Clicks Score (PT): 18  AM-PAC 6 Clicks Score (OT): 18    Ladonna Goncalves PT  6/14/2020

## 2020-06-14 NOTE — THERAPY TREATMENT NOTE
Patient Name: Isabel Carlson  : 1945    MRN: [id number]                              Today's Date: 2020       Admit Date: 2020    Visit Dx:     ICD-10-CM ICD-9-CM   1. Primary osteoarthritis of left knee M17.12 715.16     Patient Active Problem List   Diagnosis   • Heart murmur   • Abnormal EKG   • APC (atrial premature contractions)   • Non-rheumatic mitral regurgitation   • Non-rheumatic tricuspid valve insufficiency   • LVH (left ventricular hypertrophy)   • Gastroesophageal reflux disease   • Essential hypertension   • Mixed hyperlipidemia   • Pulmonary HTN (CMS/HCC)   • Osteoporosis of lumbar spine   • Menopause   • Osteoarthritis   • Lip laceration   • Status post left knee replacement   • Primary osteoarthritis of left knee     Past Medical History:   Diagnosis Date   • Abnormal stress echocardiogram 2017    Patient had borderline EKG changes 5 minutes into walking and Dr. Cali Spencer recommended the patient not to have that same type of stress test in the future   • Adhesive capsulitis of right shoulder    • Age-related osteoporosis without fracture    • APC (atrial premature contractions) 2017   • Arthritis    • Carpal tunnel syndrome    • Cataract    • Chronic hip pain    • Fatigue    • Frequent episodes of pneumonia     PT STATES SEVERAL TIMES   • Heart murmur 2017   • Left knee pain    • LVH (left ventricular hypertrophy) 2017   • Mixed hyperlipidemia 2017   • Non-rheumatic mitral regurgitation 2017    Mild to moderate per 2-D echocardiogram   • Non-rheumatic tricuspid valve insufficiency 2017    Mild to moderate per 2-D echocardiogram   • Osteoarthritis    • Osteoarthritis of both knees    • Osteoporosis    • Shoulder pain    • Stroke (CMS/HCC)    • TIA (transient ischemic attack) 10/15/2013    numbness on right side of face and hand; went to [facility]     Past Surgical History:   Procedure Laterality Date   • APPENDECTOMY     • CARDIAC  "CATHETERIZATION N/A 10/20/2017    Procedure: Right Heart Cath;  Surgeon: Christopher Kan MD;  Location: [facility] CATH INVASIVE LOCATION;  Service:    • COLONOSCOPY     • SUBTOTAL HYSTERECTOMY       General Information     Row Name 06/14/20 1101          PT Evaluation Time/Intention    Document Type  therapy note (daily note)  -MW     Mode of Treatment  physical therapy  -     Row Name 06/14/20 1101          General Information    Patient Profile Reviewed?  yes  -     Existing Precautions/Restrictions  fall  -     Row Name 06/14/20 1101          Cognitive Assessment/Intervention- PT/OT    Orientation Status (Cognition)  oriented x 3  -MW     Cognitive Assessment/Intervention Comment  C/o feeling \"woozy\" and nauseous from meds  -     Row Name 06/14/20 1101          Safety Issues, Functional Mobility    Impairments Affecting Function (Mobility)  endurance/activity tolerance;pain;strength  -     Comment, Safety Issues/Impairments (Mobility)  Gait belt used for safety.  -       User Key  (r) = Recorded By, (t) = Taken By, (c) = Cosigned By    Initials Name Provider Type    MW Ladonna Goncalves, PT Physical Therapist        Mobility     Row Name 06/14/20 1102          Bed Mobility Assessment/Treatment    Bed Mobility Assessment/Treatment  supine-sit  -MW     Supine-Sit [place] (Bed Mobility)  minimum assist (75% patient effort);verbal cues  -     Row Name 06/14/20 1102          Sit-Stand Transfer    Sit-Stand [place] (Transfers)  minimum assist (75% patient effort);verbal cues;contact guard  -     Assistive Device (Sit-Stand Transfers)  walker, front-wheeled  -     Row Name 06/14/20 1102          Gait/Stairs Assessment/Training    Gait/Stairs Assessment/Training  gait/ambulation assistive device;gait/ambulation independence  -     [place] Level (Gait)  contact guard;verbal cues  -     Assistive Device (Gait)  walker, front-wheeled  -     Distance in Feet (Gait)  12  -MW     Pattern " "(Gait)  step-to  -MW     Deviations/Abnormal Patterns (Gait)  left sided deviations;gait speed decreased  -MW     Bilateral Gait Deviations  forward flexed posture  -MW     Left Sided Gait Deviations  weight shift ability decreased  -MW     Comment (Gait/Stairs)  Ambulates very slowly with increased WB through arms. Ambualted from bed to toilet. Unable to ambulate further after extensive time on toilet due to c/o \"woozy\" and nauseous.  -     Row Name 06/14/20 1109          Mobility Assessment/Intervention    Extremity Weight-bearing Status  left lower extremity  -MW     Left Lower Extremity (Weight-bearing Status)  weight-bearing as tolerated (WBAT)  -MW       User Key  (r) = Recorded By, (t) = Taken By, (c) = Cosigned By    Initials Name Provider Type    Ladonna Gunter, PT Physical Therapist        Obj/Interventions    No documentation.       Goals/Plan    No documentation.       Clinical Impression     Row Name 06/14/20 1108          Pain Assessment    Additional Documentation  Pain Scale: Numbers Pre/Post-Treatment (Group)  -     Row Name 06/14/20 1104          Pain Scale: Numbers Pre/Post-Treatment    Pain Scale: Numbers, Pretreatment  0/10 - no pain  -MW     Pain Scale: Numbers, Post-Treatment  0/10 - no pain  -MW     Pain Location - Side  Left  -MW     Pain Location  knee  -MW     Pre/Post Treatment Pain Comment  No pain at rest, increased pain with WB  -MW     Pain Intervention(s)  Repositioned;Ambulation/increased activity;Rest  -MW     Row Name 06/14/20 1106          Plan of Care Review    Plan of Care Reviewed With  patient;family  -MW     Progress  no change  -     Outcome Summary  Pt agreeable to PT tx today with no c/o pain at rest in bed. She required CG/Min A for supine to sit. She was able to ambulate to restroom, very slowly with CGA using RW. PT returned later due to increased time needed in restroom. At this point she was sitting in recliner but was unable to continue with further PT due " "to feeling \"woozy;\" and nauseous. Emphasized importance of PT for discharge placement due to very little progress thus far and possible need for Rehab. Will F/U this PM to see if she feels better and shows progress.  -     Row Name 06/14/20 1105          Vital Signs    Pre Patient Position  Supine  -MW     Intra Patient Position  Standing  -MW     Post Patient Position  Sitting  -MW     Row Name 06/14/20 1105          Positioning and Restraints    Pre-Treatment Position  in bed  -MW     Post Treatment Position  chair  -MW     In Chair  notified nsg;reclined;sitting;call light within reach;encouraged to call for assist;exit alarm on;with family/caregiver  -       User Key  (r) = Recorded By, (t) = Taken By, (c) = Cosigned By    Initials Name Provider Type    Ladonna Gunter PT Physical Therapist        Outcome Measures     Row Name 06/14/20 1110          How much help from another person do you currently need...    Turning from your back to your side while in flat bed without using bedrails?  3  -MW     Moving from lying on back to sitting on the side of a flat bed without bedrails?  3  -MW     Moving to and from a bed to a chair (including a wheelchair)?  3  -MW     Standing up from a chair using your arms (e.g., wheelchair, bedside chair)?  3  -MW     Climbing 3-5 steps with a railing?  2  -MW     To walk in hospital room?  3  -MW     AM-PAC 6 Clicks Score (PT)  17  -MW     Row Name 06/14/20 1110          Functional Assessment    Outcome Measure Options  AM-PAC 6 Clicks Basic Mobility (PT)  -       User Key  (r) = Recorded By, (t) = Taken By, (c) = Cosigned By    Initials Name Provider Type    Ladonna Gunter PT Physical Therapist        Physical Therapy Education                 Title: PT OT SLP Therapies (Done)     Topic: Physical Therapy (Done)     Point: Mobility training (Done)     Description:   Instruct learner(s) on safety and technique for assisting patient out of bed, chair or wheelchair.  " Instruct in the proper use of assistive devices, such as walker, crutches, cane or brace.              Patient Friendly Description:   It's important to get you on your feet again, but we need to do so in a way that is safe for you. Falling has serious consequences, and your personal safety is the most important thing of all.        When it's time to get out of bed, one of us or a family member will sit next to you on the bed to give you support.     If your doctor or nurse tells you to use a walker, crutches, a cane, or a brace, be sure you use it every time you get out of bed, even if you think you don't need it.    Learning Progress Summary           Patient Acceptance, E, VU,NR by  at 6/14/2020 1110    Comment:  Safety with mobility, use of AD, progress with POC    Acceptance, E, VU,NR by  at 6/13/2020 1512    Acceptance, E, VU,NR by  at 6/13/2020 1048    Comment:  Safety during mobility, use of AD, HEP   Family Acceptance, E, VU,NR by  at 6/14/2020 1110    Comment:  Safety with mobility, use of AD, progress with POC    Acceptance, E, VU,NR by  at 6/13/2020 1512                   Point: Home exercise program (Done)     Description:   Instruct learner(s) on appropriate technique for monitoring, assisting and/or progressing patient with therapeutic exercises and activities.              Learning Progress Summary           Patient Acceptance, E, VU,NR by  at 6/13/2020 1048    Comment:  Safety during mobility, use of AD, HEP                   Point: Body mechanics (Done)     Description:   Instruct learner(s) on proper positioning and spine alignment for patient and/or caregiver during mobility tasks and/or exercises.              Learning Progress Summary           Patient Acceptance, E, VU,NR by  at 6/14/2020 1110    Comment:  Safety with mobility, use of AD, progress with POC    Acceptance, E, VU,NR by  at 6/13/2020 1512    Acceptance, E, VU,NR by  at 6/13/2020 1048    Comment:  Safety during  "mobility, use of AD, HEP   Family Acceptance, E, VU,NR by  at 6/14/2020 1110    Comment:  Safety with mobility, use of AD, progress with POC    Acceptance, E, VU,NR by  at 6/13/2020 1512                   Point: Precautions (Done)     Description:   Instruct learner(s) on prescribed precautions during mobility and gait tasks              Learning Progress Summary           Patient Acceptance, E, VU,NR by  at 6/13/2020 1512    Acceptance, E, VU,NR by  at 6/13/2020 1048    Comment:  Safety during mobility, use of AD, HEP   Family Acceptance, E, VU,NR by  at 6/13/2020 1512                               User Key     Initials Effective Dates Name Provider Type Discipline     09/17/19 -  Ladonna Goncalves, PT Physical Therapist PT              PT Recommendation and Plan  Planned Therapy Interventions (PT Eval): balance training, bed mobility training, gait training, home exercise program, patient/family education, ROM (range of motion), stair training, strengthening, transfer training  Outcome Summary/Treatment Plan (PT)  Anticipated Equipment Needs at Discharge (PT): front wheeled walker  Anticipated Discharge Disposition (PT): inpatient rehabilitation facility, home with home health  Plan of Care Reviewed With: patient, family  Progress: no change  Outcome Summary: Pt agreeable to PT tx today with no c/o pain at rest in bed. She required CG/Min A for supine to sit. She was able to ambulate to restroom, very slowly with CGA using RW. PT returned later due to increased time needed in restroom. At this point she was sitting in recliner but was unable to continue with further PT due to feeling \"woozy;\" and nauseous. Emphasized importance of PT for discharge placement due to very little progress thus far and possible need for Rehab. Will F/U this PM to see if she feels better and shows progress.     Time Calculation:   PT Charges     Row Name 06/14/20 1112             Time Calculation    Start Time  0915  -      " Stop Time  1029  -MW      Time Calculation (min)  36 min  -MW      PT Received On  06/14/20  -MW      PT - Next Appointment  06/14/20  -MW         Time Calculation- PT    Total Timed Code Minutes- PT  25 minute(s)  -MW        User Key  (r) = Recorded By, (t) = Taken By, (c) = Cosigned By    Initials Name Provider Type    MW Ladonna Goncalves, PT Physical Therapist        Therapy Charges for Today     Code Description Service Date Service Provider Modifiers Qty    12696442034 HC PT EVAL MOD COMPLEXITY 2 6/13/2020 Ladonna Goncalves, PT GP 1    34348420722 HC PT THER PROC EA 15 MIN 6/13/2020 Ladonna Goncalves, PT GP 1    52653561914 HC PT THER PROC EA 15 MIN 6/13/2020 Ladonna Goncalves, PT GP 1    28631798735 HC PT THER SUPP EA 15 MIN 6/13/2020 Ladonna Goncalves, PT GP 1    78453021757 HC PT THERAPEUTIC ACT EA 15 MIN 6/13/2020 Ladonna Goncalves, PT GP 1    15339746298 HC PT THER SUPP EA 15 MIN 6/14/2020 Ladonna Goncalves, PT GP 1    02520069554 HC PT THER PROC EA 15 MIN 6/14/2020 Ladonna Goncalves, PT GP 1    16832830157 HC PT THERAPEUTIC ACT EA 15 MIN 6/14/2020 Ladonna Goncalves, PT GP 1          PT G-Codes  Outcome Measure Options: AM-PAC 6 Clicks Basic Mobility (PT)  AM-PAC 6 Clicks Score (PT): 17  AM-PAC 6 Clicks Score (OT): 18    Ladonna Goncalves PT  6/14/2020

## 2020-06-15 ENCOUNTER — TRANSITIONAL CARE MANAGEMENT TELEPHONE ENCOUNTER (OUTPATIENT)
Dept: CALL CENTER | Facility: HOSPITAL | Age: 75
End: 2020-06-15

## 2020-06-15 NOTE — OUTREACH NOTE
Call Center TCM Note      Responses   [facility] patient discharged from?  [place]   Does the patient have one of the following disease processes/diagnoses(primary or secondary)?  Total Joint Replacement   Joint surgery performed?  Knee   TCM attempt successful?  Yes   Call start time  1637   Call end time  1640   Discharge diagnosis  TOTAL KNEE ARTHROPLASTY   Does the patient have all medications related to this admission filled (includes all antibiotics, pain medications, etc.)  Yes   Is the patient taking all medications as directed (includes completed medication regime)?  Yes   Is the patient able to teach back alternate methods of pain control?  Ice, Knee-elevation/no pillow under knee, Correct alignment, Short, frequent activity, Reposition   Does the patient have a follow up appointment with their surgeon?  Yes   Has the patient kept scheduled appointments due by today?  N/A   Comments  Declines PCP followup   What is the Home health agency?   [facility]     Has home health visited the patient within 72 hours of discharge?  Yes   What DME was ordered?   walker from [employer]    Has all DME been delivered?  Yes   Psychosocial issues?  No   When is the first therapy visit scheduled (PO Day) including how many days per week   PT started today Post op Day 2 (since being home)   Has the patient began therapy sessions (either in the home or as an out patient)?  Yes   If the patient has started attending therapy, what post op day did they begin to attend (either in home or as an out patient)?    PT started today Post op Day 2 (since being home)   Does the patient have a wound vac in place?  N/A   Has the patient fallen since discharge?  No   Did the patient receive a copy of their discharge instructions?  Yes   Nursing interventions  Reviewed instructions with patient   What is the patient's perception of their functional status since discharge?  Improving   Is the patient able to teach back signs and  symptoms of infection?  Temp >100.4 for 24h or longer, Incisional drainage, Increased swelling or redness around incision (not associated with surgical edema), Severe discomfort or pain   Is the patient able to teach back how to prevent infection?  Check incision daily, Wash hands before and after touching incision, No tub baths, hot tub or swimming, Shower only as directed by surgeon, Keep incision covered if drainage   Is the patient able to teach back signs and symptoms of DVT?  Redness in calf, Severe pain in calf, Swelling in calf, Shortness of breath or chest pain, Area hot to touch   Is the patient able to teach back home safety measures?  Ability to shower   Is the patient/caregiver able to teach back the hierarchy of who to call/visit for symptoms/problems? PCP, Specialist, Home health nurse, Urgent Care, ED, 911  Yes   TCM call completed?  Yes          Familia Valencia RN    6/15/2020, 16:41

## 2020-06-23 ENCOUNTER — READMISSION MANAGEMENT (OUTPATIENT)
Dept: CALL CENTER | Facility: HOSPITAL | Age: 75
End: 2020-06-23

## 2020-06-23 NOTE — OUTREACH NOTE
Total Joint Week 2 Survey      Responses   [facility] patient discharged from?  [place]   Does the patient have one of the following disease processes/diagnoses(primary or secondary)?  Total Joint Replacement   Joint surgery performed?  Knee   Week 2 attempt successful?  Yes   Call start time  1358   Call end time  1414   Has the patient been back in either the hospital or Emergency Department since discharge?  No   Discharge diagnosis  TOTAL KNEE ARTHROPLASTY   Is patient permission given to speak with other caregiver?  Yes   List who call center can speak with  dtr   Does the patient have all medications related to this admission filled (includes all antibiotics, pain medications, etc.)  No   What is keeping the patient from filling the prescriptions?  Lost script/didn't receive [pt reports that the pharmacy did not give her dtr the dulcolox or zofran when she picked up her meds.]   Nursing Interventions  Nurse provided patient education, Nurse advised patient to call provider   Is the patient taking all medications as directed (includes completed medication regime)?  No   Nursing Interventions  Nurse provided patient education   Medication comments  advised her to call pharmacy to inquire about those two meds and why she did not receive those when the dtr picked up the rest of meds.    Does the patient have a follow up appointment with their surgeon?  Yes   Has the patient kept scheduled appointments due by today?  N/A   What is the Home health agency?   [facility]     Has home health visited the patient within 72 hours of discharge?  Yes   What DME was ordered?   walker from [employer]    Psychosocial issues?  No   Has the patient began therapy sessions (either in the home or as an out patient)?  Yes   Has the patient fallen since discharge?  No   Comments  Pt reports incision still covered by surgical dressing. Using walker to ambulate. Decreased appetite r/t nausea and constipation. Will try  Miralax for constipation. She did take enema last week and it worked but now no BM x2 days.    What is the patient's perception of their functional status since discharge?  Improving   Is the patient able to teach back signs and symptoms of infection?  Changes in mobility, Incisional drainage   Is the patient able to teach back how to prevent infection?  Check incision daily, Keep incision covered if drainage, Monitor blood sugar if diabetic, Eat well-balanced diet   Is the patient able to teach back signs and symptoms of DVT?  Redness in calf, Severe pain in calf   Is the patient able to teach back home safety measures?  Ability to shower, Modifications to reach items   Is the patient/caregiver able to teach back the hierarchy of who to call/visit for symptoms/problems? PCP, Specialist, Home health nurse, Urgent Care, ED, 911  Yes   Week 2 call completed?  Yes          Clara Cody RN

## 2020-06-26 DIAGNOSIS — Z96.652 STATUS POST LEFT KNEE REPLACEMENT: Primary | ICD-10-CM

## 2020-06-26 RX ORDER — HYDROCODONE BITARTRATE AND ACETAMINOPHEN 5; 325 MG/1; MG/1
1 TABLET ORAL EVERY 12 HOURS PRN
Qty: 30 TABLET | Refills: 0 | Status: SHIPPED | OUTPATIENT
Start: 2020-06-26 | End: 2021-02-02

## 2020-07-09 ENCOUNTER — READMISSION MANAGEMENT (OUTPATIENT)
Dept: CALL CENTER | Facility: HOSPITAL | Age: 75
End: 2020-07-09

## 2020-07-09 NOTE — OUTREACH NOTE
Total Joint Month 1 Survey      Responses   [facility] patient discharged from?  [place]   Does the patient have one of the following disease processes/diagnoses(primary or secondary)?  Total Joint Replacement   Joint surgery performed?  Knee   Month 1 attempt successful?  No   Unsuccessful attempts  Attempt 1          Nicole Banda RN

## 2020-07-14 ENCOUNTER — READMISSION MANAGEMENT (OUTPATIENT)
Dept: CALL CENTER | Facility: HOSPITAL | Age: 75
End: 2020-07-14

## 2020-07-14 ENCOUNTER — OFFICE VISIT (OUTPATIENT)
Dept: FAMILY MEDICINE CLINIC | Facility: CLINIC | Age: 75
End: 2020-07-14

## 2020-07-14 VITALS
WEIGHT: 152.4 LBS | DIASTOLIC BLOOD PRESSURE: 72 MMHG | TEMPERATURE: 98.9 F | BODY MASS INDEX: 29.92 KG/M2 | HEART RATE: 64 BPM | OXYGEN SATURATION: 97 % | SYSTOLIC BLOOD PRESSURE: 122 MMHG | HEIGHT: 60 IN

## 2020-07-14 DIAGNOSIS — D50.8 IRON DEFICIENCY ANEMIA SECONDARY TO INADEQUATE DIETARY IRON INTAKE: ICD-10-CM

## 2020-07-14 DIAGNOSIS — E78.2 MIXED HYPERLIPIDEMIA: ICD-10-CM

## 2020-07-14 DIAGNOSIS — K21.9 GASTROESOPHAGEAL REFLUX DISEASE, ESOPHAGITIS PRESENCE NOT SPECIFIED: ICD-10-CM

## 2020-07-14 DIAGNOSIS — M15.9 PRIMARY OSTEOARTHRITIS INVOLVING MULTIPLE JOINTS: ICD-10-CM

## 2020-07-14 DIAGNOSIS — I10 ESSENTIAL HYPERTENSION: Chronic | ICD-10-CM

## 2020-07-14 DIAGNOSIS — R53.83 FATIGUE, UNSPECIFIED TYPE: Primary | ICD-10-CM

## 2020-07-14 DIAGNOSIS — R79.89 ABNORMAL SERUM THYROID STIMULATING HORMONE (TSH) LEVEL: ICD-10-CM

## 2020-07-14 PROBLEM — D50.9 IRON DEFICIENCY ANEMIA: Status: ACTIVE | Noted: 2020-07-14

## 2020-07-14 PROCEDURE — 99214 OFFICE O/P EST MOD 30 MIN: CPT | Performed by: INTERNAL MEDICINE

## 2020-07-14 RX ORDER — ROSUVASTATIN CALCIUM 20 MG/1
20 TABLET, COATED ORAL DAILY
Qty: 90 TABLET | Refills: 1 | Status: SHIPPED | OUTPATIENT
Start: 2020-07-14 | End: 2020-11-17 | Stop reason: SDUPTHER

## 2020-07-14 RX ORDER — GABAPENTIN 300 MG/1
300 CAPSULE ORAL DAILY
Qty: 90 CAPSULE | Refills: 1 | Status: SHIPPED | OUTPATIENT
Start: 2020-07-14 | End: 2020-10-13 | Stop reason: SDUPTHER

## 2020-07-14 RX ORDER — AMLODIPINE BESYLATE AND BENAZEPRIL HYDROCHLORIDE 10; 20 MG/1; MG/1
1 CAPSULE ORAL DAILY
Qty: 90 CAPSULE | Refills: 1 | Status: SHIPPED | OUTPATIENT
Start: 2020-07-14 | End: 2020-09-22

## 2020-07-14 RX ORDER — PANTOPRAZOLE SODIUM 40 MG/1
40 TABLET, DELAYED RELEASE ORAL NIGHTLY
Qty: 90 TABLET | Refills: 1 | Status: SHIPPED | OUTPATIENT
Start: 2020-07-14 | End: 2020-10-13 | Stop reason: SDUPTHER

## 2020-07-14 NOTE — OUTREACH NOTE
Total Joint Month 1 Survey      Responses   [facility] patient discharged from?  [place]   Does the patient have one of the following disease processes/diagnoses(primary or secondary)?  Total Joint Replacement   Joint surgery performed?  Knee   Month 1 attempt successful?  No   Unsuccessful attempts  Attempt 2          Ceasar Gardiner RN

## 2020-07-14 NOTE — PROGRESS NOTES
Subjective   Isabel Carlson is a 74 y.o. female.     Chief Complaint   Patient presents with   • Dizziness   • Fatigue     weakness       History of Present Illness   Patient had total knee replacement 6/12/2020 and had subsequent constipation with the oxycodone.  Had use of enemas and laxatives and stopped the oxycodone.  Constipation is better but still feeling weak and tired a lot.  Still with pain the knee and using the hydrocodone one - two a day.  Has follow up with Dr Leary.  Complains of chronic pain in the hips and joints on for which she has been taking gabapentin for many many years for over 15 years actually.  If she does not take it she actually feels worse.     The following portions of the patient's history were reviewed and updated as appropriate: allergies, current medications, past family history, past medical history, past social history, past surgical history and problem list.    Depression Screen:  PHQ-2/PHQ-9 Depression Screening 11/20/2019   Little interest or pleasure in doing things 0   Feeling down, depressed, or hopeless 0   Trouble falling or staying asleep, or sleeping too much 0   Feeling tired or having little energy 0   Poor appetite or overeating 0   Feeling bad about yourself - or that you are a failure or have let yourself or your family down 0   Trouble concentrating on things, such as reading the newspaper or watching television 0   Moving or speaking so slowly that other people could have noticed. Or the opposite - being so fidgety or restless that you have been moving around a lot more than usual 0   Thoughts that you would be better off dead, or of hurting yourself in some way 0   Total Score 0       Past Medical History:   Diagnosis Date   • Abnormal stress echocardiogram 07/20/2017    Patient had borderline EKG changes 5 minutes into walking and Dr. Cali Spencer recommended the patient not to have that same type of stress test in the future   • Adhesive capsulitis of  right shoulder    • Age-related osteoporosis without fracture    • APC (atrial premature contractions) 7/20/2017   • Arthritis    • Carpal tunnel syndrome    • Cataract    • Chronic hip pain    • Fatigue    • Frequent episodes of pneumonia     PT STATES SEVERAL TIMES   • Heart murmur 7/20/2017   • Left knee pain    • LVH (left ventricular hypertrophy) 7/20/2017   • Mixed hyperlipidemia 7/20/2017   • Non-rheumatic mitral regurgitation 07/20/2017    Mild to moderate per 2-D echocardiogram   • Non-rheumatic tricuspid valve insufficiency 07/20/2017    Mild to moderate per 2-D echocardiogram   • Osteoarthritis    • Osteoarthritis of both knees    • Osteoporosis    • Shoulder pain    • Stroke (CMS/HCC)    • TIA (transient ischemic attack) 10/15/2013    numbness on right side of face and hand; went to [facility]       Past Surgical History:   Procedure Laterality Date   • APPENDECTOMY     • CARDIAC CATHETERIZATION N/A 10/20/2017    Procedure: Right Heart Cath;  Surgeon: Christopher Kan MD;  Location: [facility] INVASIVE LOCATION;  Service:    • COLONOSCOPY     • REPLACEMENT TOTAL KNEE Left 06/12/2020   • SUBTOTAL HYSTERECTOMY     • TOTAL KNEE ARTHROPLASTY Left 6/12/2020    Procedure: TOTAL KNEE ARTHROPLASTY;  Surgeon: Jose Leary MD;  Location: [facility] OR;  Service: Orthopedics;  Laterality: Left;       Family History   Problem Relation Age of Onset   • Heart disease Mother    • Hypertension Mother    • Stroke Mother    • Arthritis Mother    • Heart disease Maternal Aunt    • Hypertension Maternal Aunt    • Hypertension Maternal Uncle    • Malig Hyperthermia Neg Hx        Social History     Socioeconomic History   • Marital status:      Spouse name: Not on file   • Number of children: Not on file   • Years of education: Not on file   • Highest education level: Not on file   Tobacco Use   • Smoking status: Never Smoker   • Smokeless tobacco: Never Used   Substance and Sexual Activity   • Alcohol  use: No     Comment: rarely   • Drug use: No   • Sexual activity: Defer       Current Outpatient Medications   Medication Sig Dispense Refill   • amLODIPine-benazepril (LOTREL) 10-20 MG per capsule Take 1 capsule by mouth Daily. 90 capsule 1   • aspirin 81 MG EC tablet Take 1 tablet by mouth Every 12 (Twelve) Hours for 58 doses. 58 tablet 0   • cetirizine (zyrTEC) 10 MG tablet Take 10 mg by mouth Daily As Needed.  4   • fluticasone (FLONASE) 50 MCG/ACT nasal spray 2 sprays into the nostril(s) as directed by provider Daily As Needed.     • HYDROcodone-acetaminophen (NORCO) 5-325 MG per tablet Take 1 tablet by mouth Every 12 (Twelve) Hours As Needed for Moderate Pain . 30 tablet 0   • ondansetron (ZOFRAN) 4 MG tablet Take 1 tablet by mouth Every 6 (Six) Hours As Needed for Nausea or Vomiting. 30 tablet 0   • pantoprazole (PROTONIX) 40 MG EC tablet Take 1 tablet by mouth Every Night. 90 tablet 1   • promethazine (PHENERGAN) 25 MG tablet Take 1 tablet by mouth Every 6 (Six) Hours As Needed for Nausea or Vomiting. 30 tablet 0   • rosuvastatin (CRESTOR) 20 MG tablet Take 1 tablet by mouth Daily. 90 tablet 1   • traMADol (ULTRAM) 50 MG tablet Take 1 tablet by mouth At Night As Needed for Moderate Pain . 90 tablet 1   • gabapentin (NEURONTIN) 300 MG capsule Take 1 capsule by mouth Daily. 90 capsule 1     No current facility-administered medications for this visit.        Review of Systems   Constitutional: Positive for fatigue. Negative for activity change, appetite change, fever, unexpected weight gain and unexpected weight loss.   HENT: Negative for nosebleeds, rhinorrhea, trouble swallowing and voice change.    Eyes: Negative for visual disturbance.   Respiratory: Negative for cough, chest tightness, shortness of breath and wheezing.    Cardiovascular: Negative for chest pain, palpitations and leg swelling.   Gastrointestinal: Positive for constipation. Negative for abdominal pain, blood in stool, diarrhea, nausea,  "vomiting, GERD and indigestion.   Genitourinary: Negative for dysuria, frequency and hematuria.   Musculoskeletal: Positive for arthralgias. Negative for back pain and myalgias.        Left knee pain postoperatively   Skin: Negative for rash and bruise.   Neurological: Negative for dizziness, tremors, syncope, weakness, light-headedness, numbness, headache, memory problem and confusion.   Hematological: Negative for adenopathy. Does not bruise/bleed easily.   Psychiatric/Behavioral: Negative for sleep disturbance and depressed mood. The patient is not nervous/anxious.        Objective   /72 (BP Location: Left arm, Patient Position: Sitting, Cuff Size: Adult)   Pulse 64   Temp 98.9 °F (37.2 °C) (Temporal)   Ht 152.4 cm (60\")   Wt 69.1 kg (152 lb 6.4 oz)   SpO2 97%   BMI 29.76 kg/m²     Physical Exam   Constitutional: She is oriented to person, place, and time. She appears well-developed and well-nourished. No distress.   HENT:   Head: Normocephalic and atraumatic.   Right Ear: External ear normal.   Left Ear: External ear normal.   Nose: Nose normal.   Mouth/Throat: Oropharynx is clear and moist.   Eyes: Pupils are equal, round, and reactive to light. Conjunctivae and EOM are normal.   Neck: Normal range of motion. Neck supple. No tracheal deviation present. No thyromegaly present.   Cardiovascular: Normal rate, regular rhythm, normal heart sounds and intact distal pulses. Exam reveals no gallop and no friction rub.   No murmur heard.  Pulmonary/Chest: Effort normal and breath sounds normal. No respiratory distress.   Abdominal: Soft. Bowel sounds are normal. She exhibits no mass. There is no tenderness. There is no guarding.   Musculoskeletal: Normal range of motion. She exhibits no edema.   Some noted decreased range of motion in the left knee postoperatively.   Lymphadenopathy:     She has no cervical adenopathy.   Neurological: She is alert and oriented to person, place, and time. She displays normal " reflexes. She exhibits normal muscle tone.   Skin: Skin is warm and dry. Capillary refill takes less than 2 seconds. No rash noted. She is not diaphoretic.   Left knee incision site is clean and healing well without any discharge or drainage.   Psychiatric: She has a normal mood and affect. Her behavior is normal. Judgment and thought content normal.   Nursing note and vitals reviewed.      Recent Results (from the past 2016 hour(s))   aPTT    Collection Time: 06/02/20 11:27 AM   Result Value Ref Range    PTT 35.1 22.7 - 35.4 seconds   Protime-INR    Collection Time: 06/02/20 11:27 AM   Result Value Ref Range    Protime 12.7 11.7 - 14.2 Seconds    INR 0.98 0.90 - 1.10   Comprehensive Metabolic Panel    Collection Time: 06/02/20 11:28 AM   Result Value Ref Range    Glucose 97 65 - 99 mg/dL    BUN 13 8 - 23 mg/dL    Creatinine 0.59 0.57 - 1.00 mg/dL    Sodium 134 (L) 136 - 145 mmol/L    Potassium 3.6 3.5 - 5.2 mmol/L    Chloride 101 98 - 107 mmol/L    CO2 25.8 22.0 - 29.0 mmol/L    Calcium 9.1 8.6 - 10.5 mg/dL    Total Protein 7.3 6.0 - 8.5 g/dL    Albumin 4.50 3.50 - 5.20 g/dL    ALT (SGPT) 18 1 - 33 U/L    AST (SGOT) 17 1 - 32 U/L    Alkaline Phosphatase 104 39 - 117 U/L    Total Bilirubin 0.8 0.2 - 1.2 mg/dL    eGFR  African Amer 120 >60 mL/min/1.73    Globulin 2.8 gm/dL    A/G Ratio 1.6 g/dL    BUN/Creatinine Ratio 22.0 7.0 - 25.0    Anion Gap 7.2 5.0 - 15.0 mmol/L   CBC Auto Differential    Collection Time: 06/02/20 11:28 AM   Result Value Ref Range    WBC 3.48 3.40 - 10.80 10*3/mm3    RBC 4.24 3.77 - 5.28 10*6/mm3    Hemoglobin 12.6 12.0 - 15.9 g/dL    Hematocrit 38.3 34.0 - 46.6 %    MCV 90.3 79.0 - 97.0 fL    MCH 29.7 26.6 - 33.0 pg    MCHC 32.9 31.5 - 35.7 g/dL    RDW 12.9 12.3 - 15.4 %    RDW-SD 42.7 37.0 - 54.0 fl    MPV 9.8 6.0 - 12.0 fL    Platelets 246 140 - 450 10*3/mm3    Neutrophil % 45.6 42.7 - 76.0 %    Lymphocyte % 41.7 19.6 - 45.3 %    Monocyte % 9.8 5.0 - 12.0 %    Eosinophil % 2.0 0.3 - 6.2 %     Basophil % 0.6 0.0 - 1.5 %    Immature Grans % 0.3 0.0 - 0.5 %    Neutrophils, Absolute 1.59 (L) 1.70 - 7.00 10*3/mm3    Lymphocytes, Absolute 1.45 0.70 - 3.10 10*3/mm3    Monocytes, Absolute 0.34 0.10 - 0.90 10*3/mm3    Eosinophils, Absolute 0.07 0.00 - 0.40 10*3/mm3    Basophils, Absolute 0.02 0.00 - 0.20 10*3/mm3    Immature Grans, Absolute 0.01 0.00 - 0.05 10*3/mm3    nRBC 0.0 0.0 - 0.2 /100 WBC   COVID-19,BIOTAP, NP/OP SWAB IN TRANSPORT MEDIA OR SALINE 24-36 HR TAT - Swab, Nasopharynx    Collection Time: 06/09/20  9:13 AM   Result Value Ref Range    Reference Lab Report      COVID19 Not Detected Not Detected - Ref. Range   Basic Metabolic Panel    Collection Time: 06/13/20  4:53 AM   Result Value Ref Range    Glucose 151 (H) 65 - 99 mg/dL    BUN 7 (L) 8 - 23 mg/dL    Creatinine 0.62 0.57 - 1.00 mg/dL    Sodium 138 136 - 145 mmol/L    Potassium 3.8 3.5 - 5.2 mmol/L    Chloride 102 98 - 107 mmol/L    CO2 25.8 22.0 - 29.0 mmol/L    Calcium 8.4 (L) 8.6 - 10.5 mg/dL    eGFR  African Amer 113 >60 mL/min/1.73    BUN/Creatinine Ratio 11.3 7.0 - 25.0    Anion Gap 10.2 5.0 - 15.0 mmol/L   CBC Auto Differential    Collection Time: 06/13/20  4:53 AM   Result Value Ref Range    WBC 7.54 3.40 - 10.80 10*3/mm3    RBC 3.78 3.77 - 5.28 10*6/mm3    Hemoglobin 11.2 (L) 12.0 - 15.9 g/dL    Hematocrit 33.6 (L) 34.0 - 46.6 %    MCV 88.9 79.0 - 97.0 fL    MCH 29.6 26.6 - 33.0 pg    MCHC 33.3 31.5 - 35.7 g/dL    RDW 12.3 12.3 - 15.4 %    RDW-SD 40.2 37.0 - 54.0 fl    MPV 10.0 6.0 - 12.0 fL    Platelets 192 140 - 450 10*3/mm3    Neutrophil % 83.1 (H) 42.7 - 76.0 %    Lymphocyte % 8.4 (L) 19.6 - 45.3 %    Monocyte % 8.0 5.0 - 12.0 %    Eosinophil % 0.0 (L) 0.3 - 6.2 %    Basophil % 0.1 0.0 - 1.5 %    Immature Grans % 0.4 0.0 - 0.5 %    Neutrophils, Absolute 6.27 1.70 - 7.00 10*3/mm3    Lymphocytes, Absolute 0.63 (L) 0.70 - 3.10 10*3/mm3    Monocytes, Absolute 0.60 0.10 - 0.90 10*3/mm3    Eosinophils, Absolute 0.00 0.00 - 0.40 10*3/mm3     Basophils, Absolute 0.01 0.00 - 0.20 10*3/mm3    Immature Grans, Absolute 0.03 0.00 - 0.05 10*3/mm3    nRBC 0.0 0.0 - 0.2 /100 WBC   Comprehensive Metabolic Panel    Collection Time: 06/14/20  4:54 AM   Result Value Ref Range    Glucose 150 (H) 65 - 99 mg/dL    BUN 14 8 - 23 mg/dL    Creatinine 1.22 (H) 0.57 - 1.00 mg/dL    Sodium 137 136 - 145 mmol/L    Potassium 3.9 3.5 - 5.2 mmol/L    Chloride 100 98 - 107 mmol/L    CO2 25.9 22.0 - 29.0 mmol/L    Calcium 8.4 (L) 8.6 - 10.5 mg/dL    Total Protein 6.5 6.0 - 8.5 g/dL    Albumin 4.00 3.50 - 5.20 g/dL    ALT (SGPT) 38 (H) 1 - 33 U/L    AST (SGOT) 44 (H) 1 - 32 U/L    Alkaline Phosphatase 78 39 - 117 U/L    Total Bilirubin 0.9 0.2 - 1.2 mg/dL    eGFR  African Amer 52 (L) >60 mL/min/1.73    Globulin 2.5 gm/dL    A/G Ratio 1.6 g/dL    BUN/Creatinine Ratio 11.5 7.0 - 25.0    Anion Gap 11.1 5.0 - 15.0 mmol/L   BNP    Collection Time: 06/14/20  4:54 AM   Result Value Ref Range    proBNP 101.2 5.0-1,800.0 pg/mL   TSH    Collection Time: 06/14/20  4:54 AM   Result Value Ref Range    TSH 0.232 (L) 0.270 - 4.200 uIU/mL   Lipid Panel    Collection Time: 06/14/20  4:54 AM   Result Value Ref Range    Total Cholesterol 110 0 - 200 mg/dL    Triglycerides 40 0 - 150 mg/dL    HDL Cholesterol 68 (H) 40 - 60 mg/dL    LDL Cholesterol  34 0 - 100 mg/dL    VLDL Cholesterol 8 5 - 40 mg/dL    LDL/HDL Ratio 0.50    Hemoglobin A1c    Collection Time: 06/14/20  4:54 AM   Result Value Ref Range    Hemoglobin A1C 5.26 4.80 - 5.60 %   CBC Auto Differential    Collection Time: 06/14/20  4:54 AM   Result Value Ref Range    WBC 8.17 3.40 - 10.80 10*3/mm3    RBC 3.38 (L) 3.77 - 5.28 10*6/mm3    Hemoglobin 10.1 (L) 12.0 - 15.9 g/dL    Hematocrit 30.2 (L) 34.0 - 46.6 %    MCV 89.3 79.0 - 97.0 fL    MCH 29.9 26.6 - 33.0 pg    MCHC 33.4 31.5 - 35.7 g/dL    RDW 12.2 (L) 12.3 - 15.4 %    RDW-SD 39.0 37.0 - 54.0 fl    MPV 10.0 6.0 - 12.0 fL    Platelets 207 140 - 450 10*3/mm3    Neutrophil % 80.3 (H) 42.7  - 76.0 %    Lymphocyte % 8.8 (L) 19.6 - 45.3 %    Monocyte % 10.2 5.0 - 12.0 %    Eosinophil % 0.0 (L) 0.3 - 6.2 %    Basophil % 0.1 0.0 - 1.5 %    Immature Grans % 0.6 (H) 0.0 - 0.5 %    Neutrophils, Absolute 6.56 1.70 - 7.00 10*3/mm3    Lymphocytes, Absolute 0.72 0.70 - 3.10 10*3/mm3    Monocytes, Absolute 0.83 0.10 - 0.90 10*3/mm3    Eosinophils, Absolute 0.00 0.00 - 0.40 10*3/mm3    Basophils, Absolute 0.01 0.00 - 0.20 10*3/mm3    Immature Grans, Absolute 0.05 0.00 - 0.05 10*3/mm3    nRBC 0.0 0.0 - 0.2 /100 WBC     Assessment/Plan   Isabel was seen today for dizziness and fatigue.    Diagnoses and all orders for this visit:    Fatigue, unspecified type  -     CBC & Differential  -     TSH  -     T4, Free    Iron deficiency anemia secondary to inadequate dietary iron intake  -     CBC & Differential    Abnormal serum thyroid stimulating hormone (TSH) level  -     TSH  -     T4, Free    Essential hypertension  -     amLODIPine-benazepril (LOTREL) 10-20 MG per capsule; Take 1 capsule by mouth Daily.    Mixed hyperlipidemia  -     rosuvastatin (CRESTOR) 20 MG tablet; Take 1 tablet by mouth Daily.    Gastroesophageal reflux disease, esophagitis presence not specified  -     pantoprazole (PROTONIX) 40 MG EC tablet; Take 1 tablet by mouth Every Night.    Primary osteoarthritis involving multiple joints  -     gabapentin (NEURONTIN) 300 MG capsule; Take 1 capsule by mouth Daily.    Discussed with patient her fatigue and possible causes including a persistent anemia which was noted in the hospital postoperatively, thyroid issues which was noted to have an abnormal TSH in the hospital but not acted upon, medication such as gabapentin or the hydrocodone or even depression.  At this time we will check the labs as noted above the rule out the thyroid and anemia.  I recommend that she continue to utilize her pain medicines only as needed and as little as possible.  We will continue her other medicines at this time.  We  discussed that the pain medicine can also cause constipation issues which she is now aware of and I recommend she utilize stool softeners and fiber supplements plenty of fluids while using as little of the pain medicines as possible.

## 2020-07-15 LAB
BASOPHILS # BLD AUTO: 0.02 10*3/MM3 (ref 0–0.2)
BASOPHILS NFR BLD AUTO: 0.4 % (ref 0–1.5)
EOSINOPHIL # BLD AUTO: 0.04 10*3/MM3 (ref 0–0.4)
EOSINOPHIL NFR BLD AUTO: 0.8 % (ref 0.3–6.2)
ERYTHROCYTE [DISTWIDTH] IN BLOOD BY AUTOMATED COUNT: 13.7 % (ref 12.3–15.4)
HCT VFR BLD AUTO: 35.7 % (ref 34–46.6)
HGB BLD-MCNC: 11.6 G/DL (ref 12–15.9)
IMM GRANULOCYTES # BLD AUTO: 0.01 10*3/MM3 (ref 0–0.05)
IMM GRANULOCYTES NFR BLD AUTO: 0.2 % (ref 0–0.5)
LYMPHOCYTES # BLD AUTO: 1.61 10*3/MM3 (ref 0.7–3.1)
LYMPHOCYTES NFR BLD AUTO: 30.7 % (ref 19.6–45.3)
MCH RBC QN AUTO: 30.3 PG (ref 26.6–33)
MCHC RBC AUTO-ENTMCNC: 32.5 G/DL (ref 31.5–35.7)
MCV RBC AUTO: 93.2 FL (ref 79–97)
MONOCYTES # BLD AUTO: 0.49 10*3/MM3 (ref 0.1–0.9)
MONOCYTES NFR BLD AUTO: 9.4 % (ref 5–12)
NEUTROPHILS # BLD AUTO: 3.07 10*3/MM3 (ref 1.7–7)
NEUTROPHILS NFR BLD AUTO: 58.5 % (ref 42.7–76)
NRBC BLD AUTO-RTO: 0 /100 WBC (ref 0–0.2)
PLATELET # BLD AUTO: 272 10*3/MM3 (ref 140–450)
RBC # BLD AUTO: 3.83 10*6/MM3 (ref 3.77–5.28)
T4 FREE SERPL-MCNC: 1.18 NG/DL (ref 0.93–1.7)
TSH SERPL DL<=0.005 MIU/L-ACNC: 0.35 UIU/ML (ref 0.27–4.2)
WBC # BLD AUTO: 5.24 10*3/MM3 (ref 3.4–10.8)

## 2020-07-21 ENCOUNTER — TELEPHONE (OUTPATIENT)
Dept: FAMILY MEDICINE CLINIC | Facility: CLINIC | Age: 75
End: 2020-07-21

## 2020-08-17 ENCOUNTER — READMISSION MANAGEMENT (OUTPATIENT)
Dept: CALL CENTER | Facility: HOSPITAL | Age: 75
End: 2020-08-17

## 2020-08-17 NOTE — OUTREACH NOTE
Total Joint Month 2 Survey      Responses   [facility] patient discharged from?  [place]   Does the patient have one of the following disease processes/diagnoses(primary or secondary)?  Total Joint Replacement   Joint surgery performed?  Knee   Month 2 attempt successful?  No   Unsuccessful attempts  Attempt 1          Ceasar Gardiner RN

## 2020-08-18 ENCOUNTER — READMISSION MANAGEMENT (OUTPATIENT)
Dept: CALL CENTER | Facility: HOSPITAL | Age: 75
End: 2020-08-18

## 2020-08-18 NOTE — OUTREACH NOTE
Total Joint Month 2 Survey      Responses   [facility] patient discharged from?  [place]   Does the patient have one of the following disease processes/diagnoses(primary or secondary)?  Total Joint Replacement   Joint surgery performed?  Knee   Month 2 attempt successful?  No   Unsuccessful attempts  Attempt 2          Familia Valencia RN

## 2020-09-21 DIAGNOSIS — I10 ESSENTIAL HYPERTENSION: Chronic | ICD-10-CM

## 2020-09-22 ENCOUNTER — READMISSION MANAGEMENT (OUTPATIENT)
Dept: CALL CENTER | Facility: HOSPITAL | Age: 75
End: 2020-09-22

## 2020-09-22 RX ORDER — AMLODIPINE BESYLATE AND BENAZEPRIL HYDROCHLORIDE 10; 20 MG/1; MG/1
CAPSULE ORAL
Qty: 90 CAPSULE | Refills: 1 | Status: SHIPPED | OUTPATIENT
Start: 2020-09-22 | End: 2021-02-02 | Stop reason: SDUPTHER

## 2020-09-22 NOTE — OUTREACH NOTE
Total Joint Month 3 Survey      Responses   [facility] patient discharged from?  [place]   Does the patient have one of the following disease processes/diagnoses(primary or secondary)?  Total Joint Replacement   Joint surgery performed?  Knee   Month 3 attempt successful?  Yes   Call start time  1043   Call end time  1047   Has the patient been back in either the hospital or Emergency Department since discharge?  No   Discharge diagnosis  Left total knee   Is the patient taking all medications as directed (includes completed medication regime)?  Yes   Has the patient kept scheduled appointments due by today?  Yes   Is the patient still receiving Home Health Services?  N/A   Is the patient still attending therapy sessions(either in the home or as an outpatient)?  Yes   Has the patient fallen since discharge?  No   What is the patient's perception of their functional status since discharge?  Improving   Graduated  Yes   Did the patient feel the follow up calls were helpful during their recovery period?  Yes   Was the number of calls appropriate?  Yes   Wrap up additional comments  Able to bend to 90 degrees.  Pain is zero right now but she has problems at night straightening her leg out.  It feels tight and hurts sometimes in bed.           Vanessa Barrera RN          verbal cues/2 person assist/nonverbal cues (demo/gestures)

## 2020-10-12 ENCOUNTER — TELEPHONE (OUTPATIENT)
Dept: FAMILY MEDICINE CLINIC | Facility: CLINIC | Age: 75
End: 2020-10-12

## 2020-10-12 NOTE — TELEPHONE ENCOUNTER
Patient called and says she is having some sinus issues, mostly a headache that starts at night on her right side.  She doesn't want to do a video visit.  Also, she wants to know if something different could be called in to replace her Gabapentin.  She said she saw something on TV that states it's not good for you.  Her phone number is [phone].

## 2020-10-13 ENCOUNTER — OFFICE VISIT (OUTPATIENT)
Dept: FAMILY MEDICINE CLINIC | Facility: CLINIC | Age: 75
End: 2020-10-13

## 2020-10-13 DIAGNOSIS — K21.9 GASTROESOPHAGEAL REFLUX DISEASE: ICD-10-CM

## 2020-10-13 DIAGNOSIS — J01.10 ACUTE NON-RECURRENT FRONTAL SINUSITIS: Primary | ICD-10-CM

## 2020-10-13 DIAGNOSIS — M15.9 PRIMARY OSTEOARTHRITIS INVOLVING MULTIPLE JOINTS: ICD-10-CM

## 2020-10-13 PROCEDURE — 99442 PR PHYS/QHP TELEPHONE EVALUATION 11-20 MIN: CPT | Performed by: INTERNAL MEDICINE

## 2020-10-13 RX ORDER — PANTOPRAZOLE SODIUM 40 MG/1
40 TABLET, DELAYED RELEASE ORAL NIGHTLY
Qty: 90 TABLET | Refills: 1 | Status: SHIPPED | OUTPATIENT
Start: 2020-10-13 | End: 2020-10-19

## 2020-10-13 RX ORDER — AMOXICILLIN 500 MG/1
500 CAPSULE ORAL 3 TIMES DAILY
Qty: 30 CAPSULE | Refills: 0 | Status: SHIPPED | OUTPATIENT
Start: 2020-10-13 | End: 2020-11-03

## 2020-10-13 RX ORDER — GABAPENTIN 300 MG/1
300 CAPSULE ORAL DAILY
Qty: 90 CAPSULE | Refills: 1 | Status: SHIPPED | OUTPATIENT
Start: 2020-10-13 | End: 2020-11-03 | Stop reason: SDUPTHER

## 2020-10-13 NOTE — PROGRESS NOTES
Subjective   Isabel Carlson is a 75 y.o. female.     Chief Complaint   Patient presents with   • Sinusitis   • Heartburn   • questions about gabapentin       History of Present Illness   You have chosen to receive care through a telephone visit. Do you consent to use a telephone visit for your medical care today? Yes  Has been having headache and clear nasal drainage with no fever, chills, n, V, rash or blood from the nose.  Has tried flonase and allegra with no relief.  Pain is on the right behind and above the right eye.  Symptoms for the last 10 days and some increase.    Patient is concerned about the gabapentin she takes once a day and helps greatly with hip pain and has been on for over 10 years.    The following portions of the patient's history were reviewed and updated as appropriate: allergies, current medications, past family history, past medical history, past social history, past surgical history and problem list.    Depression Screen:  PHQ-2/PHQ-9 Depression Screening 11/20/2019   Little interest or pleasure in doing things 0   Feeling down, depressed, or hopeless 0   Trouble falling or staying asleep, or sleeping too much 0   Feeling tired or having little energy 0   Poor appetite or overeating 0   Feeling bad about yourself - or that you are a failure or have let yourself or your family down 0   Trouble concentrating on things, such as reading the newspaper or watching television 0   Moving or speaking so slowly that other people could have noticed. Or the opposite - being so fidgety or restless that you have been moving around a lot more than usual 0   Thoughts that you would be better off dead, or of hurting yourself in some way 0   Total Score 0       Past Medical History:   Diagnosis Date   • Abnormal stress echocardiogram 07/20/2017    Patient had borderline EKG changes 5 minutes into walking and Dr. Cali Spencer recommended the patient not to have that same type of stress test in the future    • Adhesive capsulitis of right shoulder    • Age-related osteoporosis without fracture    • APC (atrial premature contractions) 7/20/2017   • Arthritis    • Carpal tunnel syndrome    • Cataract    • Chronic hip pain    • Fatigue    • Frequent episodes of pneumonia     PT STATES SEVERAL TIMES   • Heart murmur 7/20/2017   • Left knee pain    • LVH (left ventricular hypertrophy) 7/20/2017   • Mixed hyperlipidemia 7/20/2017   • Non-rheumatic mitral regurgitation 07/20/2017    Mild to moderate per 2-D echocardiogram   • Non-rheumatic tricuspid valve insufficiency 07/20/2017    Mild to moderate per 2-D echocardiogram   • Osteoarthritis    • Osteoarthritis of both knees    • Osteoporosis    • Shoulder pain    • Stroke (CMS/HCC)    • TIA (transient ischemic attack) 10/15/2013    numbness on right side of face and hand; went to [facility]       Past Surgical History:   Procedure Laterality Date   • APPENDECTOMY     • CARDIAC CATHETERIZATION N/A 10/20/2017    Procedure: Right Heart Cath;  Surgeon: Christopher Kan MD;  Location: [facility] INVASIVE LOCATION;  Service:    • COLONOSCOPY     • REPLACEMENT TOTAL KNEE Left 06/12/2020   • SUBTOTAL HYSTERECTOMY     • TOTAL KNEE ARTHROPLASTY Left 6/12/2020    Procedure: TOTAL KNEE ARTHROPLASTY;  Surgeon: Jose Leary MD;  Location: [facility] OR;  Service: Orthopedics;  Laterality: Left;       Family History   Problem Relation Age of Onset   • Heart disease Mother    • Hypertension Mother    • Stroke Mother    • Arthritis Mother    • Heart disease Maternal Aunt    • Hypertension Maternal Aunt    • Hypertension Maternal Uncle    • Malig Hyperthermia Neg Hx        Social History     Socioeconomic History   • Marital status:      Spouse name: Not on file   • Number of children: Not on file   • Years of education: Not on file   • Highest education level: Not on file   Tobacco Use   • Smoking status: Never Smoker   • Smokeless tobacco: Never Used   Substance and  Sexual Activity   • Alcohol use: No     Comment: rarely   • Drug use: No   • Sexual activity: Defer       Current Outpatient Medications   Medication Sig Dispense Refill   • amLODIPine-benazepril (LOTREL) 10-20 MG per capsule TAKE 1 CAPSULE DAILY 90 capsule 1   • amoxicillin (AMOXIL) 500 MG capsule Take 1 capsule by mouth 3 (Three) Times a Day. 30 capsule 0   • cetirizine (zyrTEC) 10 MG tablet Take 10 mg by mouth Daily As Needed.  4   • fluticasone (FLONASE) 50 MCG/ACT nasal spray 2 sprays into the nostril(s) as directed by provider Daily As Needed.     • gabapentin (NEURONTIN) 300 MG capsule Take 1 capsule by mouth Daily. 90 capsule 1   • HYDROcodone-acetaminophen (NORCO) 5-325 MG per tablet Take 1 tablet by mouth Every 12 (Twelve) Hours As Needed for Moderate Pain . 30 tablet 0   • ondansetron (ZOFRAN) 4 MG tablet Take 1 tablet by mouth Every 6 (Six) Hours As Needed for Nausea or Vomiting. 30 tablet 0   • pantoprazole (PROTONIX) 40 MG EC tablet Take 1 tablet by mouth Every Night. 90 tablet 1   • promethazine (PHENERGAN) 25 MG tablet Take 1 tablet by mouth Every 6 (Six) Hours As Needed for Nausea or Vomiting. 30 tablet 0   • rosuvastatin (CRESTOR) 20 MG tablet Take 1 tablet by mouth Daily. 90 tablet 1     No current facility-administered medications for this visit.        Review of Systems   Constitutional: Negative for activity change, appetite change, fatigue, fever, unexpected weight gain and unexpected weight loss.   HENT: Positive for congestion and sinus pressure. Negative for nosebleeds, trouble swallowing and voice change.    Eyes: Negative for visual disturbance.   Respiratory: Negative for cough, chest tightness, shortness of breath and wheezing.    Cardiovascular: Negative for chest pain, palpitations and leg swelling.   Gastrointestinal: Positive for GERD. Negative for abdominal pain, blood in stool, constipation, diarrhea, nausea, vomiting and indigestion.   Genitourinary: Negative for dysuria,  frequency and hematuria.   Musculoskeletal: Negative for arthralgias, back pain and myalgias.   Skin: Negative for rash and bruise.   Neurological: Positive for headache. Negative for dizziness, tremors, weakness, light-headedness, numbness and memory problem.   Hematological: Negative for adenopathy. Does not bruise/bleed easily.   Psychiatric/Behavioral: Negative for sleep disturbance and depressed mood. The patient is not nervous/anxious.        Objective   There were no vitals taken for this visit.    Physical Exam   Constitutional: No distress.   Pulmonary/Chest: Effort normal.  No respiratory distress.  Neurological: She is alert.   Psychiatric: She has a normal mood and affect. Her behavior is normal. Thought content is normal. She does not express abnormal judgement.       No results found for this or any previous visit (from the past 2016 hour(s)).  Assessment/Plan   Diagnoses and all orders for this visit:    1. Acute non-recurrent frontal sinusitis (Primary)  -     amoxicillin (AMOXIL) 500 MG capsule; Take 1 capsule by mouth 3 (Three) Times a Day.  Dispense: 30 capsule; Refill: 0    2. Primary osteoarthritis involving multiple joints  -     gabapentin (NEURONTIN) 300 MG capsule; Take 1 capsule by mouth Daily.  Dispense: 90 capsule; Refill: 1    3. Gastroesophageal reflux disease  -     pantoprazole (PROTONIX) 40 MG EC tablet; Take 1 tablet by mouth Every Night.  Dispense: 90 tablet; Refill: 1    Patient with evidence of a right sided frontal sinus infection by history and discussion with the patient.  Will treat with amoxicillin and observe she has any worsening problems and she is to call us back.  Discussed the gabapentin risks and benefits patient is understanding of these risk and we reviewed her history and labs.  She wishes to continue with the gabapentin at this time.  GERD is well controlled continue the medicine unchanged.      Telephone visit completed.  Time of visit in discussion and care of  patient and one-on-one care not including charting time of 12 minutes.

## 2020-10-17 DIAGNOSIS — K21.9 GASTROESOPHAGEAL REFLUX DISEASE: ICD-10-CM

## 2020-10-19 RX ORDER — PANTOPRAZOLE SODIUM 40 MG/1
TABLET, DELAYED RELEASE ORAL
Qty: 90 TABLET | Refills: 1 | Status: SHIPPED | OUTPATIENT
Start: 2020-10-19 | End: 2021-04-07 | Stop reason: SDUPTHER

## 2020-11-03 ENCOUNTER — OFFICE VISIT (OUTPATIENT)
Dept: FAMILY MEDICINE CLINIC | Facility: CLINIC | Age: 75
End: 2020-11-03

## 2020-11-03 VITALS
WEIGHT: 144 LBS | BODY MASS INDEX: 28.27 KG/M2 | HEIGHT: 60 IN | OXYGEN SATURATION: 98 % | TEMPERATURE: 97.7 F | HEART RATE: 85 BPM | DIASTOLIC BLOOD PRESSURE: 84 MMHG | SYSTOLIC BLOOD PRESSURE: 142 MMHG

## 2020-11-03 DIAGNOSIS — G44.89 OTHER HEADACHE SYNDROME: Primary | ICD-10-CM

## 2020-11-03 DIAGNOSIS — I10 ESSENTIAL HYPERTENSION: Chronic | ICD-10-CM

## 2020-11-03 DIAGNOSIS — M15.9 PRIMARY OSTEOARTHRITIS INVOLVING MULTIPLE JOINTS: ICD-10-CM

## 2020-11-03 PROCEDURE — 99214 OFFICE O/P EST MOD 30 MIN: CPT | Performed by: INTERNAL MEDICINE

## 2020-11-03 RX ORDER — PROPRANOLOL HYDROCHLORIDE 80 MG/1
80 CAPSULE, EXTENDED RELEASE ORAL DAILY
Qty: 30 CAPSULE | Refills: 1 | Status: SHIPPED | OUTPATIENT
Start: 2020-11-03 | End: 2020-11-17 | Stop reason: SDUPTHER

## 2020-11-03 RX ORDER — GABAPENTIN 300 MG/1
300 CAPSULE ORAL 2 TIMES DAILY
Qty: 90 CAPSULE | Refills: 0
Start: 2020-11-03 | End: 2020-11-23

## 2020-11-03 NOTE — PROGRESS NOTES
Subjective   Isabel Carlson is a 75 y.o. female.     Chief Complaint   Patient presents with   • Headache       History of Present Illness   4-6 weeks of right side of face and head pain and in the middle and back described as sharp and stabbing in the side of the head and dull on top with no associated dizziness, weakness, nausea, vomiting, rash, hearing or vision changes.  Bright lights worsen some.  Has tried amoxicillin, tylenol, allegra, flonase, ibuprofen, excedrin migraine with no relief.  No syncopal episodes or head traumas since a fall in February.  Was seen in [facility] on 10/26/2020 and had normal labs and ct of the head.  Pain is gone in the morning when she wakes up then the pain comes back after an hour    The following portions of the patient's history were reviewed and updated as appropriate: allergies, current medications, past family history, past medical history, past social history, past surgical history and problem list.    Depression Screen:  PHQ-2/PHQ-9 Depression Screening 11/20/2019   Little interest or pleasure in doing things 0   Feeling down, depressed, or hopeless 0   Trouble falling or staying asleep, or sleeping too much 0   Feeling tired or having little energy 0   Poor appetite or overeating 0   Feeling bad about yourself - or that you are a failure or have let yourself or your family down 0   Trouble concentrating on things, such as reading the newspaper or watching television 0   Moving or speaking so slowly that other people could have noticed. Or the opposite - being so fidgety or restless that you have been moving around a lot more than usual 0   Thoughts that you would be better off dead, or of hurting yourself in some way 0   Total Score 0       Past Medical History:   Diagnosis Date   • Abnormal stress echocardiogram 07/20/2017    Patient had borderline EKG changes 5 minutes into walking and Dr. Cali Spencer recommended the patient not to have that same type of stress test  in the future   • Adhesive capsulitis of right shoulder    • Age-related osteoporosis without fracture    • APC (atrial premature contractions) 7/20/2017   • Arthritis    • Carpal tunnel syndrome    • Cataract    • Chronic hip pain    • Fatigue    • Frequent episodes of pneumonia     PT STATES SEVERAL TIMES   • Heart murmur 7/20/2017   • Left knee pain    • LVH (left ventricular hypertrophy) 7/20/2017   • Mixed hyperlipidemia 7/20/2017   • Non-rheumatic mitral regurgitation 07/20/2017    Mild to moderate per 2-D echocardiogram   • Non-rheumatic tricuspid valve insufficiency 07/20/2017    Mild to moderate per 2-D echocardiogram   • Osteoarthritis    • Osteoarthritis of both knees    • Osteoporosis    • Shoulder pain    • Stroke (CMS/HCC)    • TIA (transient ischemic attack) 10/15/2013    numbness on right side of face and hand; went to [facility]       Past Surgical History:   Procedure Laterality Date   • APPENDECTOMY     • CARDIAC CATHETERIZATION N/A 10/20/2017    Procedure: Right Heart Cath;  Surgeon: Christopher Kan MD;  Location: [facility] INVASIVE LOCATION;  Service:    • COLONOSCOPY     • REPLACEMENT TOTAL KNEE Left 06/12/2020   • SUBTOTAL HYSTERECTOMY     • TOTAL KNEE ARTHROPLASTY Left 6/12/2020    Procedure: TOTAL KNEE ARTHROPLASTY;  Surgeon: Jose Leary MD;  Location: [facility] OR;  Service: Orthopedics;  Laterality: Left;       Family History   Problem Relation Age of Onset   • Heart disease Mother    • Hypertension Mother    • Stroke Mother    • Arthritis Mother    • Heart disease Maternal Aunt    • Hypertension Maternal Aunt    • Hypertension Maternal Uncle    • Malig Hyperthermia Neg Hx        Social History     Socioeconomic History   • Marital status:      Spouse name: Not on file   • Number of children: Not on file   • Years of education: Not on file   • Highest education level: Not on file   Tobacco Use   • Smoking status: Never Smoker   • Smokeless tobacco: Never Used    Substance and Sexual Activity   • Alcohol use: No     Comment: rarely   • Drug use: No   • Sexual activity: Defer       Current Outpatient Medications   Medication Sig Dispense Refill   • amLODIPine-benazepril (LOTREL) 10-20 MG per capsule TAKE 1 CAPSULE DAILY 90 capsule 1   • cetirizine (zyrTEC) 10 MG tablet Take 10 mg by mouth Daily As Needed.  4   • fluticasone (FLONASE) 50 MCG/ACT nasal spray 2 sprays into the nostril(s) as directed by provider Daily As Needed.     • gabapentin (NEURONTIN) 300 MG capsule Take 1 capsule by mouth 2 (two) times a day. 90 capsule 0   • HYDROcodone-acetaminophen (NORCO) 5-325 MG per tablet Take 1 tablet by mouth Every 12 (Twelve) Hours As Needed for Moderate Pain . 30 tablet 0   • ondansetron (ZOFRAN) 4 MG tablet Take 1 tablet by mouth Every 6 (Six) Hours As Needed for Nausea or Vomiting. 30 tablet 0   • pantoprazole (PROTONIX) 40 MG EC tablet TAKE 1 TABLET DAILY 90 tablet 1   • rosuvastatin (CRESTOR) 20 MG tablet Take 1 tablet by mouth Daily. 90 tablet 1   • promethazine (PHENERGAN) 25 MG tablet Take 1 tablet by mouth Every 6 (Six) Hours As Needed for Nausea or Vomiting. 30 tablet 0   • propranolol LA (Inderal LA) 80 MG 24 hr capsule Take 1 capsule by mouth Daily. 30 capsule 1     No current facility-administered medications for this visit.        Review of Systems   Constitutional: Negative for activity change, appetite change, fatigue, fever, unexpected weight gain and unexpected weight loss.   HENT: Negative for nosebleeds, rhinorrhea, trouble swallowing and voice change.    Eyes: Negative for visual disturbance.   Respiratory: Negative for cough, chest tightness, shortness of breath and wheezing.    Cardiovascular: Negative for chest pain, palpitations and leg swelling.   Gastrointestinal: Negative for abdominal pain, blood in stool, constipation, diarrhea, nausea, vomiting, GERD and indigestion.   Genitourinary: Negative for dysuria, frequency and hematuria.  "  Musculoskeletal: Negative for arthralgias, back pain and myalgias.   Skin: Negative for rash and bruise.   Neurological: Positive for headache. Negative for dizziness, tremors, weakness, light-headedness, numbness and memory problem.   Hematological: Negative for adenopathy. Does not bruise/bleed easily.   Psychiatric/Behavioral: Negative for sleep disturbance and depressed mood. The patient is not nervous/anxious.        Objective   /84 (BP Location: Left arm, Patient Position: Sitting, Cuff Size: Adult)   Pulse 85   Temp 97.7 °F (36.5 °C) (Temporal)   Ht 152.4 cm (60\")   Wt 65.3 kg (144 lb)   SpO2 98%   BMI 28.12 kg/m²     Physical Exam  Vitals signs and nursing note reviewed.   Constitutional:       General: She is not in acute distress.     Appearance: She is well-developed. She is not diaphoretic.   HENT:      Head: Normocephalic and atraumatic.      Right Ear: External ear normal.      Left Ear: External ear normal.      Nose: Nose normal.   Eyes:      Conjunctiva/sclera: Conjunctivae normal.      Pupils: Pupils are equal, round, and reactive to light.   Neck:      Musculoskeletal: Normal range of motion and neck supple.      Thyroid: No thyromegaly.      Trachea: No tracheal deviation.   Cardiovascular:      Rate and Rhythm: Normal rate and regular rhythm.      Heart sounds: Normal heart sounds. No murmur. No friction rub. No gallop.    Pulmonary:      Effort: Pulmonary effort is normal. No respiratory distress.      Breath sounds: Normal breath sounds.   Abdominal:      General: Bowel sounds are normal.      Palpations: Abdomen is soft. There is no mass.      Tenderness: There is no abdominal tenderness. There is no guarding.   Musculoskeletal: Normal range of motion.   Lymphadenopathy:      Cervical: No cervical adenopathy.   Skin:     General: Skin is warm and dry.      Capillary Refill: Capillary refill takes less than 2 seconds.      Findings: No rash.   Neurological:      Mental Status: " She is alert and oriented to person, place, and time.      Motor: No abnormal muscle tone.      Deep Tendon Reflexes: Reflexes normal.   Psychiatric:         Behavior: Behavior normal.         Thought Content: Thought content normal.         Judgment: Judgment normal.         Recent Results (from the past 2016 hour(s))   CBC AND DIFFERENTIAL    Collection Time: 10/26/20 11:34 AM    Specimen type and source: Whole Blood, Blood   Result Value Ref Range    WBC 3.26 (L) 4.5 - 11.0 10*3/uL    RBC 4.41 4.0 - 5.2 10*6/uL    Hemoglobin 12.8 12.0 - 16.0 g/dL    Hematocrit 39.9 36.0 - 46.0 %    MCV 90.5 80.0 - 100.0 fL    MCH 29.0 26.0 - 34.0 pg    MCHC 32.1 31.0 - 37.0 g/dL    RDW 13.4 12.0 - 16.8 %    Platelets 266 140 - 440 10*3/uL    MPV 10.2 8.4 - 12.4 fL    Differential Type Hospital CBC w/AutoDiff (arb'U)    Neutrophil Rel % 51.5 45 - 80 %    Lymphocyte Rel % 39.0 15 - 50 %    Monocyte Rel % 8.0 0 - 15 %    Eosinophil % 0.6 0 - 7 %    Basophil Rel % 0.6 0 - 2 %    Immature Grans % 0.3 0.0 - 1.0 %    nRBC 0 0 /100(WBC)    Neutrophils Absolute 1.68 (L) 2.0 - 8.8 10*3/uL    Lymphocytes Absolute 1.27 0.7 - 5.5 10*3/uL    Monocytes Absolute 0.26 0.0 - 1.7 10*3/uL    Eosinophils Absolute 0.02 0.0 - 0.8 10*3/uL    Basophils Absolute 0.02 0.0 - 0.2 10*3/uL    Immature Grans, Absolute 0.01 0.00 - 0.10 10*3/uL   PROTIME-INR    Collection Time: 10/26/20 11:34 AM    Specimen: Fresh Frozen Plasma    Specimen type and source: Plasma, Blood   Result Value Ref Range    Protime 10.9 10.3 - 13.3 s    INR 0.9 INR   APTT    Collection Time: 10/26/20 11:34 AM    Specimen: Fresh Frozen Plasma    Specimen type and source: Plasma, Blood   Result Value Ref Range    PTT 36.0 (H) 25.3 - 35.0 s     Assessment/Plan   Diagnoses and all orders for this visit:    1. Other headache syndrome (Primary)  -     gabapentin (NEURONTIN) 300 MG capsule; Take 1 capsule by mouth 2 (two) times a day.  Dispense: 90 capsule; Refill: 0    2. Essential  hypertension  -     propranolol LA (Inderal LA) 80 MG 24 hr capsule; Take 1 capsule by mouth Daily.  Dispense: 30 capsule; Refill: 1    3. Primary osteoarthritis involving multiple joints  -     gabapentin (NEURONTIN) 300 MG capsule; Take 1 capsule by mouth 2 (two) times a day.  Dispense: 90 capsule; Refill: 0    Persistent headache that may be related to the hypertension.  Will add propranolol LA 80 mg daily and monitor the BP.  Will also increase the gabapentin to 300 mg BID.  Follow up in 2 weeks.  If still with headache then will have her follow up with neurology.

## 2020-11-11 ENCOUNTER — HOSPITAL ENCOUNTER (EMERGENCY)
Facility: HOSPITAL | Age: 75
Discharge: HOME | End: 2020-11-11
Attending: EMERGENCY MEDICINE | Admitting: EMERGENCY MEDICINE

## 2020-11-11 ENCOUNTER — APPOINTMENT (OUTPATIENT)
Dept: CT IMAGING | Facility: HOSPITAL | Age: 75
End: 2020-11-11

## 2020-11-11 ENCOUNTER — TELEPHONE (OUTPATIENT)
Dept: FAMILY MEDICINE CLINIC | Facility: CLINIC | Age: 75
End: 2020-11-11

## 2020-11-11 VITALS
SYSTOLIC BLOOD PRESSURE: 165 MMHG | OXYGEN SATURATION: 78 % | HEIGHT: 60 IN | RESPIRATION RATE: 16 BRPM | WEIGHT: 144 LBS | BODY MASS INDEX: 28.27 KG/M2 | HEART RATE: 71 BPM | TEMPERATURE: 95.4 F | DIASTOLIC BLOOD PRESSURE: 83 MMHG

## 2020-11-11 DIAGNOSIS — G44.52 NEW PERSISTENT DAILY HEADACHE: Primary | ICD-10-CM

## 2020-11-11 LAB
ALBUMIN SERPL-MCNC: 4.8 G/DL (ref 3.5–5.2)
ALBUMIN/GLOB SERPL: 2 G/DL
ALP SERPL-CCNC: 94 U/L (ref 39–117)
ALT SERPL W P-5'-P-CCNC: 16 U/L (ref 1–33)
ANION GAP SERPL CALCULATED.3IONS-SCNC: 10.2 MMOL/L (ref 5–15)
AST SERPL-CCNC: 15 U/L (ref 1–32)
BASOPHILS # BLD AUTO: 0.02 10*3/MM3 (ref 0–0.2)
BASOPHILS NFR BLD AUTO: 0.4 % (ref 0–1.5)
BILIRUB SERPL-MCNC: 0.5 MG/DL (ref 0–1.2)
BUN SERPL-MCNC: 7 MG/DL (ref 8–23)
BUN/CREAT SERPL: 11.5 (ref 7–25)
CALCIUM SPEC-SCNC: 9.3 MG/DL (ref 8.6–10.5)
CHLORIDE SERPL-SCNC: 105 MMOL/L (ref 98–107)
CO2 SERPL-SCNC: 27.8 MMOL/L (ref 22–29)
CREAT SERPL-MCNC: 0.61 MG/DL (ref 0.57–1)
CRP SERPL-MCNC: 0.15 MG/DL (ref 0–0.5)
DEPRECATED RDW RBC AUTO: 47.2 FL (ref 37–54)
EOSINOPHIL # BLD AUTO: 0.04 10*3/MM3 (ref 0–0.4)
EOSINOPHIL NFR BLD AUTO: 0.8 % (ref 0.3–6.2)
ERYTHROCYTE [DISTWIDTH] IN BLOOD BY AUTOMATED COUNT: 13.5 % (ref 12.3–15.4)
ERYTHROCYTE [SEDIMENTATION RATE] IN BLOOD: 17 MM/HR (ref 0–30)
GFR SERPL CREATININE-BSD FRML MDRD: 115 ML/MIN/1.73
GLOBULIN UR ELPH-MCNC: 2.4 GM/DL
GLUCOSE SERPL-MCNC: 92 MG/DL (ref 65–99)
HCT VFR BLD AUTO: 40.5 % (ref 34–46.6)
HGB BLD-MCNC: 12.7 G/DL (ref 12–15.9)
IMM GRANULOCYTES # BLD AUTO: 0.01 10*3/MM3 (ref 0–0.05)
IMM GRANULOCYTES NFR BLD AUTO: 0.2 % (ref 0–0.5)
LYMPHOCYTES # BLD AUTO: 1.77 10*3/MM3 (ref 0.7–3.1)
LYMPHOCYTES NFR BLD AUTO: 34.7 % (ref 19.6–45.3)
MCH RBC QN AUTO: 29.5 PG (ref 26.6–33)
MCHC RBC AUTO-ENTMCNC: 31.4 G/DL (ref 31.5–35.7)
MCV RBC AUTO: 94 FL (ref 79–97)
MONOCYTES # BLD AUTO: 0.37 10*3/MM3 (ref 0.1–0.9)
MONOCYTES NFR BLD AUTO: 7.3 % (ref 5–12)
NEUTROPHILS NFR BLD AUTO: 2.89 10*3/MM3 (ref 1.7–7)
NEUTROPHILS NFR BLD AUTO: 56.6 % (ref 42.7–76)
NRBC BLD AUTO-RTO: 0 /100 WBC (ref 0–0.2)
PLATELET # BLD AUTO: 258 10*3/MM3 (ref 140–450)
PMV BLD AUTO: 9.9 FL (ref 6–12)
POTASSIUM SERPL-SCNC: 4.1 MMOL/L (ref 3.5–5.2)
PROT SERPL-MCNC: 7.2 G/DL (ref 6–8.5)
RBC # BLD AUTO: 4.31 10*6/MM3 (ref 3.77–5.28)
SODIUM SERPL-SCNC: 143 MMOL/L (ref 136–145)
WBC # BLD AUTO: 5.1 10*3/MM3 (ref 3.4–10.8)

## 2020-11-11 PROCEDURE — 25010000002 METHYLPREDNISOLONE PER 125 MG: Performed by: NURSE PRACTITIONER

## 2020-11-11 PROCEDURE — 96374 THER/PROPH/DIAG INJ IV PUSH: CPT

## 2020-11-11 PROCEDURE — 70450 CT HEAD/BRAIN W/O DYE: CPT

## 2020-11-11 PROCEDURE — 96375 TX/PRO/DX INJ NEW DRUG ADDON: CPT

## 2020-11-11 PROCEDURE — 85652 RBC SED RATE AUTOMATED: CPT | Performed by: NURSE PRACTITIONER

## 2020-11-11 PROCEDURE — 85025 COMPLETE CBC W/AUTO DIFF WBC: CPT | Performed by: NURSE PRACTITIONER

## 2020-11-11 PROCEDURE — 25010000002 DIPHENHYDRAMINE PER 50 MG: Performed by: NURSE PRACTITIONER

## 2020-11-11 PROCEDURE — 25010000002 DROPERIDOL PER 5 MG: Performed by: NURSE PRACTITIONER

## 2020-11-11 PROCEDURE — 86140 C-REACTIVE PROTEIN: CPT | Performed by: NURSE PRACTITIONER

## 2020-11-11 PROCEDURE — 99283 EMERGENCY DEPT VISIT LOW MDM: CPT

## 2020-11-11 PROCEDURE — 80053 COMPREHEN METABOLIC PANEL: CPT | Performed by: NURSE PRACTITIONER

## 2020-11-11 RX ORDER — DIPHENHYDRAMINE HYDROCHLORIDE 50 MG/ML
12.5 INJECTION INTRAMUSCULAR; INTRAVENOUS ONCE
Status: COMPLETED | OUTPATIENT
Start: 2020-11-11 | End: 2020-11-11

## 2020-11-11 RX ORDER — LABETALOL HYDROCHLORIDE 5 MG/ML
10 INJECTION, SOLUTION INTRAVENOUS ONCE
Status: COMPLETED | OUTPATIENT
Start: 2020-11-11 | End: 2020-11-11

## 2020-11-11 RX ORDER — SODIUM CHLORIDE 0.9 % (FLUSH) 0.9 %
10 SYRINGE (ML) INJECTION AS NEEDED
Status: DISCONTINUED | OUTPATIENT
Start: 2020-11-11 | End: 2020-11-11 | Stop reason: HOSPADM

## 2020-11-11 RX ORDER — METHYLPREDNISOLONE SODIUM SUCCINATE 125 MG/2ML
125 INJECTION, POWDER, LYOPHILIZED, FOR SOLUTION INTRAMUSCULAR; INTRAVENOUS ONCE
Status: COMPLETED | OUTPATIENT
Start: 2020-11-11 | End: 2020-11-11

## 2020-11-11 RX ORDER — DROPERIDOL 2.5 MG/ML
2.5 INJECTION, SOLUTION INTRAMUSCULAR; INTRAVENOUS ONCE
Status: COMPLETED | OUTPATIENT
Start: 2020-11-11 | End: 2020-11-11

## 2020-11-11 RX ORDER — KETOROLAC TROMETHAMINE 15 MG/ML
15 INJECTION, SOLUTION INTRAMUSCULAR; INTRAVENOUS EVERY 6 HOURS PRN
Status: DISCONTINUED | OUTPATIENT
Start: 2020-11-11 | End: 2020-11-11 | Stop reason: HOSPADM

## 2020-11-11 RX ADMIN — DROPERIDOL 2.5 MG: 2.5 INJECTION, SOLUTION INTRAMUSCULAR; INTRAVENOUS at 17:16

## 2020-11-11 RX ADMIN — SODIUM CHLORIDE 1000 ML: 9 INJECTION, SOLUTION INTRAVENOUS at 17:15

## 2020-11-11 RX ADMIN — LABETALOL HYDROCHLORIDE 10 MG: 5 INJECTION, SOLUTION INTRAVENOUS at 18:33

## 2020-11-11 RX ADMIN — DIPHENHYDRAMINE HYDROCHLORIDE 12.5 MG: 50 INJECTION, SOLUTION INTRAMUSCULAR; INTRAVENOUS at 17:15

## 2020-11-11 RX ADMIN — METHYLPREDNISOLONE SODIUM SUCCINATE 125 MG: 125 INJECTION, POWDER, FOR SOLUTION INTRAMUSCULAR; INTRAVENOUS at 17:15

## 2020-11-11 NOTE — ED NOTES
Patient from home via private vehicle with a headache that has been going on for 5 weeks. Patient has a neurologist appointment this Friday, but was unable to wait. Patient also is expeiercing blurred vision associated with nausea and photosensitivity. Denies hx of headache/migraines.      Celia Ruiz, RN  11/11/20 1699

## 2020-11-11 NOTE — ED PROVIDER NOTES
Pt presents to the ED c/o  worsening generalized headache for approximately 6 weeks.  Patient denies previous history of migraine headaches but her daughter states that patient fell down several stairs in August.  Take started 2 months later.  He has noted associated photophobia and nausea but denies fever, chills, weakness or numbness of an arm or leg.  She denies blurry vision or any visual changes.  States she has an appointment this Friday to see Dr. Brady, neurology.  He has seen her family doctor several times about her headaches and she was recently changed to propranolol, 80 mg a day.  Patient had improvement of her headaches for 1 day after starting the propranolol but they have worsened.     On exam,   Heart is regular rate and rhythm via murmurs.  Lungs are clear to auscultation bilaterally.  Her extraocular movements are intact.  Her right temporal artery is mildly tender to palpation.  Her sinuses are nontender to palpation.  Her neck is supple without lymphadenopathy or meningismus.  Her abdomen is normoactive bowel sounds, soft, nontender nondistended.  Neurologically, she has no facial droop and her  strength is 5 out of 5 bilaterally.  Her leg strength is 5 out of 5 bilaterally and her sensation is grossly intact.     Plan: I agree with plan of trying to help her blood pressure, obtaining a CT of the head to rule out intracranial hemorrhage and trying to help relieve her headache.    Patient was placed in face mask in first look. Patient was wearing facemask when I entered the room and throughout our encounter. I wore full protective equipment throughout this patient encounter including a face mask, eye shield, gown and gloves. Hand hygiene was performed before donning protective equipment and after removal when leaving the room.       Attestation:  The DELIA and I have discussed this patient's history, physical exam, and treatment plan.  I have reviewed the documentation and personally had a face  to face interaction with the patient. I affirm the documentation and agree with the treatment and plan.  The attached note describes my personal findings.            Alexandre Armando MD  11/11/20 3020

## 2020-11-11 NOTE — ED PROVIDER NOTES
EMERGENCY DEPARTMENT ENCOUNTER    Room Number:    Date seen:  2020  Time seen: 16:28 EST  PCP: Cordell Holland MD  Historian: patient and PCP visit notes    HPI:  Chief complaint:headache  A complete HPI/ROS/PMH/PSH/SH/FH are unobtainable due to: n/a  Context:Dayan Carlin is a 75 y.o. female who presents to the ED with c/o severe, all over generalized headache that is been in place for 5 to 6 weeks but has significantly increased in the past 24 hours.  It is not made better by ibuprofen, Aleve, Tylenol or Excedrin and it is not made worse by anything.  She states that the headache is just terrible and she does not even feel like talking.  She denies any visual disturbance, vomiting, loss of balance or unilateral weakness.  She does have some nausea.  She does take occasional Allegra and nasal spray.  She has an appointment on Friday to see Dr. Brady.  She is also had some medication adjustments at her primary care office for her elevated blood pressure.    Patient was placed in face mask in first look. Patient was wearing facemask when I entered the room and throughout our encounter. I wore full protective equipment throughout this patient encounter including a face mask, eye shield and gloves. Hand hygiene/washing of hands was performed before donning protective equipment and after removal when leaving the room.      MEDICAL RECORD REVIEW    RADIOLOGY REPORT    FACILITY:  [facility]  UNIT/AGE/GENDER: N.ED  ER      AGE:75 Y          SEX:F  PATIENT NAME/:  DAYAN CARLIN M    1945  UNIT NUMBER:  [id number]  ACCOUNT NUMBER:  [id number]  ACCESSION NUMBER:  [id number]    CT HEAD WO CONTRAST    DATE: 10/26/2020    EXAMINATION: NONCONTRAST HEAD CT    COMPARISON: None available    INDICATION: right sided headache.    TECHNIQUE: Contiguous axial images through the brain were obtained without contrast. Dose reduction techniques were employed per ALARA protocol.    FINDINGS: Focal  hypodensity in left basal ganglia is most consistent with an old lacunar infarct. There is generalized prominence of the ventricles and subarachnoid spaces related to mild cerebral atrophy. Periventricular hypodensities are nonspecific   findings likely representing sequela of chronic small vessel ischemic change. No CT evidence of acute territorial infarct. No parenchymal or extra-axial hemorrhage. Ventricular configuration is normal. No midline shift. No displaced fractures. The   included paranasal sinuses and mastoid air cells are clear.        IMPRESSION:  1. Generalized atrophy and findings most consistent with chronic small vessel ischemic change.   2. No acute intracranial abnormality.         Dictated by: Mauri Duque M.D.    Images and Report reviewed and interpreted by: Mauri Duque M.D.    <PS><Electronically signed by: Mauri Duque M.D.>  10/26/2020 1157    D: 10/26/2020 1156  T: 10/26/2020 1156  ALLERGIES  Patient has no known allergies.    PAST MEDICAL HISTORY  Active Ambulatory Problems     Diagnosis Date Noted   • Heart murmur 07/20/2017   • Abnormal EKG 07/20/2017   • APC (atrial premature contractions) 07/20/2017   • Non-rheumatic mitral regurgitation 07/20/2017   • Non-rheumatic tricuspid valve insufficiency 07/20/2017   • LVH (left ventricular hypertrophy) 07/20/2017   • Gastroesophageal reflux disease 07/20/2017   • Essential hypertension 07/20/2017   • Mixed hyperlipidemia 07/20/2017   • Pulmonary HTN (CMS/HCC) 10/09/2017   • Osteoporosis of lumbar spine 07/31/2019   • Menopause 07/31/2019   • Osteoarthritis 11/20/2019   • Lip laceration 03/24/2020   • Status post left knee replacement 06/12/2020   • Fatigue 07/14/2020   • Iron deficiency anemia 07/14/2020   • Abnormal serum thyroid stimulating hormone (TSH) level 07/14/2020   • Acute non-recurrent frontal sinusitis 10/13/2020   • Other headache syndrome 11/03/2020     Resolved Ambulatory Problems     Diagnosis Date Noted   • Primary  osteoarthritis of left knee 05/20/2020   • Primary osteoarthritis of left knee 06/13/2020     Past Medical History:   Diagnosis Date   • Abnormal stress echocardiogram 07/20/2017   • Adhesive capsulitis of right shoulder    • Age-related osteoporosis without fracture    • Arthritis    • Carpal tunnel syndrome    • Cataract    • Chronic hip pain    • Frequent episodes of pneumonia    • Left knee pain    • Osteoarthritis of both knees    • Osteoporosis    • Shoulder pain    • Stroke (CMS/HCC)    • TIA (transient ischemic attack) 10/15/2013       PAST SURGICAL HISTORY  Past Surgical History:   Procedure Laterality Date   • APPENDECTOMY     • CARDIAC CATHETERIZATION N/A 10/20/2017    Procedure: Right Heart Cath;  Surgeon: Christopher Kan MD;  Location: [facility] INVASIVE LOCATION;  Service:    • COLONOSCOPY     • REPLACEMENT TOTAL KNEE Left 06/12/2020   • SUBTOTAL HYSTERECTOMY     • TOTAL KNEE ARTHROPLASTY Left 6/12/2020    Procedure: TOTAL KNEE ARTHROPLASTY;  Surgeon: Jose Leary MD;  Location: [facility] OR;  Service: Orthopedics;  Laterality: Left;       FAMILY HISTORY  Family History   Problem Relation Age of Onset   • Heart disease Mother    • Hypertension Mother    • Stroke Mother    • Arthritis Mother    • Heart disease Maternal Aunt    • Hypertension Maternal Aunt    • Hypertension Maternal Uncle    • Malig Hyperthermia Neg Hx        SOCIAL HISTORY  Social History     Socioeconomic History   • Marital status:      Spouse name: Not on file   • Number of children: Not on file   • Years of education: Not on file   • Highest education level: Not on file   Tobacco Use   • Smoking status: Never Smoker   • Smokeless tobacco: Never Used   Substance and Sexual Activity   • Alcohol use: No     Comment: rarely   • Drug use: No   • Sexual activity: Defer       REVIEW OF SYSTEMS  Review of Systems    All systems reviewed and negative except for those discussed in HPI.     PHYSICAL EXAM    ED  Triage Vitals [11/11/20 1501]   Temp Heart Rate Resp BP SpO2   95.4 °F (35.2 °C) 71 16 -- 96 %      Temp src Heart Rate Source Patient Position BP Location FiO2 (%)   Tympanic -- -- -- --     Physical Exam    I have reviewed the triage vital signs and nursing notes.      GENERAL: no distress, but patient appears to not feel very well, poor eye contact due to headache.   HENT: nares patent, mm moist  EYES: no scleral icterus, PERRL, EOMI  NECK: no ROM limitations  CV: regular rhythm, regular rate, no murmur, no rubs, no gallups  RESPIRATORY: normal effort, CTAB  ABDOMEN: soft  : deferred  MUSCULOSKELETAL: no deformity  NEURO: alert, moves all extremities, follows commands, Cranial nerves 2-12 intact as tested.  Sensation intact.  5/5 strength in all extremities.  Normal cerebellar testing.  No drift in any extremity.  No dysarthria.  No aphasia.  No neglect/extinction.     SKIN: warm, dry    LAB RESULTS  Recent Results (from the past 24 hour(s))   Comprehensive Metabolic Panel    Collection Time: 11/11/20  5:05 PM    Specimen: Blood   Result Value Ref Range    Glucose 92 65 - 99 mg/dL    BUN 7 (L) 8 - 23 mg/dL    Creatinine 0.61 0.57 - 1.00 mg/dL    Sodium 143 136 - 145 mmol/L    Potassium 4.1 3.5 - 5.2 mmol/L    Chloride 105 98 - 107 mmol/L    CO2 27.8 22.0 - 29.0 mmol/L    Calcium 9.3 8.6 - 10.5 mg/dL    Total Protein 7.2 6.0 - 8.5 g/dL    Albumin 4.80 3.50 - 5.20 g/dL    ALT (SGPT) 16 1 - 33 U/L    AST (SGOT) 15 1 - 32 U/L    Alkaline Phosphatase 94 39 - 117 U/L    Total Bilirubin 0.5 0.0 - 1.2 mg/dL    eGFR  African Amer 115 >60 mL/min/1.73    Globulin 2.4 gm/dL    A/G Ratio 2.0 g/dL    BUN/Creatinine Ratio 11.5 7.0 - 25.0    Anion Gap 10.2 5.0 - 15.0 mmol/L   Sedimentation Rate    Collection Time: 11/11/20  5:05 PM    Specimen: Blood   Result Value Ref Range    Sed Rate 17 0 - 30 mm/hr   C-reactive Protein    Collection Time: 11/11/20  5:05 PM    Specimen: Blood   Result Value Ref Range    C-Reactive Protein  0.15 0.00 - 0.50 mg/dL   CBC Auto Differential    Collection Time: 11/11/20  5:05 PM    Specimen: Blood   Result Value Ref Range    WBC 5.10 3.40 - 10.80 10*3/mm3    RBC 4.31 3.77 - 5.28 10*6/mm3    Hemoglobin 12.7 12.0 - 15.9 g/dL    Hematocrit 40.5 34.0 - 46.6 %    MCV 94.0 79.0 - 97.0 fL    MCH 29.5 26.6 - 33.0 pg    MCHC 31.4 (L) 31.5 - 35.7 g/dL    RDW 13.5 12.3 - 15.4 %    RDW-SD 47.2 37.0 - 54.0 fl    MPV 9.9 6.0 - 12.0 fL    Platelets 258 140 - 450 10*3/mm3    Neutrophil % 56.6 42.7 - 76.0 %    Lymphocyte % 34.7 19.6 - 45.3 %    Monocyte % 7.3 5.0 - 12.0 %    Eosinophil % 0.8 0.3 - 6.2 %    Basophil % 0.4 0.0 - 1.5 %    Immature Grans % 0.2 0.0 - 0.5 %    Neutrophils, Absolute 2.89 1.70 - 7.00 10*3/mm3    Lymphocytes, Absolute 1.77 0.70 - 3.10 10*3/mm3    Monocytes, Absolute 0.37 0.10 - 0.90 10*3/mm3    Eosinophils, Absolute 0.04 0.00 - 0.40 10*3/mm3    Basophils, Absolute 0.02 0.00 - 0.20 10*3/mm3    Immature Grans, Absolute 0.01 0.00 - 0.05 10*3/mm3    nRBC 0.0 0.0 - 0.2 /100 WBC         RADIOLOGY RESULTS  CT Head Without Contrast               PROGRESS, DATA ANALYSIS, CONSULTS AND MEDICAL DECISION MAKING  All labs have been independently reviewed by me.  All radiology studies have been reviewed by me and discussed with radiologist dictating the report.  EKG's independently viewed and interpreted by me unless stated otherwise. Discussion below represents my analysis of pertinent findings related to patient's condition, differential diagnosis, treatment plan and final disposition.     ED Course as of Nov 11 1959 Wed Nov 11, 2020   1828 Labs unremarkable including inflammation markers.  I have updated the patient and her family member about this.  Her blood pressure still a bit elevated although it was a bit lower.  She states her headache is almost gone and she is making much better eye contact.    [EW]   1915 Discussed CT head with Dr. Beltran.  No acute findings.  Due to patient's headache being  improved we will have her follow-up with Dr. Brady as scheduled on Friday.    [EW]   1937 Repeat /92    [EW]   1942 I have updated patient on workup and CT head results.  She is to keep her appointment with Dr. Brady.     [EW]      ED Course User Index  [EW] Dipika Parmar, FRANK     DDX: Differential diagnosis for headache includes but is not limited to:  - subarachnoid hemorrhage  - intracranial mass  - stroke  - RCVS  - meningitis  - glaucoma  - giant cell arteritis  - CO poisoning  - cerebral venous sinus thrombosis    MDM: This headache was increased in the past 24 hours however the patient has had a headache for the past 5 to 6 weeks and seen her primary care provider several times.  She has no focal neurological deficits, weaknesses or any other issues to make me concerned that it is a stroke.  She does have a follow-up appointment with Dr. Brady on Friday.  Headache was improved with medications given in the emergency department.     Reviewed pt's history and workup with Dr. Armando.  After a bedside evaluation, Dr. Armando agrees with the plan of care.    The patient's history, physical exam, and lab findings were discussed with the physician, who also performed a face to face history and physical exam.  I discussed all results and noted any abnormalities with patient.  Discussed absoute need to recheck abnormalities with their family physician.  I answered any of the patient's questions.  Discussed plan for discharge, as there is no emergent indication for admission.  Pt is agreeable and understands need for follow up and repeat testing.  Pt is aware that discharge does not mean that nothing is wrong but it indicates no emergency is present and they must continue care with their family physician.  Pt is discharged with instructions to follow up with primary care doctor to have their blood pressure rechecked.           Disposition vitals:  /92   Pulse 67   Temp 95.4 °F (35.2 °C) (Tympanic)    "Resp 16   Ht 152.4 cm (60\")   Wt 65.3 kg (144 lb)   SpO2 100%   BMI 28.12 kg/m²       DIAGNOSIS  Final diagnoses:   New persistent daily headache       FOLLOW UP   Cordell Holland MD  [address] [facility]  [address]  [facility] [address] [phone]    Schedule an appointment as soon as possible for a visit in 1 week      Rodrigue Brady,   [address]  [place] [id number] [phone]      keep appointment on Friday         Dipika Parmar, FRANK  11/11/20 1959    "

## 2020-11-11 NOTE — ED NOTES
Patient was placed in face mask in first look.  Patient was wearing a face mask throughout our encounter.  I wore protective eye protection throughout the encounter.  Hand hygiene was performed before and after patient encounter.        Dhruv Hilliard RN  11/11/20 1754

## 2020-11-12 NOTE — DISCHARGE INSTRUCTIONS
Only take Tylenol 500 or 650 mg every 8 hours for headache.  Stop all other meds    Although you are being discharged from the ED today, I encourage you to return for worsening symptoms. Things can, and do, change such that treatment at home with medication may not be adequate. Specifically I recommend returning for chest pain or discomfort, difficulty breathing, persistent vomiting or difficulty holding down liquids or medications, fever > 102.0 F, or any other worsening or alarming symptoms.     Rest. Drink plenty of fluids.  Follow up with PCP or provider listed for further evaluation and management.  Follow up with primary care provider for further management and to have blood pressure rechecked.  Take all medications as prescribed.

## 2020-11-17 ENCOUNTER — OFFICE VISIT (OUTPATIENT)
Dept: FAMILY MEDICINE CLINIC | Facility: CLINIC | Age: 75
End: 2020-11-17

## 2020-11-17 VITALS
TEMPERATURE: 96.9 F | WEIGHT: 149.2 LBS | OXYGEN SATURATION: 99 % | HEIGHT: 60 IN | DIASTOLIC BLOOD PRESSURE: 84 MMHG | HEART RATE: 76 BPM | SYSTOLIC BLOOD PRESSURE: 138 MMHG | BODY MASS INDEX: 29.29 KG/M2

## 2020-11-17 DIAGNOSIS — E78.2 MIXED HYPERLIPIDEMIA: ICD-10-CM

## 2020-11-17 DIAGNOSIS — I10 ESSENTIAL HYPERTENSION: Primary | Chronic | ICD-10-CM

## 2020-11-17 DIAGNOSIS — G44.89 OTHER HEADACHE SYNDROME: ICD-10-CM

## 2020-11-17 PROCEDURE — 99213 OFFICE O/P EST LOW 20 MIN: CPT | Performed by: INTERNAL MEDICINE

## 2020-11-17 PROCEDURE — G0008 ADMIN INFLUENZA VIRUS VAC: HCPCS | Performed by: INTERNAL MEDICINE

## 2020-11-17 PROCEDURE — 90694 VACC AIIV4 NO PRSRV 0.5ML IM: CPT | Performed by: INTERNAL MEDICINE

## 2020-11-17 RX ORDER — ROSUVASTATIN CALCIUM 20 MG/1
20 TABLET, COATED ORAL DAILY
Qty: 90 TABLET | Refills: 1 | Status: SHIPPED | OUTPATIENT
Start: 2020-11-17 | End: 2021-04-07 | Stop reason: SDUPTHER

## 2020-11-17 RX ORDER — METHYLPREDNISOLONE 4 MG/1
TABLET ORAL
COMMUNITY
Start: 2020-11-13 | End: 2021-02-02

## 2020-11-17 RX ORDER — PROPRANOLOL HYDROCHLORIDE 80 MG/1
80 CAPSULE, EXTENDED RELEASE ORAL DAILY
Qty: 90 CAPSULE | Refills: 1 | Status: SHIPPED | OUTPATIENT
Start: 2020-11-17 | End: 2021-04-07 | Stop reason: SDUPTHER

## 2020-11-17 NOTE — PROGRESS NOTES
Subjective   Isabel Carlson is a 75 y.o. female.     Chief Complaint   Patient presents with   • Migraine   • Flu Vaccine       History of Present Illness   Here for follow-up for headaches for which she was seen on 11/3/2020.  At that time she was having headaches in the middle of the back of the head were sharp and stabbing going to the side of her head and on the top but no associated dizziness, weakness, nausea, vomiting, rash, hearing, or head traumas.  Noted that a CT done in [place] emergency room of the head on 10/26/2020 was negative and normal labs.  Was noted to have elevated blood pressure and propranolol LA 80 mg was added to her current regimen and the gabapentin was increased to 300 mg twice a day.  The headaches actually worsened and went to ER and given medrol pack and the headache has improved with only slight headache.  Has appointment with neruology Dr Brady in January.  On questioning patient has stopped the lotrel thinking it was replaced.  States has seen some flashes of light in the right eye off and on.  No pain in the eye or loss of vision.    The following portions of the patient's history were reviewed and updated as appropriate: allergies, current medications, past family history, past medical history, past social history, past surgical history and problem list.    Depression Screen:  PHQ-2/PHQ-9 Depression Screening 11/20/2019   Little interest or pleasure in doing things 0   Feeling down, depressed, or hopeless 0   Trouble falling or staying asleep, or sleeping too much 0   Feeling tired or having little energy 0   Poor appetite or overeating 0   Feeling bad about yourself - or that you are a failure or have let yourself or your family down 0   Trouble concentrating on things, such as reading the newspaper or watching television 0   Moving or speaking so slowly that other people could have noticed. Or the opposite - being so fidgety or restless that you have been moving around a lot  more than usual 0   Thoughts that you would be better off dead, or of hurting yourself in some way 0   Total Score 0       Past Medical History:   Diagnosis Date   • Abnormal stress echocardiogram 07/20/2017    Patient had borderline EKG changes 5 minutes into walking and Dr. Cali Spencer recommended the patient not to have that same type of stress test in the future   • Adhesive capsulitis of right shoulder    • Age-related osteoporosis without fracture    • APC (atrial premature contractions) 7/20/2017   • Arthritis    • Carpal tunnel syndrome    • Cataract    • Chronic hip pain    • Fatigue    • Frequent episodes of pneumonia     PT STATES SEVERAL TIMES   • Heart murmur 7/20/2017   • Left knee pain    • LVH (left ventricular hypertrophy) 7/20/2017   • Mixed hyperlipidemia 7/20/2017   • Non-rheumatic mitral regurgitation 07/20/2017    Mild to moderate per 2-D echocardiogram   • Non-rheumatic tricuspid valve insufficiency 07/20/2017    Mild to moderate per 2-D echocardiogram   • Osteoarthritis    • Osteoarthritis of both knees    • Osteoporosis    • Shoulder pain    • Stroke (CMS/HCC)    • TIA (transient ischemic attack) 10/15/2013    numbness on right side of face and hand; went to [facility]       Past Surgical History:   Procedure Laterality Date   • APPENDECTOMY     • CARDIAC CATHETERIZATION N/A 10/20/2017    Procedure: Right Heart Cath;  Surgeon: Christopher Kan MD;  Location: [facility] INVASIVE LOCATION;  Service:    • COLONOSCOPY     • REPLACEMENT TOTAL KNEE Left 06/12/2020   • SUBTOTAL HYSTERECTOMY     • TOTAL KNEE ARTHROPLASTY Left 6/12/2020    Procedure: TOTAL KNEE ARTHROPLASTY;  Surgeon: Jose Leary MD;  Location: [facility] OR;  Service: Orthopedics;  Laterality: Left;       Family History   Problem Relation Age of Onset   • Heart disease Mother    • Hypertension Mother    • Stroke Mother    • Arthritis Mother    • Heart disease Maternal Aunt    • Hypertension Maternal Aunt    •  Hypertension Maternal Uncle    • Malig Hyperthermia Neg Hx        Social History     Socioeconomic History   • Marital status:      Spouse name: Not on file   • Number of children: Not on file   • Years of education: Not on file   • Highest education level: Not on file   Tobacco Use   • Smoking status: Never Smoker   • Smokeless tobacco: Never Used   Substance and Sexual Activity   • Alcohol use: No     Comment: rarely   • Drug use: No   • Sexual activity: Defer       Current Outpatient Medications   Medication Sig Dispense Refill   • gabapentin (NEURONTIN) 300 MG capsule Take 1 capsule by mouth 2 (two) times a day. 90 capsule 0   • HYDROcodone-acetaminophen (NORCO) 5-325 MG per tablet Take 1 tablet by mouth Every 12 (Twelve) Hours As Needed for Moderate Pain . 30 tablet 0   • methylPREDNISolone (MEDROL) 4 MG dose pack FPD     • pantoprazole (PROTONIX) 40 MG EC tablet TAKE 1 TABLET DAILY 90 tablet 1   • propranolol LA (Inderal LA) 80 MG 24 hr capsule Take 1 capsule by mouth Daily. 90 capsule 1   • rosuvastatin (CRESTOR) 20 MG tablet Take 1 tablet by mouth Daily. 90 tablet 1   • amLODIPine-benazepril (LOTREL) 10-20 MG per capsule TAKE 1 CAPSULE DAILY 90 capsule 1   • cetirizine (zyrTEC) 10 MG tablet Take 10 mg by mouth Daily As Needed.  4   • fluticasone (FLONASE) 50 MCG/ACT nasal spray 2 sprays into the nostril(s) as directed by provider Daily As Needed.     • ondansetron (ZOFRAN) 4 MG tablet Take 1 tablet by mouth Every 6 (Six) Hours As Needed for Nausea or Vomiting. 30 tablet 0   • promethazine (PHENERGAN) 25 MG tablet Take 1 tablet by mouth Every 6 (Six) Hours As Needed for Nausea or Vomiting. 30 tablet 0     No current facility-administered medications for this visit.        Review of Systems   Constitutional: Negative for activity change, appetite change, fatigue, fever, unexpected weight gain and unexpected weight loss.   HENT: Negative for nosebleeds, rhinorrhea, trouble swallowing and voice change.   "  Eyes: Negative for visual disturbance.   Respiratory: Negative for cough, chest tightness, shortness of breath and wheezing.    Cardiovascular: Negative for chest pain, palpitations and leg swelling.   Gastrointestinal: Negative for abdominal pain, blood in stool, constipation, diarrhea, nausea, vomiting, GERD and indigestion.   Genitourinary: Negative for dysuria, frequency and hematuria.   Musculoskeletal: Negative for arthralgias, back pain and myalgias.   Skin: Negative for rash and bruise.   Neurological: Positive for headache. Negative for dizziness, tremors, weakness, light-headedness, numbness and memory problem.   Hematological: Negative for adenopathy. Does not bruise/bleed easily.   Psychiatric/Behavioral: Negative for sleep disturbance and depressed mood. The patient is not nervous/anxious.        Objective   /84 (BP Location: Left arm, Patient Position: Sitting, Cuff Size: Adult)   Pulse 76   Temp 96.9 °F (36.1 °C) (Temporal)   Ht 152.4 cm (60\")   Wt 67.7 kg (149 lb 3.2 oz)   SpO2 99%   BMI 29.14 kg/m²     Physical Exam  Vitals signs and nursing note reviewed.   Constitutional:       General: She is not in acute distress.     Appearance: She is well-developed. She is not diaphoretic.   HENT:      Head: Normocephalic and atraumatic.      Right Ear: External ear normal.      Left Ear: External ear normal.      Nose: Nose normal.   Eyes:      Conjunctiva/sclera: Conjunctivae normal.      Pupils: Pupils are equal, round, and reactive to light.   Neck:      Musculoskeletal: Normal range of motion and neck supple.      Thyroid: No thyromegaly.      Trachea: No tracheal deviation.   Cardiovascular:      Rate and Rhythm: Normal rate and regular rhythm.      Heart sounds: Normal heart sounds. No murmur. No friction rub. No gallop.    Pulmonary:      Effort: Pulmonary effort is normal. No respiratory distress.      Breath sounds: Normal breath sounds.   Abdominal:      General: Bowel sounds are " normal.      Palpations: Abdomen is soft. There is no mass.      Tenderness: There is no abdominal tenderness. There is no guarding.   Musculoskeletal: Normal range of motion.   Lymphadenopathy:      Cervical: No cervical adenopathy.   Skin:     General: Skin is warm and dry.      Capillary Refill: Capillary refill takes less than 2 seconds.      Findings: No rash.   Neurological:      Mental Status: She is alert and oriented to person, place, and time.      Motor: No abnormal muscle tone.      Deep Tendon Reflexes: Reflexes normal.   Psychiatric:         Behavior: Behavior normal.         Thought Content: Thought content normal.         Judgment: Judgment normal.         Recent Results (from the past 2016 hour(s))   CBC AND DIFFERENTIAL    Collection Time: 10/26/20 11:34 AM    Specimen type and source: Whole Blood, Blood   Result Value Ref Range    WBC 3.26 (L) 4.5 - 11.0 10*3/uL    RBC 4.41 4.0 - 5.2 10*6/uL    Hemoglobin 12.8 12.0 - 16.0 g/dL    Hematocrit 39.9 36.0 - 46.0 %    MCV 90.5 80.0 - 100.0 fL    MCH 29.0 26.0 - 34.0 pg    MCHC 32.1 31.0 - 37.0 g/dL    RDW 13.4 12.0 - 16.8 %    Platelets 266 140 - 440 10*3/uL    MPV 10.2 8.4 - 12.4 fL    Differential Type Hospital CBC w/AutoDiff (arb'U)    Neutrophil Rel % 51.5 45 - 80 %    Lymphocyte Rel % 39.0 15 - 50 %    Monocyte Rel % 8.0 0 - 15 %    Eosinophil % 0.6 0 - 7 %    Basophil Rel % 0.6 0 - 2 %    Immature Grans % 0.3 0.0 - 1.0 %    nRBC 0 0 /100(WBC)    Neutrophils Absolute 1.68 (L) 2.0 - 8.8 10*3/uL    Lymphocytes Absolute 1.27 0.7 - 5.5 10*3/uL    Monocytes Absolute 0.26 0.0 - 1.7 10*3/uL    Eosinophils Absolute 0.02 0.0 - 0.8 10*3/uL    Basophils Absolute 0.02 0.0 - 0.2 10*3/uL    Immature Grans, Absolute 0.01 0.00 - 0.10 10*3/uL   PROTIME-INR    Collection Time: 10/26/20 11:34 AM    Specimen: Fresh Frozen Plasma    Specimen type and source: Plasma, Blood   Result Value Ref Range    Protime 10.9 10.3 - 13.3 s    INR 0.9 INR   APTT    Collection Time:  10/26/20 11:34 AM    Specimen: Fresh Frozen Plasma    Specimen type and source: Plasma, Blood   Result Value Ref Range    PTT 36.0 (H) 25.3 - 35.0 s   Comprehensive Metabolic Panel    Collection Time: 11/11/20  5:05 PM    Specimen: Blood   Result Value Ref Range    Glucose 92 65 - 99 mg/dL    BUN 7 (L) 8 - 23 mg/dL    Creatinine 0.61 0.57 - 1.00 mg/dL    Sodium 143 136 - 145 mmol/L    Potassium 4.1 3.5 - 5.2 mmol/L    Chloride 105 98 - 107 mmol/L    CO2 27.8 22.0 - 29.0 mmol/L    Calcium 9.3 8.6 - 10.5 mg/dL    Total Protein 7.2 6.0 - 8.5 g/dL    Albumin 4.80 3.50 - 5.20 g/dL    ALT (SGPT) 16 1 - 33 U/L    AST (SGOT) 15 1 - 32 U/L    Alkaline Phosphatase 94 39 - 117 U/L    Total Bilirubin 0.5 0.0 - 1.2 mg/dL    eGFR  African Amer 115 >60 mL/min/1.73    Globulin 2.4 gm/dL    A/G Ratio 2.0 g/dL    BUN/Creatinine Ratio 11.5 7.0 - 25.0    Anion Gap 10.2 5.0 - 15.0 mmol/L   Sedimentation Rate    Collection Time: 11/11/20  5:05 PM    Specimen: Blood   Result Value Ref Range    Sed Rate 17 0 - 30 mm/hr   C-reactive Protein    Collection Time: 11/11/20  5:05 PM    Specimen: Blood   Result Value Ref Range    C-Reactive Protein 0.15 0.00 - 0.50 mg/dL   CBC Auto Differential    Collection Time: 11/11/20  5:05 PM    Specimen: Blood   Result Value Ref Range    WBC 5.10 3.40 - 10.80 10*3/mm3    RBC 4.31 3.77 - 5.28 10*6/mm3    Hemoglobin 12.7 12.0 - 15.9 g/dL    Hematocrit 40.5 34.0 - 46.6 %    MCV 94.0 79.0 - 97.0 fL    MCH 29.5 26.6 - 33.0 pg    MCHC 31.4 (L) 31.5 - 35.7 g/dL    RDW 13.5 12.3 - 15.4 %    RDW-SD 47.2 37.0 - 54.0 fl    MPV 9.9 6.0 - 12.0 fL    Platelets 258 140 - 450 10*3/mm3    Neutrophil % 56.6 42.7 - 76.0 %    Lymphocyte % 34.7 19.6 - 45.3 %    Monocyte % 7.3 5.0 - 12.0 %    Eosinophil % 0.8 0.3 - 6.2 %    Basophil % 0.4 0.0 - 1.5 %    Immature Grans % 0.2 0.0 - 0.5 %    Neutrophils, Absolute 2.89 1.70 - 7.00 10*3/mm3    Lymphocytes, Absolute 1.77 0.70 - 3.10 10*3/mm3    Monocytes, Absolute 0.37 0.10 - 0.90  10*3/mm3    Eosinophils, Absolute 0.04 0.00 - 0.40 10*3/mm3    Basophils, Absolute 0.02 0.00 - 0.20 10*3/mm3    Immature Grans, Absolute 0.01 0.00 - 0.05 10*3/mm3    nRBC 0.0 0.0 - 0.2 /100 WBC     Assessment/Plan   Diagnoses and all orders for this visit:    1. Essential hypertension (Primary)  -     propranolol LA (Inderal LA) 80 MG 24 hr capsule; Take 1 capsule by mouth Daily.  Dispense: 90 capsule; Refill: 1    2. Other headache syndrome    3. Mixed hyperlipidemia  -     rosuvastatin (CRESTOR) 20 MG tablet; Take 1 tablet by mouth Daily.  Dispense: 90 tablet; Refill: 1    Other orders  -     Fluad Quad 65+ yrs ([id number])    Headaches improved.  Blood pressure is borderline.  Instructed patient to restart her Lotrel which she was not supposed to stop in the first place and continue the propranolol.  Follow low blood pressures at home.  If the headaches return when she has completed her steroid course or if they worsen as bad as they were then she needs to follow-up with the neurologist as scheduled.  Otherwise continue current medications.  Discussed with her her question with history of flashes of light with her vision in the right eye and recommend that she follow-up with the ophthalmologist for evaluation to be sure there is no retinal issues going on.

## 2020-11-20 ENCOUNTER — TELEPHONE (OUTPATIENT)
Dept: FAMILY MEDICINE CLINIC | Facility: CLINIC | Age: 75
End: 2020-11-20

## 2020-11-20 NOTE — TELEPHONE ENCOUNTER
DELETE AFTER REVIEWING: Telephone encounter to be sent to the clinical pool     Caller: Sonido Carlsongurpreet      Relationship: Self    Best call back number: [phone]       When did you start taking this medications: many years    Which medication are you concerned about: Gabapentin    Who prescribed you this medication: Skyler    What are your concerns: Patient states she is having headaches and has talked with at least 3 other individuals that stated they experienced headaches with gabapentin.  Those individuals stated their PCP put them on Celebrex.  She states she did not take the Gabapentin yesterday and did not have a headache.      How long have you been taking these medications: Many years    How long have you had these concerns: 6 or 7 weeks        [id number] - [place], [place] - [address] AT [address] [place] [address] - [phone]  - [phone]   [phone]

## 2020-11-23 ENCOUNTER — OFFICE VISIT (OUTPATIENT)
Dept: FAMILY MEDICINE CLINIC | Facility: CLINIC | Age: 75
End: 2020-11-23

## 2020-11-23 DIAGNOSIS — M15.9 PRIMARY OSTEOARTHRITIS INVOLVING MULTIPLE JOINTS: ICD-10-CM

## 2020-11-23 DIAGNOSIS — G44.52 NEW DAILY PERSISTENT HEADACHE: Primary | ICD-10-CM

## 2020-11-23 DIAGNOSIS — G44.89 OTHER HEADACHE SYNDROME: ICD-10-CM

## 2020-11-23 PROCEDURE — 99442 PR PHYS/QHP TELEPHONE EVALUATION 11-20 MIN: CPT | Performed by: FAMILY MEDICINE

## 2020-11-23 NOTE — PROGRESS NOTES
Subjective   Isabel Carlson is a 75 y.o. female.     Consent given    Time 20 min    Visit via telephone      CC: follow up on headaches, wants to be off Gabapetin  Pt was taking only one pill a day not twice    Headache   This is a new problem. The current episode started more than 1 month ago. The problem occurs daily. The problem has been gradually improving. The pain is located in the right unilateral region. The pain does not radiate. Associated symptoms comments: none. Treatments tried: prednisone? [place] neurology reported by pt. The treatment provided mild relief. (None, but had been taking Gabapentin for yeras and is convinced that it is Gabapentin  causing it and wants off of it.)        Follow up for headaches: severe daily headaches, seen  and being treated and evaluated for these headaches, but wants to stop Guillermina anyway.    The following portions of the patient's history were reviewed and updated as appropriate: allergies, current medications, past family history, past medical history, past social history, past surgical history and problem list.    Past Medical History:   Diagnosis Date   • Abnormal stress echocardiogram 07/20/2017    Patient had borderline EKG changes 5 minutes into walking and Dr. Cali Spencer recommended the patient not to have that same type of stress test in the future   • Adhesive capsulitis of right shoulder    • Age-related osteoporosis without fracture    • APC (atrial premature contractions) 7/20/2017   • Arthritis    • Carpal tunnel syndrome    • Cataract    • Chronic hip pain    • Fatigue    • Frequent episodes of pneumonia     PT STATES SEVERAL TIMES   • Heart murmur 7/20/2017   • Left knee pain    • LVH (left ventricular hypertrophy) 7/20/2017   • Mixed hyperlipidemia 7/20/2017   • Non-rheumatic mitral regurgitation 07/20/2017    Mild to moderate per 2-D echocardiogram   • Non-rheumatic tricuspid valve insufficiency 07/20/2017    Mild to moderate per 2-D  echocardiogram   • Osteoarthritis    • Osteoarthritis of both knees    • Osteoporosis    • Shoulder pain    • Stroke (CMS/HCC)    • TIA (transient ischemic attack) 10/15/2013    numbness on right side of face and hand; went to [facility]       Past Surgical History:   Procedure Laterality Date   • APPENDECTOMY     • CARDIAC CATHETERIZATION N/A 10/20/2017    Procedure: Right Heart Cath;  Surgeon: Christopher Kan MD;  Location: [facility] CATH INVASIVE LOCATION;  Service:    • COLONOSCOPY     • REPLACEMENT TOTAL KNEE Left 06/12/2020   • SUBTOTAL HYSTERECTOMY     • TOTAL KNEE ARTHROPLASTY Left 6/12/2020    Procedure: TOTAL KNEE ARTHROPLASTY;  Surgeon: Jose Leary MD;  Location: [facility] OR;  Service: Orthopedics;  Laterality: Left;       Family History   Problem Relation Age of Onset   • Heart disease Mother    • Hypertension Mother    • Stroke Mother    • Arthritis Mother    • Heart disease Maternal Aunt    • Hypertension Maternal Aunt    • Hypertension Maternal Uncle    • Malig Hyperthermia Neg Hx        Social History     Socioeconomic History   • Marital status:      Spouse name: Not on file   • Number of children: Not on file   • Years of education: Not on file   • Highest education level: Not on file   Tobacco Use   • Smoking status: Never Smoker   • Smokeless tobacco: Never Used   Substance and Sexual Activity   • Alcohol use: No     Comment: rarely   • Drug use: No   • Sexual activity: Defer       Current Outpatient Medications on File Prior to Visit   Medication Sig Dispense Refill   • amLODIPine-benazepril (LOTREL) 10-20 MG per capsule TAKE 1 CAPSULE DAILY 90 capsule 1   • cetirizine (zyrTEC) 10 MG tablet Take 10 mg by mouth Daily As Needed.  4   • fluticasone (FLONASE) 50 MCG/ACT nasal spray 2 sprays into the nostril(s) as directed by provider Daily As Needed.     • HYDROcodone-acetaminophen (NORCO) 5-325 MG per tablet Take 1 tablet by mouth Every 12 (Twelve) Hours As Needed for Moderate  Pain . 30 tablet 0   • methylPREDNISolone (MEDROL) 4 MG dose pack FPD     • ondansetron (ZOFRAN) 4 MG tablet Take 1 tablet by mouth Every 6 (Six) Hours As Needed for Nausea or Vomiting. 30 tablet 0   • pantoprazole (PROTONIX) 40 MG EC tablet TAKE 1 TABLET DAILY 90 tablet 1   • promethazine (PHENERGAN) 25 MG tablet Take 1 tablet by mouth Every 6 (Six) Hours As Needed for Nausea or Vomiting. 30 tablet 0   • propranolol LA (Inderal LA) 80 MG 24 hr capsule Take 1 capsule by mouth Daily. 90 capsule 1   • rosuvastatin (CRESTOR) 20 MG tablet Take 1 tablet by mouth Daily. 90 tablet 1   • [DISCONTINUED] gabapentin (NEURONTIN) 300 MG capsule Take 1 capsule by mouth 2 (two) times a day. 90 capsule 0     No current facility-administered medications on file prior to visit.        Review of Systems   Neurological: Positive for headache.       Recent Results (from the past 4704 hour(s))   aPTT    Collection Time: 06/02/20 11:27 AM    Specimen: Blood   Result Value Ref Range    PTT 35.1 22.7 - 35.4 seconds   Protime-INR    Collection Time: 06/02/20 11:27 AM    Specimen: Blood   Result Value Ref Range    Protime 12.7 11.7 - 14.2 Seconds    INR 0.98 0.90 - 1.10   Comprehensive Metabolic Panel    Collection Time: 06/02/20 11:28 AM    Specimen: Blood   Result Value Ref Range    Glucose 97 65 - 99 mg/dL    BUN 13 8 - 23 mg/dL    Creatinine 0.59 0.57 - 1.00 mg/dL    Sodium 134 (L) 136 - 145 mmol/L    Potassium 3.6 3.5 - 5.2 mmol/L    Chloride 101 98 - 107 mmol/L    CO2 25.8 22.0 - 29.0 mmol/L    Calcium 9.1 8.6 - 10.5 mg/dL    Total Protein 7.3 6.0 - 8.5 g/dL    Albumin 4.50 3.50 - 5.20 g/dL    ALT (SGPT) 18 1 - 33 U/L    AST (SGOT) 17 1 - 32 U/L    Alkaline Phosphatase 104 39 - 117 U/L    Total Bilirubin 0.8 0.2 - 1.2 mg/dL    eGFR  African Amer 120 >60 mL/min/1.73    Globulin 2.8 gm/dL    A/G Ratio 1.6 g/dL    BUN/Creatinine Ratio 22.0 7.0 - 25.0    Anion Gap 7.2 5.0 - 15.0 mmol/L   CBC Auto Differential    Collection Time: 06/02/20  11:28 AM    Specimen: Blood   Result Value Ref Range    WBC 3.48 3.40 - 10.80 10*3/mm3    RBC 4.24 3.77 - 5.28 10*6/mm3    Hemoglobin 12.6 12.0 - 15.9 g/dL    Hematocrit 38.3 34.0 - 46.6 %    MCV 90.3 79.0 - 97.0 fL    MCH 29.7 26.6 - 33.0 pg    MCHC 32.9 31.5 - 35.7 g/dL    RDW 12.9 12.3 - 15.4 %    RDW-SD 42.7 37.0 - 54.0 fl    MPV 9.8 6.0 - 12.0 fL    Platelets 246 140 - 450 10*3/mm3    Neutrophil % 45.6 42.7 - 76.0 %    Lymphocyte % 41.7 19.6 - 45.3 %    Monocyte % 9.8 5.0 - 12.0 %    Eosinophil % 2.0 0.3 - 6.2 %    Basophil % 0.6 0.0 - 1.5 %    Immature Grans % 0.3 0.0 - 0.5 %    Neutrophils, Absolute 1.59 (L) 1.70 - 7.00 10*3/mm3    Lymphocytes, Absolute 1.45 0.70 - 3.10 10*3/mm3    Monocytes, Absolute 0.34 0.10 - 0.90 10*3/mm3    Eosinophils, Absolute 0.07 0.00 - 0.40 10*3/mm3    Basophils, Absolute 0.02 0.00 - 0.20 10*3/mm3    Immature Grans, Absolute 0.01 0.00 - 0.05 10*3/mm3    nRBC 0.0 0.0 - 0.2 /100 WBC   COVID-19,BIOTAP, NP/OP SWAB IN TRANSPORT MEDIA OR SALINE 24-36 HR TAT - Swab, Nasopharynx    Collection Time: 06/09/20  9:13 AM    Specimen: Nasopharynx; Swab   Result Value Ref Range    Reference Lab Report      COVID19 Not Detected Not Detected - Ref. Range   Basic Metabolic Panel    Collection Time: 06/13/20  4:53 AM    Specimen: Blood   Result Value Ref Range    Glucose 151 (H) 65 - 99 mg/dL    BUN 7 (L) 8 - 23 mg/dL    Creatinine 0.62 0.57 - 1.00 mg/dL    Sodium 138 136 - 145 mmol/L    Potassium 3.8 3.5 - 5.2 mmol/L    Chloride 102 98 - 107 mmol/L    CO2 25.8 22.0 - 29.0 mmol/L    Calcium 8.4 (L) 8.6 - 10.5 mg/dL    eGFR  African Amer 113 >60 mL/min/1.73    BUN/Creatinine Ratio 11.3 7.0 - 25.0    Anion Gap 10.2 5.0 - 15.0 mmol/L   CBC Auto Differential    Collection Time: 06/13/20  4:53 AM    Specimen: Blood   Result Value Ref Range    WBC 7.54 3.40 - 10.80 10*3/mm3    RBC 3.78 3.77 - 5.28 10*6/mm3    Hemoglobin 11.2 (L) 12.0 - 15.9 g/dL    Hematocrit 33.6 (L) 34.0 - 46.6 %    MCV 88.9 79.0 - 97.0  fL    MCH 29.6 26.6 - 33.0 pg    MCHC 33.3 31.5 - 35.7 g/dL    RDW 12.3 12.3 - 15.4 %    RDW-SD 40.2 37.0 - 54.0 fl    MPV 10.0 6.0 - 12.0 fL    Platelets 192 140 - 450 10*3/mm3    Neutrophil % 83.1 (H) 42.7 - 76.0 %    Lymphocyte % 8.4 (L) 19.6 - 45.3 %    Monocyte % 8.0 5.0 - 12.0 %    Eosinophil % 0.0 (L) 0.3 - 6.2 %    Basophil % 0.1 0.0 - 1.5 %    Immature Grans % 0.4 0.0 - 0.5 %    Neutrophils, Absolute 6.27 1.70 - 7.00 10*3/mm3    Lymphocytes, Absolute 0.63 (L) 0.70 - 3.10 10*3/mm3    Monocytes, Absolute 0.60 0.10 - 0.90 10*3/mm3    Eosinophils, Absolute 0.00 0.00 - 0.40 10*3/mm3    Basophils, Absolute 0.01 0.00 - 0.20 10*3/mm3    Immature Grans, Absolute 0.03 0.00 - 0.05 10*3/mm3    nRBC 0.0 0.0 - 0.2 /100 WBC   Comprehensive Metabolic Panel    Collection Time: 06/14/20  4:54 AM    Specimen: Blood   Result Value Ref Range    Glucose 150 (H) 65 - 99 mg/dL    BUN 14 8 - 23 mg/dL    Creatinine 1.22 (H) 0.57 - 1.00 mg/dL    Sodium 137 136 - 145 mmol/L    Potassium 3.9 3.5 - 5.2 mmol/L    Chloride 100 98 - 107 mmol/L    CO2 25.9 22.0 - 29.0 mmol/L    Calcium 8.4 (L) 8.6 - 10.5 mg/dL    Total Protein 6.5 6.0 - 8.5 g/dL    Albumin 4.00 3.50 - 5.20 g/dL    ALT (SGPT) 38 (H) 1 - 33 U/L    AST (SGOT) 44 (H) 1 - 32 U/L    Alkaline Phosphatase 78 39 - 117 U/L    Total Bilirubin 0.9 0.2 - 1.2 mg/dL    eGFR  African Amer 52 (L) >60 mL/min/1.73    Globulin 2.5 gm/dL    A/G Ratio 1.6 g/dL    BUN/Creatinine Ratio 11.5 7.0 - 25.0    Anion Gap 11.1 5.0 - 15.0 mmol/L   BNP    Collection Time: 06/14/20  4:54 AM    Specimen: Blood   Result Value Ref Range    proBNP 101.2 5.0-1,800.0 pg/mL   TSH    Collection Time: 06/14/20  4:54 AM    Specimen: Blood   Result Value Ref Range    TSH 0.232 (L) 0.270 - 4.200 uIU/mL   Lipid Panel    Collection Time: 06/14/20  4:54 AM    Specimen: Blood   Result Value Ref Range    Total Cholesterol 110 0 - 200 mg/dL    Triglycerides 40 0 - 150 mg/dL    HDL Cholesterol 68 (H) 40 - 60 mg/dL    LDL  Cholesterol  34 0 - 100 mg/dL    VLDL Cholesterol 8 5 - 40 mg/dL    LDL/HDL Ratio 0.50    Hemoglobin A1c    Collection Time: 06/14/20  4:54 AM    Specimen: Blood   Result Value Ref Range    Hemoglobin A1C 5.26 4.80 - 5.60 %   CBC Auto Differential    Collection Time: 06/14/20  4:54 AM    Specimen: Blood   Result Value Ref Range    WBC 8.17 3.40 - 10.80 10*3/mm3    RBC 3.38 (L) 3.77 - 5.28 10*6/mm3    Hemoglobin 10.1 (L) 12.0 - 15.9 g/dL    Hematocrit 30.2 (L) 34.0 - 46.6 %    MCV 89.3 79.0 - 97.0 fL    MCH 29.9 26.6 - 33.0 pg    MCHC 33.4 31.5 - 35.7 g/dL    RDW 12.2 (L) 12.3 - 15.4 %    RDW-SD 39.0 37.0 - 54.0 fl    MPV 10.0 6.0 - 12.0 fL    Platelets 207 140 - 450 10*3/mm3    Neutrophil % 80.3 (H) 42.7 - 76.0 %    Lymphocyte % 8.8 (L) 19.6 - 45.3 %    Monocyte % 10.2 5.0 - 12.0 %    Eosinophil % 0.0 (L) 0.3 - 6.2 %    Basophil % 0.1 0.0 - 1.5 %    Immature Grans % 0.6 (H) 0.0 - 0.5 %    Neutrophils, Absolute 6.56 1.70 - 7.00 10*3/mm3    Lymphocytes, Absolute 0.72 0.70 - 3.10 10*3/mm3    Monocytes, Absolute 0.83 0.10 - 0.90 10*3/mm3    Eosinophils, Absolute 0.00 0.00 - 0.40 10*3/mm3    Basophils, Absolute 0.01 0.00 - 0.20 10*3/mm3    Immature Grans, Absolute 0.05 0.00 - 0.05 10*3/mm3    nRBC 0.0 0.0 - 0.2 /100 WBC   CBC & Differential    Collection Time: 07/14/20  2:43 PM    Specimen: Blood   Result Value Ref Range    WBC 5.24 3.40 - 10.80 10*3/mm3    RBC 3.83 3.77 - 5.28 10*6/mm3    Hemoglobin 11.6 (L) 12.0 - 15.9 g/dL    Hematocrit 35.7 34.0 - 46.6 %    MCV 93.2 79.0 - 97.0 fL    MCH 30.3 26.6 - 33.0 pg    MCHC 32.5 31.5 - 35.7 g/dL    RDW 13.7 12.3 - 15.4 %    Platelets 272 140 - 450 10*3/mm3    Neutrophil Rel % 58.5 42.7 - 76.0 %    Lymphocyte Rel % 30.7 19.6 - 45.3 %    Monocyte Rel % 9.4 5.0 - 12.0 %    Eosinophil Rel % 0.8 0.3 - 6.2 %    Basophil Rel % 0.4 0.0 - 1.5 %    Neutrophils Absolute 3.07 1.70 - 7.00 10*3/mm3    Lymphocytes Absolute 1.61 0.70 - 3.10 10*3/mm3    Monocytes Absolute 0.49 0.10 - 0.90  10*3/mm3    Eosinophils Absolute 0.04 0.00 - 0.40 10*3/mm3    Basophils Absolute 0.02 0.00 - 0.20 10*3/mm3    Immature Granulocyte Rel % 0.2 0.0 - 0.5 %    Immature Grans Absolute 0.01 0.00 - 0.05 10*3/mm3    nRBC 0.0 0.0 - 0.2 /100 WBC   TSH    Collection Time: 07/14/20  2:43 PM    Specimen: Blood   Result Value Ref Range    TSH 0.352 0.270 - 4.200 uIU/mL   T4, Free    Collection Time: 07/14/20  2:43 PM    Specimen: Blood   Result Value Ref Range    Free T4 1.18 0.93 - 1.70 ng/dL   CBC AND DIFFERENTIAL    Collection Time: 10/26/20 11:34 AM    Specimen type and source: Whole Blood, Blood   Result Value Ref Range    WBC 3.26 (L) 4.5 - 11.0 10*3/uL    RBC 4.41 4.0 - 5.2 10*6/uL    Hemoglobin 12.8 12.0 - 16.0 g/dL    Hematocrit 39.9 36.0 - 46.0 %    MCV 90.5 80.0 - 100.0 fL    MCH 29.0 26.0 - 34.0 pg    MCHC 32.1 31.0 - 37.0 g/dL    RDW 13.4 12.0 - 16.8 %    Platelets 266 140 - 440 10*3/uL    MPV 10.2 8.4 - 12.4 fL    Differential Type [facility] CBC w/AutoDiff (arb'U)    Neutrophil Rel % 51.5 45 - 80 %    Lymphocyte Rel % 39.0 15 - 50 %    Monocyte Rel % 8.0 0 - 15 %    Eosinophil % 0.6 0 - 7 %    Basophil Rel % 0.6 0 - 2 %    Immature Grans % 0.3 0.0 - 1.0 %    nRBC 0 0 /100(WBC)    Neutrophils Absolute 1.68 (L) 2.0 - 8.8 10*3/uL    Lymphocytes Absolute 1.27 0.7 - 5.5 10*3/uL    Monocytes Absolute 0.26 0.0 - 1.7 10*3/uL    Eosinophils Absolute 0.02 0.0 - 0.8 10*3/uL    Basophils Absolute 0.02 0.0 - 0.2 10*3/uL    Immature Grans, Absolute 0.01 0.00 - 0.10 10*3/uL   PROTIME-INR    Collection Time: 10/26/20 11:34 AM    Specimen: Fresh Frozen Plasma    Specimen type and source: Plasma, Blood   Result Value Ref Range    Protime 10.9 10.3 - 13.3 s    INR 0.9 INR   APTT    Collection Time: 10/26/20 11:34 AM    Specimen: Fresh Frozen Plasma    Specimen type and source: Plasma, Blood   Result Value Ref Range    PTT 36.0 (H) 25.3 - 35.0 s   Comprehensive Metabolic Panel    Collection Time: 11/11/20  5:05 PM    Specimen: Blood    Result Value Ref Range    Glucose 92 65 - 99 mg/dL    BUN 7 (L) 8 - 23 mg/dL    Creatinine 0.61 0.57 - 1.00 mg/dL    Sodium 143 136 - 145 mmol/L    Potassium 4.1 3.5 - 5.2 mmol/L    Chloride 105 98 - 107 mmol/L    CO2 27.8 22.0 - 29.0 mmol/L    Calcium 9.3 8.6 - 10.5 mg/dL    Total Protein 7.2 6.0 - 8.5 g/dL    Albumin 4.80 3.50 - 5.20 g/dL    ALT (SGPT) 16 1 - 33 U/L    AST (SGOT) 15 1 - 32 U/L    Alkaline Phosphatase 94 39 - 117 U/L    Total Bilirubin 0.5 0.0 - 1.2 mg/dL    eGFR  African Amer 115 >60 mL/min/1.73    Globulin 2.4 gm/dL    A/G Ratio 2.0 g/dL    BUN/Creatinine Ratio 11.5 7.0 - 25.0    Anion Gap 10.2 5.0 - 15.0 mmol/L   Sedimentation Rate    Collection Time: 11/11/20  5:05 PM    Specimen: Blood   Result Value Ref Range    Sed Rate 17 0 - 30 mm/hr   C-reactive Protein    Collection Time: 11/11/20  5:05 PM    Specimen: Blood   Result Value Ref Range    C-Reactive Protein 0.15 0.00 - 0.50 mg/dL   CBC Auto Differential    Collection Time: 11/11/20  5:05 PM    Specimen: Blood   Result Value Ref Range    WBC 5.10 3.40 - 10.80 10*3/mm3    RBC 4.31 3.77 - 5.28 10*6/mm3    Hemoglobin 12.7 12.0 - 15.9 g/dL    Hematocrit 40.5 34.0 - 46.6 %    MCV 94.0 79.0 - 97.0 fL    MCH 29.5 26.6 - 33.0 pg    MCHC 31.4 (L) 31.5 - 35.7 g/dL    RDW 13.5 12.3 - 15.4 %    RDW-SD 47.2 37.0 - 54.0 fl    MPV 9.9 6.0 - 12.0 fL    Platelets 258 140 - 450 10*3/mm3    Neutrophil % 56.6 42.7 - 76.0 %    Lymphocyte % 34.7 19.6 - 45.3 %    Monocyte % 7.3 5.0 - 12.0 %    Eosinophil % 0.8 0.3 - 6.2 %    Basophil % 0.4 0.0 - 1.5 %    Immature Grans % 0.2 0.0 - 0.5 %    Neutrophils, Absolute 2.89 1.70 - 7.00 10*3/mm3    Lymphocytes, Absolute 1.77 0.70 - 3.10 10*3/mm3    Monocytes, Absolute 0.37 0.10 - 0.90 10*3/mm3    Eosinophils, Absolute 0.04 0.00 - 0.40 10*3/mm3    Basophils, Absolute 0.02 0.00 - 0.20 10*3/mm3    Immature Grans, Absolute 0.01 0.00 - 0.05 10*3/mm3    nRBC 0.0 0.0 - 0.2 /100 WBC     Objective   There were no vitals filed for  this visit.  There is no height or weight on file to calculate BMI.  Physical Exam      Diagnoses and all orders for this visit:    1. New daily persistent headache (Primary)    2. Primary osteoarthritis involving multiple joints    3. Other headache syndrome      Wean off of Gabapentin: every other day for 1 week and then every 2 days for a week, and then every 3 days and then stop.

## 2021-01-27 ENCOUNTER — TELEPHONE (OUTPATIENT)
Dept: FAMILY MEDICINE CLINIC | Facility: CLINIC | Age: 76
End: 2021-01-27

## 2021-01-27 NOTE — TELEPHONE ENCOUNTER
Caller: Isabel Carlson    Relationship: Self    Best call back number: [phone]    PATIENT HAS QUESTIONS ABOUT A MEDICATION Cyclobenzaprine hydrochloride THAT SHE IS CURRENTLY TAKING . PATIENT IS REQUESTING CONSULTATION NEEDING TO MAKE SURE ITS OK TO TAKE IT WITH HIGH BLOOD PRESSURE MEDICATION.

## 2021-01-27 NOTE — TELEPHONE ENCOUNTER
This medication should have no interaction and should not affect the blood pressure or the medications

## 2021-02-02 ENCOUNTER — OFFICE VISIT (OUTPATIENT)
Dept: FAMILY MEDICINE CLINIC | Facility: CLINIC | Age: 76
End: 2021-02-02

## 2021-02-02 VITALS
WEIGHT: 153 LBS | OXYGEN SATURATION: 98 % | HEIGHT: 60 IN | HEART RATE: 74 BPM | TEMPERATURE: 97.7 F | BODY MASS INDEX: 30.04 KG/M2 | DIASTOLIC BLOOD PRESSURE: 80 MMHG | SYSTOLIC BLOOD PRESSURE: 128 MMHG

## 2021-02-02 DIAGNOSIS — Z12.31 SCREENING MAMMOGRAM, ENCOUNTER FOR: ICD-10-CM

## 2021-02-02 DIAGNOSIS — Z00.00 MEDICARE ANNUAL WELLNESS VISIT, SUBSEQUENT: Primary | ICD-10-CM

## 2021-02-02 DIAGNOSIS — G44.89 OTHER HEADACHE SYNDROME: ICD-10-CM

## 2021-02-02 DIAGNOSIS — I10 ESSENTIAL HYPERTENSION: Chronic | ICD-10-CM

## 2021-02-02 PROCEDURE — G0439 PPPS, SUBSEQ VISIT: HCPCS | Performed by: INTERNAL MEDICINE

## 2021-02-02 RX ORDER — CYCLOBENZAPRINE HCL 5 MG
TABLET ORAL
COMMUNITY
Start: 2021-01-16 | End: 2022-02-24 | Stop reason: SDUPTHER

## 2021-02-02 RX ORDER — AMLODIPINE BESYLATE AND BENAZEPRIL HYDROCHLORIDE 10; 20 MG/1; MG/1
1 CAPSULE ORAL DAILY
Qty: 90 CAPSULE | Refills: 1 | Status: SHIPPED | OUTPATIENT
Start: 2021-02-02 | End: 2021-04-07 | Stop reason: SDUPTHER

## 2021-02-02 NOTE — PROGRESS NOTES
Subsequent Medicare Wellness Visit   The ABC's of the Annual Wellness Visit    Chief Complaint   Patient presents with   • Annual Exam       HPI:  Isabel Carlson, -1945, is a 75 y.o. female who presents for a Subsequent Medicare Wellness Visit.  Follow-up for hypertension.  Currently has been feeling well and asymptomatic without any headaches, vision changes, cough, chest pain, shortness of breath, swelling, focal neurologic deficit, memory loss or syncope.  Has been taking the medications regularly and adherent with the regimen amlodipine-benazepril 10-20 daily and propranolol 80 mg LA daily.  Denies medication side effects and no significant interval events.      Follow-up for cholesterol.  Currently, has been feeling well without any myalgias, muscle aches, weakness, numbness, chest pain, short of breath or other issues.  Currently, is adherent with medication regimen of rosuvastatin 20 mg and denies medication side effects. Is due for lab follow-up.    Headaches are much better but still has some headaches at night only on the right side of the head only.  Pain is on the right side only. And registers a 7 out of 10 pain scale and lasts 2-4 hours.  Goes to bed and wakes up the next morning feeling better. Has been seeing Dr Brady neurology and was given cyclobenzaprine with no relief.  Patient did have fall and hit head in  and headaches began 3-4 months later.  Bright lights and loud noises worsen the headache.  Tylenol and aleve help only a little.    Recent Hospitalizations:  Recently treated at the following:  [facility].   left knee total arthroplasty    Current Medical Providers:  Patient Care Team:  Cordell Holland MD as PCP - General (Internal Medicine)  Jose Leary MD as Consulting Physician (Orthopedic Surgery)  Rodrigue Brady DO as Consulting Physician (Neurology)    Health Habits and Functional and Cognitive Screening and Depression  Screening:  Functional & Cognitive Status 2/2/2021   Do you have difficulty preparing food and eating? No   Do you have difficulty bathing yourself, getting dressed or grooming yourself? No   Do you have difficulty using the toilet? No   Do you have difficulty moving around from place to place? No   Do you have trouble with steps or getting out of a bed or a chair? No   Current Diet Well Balanced Diet   Dental Exam Up to date   Eye Exam Up to date   Exercise (times per week) 4 times per week   Current Exercises Include Walking   Do you need help using the phone?  No   Are you deaf or do you have serious difficulty hearing?  Yes   Do you need help with transportation? No   Do you need help shopping? No   Do you need help preparing meals?  No   Do you need help with housework?  No   Do you need help with laundry? No   Do you need help taking your medications? No   Do you need help managing money? No   Do you ever drive or ride in a car without wearing a seat belt? No   Have you felt unusual stress, anger or loneliness in the last month? No   Who do you live with? Alone   If you need help, do you have trouble finding someone available to you? No   Have you been bothered in the last four weeks by sexual problems? No   Do you have difficulty concentrating, remembering or making decisions? Yes       Compared to one year ago, the patient feels her physical health is the same and her mental health is the same.    Depression Screen:  PHQ-2/PHQ-9 Depression Screening 2/2/2021   Little interest or pleasure in doing things 0   Feeling down, depressed, or hopeless 0   Trouble falling or staying asleep, or sleeping too much 1   Feeling tired or having little energy 2   Poor appetite or overeating 0   Feeling bad about yourself - or that you are a failure or have let yourself or your family down 0   Trouble concentrating on things, such as reading the newspaper or watching television 0   Moving or speaking so slowly that other  people could have noticed. Or the opposite - being so fidgety or restless that you have been moving around a lot more than usual 0   Thoughts that you would be better off dead, or of hurting yourself in some way 0   Total Score 3       Falls Risk Assessment:  ANDREW Fall Risk Clinician Key Questions   Have you fallen in the past year?: Yes  History of fall in June and none since.    Past Medical/Family/Social History:  The following portions of the patient's history were reviewed and updated as appropriate: allergies, current medications, past family history, past medical history, past social history, past surgical history and problem list.    No Known Allergies      Current Outpatient Medications:   •  amLODIPine-benazepril (LOTREL) 10-20 MG per capsule, Take 1 capsule by mouth Daily., Disp: 90 capsule, Rfl: 1  •  cetirizine (zyrTEC) 10 MG tablet, Take 10 mg by mouth Daily As Needed., Disp: , Rfl: 4  •  fluticasone (FLONASE) 50 MCG/ACT nasal spray, 2 sprays into the nostril(s) as directed by provider Daily As Needed., Disp: , Rfl:   •  ondansetron (ZOFRAN) 4 MG tablet, Take 1 tablet by mouth Every 6 (Six) Hours As Needed for Nausea or Vomiting., Disp: 30 tablet, Rfl: 0  •  pantoprazole (PROTONIX) 40 MG EC tablet, TAKE 1 TABLET DAILY, Disp: 90 tablet, Rfl: 1  •  promethazine (PHENERGAN) 25 MG tablet, Take 1 tablet by mouth Every 6 (Six) Hours As Needed for Nausea or Vomiting., Disp: 30 tablet, Rfl: 0  •  propranolol LA (Inderal LA) 80 MG 24 hr capsule, Take 1 capsule by mouth Daily., Disp: 90 capsule, Rfl: 1  •  rosuvastatin (CRESTOR) 20 MG tablet, Take 1 tablet by mouth Daily., Disp: 90 tablet, Rfl: 1  •  cyclobenzaprine (FLEXERIL) 5 MG tablet, , Disp: , Rfl:     Aspirin use counseling: Does not need ASA (and currently is not on it)    Current medication list contains no high risk medications.  No harmful drug interactions have been identified.     Family History   Problem Relation Age of Onset   • Heart disease  Mother    • Hypertension Mother    • Stroke Mother    • Arthritis Mother    • Heart disease Maternal Aunt    • Hypertension Maternal Aunt    • Hypertension Maternal Uncle    • Cancer Sister    • Cancer Other    • Malig Hyperthermia Neg Hx        Social History     Tobacco Use   • Smoking status: Never Smoker   • Smokeless tobacco: Never Used   Substance Use Topics   • Alcohol use: No     Comment: rarely       Past Surgical History:   Procedure Laterality Date   • APPENDECTOMY     • CARDIAC CATHETERIZATION N/A 10/20/2017    Procedure: Right Heart Cath;  Surgeon: Christopher Kan MD;  Location: [facility] INVASIVE LOC[facility];  Service:    • COLONOSCOPY     • REPLACEMENT TOTAL KNEE Left 06/12/2020   • SUBTOTAL HYSTERECTOMY     • TOTAL KNEE ARTHROPLASTY Left 6/12/2020    Procedure: TOTAL KNEE ARTHROPLASTY;  Surgeon: Jose Leary MD;  Location: [facility] OR;  Service: Orthopedics;  Laterality: Left;       Patient Active Problem List   Diagnosis   • Heart murmur   • Abnormal EKG   • APC (atrial premature contractions)   • Non-rheumatic mitral regurgitation   • Non-rheumatic tricuspid valve insufficiency   • LVH (left ventricular hypertrophy)   • Gastroesophageal reflux disease   • Essential hypertension   • Mixed hyperlipidemia   • Pulmonary HTN (CMS/HCC)   • Osteoporosis of lumbar spine   • Menopause   • Osteoarthritis   • Lip laceration   • Status post left knee replacement   • Fatigue   • Iron deficiency anemia   • Abnormal serum thyroid stimulating hormone (TSH) level   • Acute non-recurrent frontal sinusitis   • Other headache syndrome   • Medicare annual wellness visit, subsequent       Review of Systems   Constitutional: Negative for activity change, appetite change, fatigue, fever, unexpected weight gain and unexpected weight loss.   HENT: Negative for nosebleeds, rhinorrhea, trouble swallowing and voice change.    Eyes: Negative for visual disturbance.   Respiratory: Negative for cough, chest  "tightness, shortness of breath and wheezing.    Cardiovascular: Negative for chest pain, palpitations and leg swelling.   Gastrointestinal: Negative for abdominal pain, blood in stool, constipation, diarrhea, nausea, vomiting, GERD and indigestion.   Genitourinary: Negative for dysuria, frequency and hematuria.   Musculoskeletal: Negative for arthralgias, back pain and myalgias.   Skin: Negative for rash and bruise.   Neurological: Positive for headache. Negative for dizziness, tremors, weakness, light-headedness, numbness and memory problem.   Hematological: Negative for adenopathy. Does not bruise/bleed easily.   Psychiatric/Behavioral: Negative for sleep disturbance and depressed mood. The patient is not nervous/anxious.        Objective     Vitals:    02/02/21 1323   BP: 128/80   BP Location: Left arm   Patient Position: Sitting   Cuff Size: Adult   Pulse: 74   Temp: 97.7 °F (36.5 °C)   TempSrc: Temporal   SpO2: 98%   Weight: 69.4 kg (153 lb)   Height: 152.4 cm (60\")       Patient's Body mass index is 29.88 kg/m². BMI is above normal parameters. Recommendations include: exercise counseling and nutrition counseling.      No exam data present    The patient has no evidence of cognitve impairment.     Physical Exam  Vitals signs and nursing note reviewed.   Constitutional:       General: She is not in acute distress.     Appearance: She is well-developed. She is not diaphoretic.   HENT:      Head: Normocephalic and atraumatic.      Right Ear: External ear normal.      Left Ear: External ear normal.      Nose: Nose normal.   Eyes:      Conjunctiva/sclera: Conjunctivae normal.      Pupils: Pupils are equal, round, and reactive to light.   Neck:      Musculoskeletal: Normal range of motion and neck supple.      Thyroid: No thyromegaly.      Trachea: No tracheal deviation.   Cardiovascular:      Rate and Rhythm: Normal rate and regular rhythm.      Heart sounds: Normal heart sounds. No murmur. No friction rub. No " gallop.    Pulmonary:      Effort: Pulmonary effort is normal. No respiratory distress.      Breath sounds: Normal breath sounds.   Abdominal:      General: Bowel sounds are normal.      Palpations: Abdomen is soft. There is no mass.      Tenderness: There is no abdominal tenderness. There is no guarding.   Musculoskeletal: Normal range of motion.   Lymphadenopathy:      Cervical: No cervical adenopathy.   Skin:     General: Skin is warm and dry.      Capillary Refill: Capillary refill takes less than 2 seconds.      Findings: No rash.   Neurological:      Mental Status: She is alert and oriented to person, place, and time.      Motor: No abnormal muscle tone.      Deep Tendon Reflexes: Reflexes normal.   Psychiatric:         Behavior: Behavior normal.         Thought Content: Thought content normal.         Judgment: Judgment normal.         Recent Lab Results:  Lab Results   Component Value Date    GLU 97 12/30/2019     Lab Results   Component Value Date    CHOL 110 06/14/2020    TRIG 40 06/14/2020    HDL 68 (H) 06/14/2020    VLDL 8 06/14/2020    LDLHDL 0.50 06/14/2020       Assessment/Plan   Age-appropriate Screening Schedule:  Refer to the list below for future screening recommendations based on patient's age, sex and/or medical conditions.      Health Maintenance   Topic Date Due   • ZOSTER VACCINE (1 of 2) 08/14/1993   • LIPID PANEL  06/14/2021   • MAMMOGRAM  07/31/2021   • DXA SCAN  08/01/2021   • TDAP/TD VACCINES (2 - Td) 03/03/2030   • INFLUENZA VACCINE  Completed   • COLONOSCOPY  Discontinued       Medicare Risks and Personalized Health Plan:  Advance Directive Discussion  Fall Risk  Glaucoma Risk  Immunizations Discussed/Encouraged (specific immunizations; Shingrix )      CMS-Preventive Services Quick Reference  Medicare Preventive Services Addressed:  Annual Wellness Visit (AWV)  Glaucoma screening (for individuals with diabetes mellitus, family history of glaucoma, -Americans (> or =) age 50,  -Americans (> or =) age 65)    Advance Care Planning:  ACP discussion was held with the patient during this visit. Patient does not have an advance directive, information provided.    Diagnoses and all orders for this visit:    1. Medicare annual wellness visit, subsequent (Primary)    2. Essential hypertension  -     amLODIPine-benazepril (LOTREL) 10-20 MG per capsule; Take 1 capsule by mouth Daily.  Dispense: 90 capsule; Refill: 1    3. Other headache syndrome    4. Screening mammogram, encounter for  -     Mammo Screening Bilateral With CAD; Future      Reviewed history and annual wellness visit with patient during office time.  Medications reviewed as appropriate.  Discussed advanced directives and living will.  Patient has living will: Living will: no and information packet given to patient to complete at home and bring copy to office.  Discussed fall risk and precautions encourage removing throw rugs and using grab bars within the home and bathroom.  Will check the labs as ordered above to evaluate the blood sugars, kidney, liver, cholesterol for screening.  Discussed flu shot recommended to get the high-dose influenza vaccine annually in the fall.  Prevnar-13 and pneumovax-23 up to date and appropriate.  Shingrix vaccination and Covid-19 series recommended.  Encourage follow-up with the eye doctor on annual basis for glaucoma evaluation.  Discussed weight and encouraged exercise as tolerated while following a healthy diet. Recommend to get annual mammograms.  Follow-up with specialists as scheduled.      Follow up with Dr Brady as scheduled in 1 week. Will try ubrelvey 50 mg trial and given 2 tablets.    An After Visit Summary and PPPS with all of these plans were given to the patient.      Follow Up:  Return in about 6 months (around 8/2/2021) for Next scheduled follow up.           · COVID-19 Precautions - Patient was compliant in wearing a mask. When I saw the patient, I used appropriate personal  protective equipment (PPE) including mask and eye shield (standard procedure).  Additionally, I used gown and gloves if indicated.  Hand hygiene was completed before and after seeing the patient.  · Dictated utilizing Dragon Dictation

## 2021-02-02 NOTE — PATIENT INSTRUCTIONS
Medicare Wellness  Personal Prevention Plan of Service     Date of Office Visit:  2021  Encounter Provider:  Cordell Holland MD  Place of Service:  [facility] PRIMARY CARE  Patient Name: Isabel Carlson  :  1945    As part of the Medicare Wellness portion of your visit today, we are providing you with this personalized preventive plan of services (PPPS). This plan is based upon recommendations of the [place] Preventive Services Task Force (USPSTF) and the Advisory Committee on Immunization Practices (ACIP).    This lists the preventive care services that should be considered, and provides dates of when you are due. Items listed as completed are up-to-date and do not require any further intervention.    Health Maintenance   Topic Date Due   • ZOSTER VACCINE (1 of 2) 1993   • ANNUAL WELLNESS VISIT  2020   • LIPID PANEL  2021   • MAMMOGRAM  2021   • DXA SCAN  2021   • TDAP/TD VACCINES (2 - Td) 2030   • INFLUENZA VACCINE  Completed   • Pneumococcal Vaccine 65+  Completed   • MENINGOCOCCAL VACCINE  Aged Out   • HEPATITIS C SCREENING  Discontinued   • COLONOSCOPY  Discontinued       Orders Placed This Encounter   Procedures   • Mammo Screening Bilateral With CAD     Standing Status:   Future     Standing Expiration Date:   2022     Scheduling Instructions:      Not due till 2020 or later     Order Specific Question:   Reason for Exam:     Answer:   screen mammogram       Return in about 6 months (around 2021) for Next scheduled follow up.

## 2021-02-22 ENCOUNTER — TELEPHONE (OUTPATIENT)
Dept: FAMILY MEDICINE CLINIC | Facility: CLINIC | Age: 76
End: 2021-02-22

## 2021-02-22 DIAGNOSIS — J34.89 RHINORRHEA: ICD-10-CM

## 2021-02-22 DIAGNOSIS — G44.89 OTHER HEADACHE SYNDROME: Primary | ICD-10-CM

## 2021-02-22 NOTE — TELEPHONE ENCOUNTER
Patient called in requesting a referral to an allergist, she says she's still having headaches.    Best call back # [phone]

## 2021-03-03 ENCOUNTER — TELEPHONE (OUTPATIENT)
Dept: FAMILY MEDICINE CLINIC | Facility: CLINIC | Age: 76
End: 2021-03-03

## 2021-03-03 NOTE — TELEPHONE ENCOUNTER
Patient called and states she is still having her head aches and she called to see if Dr Holland could give her a name of a good dermatologist or allergist.  Her number is [phone].

## 2021-04-07 DIAGNOSIS — I10 ESSENTIAL HYPERTENSION: Chronic | ICD-10-CM

## 2021-04-07 DIAGNOSIS — K21.9 GASTROESOPHAGEAL REFLUX DISEASE: ICD-10-CM

## 2021-04-07 DIAGNOSIS — E78.2 MIXED HYPERLIPIDEMIA: ICD-10-CM

## 2021-04-07 RX ORDER — AMLODIPINE BESYLATE AND BENAZEPRIL HYDROCHLORIDE 10; 20 MG/1; MG/1
1 CAPSULE ORAL DAILY
Qty: 90 CAPSULE | Refills: 1 | Status: SHIPPED | OUTPATIENT
Start: 2021-04-07 | End: 2021-06-09 | Stop reason: SDUPTHER

## 2021-04-07 RX ORDER — PROPRANOLOL HYDROCHLORIDE 80 MG/1
80 CAPSULE, EXTENDED RELEASE ORAL DAILY
Qty: 90 CAPSULE | Refills: 1 | Status: SHIPPED | OUTPATIENT
Start: 2021-04-07 | End: 2021-06-09 | Stop reason: SDUPTHER

## 2021-04-07 RX ORDER — PANTOPRAZOLE SODIUM 40 MG/1
40 TABLET, DELAYED RELEASE ORAL DAILY
Qty: 90 TABLET | Refills: 1 | Status: SHIPPED | OUTPATIENT
Start: 2021-04-07 | End: 2021-06-09 | Stop reason: SDUPTHER

## 2021-04-07 RX ORDER — ROSUVASTATIN CALCIUM 20 MG/1
20 TABLET, COATED ORAL DAILY
Qty: 90 TABLET | Refills: 1 | Status: SHIPPED | OUTPATIENT
Start: 2021-04-07 | End: 2021-06-09 | Stop reason: SDUPTHER

## 2021-04-07 NOTE — TELEPHONE ENCOUNTER
"Patient is trying to refill medicine thru [facility]. They won't fill anything until the they speak to someone at the office. She would not tell me the medicines, just that it's \"all of them\". [facility] [phone]  "

## 2021-05-10 ENCOUNTER — TELEPHONE (OUTPATIENT)
Dept: FAMILY MEDICINE CLINIC | Facility: CLINIC | Age: 76
End: 2021-05-10

## 2021-05-10 NOTE — TELEPHONE ENCOUNTER
PATIENT IS CALLING IN SHE IS ASKING TO GET REFILL ON GABAPENTIN SHE HAS NOT HAD TO TAKE IN AWHILE BUT HAD TO START BACK BECAUSE HER  HIPS ARE HURTING HER. SHE MADE APPT FOR 06/01/21 FIRST AVAILABLE AND IS ASKING TO GET ENOUGH TO THE APPT.      PLEASE ADVISE    CALL BACK NUMBER IS  [phone]        CONFIRMED PHARMACY  [facility] - [place], [place] - [address] AT [address] [facility] - [phone] [facility] [phone]

## 2021-05-11 DIAGNOSIS — M15.9 PRIMARY OSTEOARTHRITIS INVOLVING MULTIPLE JOINTS: ICD-10-CM

## 2021-05-11 RX ORDER — GABAPENTIN 300 MG/1
300 CAPSULE ORAL NIGHTLY
Qty: 90 CAPSULE | Refills: 0 | Status: SHIPPED | OUTPATIENT
Start: 2021-05-11 | End: 2021-06-09 | Stop reason: SDUPTHER

## 2021-05-11 NOTE — TELEPHONE ENCOUNTER
Tell her I sent it to [facility].  Only take one at night and see how this is doing.

## 2021-06-09 ENCOUNTER — OFFICE VISIT (OUTPATIENT)
Dept: FAMILY MEDICINE CLINIC | Facility: CLINIC | Age: 76
End: 2021-06-09

## 2021-06-09 VITALS
HEART RATE: 51 BPM | BODY MASS INDEX: 31.8 KG/M2 | WEIGHT: 162 LBS | HEIGHT: 60 IN | DIASTOLIC BLOOD PRESSURE: 80 MMHG | TEMPERATURE: 98.1 F | SYSTOLIC BLOOD PRESSURE: 118 MMHG | OXYGEN SATURATION: 98 %

## 2021-06-09 DIAGNOSIS — K21.9 GASTROESOPHAGEAL REFLUX DISEASE: ICD-10-CM

## 2021-06-09 DIAGNOSIS — I10 ESSENTIAL HYPERTENSION: Chronic | ICD-10-CM

## 2021-06-09 DIAGNOSIS — M15.9 PRIMARY OSTEOARTHRITIS INVOLVING MULTIPLE JOINTS: ICD-10-CM

## 2021-06-09 DIAGNOSIS — E78.2 MIXED HYPERLIPIDEMIA: ICD-10-CM

## 2021-06-09 PROCEDURE — 99213 OFFICE O/P EST LOW 20 MIN: CPT | Performed by: INTERNAL MEDICINE

## 2021-06-09 RX ORDER — PROPRANOLOL HYDROCHLORIDE 80 MG/1
80 CAPSULE, EXTENDED RELEASE ORAL DAILY
Qty: 90 CAPSULE | Refills: 1 | Status: SHIPPED | OUTPATIENT
Start: 2021-06-09 | End: 2022-02-24 | Stop reason: SDUPTHER

## 2021-06-09 RX ORDER — GABAPENTIN 300 MG/1
300 CAPSULE ORAL NIGHTLY
Qty: 90 CAPSULE | Refills: 1 | Status: SHIPPED | OUTPATIENT
Start: 2021-06-09 | End: 2021-11-15

## 2021-06-09 RX ORDER — AMLODIPINE BESYLATE AND BENAZEPRIL HYDROCHLORIDE 10; 20 MG/1; MG/1
1 CAPSULE ORAL DAILY
Qty: 90 CAPSULE | Refills: 1 | Status: SHIPPED | OUTPATIENT
Start: 2021-06-09 | End: 2021-11-15

## 2021-06-09 RX ORDER — ROSUVASTATIN CALCIUM 20 MG/1
20 TABLET, COATED ORAL DAILY
Qty: 90 TABLET | Refills: 1 | Status: SHIPPED | OUTPATIENT
Start: 2021-06-09 | End: 2022-02-24 | Stop reason: SDUPTHER

## 2021-06-09 RX ORDER — PANTOPRAZOLE SODIUM 40 MG/1
40 TABLET, DELAYED RELEASE ORAL DAILY
Qty: 90 TABLET | Refills: 1 | Status: SHIPPED | OUTPATIENT
Start: 2021-06-09 | End: 2022-02-24 | Stop reason: SDUPTHER

## 2021-06-09 NOTE — PROGRESS NOTES
Subjective   Isabel Carlson is a 75 y.o. female.     Chief Complaint   Patient presents with   • Hypertension   • Hyperlipidemia       History of Present Illness   Follow-up for hypertension.  Currently has been feeling well and asymptomatic without any headaches, vision changes, cough, chest pain, shortness of breath, swelling, focal neurologic deficit, memory loss or syncope.  Has been taking the medications regularly and adherent with the regimen amlodipine-benazepril 10-20 daily and propranolol 80 mg LA daily.  Denies medication side effects and no significant interval events.       Follow-up for cholesterol.  Currently, has been feeling well without any myalgias, muscle aches, weakness, numbness, chest pain, short of breath or other issues.  Currently, is adherent with medication regimen of rosuvastatin 20 mg and denies medication side effects. Is due for lab follow-up.     Still having headaches that seem mainly at 6PM at night when really starts up.  Will be seeing the allergist next week.    The following portions of the patient's history were reviewed and updated as appropriate: allergies, current medications, past family history, past medical history, past social history, past surgical history and problem list.    Depression Screen:  PHQ-2/PHQ-9 Depression Screening 2/2/2021   Little interest or pleasure in doing things 0   Feeling down, depressed, or hopeless 0   Trouble falling or staying asleep, or sleeping too much 1   Feeling tired or having little energy 2   Poor appetite or overeating 0   Feeling bad about yourself - or that you are a failure or have let yourself or your family down 0   Trouble concentrating on things, such as reading the newspaper or watching television 0   Moving or speaking so slowly that other people could have noticed. Or the opposite - being so fidgety or restless that you have been moving around a lot more than usual 0   Thoughts that you would be better off dead, or of  hurting yourself in some way 0   Total Score 3       Past Medical History:   Diagnosis Date   • Abnormal stress echocardiogram 07/20/2017    Patient had borderline EKG changes 5 minutes into walking and Dr. Cali Spencer recommended the patient not to have that same type of stress test in the future   • Adhesive capsulitis of right shoulder    • Age-related osteoporosis without fracture    • APC (atrial premature contractions) 7/20/2017   • Arthritis    • Carpal tunnel syndrome    • Cataract    • Chronic hip pain    • Fatigue    • Frequent episodes of pneumonia     PT STATES SEVERAL TIMES   • Heart murmur 7/20/2017   • Left knee pain    • LVH (left ventricular hypertrophy) 7/20/2017   • Mixed hyperlipidemia 7/20/2017   • Non-rheumatic mitral regurgitation 07/20/2017    Mild to moderate per 2-D echocardiogram   • Non-rheumatic tricuspid valve insufficiency 07/20/2017    Mild to moderate per 2-D echocardiogram   • Osteoarthritis    • Osteoarthritis of both knees    • Osteoporosis    • Shoulder pain    • Stroke (CMS/HCC)    • TIA (transient ischemic attack) 10/15/2013    numbness on right side of face and hand; went to [facility]       Past Surgical History:   Procedure Laterality Date   • APPENDECTOMY     • CARDIAC CATHETERIZATION N/A 10/20/2017    Procedure: Right Heart Cath;  Surgeon: Christopher Kan MD;  Location: [facility] INVASIVE LOCATION;  Service:    • COLONOSCOPY     • REPLACEMENT TOTAL KNEE Left 06/12/2020   • SUBTOTAL HYSTERECTOMY     • TOTAL KNEE ARTHROPLASTY Left 6/12/2020    Procedure: TOTAL KNEE ARTHROPLASTY;  Surgeon: Jose Leary MD;  Location: [facility] OR;  Service: Orthopedics;  Laterality: Left;       Family History   Problem Relation Age of Onset   • Heart disease Mother    • Hypertension Mother    • Stroke Mother    • Arthritis Mother    • Heart disease Maternal Aunt    • Hypertension Maternal Aunt    • Hypertension Maternal Uncle    • Cancer Sister    • Cancer Other    • Malig  Hyperthermia Neg Hx        Social History     Socioeconomic History   • Marital status:      Spouse name: Not on file   • Number of children: Not on file   • Years of education: Not on file   • Highest education level: Not on file   Tobacco Use   • Smoking status: Never Smoker   • Smokeless tobacco: Never Used   Vaping Use   • Vaping Use: Never used   Substance and Sexual Activity   • Alcohol use: No     Comment: rarely   • Drug use: No   • Sexual activity: Defer       Current Outpatient Medications   Medication Sig Dispense Refill   • amLODIPine-benazepril (LOTREL) 10-20 MG per capsule Take 1 capsule by mouth Daily. 90 capsule 1   • cetirizine (zyrTEC) 10 MG tablet Take 10 mg by mouth Daily As Needed.  4   • cyclobenzaprine (FLEXERIL) 5 MG tablet      • fluticasone (FLONASE) 50 MCG/ACT nasal spray 2 sprays into the nostril(s) as directed by provider Daily As Needed.     • gabapentin (NEURONTIN) 300 MG capsule Take 1 capsule by mouth Every Night. 90 capsule 1   • pantoprazole (PROTONIX) 40 MG EC tablet Take 1 tablet by mouth Daily. 90 tablet 1   • propranolol LA (Inderal LA) 80 MG 24 hr capsule Take 1 capsule by mouth Daily. 90 capsule 1   • rosuvastatin (CRESTOR) 20 MG tablet Take 1 tablet by mouth Daily. 90 tablet 1     No current facility-administered medications for this visit.       Review of Systems   Constitutional: Negative for activity change, appetite change, fatigue, fever, unexpected weight gain and unexpected weight loss.   HENT: Negative for nosebleeds, rhinorrhea, trouble swallowing and voice change.    Eyes: Negative for visual disturbance.   Respiratory: Negative for cough, chest tightness, shortness of breath and wheezing.    Cardiovascular: Negative for chest pain, palpitations and leg swelling.   Gastrointestinal: Negative for abdominal pain, blood in stool, constipation, diarrhea, nausea, vomiting, GERD and indigestion.   Genitourinary: Negative for dysuria, frequency and hematuria.  "  Musculoskeletal: Negative for arthralgias, back pain and myalgias.   Skin: Negative for rash and bruise.   Neurological: Negative for dizziness, tremors, weakness, light-headedness, numbness, headache and memory problem.   Hematological: Negative for adenopathy. Does not bruise/bleed easily.   Psychiatric/Behavioral: Negative for sleep disturbance and depressed mood. The patient is not nervous/anxious.        Objective   /80 (BP Location: Left arm, Patient Position: Sitting, Cuff Size: Adult)   Pulse 51   Temp 98.1 °F (36.7 °C) (Temporal)   Ht 152.4 cm (60\")   Wt 73.5 kg (162 lb)   SpO2 98%   BMI 31.64 kg/m²     Physical Exam  Vitals and nursing note reviewed.   Constitutional:       General: She is not in acute distress.     Appearance: She is well-developed. She is not diaphoretic.   HENT:      Head: Normocephalic and atraumatic.      Right Ear: External ear normal.      Left Ear: External ear normal.      Nose: Nose normal.   Eyes:      Conjunctiva/sclera: Conjunctivae normal.      Pupils: Pupils are equal, round, and reactive to light.   Neck:      Thyroid: No thyromegaly.      Trachea: No tracheal deviation.   Cardiovascular:      Rate and Rhythm: Normal rate and regular rhythm.      Heart sounds: Normal heart sounds. No murmur heard.   No friction rub. No gallop.    Pulmonary:      Effort: Pulmonary effort is normal. No respiratory distress.      Breath sounds: Normal breath sounds.   Abdominal:      General: Bowel sounds are normal.      Palpations: Abdomen is soft. There is no mass.      Tenderness: There is no abdominal tenderness. There is no guarding.   Musculoskeletal:         General: Normal range of motion.      Cervical back: Normal range of motion and neck supple.   Lymphadenopathy:      Cervical: No cervical adenopathy.   Skin:     General: Skin is warm and dry.      Capillary Refill: Capillary refill takes less than 2 seconds.      Findings: No rash.   Neurological:      Mental " Status: She is alert and oriented to person, place, and time.      Motor: No abnormal muscle tone.      Deep Tendon Reflexes: Reflexes normal.   Psychiatric:         Behavior: Behavior normal.         Thought Content: Thought content normal.         Judgment: Judgment normal.         No results found for this or any previous visit (from the past 2016 hour(s)).  Assessment/Plan   Diagnoses and all orders for this visit:    1. Mixed hyperlipidemia  -     rosuvastatin (CRESTOR) 20 MG tablet; Take 1 tablet by mouth Daily.  Dispense: 90 tablet; Refill: 1  -     Comprehensive Metabolic Panel; Future  -     Lipid Panel; Future    2. Essential hypertension  -     propranolol LA (Inderal LA) 80 MG 24 hr capsule; Take 1 capsule by mouth Daily.  Dispense: 90 capsule; Refill: 1  -     amLODIPine-benazepril (LOTREL) 10-20 MG per capsule; Take 1 capsule by mouth Daily.  Dispense: 90 capsule; Refill: 1  -     Comprehensive Metabolic Panel; Future  -     Lipid Panel; Future    3. Gastroesophageal reflux disease  -     pantoprazole (PROTONIX) 40 MG EC tablet; Take 1 tablet by mouth Daily.  Dispense: 90 tablet; Refill: 1    4. Primary osteoarthritis involving multiple joints  -     gabapentin (NEURONTIN) 300 MG capsule; Take 1 capsule by mouth Every Night.  Dispense: 90 capsule; Refill: 1    hypertension controlled.  Continue current medications and will check labs for renal and electrolytes to monitor for medication side effects.  GERD is controlled with the pantoprazole and the OA is stable.  Continue the current medications unchanged.           · COVID-19 Precautions - Patient was compliant in wearing a mask. When I saw the patient, I used appropriate personal protective equipment (PPE) including mask and eye shield (standard procedure).  Additionally, I used gown and gloves if indicated.  Hand hygiene was completed before and after seeing the patient.  · Dictated utilizing Dragon Dictation

## 2021-07-26 ENCOUNTER — TRANSCRIBE ORDERS (OUTPATIENT)
Dept: ADMINISTRATIVE | Facility: HOSPITAL | Age: 76
End: 2021-07-26

## 2021-07-26 DIAGNOSIS — Z12.31 ENCOUNTER FOR SCREENING MAMMOGRAM FOR MALIGNANT NEOPLASM OF BREAST: Primary | ICD-10-CM

## 2021-07-27 DIAGNOSIS — Z12.31 SCREENING MAMMOGRAM, ENCOUNTER FOR: Primary | ICD-10-CM

## 2021-09-21 ENCOUNTER — TELEPHONE (OUTPATIENT)
Dept: FAMILY MEDICINE CLINIC | Facility: CLINIC | Age: 76
End: 2021-09-21

## 2021-09-21 DIAGNOSIS — N30.00 ACUTE CYSTITIS WITHOUT HEMATURIA: Primary | ICD-10-CM

## 2021-09-21 DIAGNOSIS — N30.00 ACUTE CYSTITIS WITHOUT HEMATURIA: ICD-10-CM

## 2021-09-21 RX ORDER — SULFAMETHOXAZOLE AND TRIMETHOPRIM 800; 160 MG/1; MG/1
1 TABLET ORAL 2 TIMES DAILY
Qty: 14 TABLET | Refills: 0 | Status: SHIPPED | OUTPATIENT
Start: 2021-09-21 | End: 2021-09-28

## 2021-09-21 NOTE — TELEPHONE ENCOUNTER
Patient left urine , please order culture     Patient is also requesting something be called in while we wait on culture

## 2021-09-21 NOTE — TELEPHONE ENCOUNTER
Caller: Isabel Carlson    Relationship: Self    Best call back number: [phone]    What medication are you requesting: ANTIBIOTIC    What are your current symptoms: UTI SYMPTOMS    How long have you been experiencing symptoms: THIS MORNING    Have you had these symptoms before:    [] Yes  [x] No    Have you been treated for these symptoms before:   [] Yes  [x] No    If a prescription is needed, what is your preferred pharmacy and phone number:     [id number] [address] - [phone] [facility] [phone] FX  Additional notes:

## 2021-09-24 LAB
BACTERIA UR CULT: ABNORMAL
BACTERIA UR CULT: ABNORMAL
OTHER ANTIBIOTIC SUSC ISLT: ABNORMAL

## 2021-09-27 DIAGNOSIS — N30.00 ACUTE CYSTITIS WITHOUT HEMATURIA: Primary | ICD-10-CM

## 2021-09-27 RX ORDER — NITROFURANTOIN 25; 75 MG/1; MG/1
100 CAPSULE ORAL 2 TIMES DAILY
Qty: 20 CAPSULE | Refills: 0 | Status: SHIPPED | OUTPATIENT
Start: 2021-09-27 | End: 2022-05-18

## 2021-09-28 ENCOUNTER — OFFICE VISIT (OUTPATIENT)
Dept: FAMILY MEDICINE CLINIC | Facility: CLINIC | Age: 76
End: 2021-09-28

## 2021-09-28 ENCOUNTER — TELEPHONE (OUTPATIENT)
Dept: FAMILY MEDICINE CLINIC | Facility: CLINIC | Age: 76
End: 2021-09-28

## 2021-09-28 VITALS
DIASTOLIC BLOOD PRESSURE: 64 MMHG | HEIGHT: 60 IN | HEART RATE: 68 BPM | TEMPERATURE: 98.6 F | SYSTOLIC BLOOD PRESSURE: 110 MMHG | BODY MASS INDEX: 30.19 KG/M2 | OXYGEN SATURATION: 98 % | WEIGHT: 153.8 LBS

## 2021-09-28 DIAGNOSIS — R30.0 DYSURIA: Primary | ICD-10-CM

## 2021-09-28 DIAGNOSIS — N30.00 ACUTE CYSTITIS WITHOUT HEMATURIA: ICD-10-CM

## 2021-09-28 LAB
BILIRUB BLD-MCNC: NEGATIVE MG/DL
CLARITY, POC: ABNORMAL
COLOR UR: YELLOW
GLUCOSE UR STRIP-MCNC: NEGATIVE MG/DL
KETONES UR QL: NEGATIVE
LEUKOCYTE EST, POC: ABNORMAL
NITRITE UR-MCNC: NEGATIVE MG/ML
PH UR: 6 [PH] (ref 5–8)
PROT UR STRIP-MCNC: ABNORMAL MG/DL
RBC # UR STRIP: ABNORMAL /UL
SP GR UR: 1.01 (ref 1–1.03)
UROBILINOGEN UR QL: NORMAL

## 2021-09-28 PROCEDURE — 81003 URINALYSIS AUTO W/O SCOPE: CPT | Performed by: INTERNAL MEDICINE

## 2021-09-28 PROCEDURE — 99213 OFFICE O/P EST LOW 20 MIN: CPT | Performed by: INTERNAL MEDICINE

## 2021-09-28 NOTE — PROGRESS NOTES
Subjective   Isabel Carlson is a 76 y.o. female.     Chief Complaint   Patient presents with   • Difficulty Urinating       History of Present Illness   10-12 days of dysuria, frequency and urgency with no F, C, N, V, D, abdomen pain, or back pain.  Patient initially was treated with Bactrim approximately 1 week ago but continues with difficulties.  Repeat urine culture was obtained and is showing E. coli that is resistant to multiple antibiotics.    The following portions of the patient's history were reviewed and updated as appropriate: allergies, current medications, past family history, past medical history, past social history, past surgical history and problem list.    Depression Screen:  PHQ-2/PHQ-9 Depression Screening 2/2/2021   Little interest or pleasure in doing things 0   Feeling down, depressed, or hopeless 0   Trouble falling or staying asleep, or sleeping too much 1   Feeling tired or having little energy 2   Poor appetite or overeating 0   Feeling bad about yourself - or that you are a failure or have let yourself or your family down 0   Trouble concentrating on things, such as reading the newspaper or watching television 0   Moving or speaking so slowly that other people could have noticed. Or the opposite - being so fidgety or restless that you have been moving around a lot more than usual 0   Thoughts that you would be better off dead, or of hurting yourself in some way 0   Total Score 3       Past Medical History:   Diagnosis Date   • Abnormal stress echocardiogram 07/20/2017    Patient had borderline EKG changes 5 minutes into walking and Dr. Cali Spencer recommended the patient not to have that same type of stress test in the future   • Adhesive capsulitis of right shoulder    • Age-related osteoporosis without fracture    • APC (atrial premature contractions) 7/20/2017   • Arthritis    • Carpal tunnel syndrome    • Cataract    • Chronic hip pain    • Fatigue    • Frequent episodes of  pneumonia     PT STATES SEVERAL TIMES   • Heart murmur 7/20/2017   • Left knee pain    • LVH (left ventricular hypertrophy) 7/20/2017   • Mixed hyperlipidemia 7/20/2017   • Non-rheumatic mitral regurgitation 07/20/2017    Mild to moderate per 2-D echocardiogram   • Non-rheumatic tricuspid valve insufficiency 07/20/2017    Mild to moderate per 2-D echocardiogram   • Osteoarthritis    • Osteoarthritis of both knees    • Osteoporosis    • Shoulder pain    • Stroke (CMS/HCC)    • TIA (transient ischemic attack) 10/15/2013    numbness on right side of face and hand; went to [facility]       Past Surgical History:   Procedure Laterality Date   • APPENDECTOMY     • CARDIAC CATHETERIZATION N/A 10/20/2017    Procedure: Right Heart Cath;  Surgeon: Christopher Kan MD;  Location: [facility] INVASIVE LOCATION;  Service:    • COLONOSCOPY     • REPLACEMENT TOTAL KNEE Left 06/12/2020   • SUBTOTAL HYSTERECTOMY     • TOTAL KNEE ARTHROPLASTY Left 6/12/2020    Procedure: TOTAL KNEE ARTHROPLASTY;  Surgeon: Jose Leary MD;  Location: [facility] OR;  Service: Orthopedics;  Laterality: Left;       Family History   Problem Relation Age of Onset   • Heart disease Mother    • Hypertension Mother    • Stroke Mother    • Arthritis Mother    • Heart disease Maternal Aunt    • Hypertension Maternal Aunt    • Hypertension Maternal Uncle    • Cancer Sister    • Cancer Other    • Malig Hyperthermia Neg Hx        Social History     Socioeconomic History   • Marital status:      Spouse name: Not on file   • Number of children: Not on file   • Years of education: Not on file   • Highest education level: Not on file   Tobacco Use   • Smoking status: Never Smoker   • Smokeless tobacco: Never Used   Vaping Use   • Vaping Use: Never used   Substance and Sexual Activity   • Alcohol use: No     Comment: rarely   • Drug use: No   • Sexual activity: Defer       Current Outpatient Medications   Medication Sig Dispense Refill   •  amLODIPine-benazepril (LOTREL) 10-20 MG per capsule Take 1 capsule by mouth Daily. 90 capsule 1   • cetirizine (zyrTEC) 10 MG tablet Take 10 mg by mouth Daily As Needed.  4   • cyclobenzaprine (FLEXERIL) 5 MG tablet      • fluticasone (FLONASE) 50 MCG/ACT nasal spray 2 sprays into the nostril(s) as directed by provider Daily As Needed.     • gabapentin (NEURONTIN) 300 MG capsule Take 1 capsule by mouth Every Night. 90 capsule 1   • nitrofurantoin, macrocrystal-monohydrate, (Macrobid) 100 MG capsule Take 1 capsule by mouth 2 (Two) Times a Day. 20 capsule 0   • pantoprazole (PROTONIX) 40 MG EC tablet Take 1 tablet by mouth Daily. 90 tablet 1   • propranolol LA (Inderal LA) 80 MG 24 hr capsule Take 1 capsule by mouth Daily. 90 capsule 1   • rosuvastatin (CRESTOR) 20 MG tablet Take 1 tablet by mouth Daily. 90 tablet 1     No current facility-administered medications for this visit.       Review of Systems   Constitutional: Negative for activity change, appetite change, fatigue, fever, unexpected weight gain and unexpected weight loss.   HENT: Negative for nosebleeds, rhinorrhea, trouble swallowing and voice change.    Eyes: Negative for visual disturbance.   Respiratory: Negative for cough, chest tightness, shortness of breath and wheezing.    Cardiovascular: Negative for chest pain, palpitations and leg swelling.   Gastrointestinal: Negative for abdominal pain, blood in stool, constipation, diarrhea, nausea, vomiting, GERD and indigestion.   Genitourinary: Positive for difficulty urinating, frequency and urgency. Negative for dysuria and hematuria.   Musculoskeletal: Negative for arthralgias, back pain and myalgias.   Skin: Negative for rash and bruise.   Neurological: Negative for dizziness, tremors, weakness, light-headedness, numbness, headache and memory problem.   Hematological: Negative for adenopathy. Does not bruise/bleed easily.   Psychiatric/Behavioral: Negative for sleep disturbance and depressed mood. The  "patient is not nervous/anxious.        Objective   /64 (BP Location: Left arm, Patient Position: Sitting)   Pulse 68   Temp 98.6 °F (37 °C)   Ht 152.4 cm (60\")   Wt 69.8 kg (153 lb 12.8 oz)   SpO2 98%   BMI 30.04 kg/m²     Physical Exam  Vitals and nursing note reviewed.   Constitutional:       General: She is not in acute distress.     Appearance: She is well-developed. She is not diaphoretic.   HENT:      Head: Normocephalic and atraumatic.      Right Ear: External ear normal.      Left Ear: External ear normal.      Nose: Nose normal.   Eyes:      Conjunctiva/sclera: Conjunctivae normal.      Pupils: Pupils are equal, round, and reactive to light.   Neck:      Thyroid: No thyromegaly.      Trachea: No tracheal deviation.   Cardiovascular:      Rate and Rhythm: Normal rate and regular rhythm.      Heart sounds: Normal heart sounds. No murmur heard.   No friction rub. No gallop.    Pulmonary:      Effort: Pulmonary effort is normal. No respiratory distress.      Breath sounds: Normal breath sounds.   Abdominal:      General: Bowel sounds are normal.      Palpations: Abdomen is soft. There is no mass.      Tenderness: There is no abdominal tenderness. There is no guarding.   Musculoskeletal:         General: Normal range of motion.      Cervical back: Normal range of motion and neck supple.   Lymphadenopathy:      Cervical: No cervical adenopathy.   Skin:     General: Skin is warm and dry.      Capillary Refill: Capillary refill takes less than 2 seconds.      Findings: No rash.   Neurological:      Mental Status: She is alert and oriented to person, place, and time.      Motor: No abnormal muscle tone.      Deep Tendon Reflexes: Reflexes normal.   Psychiatric:         Behavior: Behavior normal.         Thought Content: Thought content normal.         Judgment: Judgment normal.         Recent Results (from the past 2016 hour(s))   POCT urinalysis dipstick, automated    Collection Time: 09/21/21  3:30 PM "    Specimen: Urine   Result Value Ref Range    Color Yellow Yellow, Straw, Dark Yellow, Jodie    Clarity, UA Cloudy (A) Clear    Specific Gravity  1.015 1.005 - 1.030    pH, Urine 6.0 5.0 - 8.0    Leukocytes Large (3+) (A) Negative    Nitrite, UA Negative Negative    Protein, POC Negative Negative mg/dL    Glucose, UA Negative Negative, 1000 mg/dL (3+) mg/dL    Ketones, UA Negative Negative    Urobilinogen, UA Normal Normal    Bilirubin Negative Negative    Blood, UA 2+ (A) Negative   Urine Culture - Urine, Urine, Clean Catch    Collection Time: 09/21/21  4:00 PM    Specimen: Urine, Clean Catch    UR   Result Value Ref Range    Urine Culture Final report (A)     Result 1 Escherichia coli (A)     Susceptibility Testing Comment    POCT urinalysis dipstick, automated    Collection Time: 09/28/21  1:35 PM    Specimen: Urine   Result Value Ref Range    Color Yellow Yellow, Straw, Dark Yellow, Jodie    Clarity, UA Cloudy (A) Clear    Specific Gravity  1.015 1.005 - 1.030    pH, Urine 6.0 5.0 - 8.0    Leukocytes Large (3+) (A) Negative    Nitrite, UA Negative Negative    Protein, POC Trace (A) Negative mg/dL    Glucose, UA Negative Negative, 1000 mg/dL (3+) mg/dL    Ketones, UA Negative Negative    Urobilinogen, UA Normal Normal    Bilirubin Negative Negative    Blood, UA 3+ (A) Negative     Assessment/Plan   Diagnoses and all orders for this visit:    1. Dysuria (Primary)  -     POCT urinalysis dipstick, automated    2. Acute cystitis without hematuria  -     Urine Culture - Urine, Urine, Clean Catch; Future  -     Urinalysis without microscopic (no culture) - Urine, Clean Catch; Future    Dysuria with likely UTI persistent secondary to the E. coli with multidrug resistance.  Will initiate and utilize the Macrodantin that it is sensitive to and noted by the culture results.  I did recommend repeating the urine culture at the conclusion of the antibiotic treatment course.           · COVID-19 Precautions - Patient was  compliant in wearing a mask. When I saw the patient, I used appropriate personal protective equipment (PPE) including mask and eye shield (standard procedure).  Additionally, I used gown and gloves if indicated.  Hand hygiene was completed before and after seeing the patient.  · Dictated utilizing Dragon Dictation

## 2021-09-28 NOTE — TELEPHONE ENCOUNTER
[facility]    The urine culture grew a common bacteria that is resistant to many different antibiotics including the one that was prescribed.  We will need to change this to a new medicine such as nitrofurantoin 1 tablet twice a day.  A prescription has been sent to the [facility] pharmacy.

## 2021-09-28 NOTE — TELEPHONE ENCOUNTER
----- Message from Cordell Holland MD sent at 9/27/2021  7:51 PM EDT -----  The urine culture grew a common bacteria that is resistant to many different antibiotics including the one that was prescribed.  We will need to change this to a new medicine such as nitrofurantoin 1 tablet twice a day.  A prescription has been sent to the [facility] pharmacy.

## 2021-10-12 ENCOUNTER — TELEPHONE (OUTPATIENT)
Dept: OBSTETRICS AND GYNECOLOGY | Facility: CLINIC | Age: 76
End: 2021-10-12

## 2021-10-29 ENCOUNTER — HOSPITAL ENCOUNTER (OUTPATIENT)
Dept: MAMMOGRAPHY | Facility: HOSPITAL | Age: 76
Discharge: HOME | End: 2021-10-29
Admitting: INTERNAL MEDICINE

## 2021-10-29 DIAGNOSIS — Z12.31 ENCOUNTER FOR SCREENING MAMMOGRAM FOR MALIGNANT NEOPLASM OF BREAST: ICD-10-CM

## 2021-10-29 PROCEDURE — 77067 SCR MAMMO BI INCL CAD: CPT

## 2021-10-29 PROCEDURE — 77063 BREAST TOMOSYNTHESIS BI: CPT

## 2021-11-11 DIAGNOSIS — I10 ESSENTIAL HYPERTENSION: Chronic | ICD-10-CM

## 2021-11-11 DIAGNOSIS — M15.9 PRIMARY OSTEOARTHRITIS INVOLVING MULTIPLE JOINTS: ICD-10-CM

## 2021-11-15 RX ORDER — AMLODIPINE BESYLATE AND BENAZEPRIL HYDROCHLORIDE 10; 20 MG/1; MG/1
CAPSULE ORAL
Qty: 90 CAPSULE | Refills: 1 | Status: SHIPPED | OUTPATIENT
Start: 2021-11-15 | End: 2022-02-24 | Stop reason: SDUPTHER

## 2021-11-15 RX ORDER — GABAPENTIN 300 MG/1
CAPSULE ORAL
Qty: 90 CAPSULE | Refills: 1 | Status: SHIPPED | OUTPATIENT
Start: 2021-11-15 | End: 2022-02-24 | Stop reason: SDUPTHER

## 2021-11-15 NOTE — TELEPHONE ENCOUNTER
Rx Refill Note  Requested Prescriptions     Pending Prescriptions Disp Refills   • gabapentin (NEURONTIN) 300 MG capsule [Pharmacy Med Name: GABAPENTIN CAP 300MG] 90 capsule 1     Sig: TAKE 1 CAPSULE NIGHTLY   • amLODIPine-benazepril (LOTREL) 10-20 MG per capsule [Pharmacy Med Name: AMLOD/BENAZP CAP 10-20MG] 90 capsule 1     Sig: TAKE 1 CAPSULE DAILY      Last office visit with prescribing clinician: 9/28/2021      Next office visit with prescribing clinician: Visit date not found            May Amanda MA  11/15/21, 14:31 EST

## 2022-01-10 ENCOUNTER — TELEMEDICINE (OUTPATIENT)
Dept: FAMILY MEDICINE CLINIC | Facility: CLINIC | Age: 77
End: 2022-01-10

## 2022-01-10 ENCOUNTER — TELEPHONE (OUTPATIENT)
Dept: FAMILY MEDICINE CLINIC | Facility: CLINIC | Age: 77
End: 2022-01-10

## 2022-01-10 DIAGNOSIS — J06.9 UPPER RESPIRATORY TRACT INFECTION DUE TO COVID-19 VIRUS: Primary | ICD-10-CM

## 2022-01-10 DIAGNOSIS — U07.1 UPPER RESPIRATORY TRACT INFECTION DUE TO COVID-19 VIRUS: Primary | ICD-10-CM

## 2022-01-10 DIAGNOSIS — I10 ESSENTIAL HYPERTENSION: Chronic | ICD-10-CM

## 2022-01-10 PROCEDURE — 99213 OFFICE O/P EST LOW 20 MIN: CPT | Performed by: FAMILY MEDICINE

## 2022-01-10 NOTE — PROGRESS NOTES
Positive Covid test 1/9/2022    Subjective   Isabel Carlson is a 76 y.o. female.     History of Present Illness   Telemedicine visit due to Covid.  Consent obtained.  6-minute visit.  Patient complains of symptoms for the last 1 week.  She states 2 days previously she got back from the new year celebration in [place].  She states her symptoms started 2 days after she got back.  She states her sore throat resolved in 1 day.  She has no shortness of breath.  She states she just has a lot of drainage and a small amount of cough.  No fever.  Patient has had 3 Covid vaccinations.  Follow-up hypertension.  Patient states doing well meds.  The following portions of the patient's history were reviewed and updated as appropriate: allergies, current medications, past family history, past medical history, past social history, past surgical history and problem list.    Review of Systems   Constitutional: Positive for fatigue. Negative for fever.   HENT: Positive for postnasal drip and rhinorrhea.    Respiratory: Positive for cough. Negative for shortness of breath and wheezing.    Cardiovascular: Negative for chest pain.       Patient Active Problem List   Diagnosis   • Heart murmur   • Abnormal EKG   • APC (atrial premature contractions)   • Non-rheumatic mitral regurgitation   • Non-rheumatic tricuspid valve insufficiency   • LVH (left ventricular hypertrophy)   • Gastroesophageal reflux disease   • Essential hypertension   • Mixed hyperlipidemia   • Pulmonary HTN (HCC)   • Osteoporosis of lumbar spine   • Menopause   • Osteoarthritis   • Lip laceration   • Status post left knee replacement   • Fatigue   • Iron deficiency anemia   • Abnormal serum thyroid stimulating hormone (TSH) level   • Acute non-recurrent frontal sinusitis   • Other headache syndrome   • Medicare annual wellness visit, subsequent   • Upper respiratory tract infection due to COVID-19 virus       No Known Allergies      Current Outpatient Medications:    •  amLODIPine-benazepril (LOTREL) 10-20 MG per capsule, TAKE 1 CAPSULE DAILY, Disp: 90 capsule, Rfl: 1  •  cetirizine (zyrTEC) 10 MG tablet, Take 10 mg by mouth Daily As Needed., Disp: , Rfl: 4  •  cyclobenzaprine (FLEXERIL) 5 MG tablet, , Disp: , Rfl:   •  fluticasone (FLONASE) 50 MCG/ACT nasal spray, 2 sprays into the nostril(s) as directed by provider Daily As Needed., Disp: , Rfl:   •  gabapentin (NEURONTIN) 300 MG capsule, TAKE 1 CAPSULE NIGHTLY, Disp: 90 capsule, Rfl: 1  •  nitrofurantoin, macrocrystal-monohydrate, (Macrobid) 100 MG capsule, Take 1 capsule by mouth 2 (Two) Times a Day., Disp: 20 capsule, Rfl: 0  •  pantoprazole (PROTONIX) 40 MG EC tablet, Take 1 tablet by mouth Daily., Disp: 90 tablet, Rfl: 1  •  propranolol LA (Inderal LA) 80 MG 24 hr capsule, Take 1 capsule by mouth Daily., Disp: 90 capsule, Rfl: 1  •  rosuvastatin (CRESTOR) 20 MG tablet, Take 1 tablet by mouth Daily., Disp: 90 tablet, Rfl: 1    Past Medical History:   Diagnosis Date   • Abnormal stress echocardiogram 07/20/2017    Patient had borderline EKG changes 5 minutes into walking and Dr. Cali Spencer recommended the patient not to have that same type of stress test in the future   • Adhesive capsulitis of right shoulder    • Age-related osteoporosis without fracture    • APC (atrial premature contractions) 7/20/2017   • Arthritis    • Carpal tunnel syndrome    • Cataract    • Chronic hip pain    • Fatigue    • Frequent episodes of pneumonia     PT STATES SEVERAL TIMES   • Heart murmur 7/20/2017   • Left knee pain    • LVH (left ventricular hypertrophy) 7/20/2017   • Mixed hyperlipidemia 7/20/2017   • Non-rheumatic mitral regurgitation 07/20/2017    Mild to moderate per 2-D echocardiogram   • Non-rheumatic tricuspid valve insufficiency 07/20/2017    Mild to moderate per 2-D echocardiogram   • Osteoarthritis    • Osteoarthritis of both knees    • Osteoporosis    • Shoulder pain    • Stroke (HCC)    • TIA (transient ischemic attack)  10/15/2013    numbness on right side of face and hand; went to [facility]       Past Surgical History:   Procedure Laterality Date   • APPENDECTOMY     • CARDIAC CATHETERIZATION N/A 10/20/2017    Procedure: Right Heart Cath;  Surgeon: Christopher Kan MD;  Location: [facility] CATH INVASIVE LOCATION;  Service:    • COLONOSCOPY     • REPLACEMENT TOTAL KNEE Left 06/12/2020   • SUBTOTAL HYSTERECTOMY     • TOTAL KNEE ARTHROPLASTY Left 6/12/2020    Procedure: TOTAL KNEE ARTHROPLASTY;  Surgeon: Jose Leary MD;  Location: [facility] MAIN OR;  Service: Orthopedics;  Laterality: Left;       Family History   Problem Relation Age of Onset   • Heart disease Mother    • Hypertension Mother    • Stroke Mother    • Arthritis Mother    • Heart disease Maternal Aunt    • Hypertension Maternal Aunt    • Hypertension Maternal Uncle    • Cancer Sister    • Cancer Other    • Breast cancer Cousin    • Malig Hyperthermia Neg Hx        Social History     Tobacco Use   • Smoking status: Never Smoker   • Smokeless tobacco: Never Used   Substance Use Topics   • Alcohol use: No     Comment: rarely            Objective     There were no vitals filed for this visit.  There is no height or weight on file to calculate BMI.    Physical Exam  Pulmonary:      Effort: Pulmonary effort is normal.   Neurological:      Mental Status: She is alert and oriented to person, place, and time.   Psychiatric:         Mood and Affect: Mood normal.         Thought Content: Thought content normal.         Judgment: Judgment normal.         Lab Results   Component Value Date    GLUCOSE 91 06/16/2021    BUN 9 06/16/2021    CREATININE 0.69 06/16/2021    EGFRIFNONA 82 06/16/2021    EGFRIFAFRI 100 06/16/2021    BCR 13.0 06/16/2021    K 4.5 06/16/2021    CO2 28.6 06/16/2021    CALCIUM 9.7 06/16/2021    PROTENTOTREF 6.9 06/16/2021    ALBUMIN 4.60 06/16/2021    LABIL2 2.0 06/16/2021    AST 8 06/16/2021    ALT 9 06/16/2021       WBC   Date Value Ref Range Status    11/11/2020 5.10 3.40 - 10.80 10*3/mm3 Final   10/26/2020 3.26 (L) 4.5 - 11.0 10*3/uL Final     RBC   Date Value Ref Range Status   11/11/2020 4.31 3.77 - 5.28 10*6/mm3 Final   10/26/2020 4.41 4.0 - 5.2 10*6/uL Final     Hemoglobin   Date Value Ref Range Status   11/11/2020 12.7 12.0 - 15.9 g/dL Final   10/26/2020 12.8 12.0 - 16.0 g/dL Final     Hematocrit   Date Value Ref Range Status   11/11/2020 40.5 34.0 - 46.6 % Final   10/26/2020 39.9 36.0 - 46.0 % Final     MCV   Date Value Ref Range Status   11/11/2020 94.0 79.0 - 97.0 fL Final   10/26/2020 90.5 80.0 - 100.0 fL Final     MCH   Date Value Ref Range Status   11/11/2020 29.5 26.6 - 33.0 pg Final   10/26/2020 29.0 26.0 - 34.0 pg Final     MCHC   Date Value Ref Range Status   11/11/2020 31.4 (L) 31.5 - 35.7 g/dL Final   10/26/2020 32.1 31.0 - 37.0 g/dL Final     RDW   Date Value Ref Range Status   11/11/2020 13.5 12.3 - 15.4 % Final   10/26/2020 13.4 12.0 - 16.8 % Final     RDW-SD   Date Value Ref Range Status   11/11/2020 47.2 37.0 - 54.0 fl Final     MPV   Date Value Ref Range Status   11/11/2020 9.9 6.0 - 12.0 fL Final   10/26/2020 10.2 8.4 - 12.4 fL Final     Platelets   Date Value Ref Range Status   11/11/2020 258 140 - 450 10*3/mm3 Final   10/26/2020 266 140 - 440 10*3/uL Final     Neutrophil Rel %   Date Value Ref Range Status   10/26/2020 51.5 45 - 80 % Final     Neutrophil %   Date Value Ref Range Status   11/11/2020 56.6 42.7 - 76.0 % Final     Lymphocyte Rel %   Date Value Ref Range Status   10/26/2020 39.0 15 - 50 % Final     Lymphocyte %   Date Value Ref Range Status   11/11/2020 34.7 19.6 - 45.3 % Final     Monocyte Rel %   Date Value Ref Range Status   10/26/2020 8.0 0 - 15 % Final     Monocyte %   Date Value Ref Range Status   11/11/2020 7.3 5.0 - 12.0 % Final     Eosinophil %   Date Value Ref Range Status   11/11/2020 0.8 0.3 - 6.2 % Final   10/26/2020 0.6 0 - 7 % Final     Basophil Rel %   Date Value Ref Range Status   10/26/2020 0.6 0 - 2 %  Final     Basophil %   Date Value Ref Range Status   11/11/2020 0.4 0.0 - 1.5 % Final     Immature Grans %   Date Value Ref Range Status   11/11/2020 0.2 0.0 - 0.5 % Final   10/26/2020 0.3 0.0 - 1.0 % Final     Neutrophils Absolute   Date Value Ref Range Status   10/26/2020 1.68 (L) 2.0 - 8.8 10*3/uL Final     Neutrophils, Absolute   Date Value Ref Range Status   11/11/2020 2.89 1.70 - 7.00 10*3/mm3 Final     Lymphocytes Absolute   Date Value Ref Range Status   10/26/2020 1.27 0.7 - 5.5 10*3/uL Final     Lymphocytes, Absolute   Date Value Ref Range Status   11/11/2020 1.77 0.70 - 3.10 10*3/mm3 Final     Monocytes Absolute   Date Value Ref Range Status   10/26/2020 0.26 0.0 - 1.7 10*3/uL Final     Monocytes, Absolute   Date Value Ref Range Status   11/11/2020 0.37 0.10 - 0.90 10*3/mm3 Final     Eosinophils Absolute   Date Value Ref Range Status   10/26/2020 0.02 0.0 - 0.8 10*3/uL Final     Eosinophils, Absolute   Date Value Ref Range Status   11/11/2020 0.04 0.00 - 0.40 10*3/mm3 Final     Basophils Absolute   Date Value Ref Range Status   10/26/2020 0.02 0.0 - 0.2 10*3/uL Final     Basophils, Absolute   Date Value Ref Range Status   11/11/2020 0.02 0.00 - 0.20 10*3/mm3 Final     Immature Grans, Absolute   Date Value Ref Range Status   11/11/2020 0.01 0.00 - 0.05 10*3/mm3 Final   10/26/2020 0.01 0.00 - 0.10 10*3/uL Final     nRBC   Date Value Ref Range Status   11/11/2020 0.0 0.0 - 0.2 /100 WBC Final       Lab Results   Component Value Date    HGBA1C 5.26 06/14/2020       No results found for: [id number]    TSH   Date Value Ref Range Status   07/14/2020 0.352 0.270 - 4.200 uIU/mL Final   06/14/2020 0.232 (L) 0.270 - 4.200 uIU/mL Final       Lab Results   Component Value Date    CHOL 110 06/14/2020     Lab Results   Component Value Date    TRIG 61 06/16/2021     Lab Results   Component Value Date    HDL 67 (H) 06/16/2021     Lab Results   Component Value Date    LDL 49 06/16/2021     Lab Results   Component Value Date     VLDL 13 06/16/2021     Lab Results   Component Value Date    LDLHDL 0.50 06/14/2020         Procedures    Assessment/Plan   Problems Addressed this Visit     Essential hypertension (Chronic)    Upper respiratory tract infection due to COVID-19 virus - Primary      Diagnoses       Codes Comments    Upper respiratory tract infection due to COVID-19 virus    -  Primary ICD-10-CM: U07.1, J06.9  ICD-9-CM: 465.9, 079.89     Essential hypertension     ICD-10-CM: I10  ICD-9-CM: 401.9         Patient has had 3 Covid vaccinations.  Start Delsym as needed.  Patient clinically is doing well with illness.  No infusion indicated at present time.   Hypertension.  Patient states doing well.  Continue meds.  No orders of the defined types were placed in this encounter.      Current Outpatient Medications   Medication Sig Dispense Refill   • amLODIPine-benazepril (LOTREL) 10-20 MG per capsule TAKE 1 CAPSULE DAILY 90 capsule 1   • cetirizine (zyrTEC) 10 MG tablet Take 10 mg by mouth Daily As Needed.  4   • cyclobenzaprine (FLEXERIL) 5 MG tablet      • fluticasone (FLONASE) 50 MCG/ACT nasal spray 2 sprays into the nostril(s) as directed by provider Daily As Needed.     • gabapentin (NEURONTIN) 300 MG capsule TAKE 1 CAPSULE NIGHTLY 90 capsule 1   • nitrofurantoin, macrocrystal-monohydrate, (Macrobid) 100 MG capsule Take 1 capsule by mouth 2 (Two) Times a Day. 20 capsule 0   • pantoprazole (PROTONIX) 40 MG EC tablet Take 1 tablet by mouth Daily. 90 tablet 1   • propranolol LA (Inderal LA) 80 MG 24 hr capsule Take 1 capsule by mouth Daily. 90 capsule 1   • rosuvastatin (CRESTOR) 20 MG tablet Take 1 tablet by mouth Daily. 90 tablet 1     No current facility-administered medications for this visit.       Isabel Carlson had no medications administered during this visit.    No follow-ups on file.    There are no Patient Instructions on file for this visit.

## 2022-01-10 NOTE — TELEPHONE ENCOUNTER
Caller: Isabel Carlson    Relationship to patient: Self    Best call back number: [phone]    Date of positive COVID19 test: 1/9/22    COVID19 symptoms: SCRATCHY THROAT, FATIGUE    Additional information or concerns: PATIENT WANTS TO KNOW WHAT DR. GOLDMAN  RECOMMENDS SHE DO FOR TREATMENT OR IF THERE ARE ANY MEDICATIONS SHE SHOULD BE TAKING.     What is the patients preferred pharmacy: [id number] [address] [phone] [facility] [phone]

## 2022-02-24 DIAGNOSIS — M15.9 PRIMARY OSTEOARTHRITIS INVOLVING MULTIPLE JOINTS: ICD-10-CM

## 2022-02-24 DIAGNOSIS — K21.9 GASTROESOPHAGEAL REFLUX DISEASE: ICD-10-CM

## 2022-02-24 DIAGNOSIS — I10 ESSENTIAL HYPERTENSION: Chronic | ICD-10-CM

## 2022-02-24 DIAGNOSIS — E78.2 MIXED HYPERLIPIDEMIA: ICD-10-CM

## 2022-02-24 NOTE — TELEPHONE ENCOUNTER
Caller: Isabel Carlson    Relationship: Self    Best call back number: [phone] (H)    Requested Prescriptions:   Requested Prescriptions     Pending Prescriptions Disp Refills   • pantoprazole (PROTONIX) 40 MG EC tablet 90 tablet 1     Sig: Take 1 tablet by mouth Daily.   • amLODIPine-benazepril (LOTREL) 10-20 MG per capsule 90 capsule 1     Sig: Take 1 capsule by mouth Daily.   • cyclobenzaprine (FLEXERIL) 5 MG tablet     • rosuvastatin (CRESTOR) 20 MG tablet 90 tablet 1     Sig: Take 1 tablet by mouth Daily.   • propranolol LA (Inderal LA) 80 MG 24 hr capsule 90 capsule 1     Sig: Take 1 capsule by mouth Daily.   • gabapentin (NEURONTIN) 300 MG capsule 90 capsule 1     Sig: Take 1 capsule by mouth Every Night.        Pharmacy where request should be sent: [facility] [place], [place] - [address] AT PORTAL TO REGISTERED [facility] - [phone] [facility] [phone] FX     Additional details provided by patient: PATIENT IS ALMOST OUT OF MEDICATIONS AND NEEDS REFILLED ASAP AND IS ASKING IF SHE NEEDS TO BE SEEN BEFORE SHE CAN GET REFILLS, PLEASE ADVISE PATIENT ASAP    Does the patient have less than a 3 day supply:  [x] Yes  [] No    Alber Edgar Rep   02/24/22 15:07 EST

## 2022-02-25 RX ORDER — PANTOPRAZOLE SODIUM 40 MG/1
40 TABLET, DELAYED RELEASE ORAL DAILY
Qty: 90 TABLET | Refills: 1 | Status: SHIPPED | OUTPATIENT
Start: 2022-02-25 | End: 2022-03-17

## 2022-02-25 RX ORDER — AMLODIPINE BESYLATE AND BENAZEPRIL HYDROCHLORIDE 10; 20 MG/1; MG/1
1 CAPSULE ORAL DAILY
Qty: 90 CAPSULE | Refills: 1 | Status: SHIPPED | OUTPATIENT
Start: 2022-02-25 | End: 2022-05-18 | Stop reason: SDUPTHER

## 2022-02-25 RX ORDER — ROSUVASTATIN CALCIUM 20 MG/1
20 TABLET, COATED ORAL DAILY
Qty: 90 TABLET | Refills: 1 | Status: SHIPPED | OUTPATIENT
Start: 2022-02-25 | End: 2022-05-18 | Stop reason: SDUPTHER

## 2022-02-25 RX ORDER — PROPRANOLOL HYDROCHLORIDE 80 MG/1
80 CAPSULE, EXTENDED RELEASE ORAL DAILY
Qty: 90 CAPSULE | Refills: 1 | Status: SHIPPED | OUTPATIENT
Start: 2022-02-25 | End: 2022-03-17

## 2022-02-25 RX ORDER — CYCLOBENZAPRINE HCL 5 MG
5 TABLET ORAL 2 TIMES DAILY PRN
Qty: 60 TABLET | Refills: 0 | Status: SHIPPED | OUTPATIENT
Start: 2022-02-25 | End: 2022-05-18

## 2022-02-25 NOTE — TELEPHONE ENCOUNTER
Rx Refill Note  Requested Prescriptions     Pending Prescriptions Disp Refills   • pantoprazole (PROTONIX) 40 MG EC tablet 90 tablet 1     Sig: Take 1 tablet by mouth Daily.   • amLODIPine-benazepril (LOTREL) 10-20 MG per capsule 90 capsule 1     Sig: Take 1 capsule by mouth Daily.   • cyclobenzaprine (FLEXERIL) 5 MG tablet 60 tablet 0     Sig: Take 1 tablet by mouth 2 (Two) Times a Day As Needed for Muscle Spasms.   • rosuvastatin (CRESTOR) 20 MG tablet 90 tablet 1     Sig: Take 1 tablet by mouth Daily.   • propranolol LA (Inderal LA) 80 MG 24 hr capsule 90 capsule 1     Sig: Take 1 capsule by mouth Daily.   • gabapentin (NEURONTIN) 300 MG capsule 90 capsule 1     Sig: Take 1 capsule by mouth Every Night.      Last office visit with prescribing clinician: 1/10/22      Next office visit with prescribing clinician: Visit date not found            May Amanda MA  02/25/22, 09:32 EST

## 2022-02-28 RX ORDER — GABAPENTIN 300 MG/1
300 CAPSULE ORAL NIGHTLY
Qty: 90 CAPSULE | Refills: 1 | Status: SHIPPED | OUTPATIENT
Start: 2022-02-28 | End: 2022-05-18 | Stop reason: SDUPTHER

## 2022-03-16 DIAGNOSIS — I10 ESSENTIAL HYPERTENSION: Chronic | ICD-10-CM

## 2022-03-16 DIAGNOSIS — K21.9 GASTROESOPHAGEAL REFLUX DISEASE: ICD-10-CM

## 2022-03-17 RX ORDER — PANTOPRAZOLE SODIUM 40 MG/1
40 TABLET, DELAYED RELEASE ORAL DAILY
Qty: 90 TABLET | Refills: 0 | Status: SHIPPED | OUTPATIENT
Start: 2022-03-17 | End: 2022-05-18 | Stop reason: SDUPTHER

## 2022-03-17 RX ORDER — MELOXICAM 15 MG/1
15 TABLET ORAL DAILY
COMMUNITY
Start: 2022-02-25 | End: 2022-08-03 | Stop reason: SDUPTHER

## 2022-03-17 RX ORDER — PROPRANOLOL HYDROCHLORIDE 80 MG/1
80 CAPSULE, EXTENDED RELEASE ORAL DAILY
Qty: 90 CAPSULE | Refills: 0 | Status: SHIPPED | OUTPATIENT
Start: 2022-03-17 | End: 2022-05-18 | Stop reason: SDUPTHER

## 2022-03-17 NOTE — TELEPHONE ENCOUNTER
Rx Refill Note  Requested Prescriptions     Pending Prescriptions Disp Refills   • propranolol LA (INDERAL LA) 80 MG 24 hr capsule [Pharmacy Med Name: PROPRANOLOL  CAP 80MG ER] 90 capsule 1     Sig: TAKE 1 CAPSULE DAILY   • pantoprazole (PROTONIX) 40 MG EC tablet [Pharmacy Med Name: PANTOPRAZOLE TAB 40MG DR] 90 tablet 1     Sig: TAKE 1 TABLET DAILY      Last office visit with prescribing clinician: 1/10/22      Next office visit with prescribing clinician:  No visits on file  Terri Curiel MA  03/17/22, 09:02 EDT

## 2022-05-18 ENCOUNTER — OFFICE VISIT (OUTPATIENT)
Dept: FAMILY MEDICINE CLINIC | Facility: CLINIC | Age: 77
End: 2022-05-18

## 2022-05-18 VITALS
HEIGHT: 60 IN | BODY MASS INDEX: 31.02 KG/M2 | WEIGHT: 158 LBS | HEART RATE: 68 BPM | DIASTOLIC BLOOD PRESSURE: 74 MMHG | SYSTOLIC BLOOD PRESSURE: 126 MMHG | TEMPERATURE: 98.4 F | OXYGEN SATURATION: 98 %

## 2022-05-18 DIAGNOSIS — I10 ESSENTIAL HYPERTENSION: Chronic | ICD-10-CM

## 2022-05-18 DIAGNOSIS — Z28.21 HERPES ZOSTER VACCINATION DECLINED: ICD-10-CM

## 2022-05-18 DIAGNOSIS — R53.83 FATIGUE, UNSPECIFIED TYPE: ICD-10-CM

## 2022-05-18 DIAGNOSIS — M15.9 PRIMARY OSTEOARTHRITIS INVOLVING MULTIPLE JOINTS: ICD-10-CM

## 2022-05-18 DIAGNOSIS — D50.8 IRON DEFICIENCY ANEMIA SECONDARY TO INADEQUATE DIETARY IRON INTAKE: ICD-10-CM

## 2022-05-18 DIAGNOSIS — K21.9 GASTROESOPHAGEAL REFLUX DISEASE: ICD-10-CM

## 2022-05-18 DIAGNOSIS — Z00.00 MEDICARE ANNUAL WELLNESS VISIT, SUBSEQUENT: Primary | ICD-10-CM

## 2022-05-18 DIAGNOSIS — E78.2 MIXED HYPERLIPIDEMIA: ICD-10-CM

## 2022-05-18 DIAGNOSIS — Z78.0 POSTMENOPAUSAL: ICD-10-CM

## 2022-05-18 DIAGNOSIS — H91.93 BILATERAL HEARING LOSS, UNSPECIFIED HEARING LOSS TYPE: ICD-10-CM

## 2022-05-18 PROBLEM — I27.20 PULMONARY HTN: Status: RESOLVED | Noted: 2017-10-09 | Resolved: 2022-05-18

## 2022-05-18 PROCEDURE — 1159F MED LIST DOCD IN RCRD: CPT | Performed by: INTERNAL MEDICINE

## 2022-05-18 PROCEDURE — 1170F FXNL STATUS ASSESSED: CPT | Performed by: INTERNAL MEDICINE

## 2022-05-18 PROCEDURE — G0439 PPPS, SUBSEQ VISIT: HCPCS | Performed by: INTERNAL MEDICINE

## 2022-05-18 RX ORDER — PANTOPRAZOLE SODIUM 40 MG/1
40 TABLET, DELAYED RELEASE ORAL DAILY
Qty: 90 TABLET | Refills: 3 | Status: SHIPPED | OUTPATIENT
Start: 2022-05-18 | End: 2022-06-02

## 2022-05-18 RX ORDER — PROPRANOLOL HYDROCHLORIDE 80 MG/1
80 CAPSULE, EXTENDED RELEASE ORAL DAILY
Qty: 90 CAPSULE | Refills: 1 | Status: SHIPPED | OUTPATIENT
Start: 2022-05-18 | End: 2022-06-02

## 2022-05-18 RX ORDER — AMLODIPINE BESYLATE AND BENAZEPRIL HYDROCHLORIDE 10; 20 MG/1; MG/1
1 CAPSULE ORAL DAILY
Qty: 90 CAPSULE | Refills: 1 | Status: SHIPPED | OUTPATIENT
Start: 2022-05-18 | End: 2022-10-04

## 2022-05-18 RX ORDER — CYCLOBENZAPRINE HCL 10 MG
TABLET ORAL
Qty: 90 TABLET | Refills: 2 | Status: SHIPPED | OUTPATIENT
Start: 2022-05-18

## 2022-05-18 RX ORDER — GABAPENTIN 300 MG/1
300 CAPSULE ORAL NIGHTLY
Qty: 90 CAPSULE | Refills: 1 | Status: SHIPPED | OUTPATIENT
Start: 2022-05-18 | End: 2022-12-23

## 2022-05-18 RX ORDER — ROSUVASTATIN CALCIUM 20 MG/1
20 TABLET, COATED ORAL DAILY
Qty: 90 TABLET | Refills: 1 | Status: SHIPPED | OUTPATIENT
Start: 2022-05-18 | End: 2023-02-08 | Stop reason: SDUPTHER

## 2022-05-18 NOTE — PATIENT INSTRUCTIONS
Medicare Wellness  Personal Prevention Plan of Service     Date of Office Visit:    Encounter Provider:  Cordell Holland MD  Place of Service:  [facility]  Patient Name: Isabel Carlson  :  1945    As part of the Medicare Wellness portion of your visit today, we are providing you with this personalized preventive plan of services (PPPS). This plan is based upon recommendations of the [place] Preventive Services Task Force (USPSTF) and the Advisory Committee on Immunization Practices (ACIP).    This lists the preventive care services that should be considered, and provides dates of when you are due. Items listed as completed are up-to-date and do not require any further intervention.    Health Maintenance   Topic Date Due    ZOSTER VACCINE (1 of 2) Never done    DXA SCAN  2021    LIPID PANEL  2022    INFLUENZA VACCINE  2022    ANNUAL WELLNESS VISIT  2023    MAMMOGRAM  10/29/2023    TDAP/TD VACCINES (2 - Td or Tdap) 2030    COVID-19 Vaccine  Completed    Pneumococcal Vaccine 65+  Completed    HEPATITIS C SCREENING  Discontinued    COLORECTAL CANCER SCREENING  Discontinued       Orders Placed This Encounter   Procedures    DEXA Bone Density Axial     Standing Status:   Future     Standing Expiration Date:   2023     Order Specific Question:   Reason for Exam:     Answer:   postmenopausal    Comprehensive Metabolic Panel     Order Specific Question:   Release to patient     Answer:   Immediate    Lipid Panel    TSH Rfx On Abnormal To Free T4     Order Specific Question:   Release to patient     Answer:   Immediate    Vitamin B12     Order Specific Question:   Release to patient     Answer:   Immediate    Ambulatory Referral to Audiology     Referral Priority:   Routine     Referral Type:   Consultation     Referral Reason:   Specialty Services Required     Requested Specialty:   Audiology     Number of Visits Requested:   1    CBC &  Differential     Order Specific Question:   Manual Differential     Answer:   No       Return if symptoms worsen or fail to improve.

## 2022-05-18 NOTE — PROGRESS NOTES
Subsequent Medicare Wellness Visit   The ABC's of the Annual Wellness Visit    Chief Complaint   Patient presents with   • Fatigue   • Annual Exam       HPI:  Isabel Carlson, -1945, is a 76 y.o. female who presents for a Subsequent Medicare Wellness Visit.    Complains of fatigue and no energy that has been present for the last 6 or more months and having some ankle swelling.  Some short of breath with stairs and exertion.  No dizziness, chest pain, cough, wheezing, passing out, blood in urine or stool.    Follow-up for hypertension.  Currently, has been feeling well and asymptomatic without any headaches, vision changes, cough, chest pain, shortness of breath, swelling, focal neurologic deficit, memory loss or syncope.  Has been taking the medications regularly and adherent with the regimen amlodipine-benazepril 10-20 and propranolol LA 80 mg.  Denies medication side effects and no significant interval events.      Follow-up for cholesterol.  Currently, has been feeling well without any myalgias, muscle aches, weakness, numbness, chest pain, short of breath or other issues.  Currently, is adherent with medication regimen of rosuvastatin 20 mg and denies medication side effects. Is due for lab follow-up.    Recent Hospitalizations:  No hospitalization(s) within the last year..    Current Medical Providers:  Patient Care Team:  Cordell Holland MD as PCP - General (Internal Medicine)  Jose Leary MD as Consulting Physician (Orthopedic Surgery)  Rodrigue Brady DO as Consulting Physician (Neurology)  Eddie Thrasher MD as Consulting Physician (Allergy)    Health Habits and Functional and Cognitive Screening and Depression Screening:  Functional & Cognitive Status 2022   Do you have difficulty preparing food and eating? No   Do you have difficulty bathing yourself, getting dressed or grooming yourself? No   Do you have difficulty using the toilet? No   Do you have difficulty moving  around from place to place? No   Do you have trouble with steps or getting out of a bed or a chair? No   Current Diet Well Balanced Diet   Dental Exam Up to date   Eye Exam Up to date   Exercise (times per week) 0 times per week   Current Exercises Include -   Do you need help using the phone?  No   Are you deaf or do you have serious difficulty hearing?  Yes   Do you need help with transportation? No   Do you need help shopping? No   Do you need help preparing meals?  No   Do you need help with housework?  No   Do you need help with laundry? No   Do you need help taking your medications? No   Do you need help managing money? No   Do you ever drive or ride in a car without wearing a seat belt? No   Have you felt unusual stress, anger or loneliness in the last month? No   Who do you live with? Alone   If you need help, do you have trouble finding someone available to you? No   Have you been bothered in the last four weeks by sexual problems? No   Do you have difficulty concentrating, remembering or making decisions? Yes   chronic hearing issues not really causing difficulty for the patient.    Compared to one year ago, the patient feels her physical health is the same and her mental health is the same.    Depression Screen:  PHQ-2/PHQ-9 Depression Screening 5/18/2022   Retired Total Score -   Little Interest or Pleasure in Doing Things 1-->several days   Feeling Down, Depressed or Hopeless 0-->not at all   PHQ-9: Brief Depression Severity Measure Score 1     Patient screened positive for depression based on a PHQ-9 score of 1 on 5/18/2022. Follow-up recommendations include: Patient fatigue and no energy.     Falls Risk Assessment:  ANDREW Fall Risk Clinician Key Questions   Have you fallen in the past year?: Yes  One slipped on steps and another when twisted ankle in a hole in yard.    Past Medical/Family/Social History:  The following portions of the patient's history were reviewed and updated as appropriate:  allergies, current medications, past family history, past medical history, past social history, past surgical history and problem list.    No Known Allergies      Current Outpatient Medications:   •  amLODIPine-benazepril (LOTREL) 10-20 MG per capsule, Take 1 capsule by mouth Daily., Disp: 90 capsule, Rfl: 1  •  cetirizine (zyrTEC) 10 MG tablet, Take 10 mg by mouth Daily As Needed., Disp: , Rfl: 4  •  cyclobenzaprine (FLEXERIL) 10 MG tablet, 1/2 to 1 tab every 8 hours as needed., Disp: 90 tablet, Rfl: 2  •  fluticasone (FLONASE) 50 MCG/ACT nasal spray, 2 sprays into the nostril(s) as directed by provider Daily As Needed., Disp: , Rfl:   •  gabapentin (NEURONTIN) 300 MG capsule, Take 1 capsule by mouth Every Night., Disp: 90 capsule, Rfl: 1  •  meloxicam (MOBIC) 15 MG tablet, Take 15 mg by mouth Daily., Disp: , Rfl:   •  pantoprazole (PROTONIX) 40 MG EC tablet, Take 1 tablet by mouth Daily., Disp: 90 tablet, Rfl: 3  •  propranolol LA (INDERAL LA) 80 MG 24 hr capsule, Take 1 capsule by mouth Daily., Disp: 90 capsule, Rfl: 1  •  rosuvastatin (CRESTOR) 20 MG tablet, Take 1 tablet by mouth Daily., Disp: 90 tablet, Rfl: 1    Aspirin use counseling: Does not need ASA (and currently is not on it)    Current medication list contains no high risk medications.  No harmful drug interactions have been identified.     Family History   Problem Relation Age of Onset   • Heart disease Mother    • Hypertension Mother    • Stroke Mother    • Arthritis Mother    • Heart disease Maternal Aunt    • Hypertension Maternal Aunt    • Hypertension Maternal Uncle    • Cancer Sister    • Cancer Other    • Breast cancer Cousin    • Malig Hyperthermia Neg Hx        Social History     Tobacco Use   • Smoking status: Never Smoker   • Smokeless tobacco: Never Used   Substance Use Topics   • Alcohol use: No     Comment: rarely       Past Surgical History:   Procedure Laterality Date   • APPENDECTOMY     • CARDIAC CATHETERIZATION N/A 10/20/2017     Procedure: Right Heart Cath;  Surgeon: Christopher Kan MD;  Location: [facility] INVASIVE LOCATION;  Service:    • COLONOSCOPY     • REPLACEMENT TOTAL KNEE Left 06/12/2020   • SUBTOTAL HYSTERECTOMY     • TOTAL KNEE ARTHROPLASTY Left 6/12/2020    Procedure: TOTAL KNEE ARTHROPLASTY;  Surgeon: Jose Leary MD;  Location: [facility] OR;  Service: Orthopedics;  Laterality: Left;       Patient Active Problem List   Diagnosis   • Heart murmur   • Abnormal EKG   • APC (atrial premature contractions)   • Non-rheumatic mitral regurgitation   • Non-rheumatic tricuspid valve insufficiency   • LVH (left ventricular hypertrophy)   • Gastroesophageal reflux disease   • Essential hypertension   • Mixed hyperlipidemia   • Osteoporosis of lumbar spine   • Menopause   • Osteoarthritis   • Lip laceration   • Status post left knee replacement   • Fatigue   • Iron deficiency anemia   • Abnormal serum thyroid stimulating hormone (TSH) level   • Other headache syndrome   • Medicare annual wellness visit, subsequent   • Upper respiratory tract infection due to COVID-19 virus   • Herpes zoster vaccination declined       Review of Systems   Constitutional: Positive for fatigue. Negative for activity change, appetite change, fever, unexpected weight gain and unexpected weight loss.   HENT: Negative for nosebleeds, rhinorrhea, trouble swallowing and voice change.    Eyes: Negative for visual disturbance.   Respiratory: Positive for shortness of breath. Negative for cough, chest tightness and wheezing.    Cardiovascular: Negative for chest pain and palpitations.        Ankle and feet swelling.   Gastrointestinal: Negative for abdominal pain, blood in stool, constipation, diarrhea, nausea, vomiting, GERD and indigestion.   Genitourinary: Negative for dysuria, frequency and hematuria.   Musculoskeletal: Negative for arthralgias, back pain and myalgias.   Skin: Negative for rash and wound.   Neurological: Negative for dizziness,  "tremors, weakness, light-headedness, numbness, headache and memory problem.   Hematological: Negative for adenopathy. Does not bruise/bleed easily.   Psychiatric/Behavioral: Negative for sleep disturbance and depressed mood. The patient is not nervous/anxious.        Objective     Vitals:    05/18/22 1506   BP: 126/74   BP Location: Left arm   Patient Position: Sitting   Cuff Size: Large Adult   Pulse: 68   Temp: 98.4 °F (36.9 °C)   TempSrc: Temporal   SpO2: 98%   Weight: 71.7 kg (158 lb)   Height: 152.4 cm (60\")   PainSc: 0-No pain     BMI is >= 30 and <= 34.9 (Class 1 obesity). The following options were offered after discussion: exercise counseling/recommendations and nutrition counseling/recommendations    The patient has no evidence of cognitve impairment.     Physical Exam  Vitals and nursing note reviewed.   Constitutional:       General: She is not in acute distress.     Appearance: She is well-developed. She is not diaphoretic.   HENT:      Head: Normocephalic and atraumatic.      Right Ear: External ear normal.      Left Ear: External ear normal.      Nose: Nose normal.   Eyes:      Conjunctiva/sclera: Conjunctivae normal.      Pupils: Pupils are equal, round, and reactive to light.   Neck:      Thyroid: No thyromegaly.      Trachea: No tracheal deviation.   Cardiovascular:      Rate and Rhythm: Normal rate and regular rhythm.      Heart sounds: Normal heart sounds. No murmur heard.    No friction rub. No gallop.   Pulmonary:      Effort: Pulmonary effort is normal. No respiratory distress.      Breath sounds: Normal breath sounds.   Abdominal:      General: Bowel sounds are normal.      Palpations: Abdomen is soft. There is no mass.      Tenderness: There is no abdominal tenderness. There is no guarding.   Musculoskeletal:         General: Normal range of motion.      Cervical back: Normal range of motion and neck supple.   Lymphadenopathy:      Cervical: No cervical adenopathy.   Skin:     General: Skin " is warm and dry.      Capillary Refill: Capillary refill takes less than 2 seconds.      Findings: No rash.   Neurological:      Mental Status: She is alert and oriented to person, place, and time.      Motor: No abnormal muscle tone.      Deep Tendon Reflexes: Reflexes normal.   Psychiatric:         Behavior: Behavior normal.         Thought Content: Thought content normal.         Judgment: Judgment normal.     Recent Lab Results:     Lab Results   Component Value Date    CHOL 110 06/14/2020    TRIG 61 06/16/2021    HDL 67 (H) 06/16/2021    VLDL 13 06/16/2021    LDLHDL 0.50 06/14/2020     Assessment & Plan   Age-appropriate Screening Schedule:  Refer to the list below for future screening recommendations based on patient's age, sex and/or medical conditions.      Health Maintenance   Topic Date Due   • ZOSTER VACCINE (1 of 2) Never done   • DXA SCAN  08/01/2021   • LIPID PANEL  06/16/2022   • INFLUENZA VACCINE  08/01/2022   • MAMMOGRAM  10/29/2023   • TDAP/TD VACCINES (2 - Td or Tdap) 03/03/2030       Medicare Risks and Personalized Health Plan:  Advance Directive Discussion  Fall Risk  Glaucoma Risk  Immunizations Discussed/Encouraged (specific immunizations; Shingrix )  Osteoporosis Risk    CMS-Preventive Services Quick Reference  Medicare Preventive Services Addressed:  Annual Wellness Visit (AWV)  Glaucoma screening (for individuals with diabetes mellitus, family history of glaucoma, -Americans (> or =) age 50, -Americans (> or =) age 65)    Advance Care Planning:  ACP discussion was held with the patient during this visit. Patient does not have an advance directive, declines further assistance.    Diagnoses and all orders for this visit:    1. Medicare annual wellness visit, subsequent (Primary)    2. Essential hypertension  -     Comprehensive Metabolic Panel  -     Lipid Panel  -     propranolol LA (INDERAL LA) 80 MG 24 hr capsule; Take 1 capsule by mouth Daily.  Dispense: 90 capsule; Refill:  1  -     amLODIPine-benazepril (LOTREL) 10-20 MG per capsule; Take 1 capsule by mouth Daily.  Dispense: 90 capsule; Refill: 1    3. Mixed hyperlipidemia  -     Comprehensive Metabolic Panel  -     Lipid Panel  -     rosuvastatin (CRESTOR) 20 MG tablet; Take 1 tablet by mouth Daily.  Dispense: 90 tablet; Refill: 1    4. Iron deficiency anemia secondary to inadequate dietary iron intake  -     CBC & Differential    5. Fatigue, unspecified type  -     CBC & Differential  -     TSH Rfx On Abnormal To Free T4  -     Vitamin B12    6. Bilateral hearing loss, unspecified hearing loss type  -     Ambulatory Referral to Audiology    7. Postmenopausal  -     DEXA Bone Density Axial; Future    8. Herpes zoster vaccination declined    9. Primary osteoarthritis involving multiple joints  -     gabapentin (NEURONTIN) 300 MG capsule; Take 1 capsule by mouth Every Night.  Dispense: 90 capsule; Refill: 1    10. Gastroesophageal reflux disease  -     pantoprazole (PROTONIX) 40 MG EC tablet; Take 1 tablet by mouth Daily.  Dispense: 90 tablet; Refill: 3    Other orders  -     cyclobenzaprine (FLEXERIL) 10 MG tablet; 1/2 to 1 tab every 8 hours as needed.  Dispense: 90 tablet; Refill: 2      Reviewed history and annual wellness visit with patient during office time.  Medications reviewed as appropriate.  Discussed advanced directives and living will.  Patient has living will: Living will: Patient refused.  Discussed fall risk and precautions encourage removing throw rugs and using grab bars within the home and bathroom.  Will check the labs as ordered above to evaluate the blood sugars, kidney, liver, cholesterol for screening.  Discussed flu shot recommended to get the high-dose influenza vaccine annually in the fall.  The patient has a COVID HM Topic on their chart, and they are fully vaccinated.  Prevnar-13 and pneumovax-23 up to date and appropriate.  Shingrix vaccination series recommended.  Encourage follow-up with the eye doctor  on annual basis for glaucoma evaluation.  Discussed weight and encouraged exercise as tolerated while following a healthy diet.     Patient with a chronic fatigue but with history of anemia.  Blood pressure appears to be well controlled.  We will check labs as ordered above to see if there is any other underlying cause such as the anemia continue her other current medications at this time.  If she has normal labs and has persisting fatigue weakness shortness of breath or any new symptoms such as chest pain then will have cardiac evaluation.    An After Visit Summary and PPPS with all of these plans were given to the patient.      Follow Up:  No follow-ups on file.           · COVID-19 Precautions - Patient was compliant in wearing a mask. When I saw the patient, I used appropriate personal protective equipment (PPE) including mask and eye shield (standard procedure).  Additionally, I used gown and gloves if indicated.  Hand hygiene was completed before and after seeing the patient.  · Dictated utilizing Dragon Dictation

## 2022-05-19 DIAGNOSIS — E53.8 VITAMIN B12 DEFICIENCY: Primary | ICD-10-CM

## 2022-05-19 LAB
ALBUMIN SERPL-MCNC: 4.8 G/DL (ref 3.7–4.7)
ALBUMIN/GLOB SERPL: 1.7 {RATIO} (ref 1.2–2.2)
ALP SERPL-CCNC: 129 IU/L (ref 44–121)
ALT SERPL-CCNC: 12 IU/L (ref 0–32)
AST SERPL-CCNC: 16 IU/L (ref 0–40)
BASOPHILS # BLD AUTO: 0 X10E3/UL (ref 0–0.2)
BASOPHILS NFR BLD AUTO: 1 %
BILIRUB SERPL-MCNC: 0.7 MG/DL (ref 0–1.2)
BUN SERPL-MCNC: 15 MG/DL (ref 8–27)
BUN/CREAT SERPL: 16 (ref 12–28)
CALCIUM SERPL-MCNC: 9.5 MG/DL (ref 8.7–10.3)
CHLORIDE SERPL-SCNC: 102 MMOL/L (ref 96–106)
CHOLEST SERPL-MCNC: 143 MG/DL (ref 100–199)
CO2 SERPL-SCNC: 21 MMOL/L (ref 20–29)
CREAT SERPL-MCNC: 0.92 MG/DL (ref 0.57–1)
EGFRCR SERPLBLD CKD-EPI 2021: 64 ML/MIN/1.73
EOSINOPHIL # BLD AUTO: 0.1 X10E3/UL (ref 0–0.4)
EOSINOPHIL NFR BLD AUTO: 2 %
ERYTHROCYTE [DISTWIDTH] IN BLOOD BY AUTOMATED COUNT: 12.7 % (ref 11.7–15.4)
GLOBULIN SER CALC-MCNC: 2.9 G/DL (ref 1.5–4.5)
GLUCOSE SERPL-MCNC: ABNORMAL MG/DL
HCT VFR BLD AUTO: 40.6 % (ref 34–46.6)
HDLC SERPL-MCNC: 64 MG/DL
HGB BLD-MCNC: 13 G/DL (ref 11.1–15.9)
IMM GRANULOCYTES # BLD AUTO: 0 X10E3/UL (ref 0–0.1)
IMM GRANULOCYTES NFR BLD AUTO: 0 %
LDLC SERPL CALC-MCNC: 60 MG/DL (ref 0–99)
LYMPHOCYTES # BLD AUTO: 2 X10E3/UL (ref 0.7–3.1)
LYMPHOCYTES NFR BLD AUTO: 39 %
MCH RBC QN AUTO: 29.1 PG (ref 26.6–33)
MCHC RBC AUTO-ENTMCNC: 32 G/DL (ref 31.5–35.7)
MCV RBC AUTO: 91 FL (ref 79–97)
MONOCYTES # BLD AUTO: 0.5 X10E3/UL (ref 0.1–0.9)
MONOCYTES NFR BLD AUTO: 10 %
NEUTROPHILS # BLD AUTO: 2.5 X10E3/UL (ref 1.4–7)
NEUTROPHILS NFR BLD AUTO: 48 %
PLATELET # BLD AUTO: 280 X10E3/UL (ref 150–450)
POTASSIUM SERPL-SCNC: ABNORMAL MMOL/L
PROT SERPL-MCNC: 7.7 G/DL (ref 6–8.5)
RBC # BLD AUTO: 4.46 X10E6/UL (ref 3.77–5.28)
SODIUM SERPL-SCNC: 142 MMOL/L (ref 134–144)
TRIGL SERPL-MCNC: 104 MG/DL (ref 0–149)
TSH SERPL DL<=0.005 MIU/L-ACNC: 0.58 UIU/ML (ref 0.45–4.5)
VIT B12 SERPL-MCNC: 201 PG/ML (ref 232–1245)
VLDLC SERPL CALC-MCNC: 19 MG/DL (ref 5–40)
WBC # BLD AUTO: 5 X10E3/UL (ref 3.4–10.8)

## 2022-05-19 RX ORDER — CYANOCOBALAMIN 1000 UG/ML
1000 INJECTION, SOLUTION INTRAMUSCULAR; SUBCUTANEOUS WEEKLY
Status: SHIPPED | OUTPATIENT
Start: 2022-05-19

## 2022-06-02 DIAGNOSIS — I10 ESSENTIAL HYPERTENSION: Chronic | ICD-10-CM

## 2022-06-02 DIAGNOSIS — K21.9 GASTROESOPHAGEAL REFLUX DISEASE: ICD-10-CM

## 2022-06-02 RX ORDER — PANTOPRAZOLE SODIUM 40 MG/1
TABLET, DELAYED RELEASE ORAL
Qty: 90 TABLET | Refills: 1 | Status: SHIPPED | OUTPATIENT
Start: 2022-06-02 | End: 2023-02-08 | Stop reason: SDUPTHER

## 2022-06-02 RX ORDER — PROPRANOLOL HYDROCHLORIDE 80 MG/1
CAPSULE, EXTENDED RELEASE ORAL
Qty: 90 CAPSULE | Refills: 1 | Status: SHIPPED | OUTPATIENT
Start: 2022-06-02 | End: 2022-11-15

## 2022-06-02 NOTE — TELEPHONE ENCOUNTER
Rx Refill Note  Requested Prescriptions     Pending Prescriptions Disp Refills   • propranolol LA (INDERAL LA) 80 MG 24 hr capsule [Pharmacy Med Name: PROPRANOLOL  CAP 80MG ER] 90 capsule 0     Sig: TAKE 1 CAPSULE DAILY   • pantoprazole (PROTONIX) 40 MG EC tablet [Pharmacy Med Name: PANTOPRAZOLE TAB 40MG DR] 90 tablet 0     Sig: TAKE 1 TABLET DAILY      Last office visit with prescribing clinician: 5/18/2022      Next office visit with prescribing clinician: Visit date not found            May Amanda MA  06/02/22, 08:36 EDT

## 2022-07-21 ENCOUNTER — CLINICAL SUPPORT (OUTPATIENT)
Dept: FAMILY MEDICINE CLINIC | Facility: CLINIC | Age: 77
End: 2022-07-21

## 2022-07-21 PROCEDURE — 96372 THER/PROPH/DIAG INJ SC/IM: CPT | Performed by: INTERNAL MEDICINE

## 2022-07-21 RX ADMIN — CYANOCOBALAMIN 1000 MCG: 1000 INJECTION, SOLUTION INTRAMUSCULAR; SUBCUTANEOUS at 15:19

## 2022-07-29 ENCOUNTER — CLINICAL SUPPORT (OUTPATIENT)
Dept: FAMILY MEDICINE CLINIC | Facility: CLINIC | Age: 77
End: 2022-07-29

## 2022-07-29 PROCEDURE — 96372 THER/PROPH/DIAG INJ SC/IM: CPT | Performed by: INTERNAL MEDICINE

## 2022-07-29 RX ADMIN — CYANOCOBALAMIN 1000 MCG: 1000 INJECTION, SOLUTION INTRAMUSCULAR; SUBCUTANEOUS at 13:53

## 2022-08-03 DIAGNOSIS — M15.9 PRIMARY OSTEOARTHRITIS INVOLVING MULTIPLE JOINTS: Primary | ICD-10-CM

## 2022-08-03 RX ORDER — MELOXICAM 15 MG/1
15 TABLET ORAL DAILY PRN
Qty: 30 TABLET | Refills: 0 | Status: SHIPPED | OUTPATIENT
Start: 2022-08-03 | End: 2023-02-08 | Stop reason: SDUPTHER

## 2022-08-04 ENCOUNTER — CLINICAL SUPPORT (OUTPATIENT)
Dept: FAMILY MEDICINE CLINIC | Facility: CLINIC | Age: 77
End: 2022-08-04

## 2022-08-04 DIAGNOSIS — R53.83 FATIGUE, UNSPECIFIED TYPE: Primary | ICD-10-CM

## 2022-08-04 PROCEDURE — 96372 THER/PROPH/DIAG INJ SC/IM: CPT | Performed by: INTERNAL MEDICINE

## 2022-08-04 RX ADMIN — CYANOCOBALAMIN 1000 MCG: 1000 INJECTION, SOLUTION INTRAMUSCULAR; SUBCUTANEOUS at 13:02

## 2022-08-10 ENCOUNTER — CLINICAL SUPPORT (OUTPATIENT)
Dept: FAMILY MEDICINE CLINIC | Facility: CLINIC | Age: 77
End: 2022-08-10

## 2022-08-10 PROCEDURE — 96372 THER/PROPH/DIAG INJ SC/IM: CPT | Performed by: INTERNAL MEDICINE

## 2022-08-10 RX ADMIN — CYANOCOBALAMIN 1000 MCG: 1000 INJECTION, SOLUTION INTRAMUSCULAR; SUBCUTANEOUS at 15:17

## 2022-08-17 ENCOUNTER — CLINICAL SUPPORT (OUTPATIENT)
Dept: FAMILY MEDICINE CLINIC | Facility: CLINIC | Age: 77
End: 2022-08-17

## 2022-08-17 DIAGNOSIS — D50.9 IRON DEFICIENCY ANEMIA, UNSPECIFIED IRON DEFICIENCY ANEMIA TYPE: ICD-10-CM

## 2022-08-17 PROCEDURE — 96372 THER/PROPH/DIAG INJ SC/IM: CPT | Performed by: INTERNAL MEDICINE

## 2022-08-17 RX ADMIN — CYANOCOBALAMIN 1000 MCG: 1000 INJECTION, SOLUTION INTRAMUSCULAR; SUBCUTANEOUS at 14:54

## 2022-08-24 ENCOUNTER — CLINICAL SUPPORT (OUTPATIENT)
Dept: FAMILY MEDICINE CLINIC | Facility: CLINIC | Age: 77
End: 2022-08-24

## 2022-08-24 PROCEDURE — 96372 THER/PROPH/DIAG INJ SC/IM: CPT | Performed by: INTERNAL MEDICINE

## 2022-08-24 RX ADMIN — CYANOCOBALAMIN 1000 MCG: 1000 INJECTION, SOLUTION INTRAMUSCULAR; SUBCUTANEOUS at 14:37

## 2022-08-31 ENCOUNTER — CLINICAL SUPPORT (OUTPATIENT)
Dept: FAMILY MEDICINE CLINIC | Facility: CLINIC | Age: 77
End: 2022-08-31

## 2022-08-31 DIAGNOSIS — D50.9 IRON DEFICIENCY ANEMIA, UNSPECIFIED IRON DEFICIENCY ANEMIA TYPE: ICD-10-CM

## 2022-08-31 PROCEDURE — 96372 THER/PROPH/DIAG INJ SC/IM: CPT | Performed by: INTERNAL MEDICINE

## 2022-08-31 RX ADMIN — CYANOCOBALAMIN 1000 MCG: 1000 INJECTION, SOLUTION INTRAMUSCULAR; SUBCUTANEOUS at 14:28

## 2022-09-13 ENCOUNTER — APPOINTMENT (OUTPATIENT)
Dept: BONE DENSITY | Facility: HOSPITAL | Age: 77
End: 2022-09-13

## 2022-10-04 DIAGNOSIS — I10 ESSENTIAL HYPERTENSION: Chronic | ICD-10-CM

## 2022-10-04 RX ORDER — AMLODIPINE BESYLATE AND BENAZEPRIL HYDROCHLORIDE 10; 20 MG/1; MG/1
CAPSULE ORAL
Qty: 90 CAPSULE | Refills: 1 | Status: SHIPPED | OUTPATIENT
Start: 2022-10-04 | End: 2023-02-08 | Stop reason: SDUPTHER

## 2022-10-04 RX ORDER — HYDROCODONE BITARTRATE AND ACETAMINOPHEN 7.5; 325 MG/1; MG/1
TABLET ORAL
COMMUNITY
Start: 2022-09-14

## 2022-10-04 NOTE — TELEPHONE ENCOUNTER
Rx Refill Note  Requested Prescriptions     Pending Prescriptions Disp Refills   • amLODIPine-benazepril (LOTREL) 10-20 MG per capsule [Pharmacy Med Name: AMLOD/BENAZP CAP 10-20MG] 90 capsule 1     Sig: TAKE 1 CAPSULE DAILY      Last office visit with prescribing clinician: 5/18/2022 [facility]     Next office visit with prescribing clinician: due 5/19/2023    Terri Curiel MA  10/04/22, 16:51 EDT

## 2022-10-11 ENCOUNTER — CLINICAL SUPPORT (OUTPATIENT)
Dept: FAMILY MEDICINE CLINIC | Facility: CLINIC | Age: 77
End: 2022-10-11

## 2022-10-11 DIAGNOSIS — Z23 NEED FOR COVID-19 VACCINE: ICD-10-CM

## 2022-10-11 DIAGNOSIS — Z23 NEED FOR INFLUENZA VACCINATION: Primary | ICD-10-CM

## 2022-10-11 PROCEDURE — G0008 ADMIN INFLUENZA VIRUS VAC: HCPCS | Performed by: INTERNAL MEDICINE

## 2022-10-11 PROCEDURE — 91312 COVID-19 (PFIZER) BIVALENT BOOSTER 12+YRS: CPT | Performed by: INTERNAL MEDICINE

## 2022-10-11 PROCEDURE — 96372 THER/PROPH/DIAG INJ SC/IM: CPT | Performed by: INTERNAL MEDICINE

## 2022-10-11 PROCEDURE — 0124A PR ADM SARSCOV2 30MCG/0.3ML BST: CPT | Performed by: INTERNAL MEDICINE

## 2022-10-11 PROCEDURE — 90662 IIV NO PRSV INCREASED AG IM: CPT | Performed by: INTERNAL MEDICINE

## 2022-10-11 RX ADMIN — CYANOCOBALAMIN 1000 MCG: 1000 INJECTION, SOLUTION INTRAMUSCULAR; SUBCUTANEOUS at 10:38

## 2022-10-19 ENCOUNTER — CLINICAL SUPPORT (OUTPATIENT)
Dept: FAMILY MEDICINE CLINIC | Facility: CLINIC | Age: 77
End: 2022-10-19

## 2022-10-19 DIAGNOSIS — D50.9 IRON DEFICIENCY ANEMIA, UNSPECIFIED IRON DEFICIENCY ANEMIA TYPE: ICD-10-CM

## 2022-10-19 PROCEDURE — 96372 THER/PROPH/DIAG INJ SC/IM: CPT | Performed by: INTERNAL MEDICINE

## 2022-10-19 RX ADMIN — CYANOCOBALAMIN 1000 MCG: 1000 INJECTION, SOLUTION INTRAMUSCULAR; SUBCUTANEOUS at 13:40

## 2022-11-04 ENCOUNTER — TELEPHONE (OUTPATIENT)
Dept: FAMILY MEDICINE CLINIC | Facility: CLINIC | Age: 77
End: 2022-11-04

## 2022-11-04 NOTE — TELEPHONE ENCOUNTER
-incoming call from pt asking for medication she used to take for osteoporosis    She is asking for med refill due to recently breaking a bone    Medication started with an R and that's all she can remember    [phone]

## 2022-11-09 ENCOUNTER — CLINICAL SUPPORT (OUTPATIENT)
Dept: FAMILY MEDICINE CLINIC | Facility: CLINIC | Age: 77
End: 2022-11-09

## 2022-11-09 DIAGNOSIS — R79.89 ABNORMAL SERUM THYROID STIMULATING HORMONE (TSH) LEVEL: ICD-10-CM

## 2022-11-09 PROCEDURE — 96372 THER/PROPH/DIAG INJ SC/IM: CPT | Performed by: INTERNAL MEDICINE

## 2022-11-09 RX ADMIN — CYANOCOBALAMIN 1000 MCG: 1000 INJECTION, SOLUTION INTRAMUSCULAR; SUBCUTANEOUS at 14:18

## 2022-11-15 DIAGNOSIS — I10 ESSENTIAL HYPERTENSION: Chronic | ICD-10-CM

## 2022-11-15 RX ORDER — PROPRANOLOL HYDROCHLORIDE 80 MG/1
CAPSULE, EXTENDED RELEASE ORAL
Qty: 90 CAPSULE | Refills: 1 | Status: SHIPPED | OUTPATIENT
Start: 2022-11-15

## 2022-11-15 NOTE — TELEPHONE ENCOUNTER
Rx Refill Note  Requested Prescriptions     Pending Prescriptions Disp Refills   • propranolol LA (INDERAL LA) 80 MG 24 hr capsule [Pharmacy Med Name: PROPRANOLOL  CAP 80MG ER] 90 capsule 1     Sig: TAKE 1 CAPSULE DAILY      Last office visit with prescribing clinician: 5/18/2022      Next office visit with prescribing clinician: Visit date not found

## 2022-11-16 ENCOUNTER — CLINICAL SUPPORT (OUTPATIENT)
Dept: FAMILY MEDICINE CLINIC | Facility: CLINIC | Age: 77
End: 2022-11-16

## 2022-11-16 DIAGNOSIS — E53.8 B12 DEFICIENCY: ICD-10-CM

## 2022-11-16 PROCEDURE — 96372 THER/PROPH/DIAG INJ SC/IM: CPT | Performed by: INTERNAL MEDICINE

## 2022-11-16 RX ADMIN — CYANOCOBALAMIN 1000 MCG: 1000 INJECTION, SOLUTION INTRAMUSCULAR; SUBCUTANEOUS at 14:11

## 2022-11-16 NOTE — PROGRESS NOTES
Immunization  Immunization performed in left deltoid by May Amanda MA. Patient tolerated the procedure well without complications.  11/16/22   May Amanda MA

## 2022-11-29 ENCOUNTER — HOSPITAL ENCOUNTER (OUTPATIENT)
Dept: BONE DENSITY | Facility: HOSPITAL | Age: 77
Discharge: HOME | End: 2022-11-29
Admitting: INTERNAL MEDICINE

## 2022-11-29 DIAGNOSIS — Z78.0 POSTMENOPAUSAL: ICD-10-CM

## 2022-11-29 PROCEDURE — 77080 DXA BONE DENSITY AXIAL: CPT

## 2022-12-06 ENCOUNTER — CLINICAL SUPPORT (OUTPATIENT)
Dept: FAMILY MEDICINE CLINIC | Facility: CLINIC | Age: 77
End: 2022-12-06

## 2022-12-06 DIAGNOSIS — D50.9 IRON DEFICIENCY ANEMIA, UNSPECIFIED IRON DEFICIENCY ANEMIA TYPE: ICD-10-CM

## 2022-12-06 PROCEDURE — 96372 THER/PROPH/DIAG INJ SC/IM: CPT | Performed by: INTERNAL MEDICINE

## 2022-12-06 RX ADMIN — CYANOCOBALAMIN 1000 MCG: 1000 INJECTION, SOLUTION INTRAMUSCULAR; SUBCUTANEOUS at 14:11

## 2022-12-06 NOTE — PROGRESS NOTES
Injection  Injection performed in RIGHT DELTOID by Marquita Tam MA. Patient tolerated the procedure well without complications.  12/06/22   Marquita Tam MA

## 2022-12-14 ENCOUNTER — TELEPHONE (OUTPATIENT)
Dept: FAMILY MEDICINE CLINIC | Facility: CLINIC | Age: 77
End: 2022-12-14

## 2022-12-14 ENCOUNTER — CLINICAL SUPPORT (OUTPATIENT)
Dept: FAMILY MEDICINE CLINIC | Facility: CLINIC | Age: 77
End: 2022-12-14

## 2022-12-14 DIAGNOSIS — M81.0 OSTEOPOROSIS OF LUMBAR SPINE: Primary | ICD-10-CM

## 2022-12-14 PROCEDURE — 96372 THER/PROPH/DIAG INJ SC/IM: CPT | Performed by: INTERNAL MEDICINE

## 2022-12-14 RX ORDER — IBANDRONATE SODIUM 150 MG/1
150 TABLET, FILM COATED ORAL
Qty: 3 TABLET | Refills: 1 | Status: SHIPPED | OUTPATIENT
Start: 2022-12-14 | End: 2023-03-08 | Stop reason: SDUPTHER

## 2022-12-14 RX ADMIN — CYANOCOBALAMIN 1000 MCG: 1000 INJECTION, SOLUTION INTRAMUSCULAR; SUBCUTANEOUS at 14:23

## 2022-12-14 NOTE — TELEPHONE ENCOUNTER
Patient stated she would like to start Boniva per Dr Rush recommendation. She would like it sent to the [facility] mail service pharmacy on record

## 2022-12-19 ENCOUNTER — TELEPHONE (OUTPATIENT)
Dept: FAMILY MEDICINE CLINIC | Facility: CLINIC | Age: 77
End: 2022-12-19

## 2022-12-19 NOTE — TELEPHONE ENCOUNTER
OK FOR [facility] pharmacy is requesting to verify middle initial. We have sent this over multiple times clarifying the middle initial we have is D. I have called pharmacy and they state they are still waiting on patient to verify the correct middle initial with them and that is why we are continuing to get this request form to clarify.      Please have patient call [phone] and use reference # [id number] to clarify which middle initial should be used.

## 2022-12-21 DIAGNOSIS — M15.9 PRIMARY OSTEOARTHRITIS INVOLVING MULTIPLE JOINTS: ICD-10-CM

## 2022-12-22 NOTE — TELEPHONE ENCOUNTER
Rx Refill Note  Requested Prescriptions     Pending Prescriptions Disp Refills   • gabapentin (NEURONTIN) 300 MG capsule [Pharmacy Med Name: GABAPENTIN CAP 300MG] 90 capsule 1     Sig: TAKE 1 CAPSULE EVERY NIGHT      Last office visit with prescribing clinician: 5/18/2022   Last telemedicine visit with prescribing clinician: 12/27/2022   Next office visit with prescribing clinician: Visit date not found                         Would you like a call back once the refill request has been completed: [] Yes [] No    If the office needs to give you a call back, can they leave a voicemail: [] Yes [] No    Corina Che MA  12/22/22, 06:54 EST

## 2022-12-23 RX ORDER — GABAPENTIN 300 MG/1
CAPSULE ORAL
Qty: 30 CAPSULE | Refills: 0 | Status: SHIPPED | OUTPATIENT
Start: 2022-12-23 | End: 2023-02-08 | Stop reason: SDUPTHER

## 2022-12-27 ENCOUNTER — CLINICAL SUPPORT (OUTPATIENT)
Dept: FAMILY MEDICINE CLINIC | Facility: CLINIC | Age: 77
End: 2022-12-27

## 2022-12-27 DIAGNOSIS — R53.83 FATIGUE, UNSPECIFIED TYPE: ICD-10-CM

## 2022-12-27 PROCEDURE — 96372 THER/PROPH/DIAG INJ SC/IM: CPT | Performed by: INTERNAL MEDICINE

## 2022-12-27 RX ADMIN — CYANOCOBALAMIN 1000 MCG: 1000 INJECTION, SOLUTION INTRAMUSCULAR; SUBCUTANEOUS at 13:07

## 2023-01-11 ENCOUNTER — CLINICAL SUPPORT (OUTPATIENT)
Dept: FAMILY MEDICINE CLINIC | Facility: CLINIC | Age: 78
End: 2023-01-11
Payer: MEDICARE

## 2023-01-11 DIAGNOSIS — D50.9 IRON DEFICIENCY ANEMIA, UNSPECIFIED IRON DEFICIENCY ANEMIA TYPE: ICD-10-CM

## 2023-01-11 PROCEDURE — 96372 THER/PROPH/DIAG INJ SC/IM: CPT | Performed by: INTERNAL MEDICINE

## 2023-01-11 RX ADMIN — CYANOCOBALAMIN 1000 MCG: 1000 INJECTION, SOLUTION INTRAMUSCULAR; SUBCUTANEOUS at 14:20

## 2023-01-11 NOTE — PROGRESS NOTES
Injection  Injection performed in RIGHT DELTOID by Marquita Tam MA. Patient tolerated the procedure well without complications.  01/11/23   Marquita Tam MA

## 2023-01-18 ENCOUNTER — CLINICAL SUPPORT (OUTPATIENT)
Dept: FAMILY MEDICINE CLINIC | Facility: CLINIC | Age: 78
End: 2023-01-18
Payer: MEDICARE

## 2023-01-18 DIAGNOSIS — R53.83 FATIGUE, UNSPECIFIED TYPE: ICD-10-CM

## 2023-01-18 PROCEDURE — 96372 THER/PROPH/DIAG INJ SC/IM: CPT | Performed by: INTERNAL MEDICINE

## 2023-01-18 RX ADMIN — CYANOCOBALAMIN 1000 MCG: 1000 INJECTION, SOLUTION INTRAMUSCULAR; SUBCUTANEOUS at 14:14

## 2023-01-18 NOTE — PROGRESS NOTES
Injection  Injection performed in left deltoid by Marquita Tam MA. Patient tolerated the procedure well without complications.  01/18/23   Marquita Tam MA

## 2023-01-24 DIAGNOSIS — K21.9 GASTROESOPHAGEAL REFLUX DISEASE: ICD-10-CM

## 2023-01-24 DIAGNOSIS — M15.9 PRIMARY OSTEOARTHRITIS INVOLVING MULTIPLE JOINTS: ICD-10-CM

## 2023-01-25 ENCOUNTER — CLINICAL SUPPORT (OUTPATIENT)
Dept: FAMILY MEDICINE CLINIC | Facility: CLINIC | Age: 78
End: 2023-01-25
Payer: MEDICARE

## 2023-01-25 DIAGNOSIS — R53.83 FATIGUE, UNSPECIFIED TYPE: ICD-10-CM

## 2023-01-25 PROCEDURE — 96372 THER/PROPH/DIAG INJ SC/IM: CPT | Performed by: INTERNAL MEDICINE

## 2023-01-25 RX ORDER — PANTOPRAZOLE SODIUM 40 MG/1
TABLET, DELAYED RELEASE ORAL
Qty: 90 TABLET | Refills: 1 | OUTPATIENT
Start: 2023-01-25

## 2023-01-25 RX ORDER — GABAPENTIN 300 MG/1
CAPSULE ORAL
Qty: 30 CAPSULE | Refills: 0 | OUTPATIENT
Start: 2023-01-25

## 2023-01-25 RX ADMIN — CYANOCOBALAMIN 1000 MCG: 1000 INJECTION, SOLUTION INTRAMUSCULAR; SUBCUTANEOUS at 14:28

## 2023-01-25 NOTE — PROGRESS NOTES
Injection  Injection performed in RIGHT DELTOID by Marquita Tam MA. Patient tolerated the procedure well without complications.  01/25/23   Marquita Tam MA

## 2023-01-25 NOTE — TELEPHONE ENCOUNTER
Rx Refill Note  Requested Prescriptions     Pending Prescriptions Disp Refills   • pantoprazole (PROTONIX) 40 MG EC tablet [Pharmacy Med Name: PANTOPRAZOLE TAB 40MG DR] 90 tablet 1     Sig: TAKE 1 TABLET DAILY   • gabapentin (NEURONTIN) 300 MG capsule [Pharmacy Med Name: GABAPENTIN CAP 300MG] 30 capsule 0     Sig: TAKE 1 CAPSULE EVERY NIGHT      Last office visit with prescribing clinician: 5/18/2022   Last telemedicine visit with prescribing clinician: 1/25/2023   Next office visit with prescribing clinician: Visit date not found                         Would you like a call back once the refill request has been completed: [] Yes [] No    If the office needs to give you a call back, can they leave a voicemail: [] Yes [] No    Dl Greene, ASHLYN/LMR  01/25/23, 07:59 EST

## 2023-02-01 ENCOUNTER — CLINICAL SUPPORT (OUTPATIENT)
Dept: FAMILY MEDICINE CLINIC | Facility: CLINIC | Age: 78
End: 2023-02-01
Payer: MEDICARE

## 2023-02-01 PROCEDURE — 96372 THER/PROPH/DIAG INJ SC/IM: CPT | Performed by: INTERNAL MEDICINE

## 2023-02-01 RX ADMIN — CYANOCOBALAMIN 1000 MCG: 1000 INJECTION, SOLUTION INTRAMUSCULAR; SUBCUTANEOUS at 14:31

## 2023-02-08 ENCOUNTER — OFFICE VISIT (OUTPATIENT)
Dept: FAMILY MEDICINE CLINIC | Facility: CLINIC | Age: 78
End: 2023-02-08
Payer: MEDICARE

## 2023-02-08 VITALS
BODY MASS INDEX: 29.25 KG/M2 | HEART RATE: 57 BPM | OXYGEN SATURATION: 97 % | HEIGHT: 60 IN | DIASTOLIC BLOOD PRESSURE: 76 MMHG | SYSTOLIC BLOOD PRESSURE: 136 MMHG | WEIGHT: 149 LBS

## 2023-02-08 DIAGNOSIS — K21.9 GASTROESOPHAGEAL REFLUX DISEASE: ICD-10-CM

## 2023-02-08 DIAGNOSIS — M15.9 PRIMARY OSTEOARTHRITIS INVOLVING MULTIPLE JOINTS: ICD-10-CM

## 2023-02-08 DIAGNOSIS — Z51.81 ENCOUNTER FOR MEDICATION MONITORING: Primary | ICD-10-CM

## 2023-02-08 DIAGNOSIS — I10 ESSENTIAL HYPERTENSION: Chronic | ICD-10-CM

## 2023-02-08 DIAGNOSIS — E78.2 MIXED HYPERLIPIDEMIA: ICD-10-CM

## 2023-02-08 PROBLEM — Z96.1 PSEUDOPHAKIA: Status: ACTIVE | Noted: 2021-12-06

## 2023-02-08 PROBLEM — E53.8 B12 DEFICIENCY: Status: ACTIVE | Noted: 2023-02-08

## 2023-02-08 PROBLEM — H20.9 IRIDOCYCLITIS: Status: ACTIVE | Noted: 2021-05-14

## 2023-02-08 PROBLEM — H35.372 EPIRETINAL MEMBRANE (ERM) OF LEFT EYE: Status: ACTIVE | Noted: 2021-01-15

## 2023-02-08 PROBLEM — H35.352 CYSTOID MACULAR EDEMA OF LEFT EYE: Status: ACTIVE | Noted: 2021-08-12

## 2023-02-08 PROBLEM — H43.822 VITREOMACULAR ADHESION OF LEFT EYE: Status: ACTIVE | Noted: 2021-08-12

## 2023-02-08 PROBLEM — H43.819 POSTERIOR VITREOUS DETACHMENT: Status: ACTIVE | Noted: 2020-11-19

## 2023-02-08 PROCEDURE — 99213 OFFICE O/P EST LOW 20 MIN: CPT | Performed by: INTERNAL MEDICINE

## 2023-02-08 RX ORDER — AMLODIPINE BESYLATE AND BENAZEPRIL HYDROCHLORIDE 10; 20 MG/1; MG/1
1 CAPSULE ORAL DAILY
Qty: 90 CAPSULE | Refills: 1 | Status: SHIPPED | OUTPATIENT
Start: 2023-02-08

## 2023-02-08 RX ORDER — GABAPENTIN 300 MG/1
300 CAPSULE ORAL NIGHTLY
Qty: 90 CAPSULE | Refills: 1 | Status: SHIPPED | OUTPATIENT
Start: 2023-02-08

## 2023-02-08 RX ORDER — ROSUVASTATIN CALCIUM 20 MG/1
20 TABLET, COATED ORAL DAILY
Qty: 90 TABLET | Refills: 1 | Status: SHIPPED | OUTPATIENT
Start: 2023-02-08

## 2023-02-08 RX ORDER — MELOXICAM 15 MG/1
15 TABLET ORAL DAILY PRN
Qty: 90 TABLET | Refills: 1 | Status: SHIPPED | OUTPATIENT
Start: 2023-02-08

## 2023-02-08 RX ORDER — PANTOPRAZOLE SODIUM 40 MG/1
40 TABLET, DELAYED RELEASE ORAL DAILY
Qty: 90 TABLET | Refills: 1 | Status: SHIPPED | OUTPATIENT
Start: 2023-02-08

## 2023-02-08 NOTE — PROGRESS NOTES
Subjective   Isabel Carlson is a 77 y.o. female.     Chief Complaint   Patient presents with   • Hypertension   • Pain     Med refill       History of Present Illness     Follow-up for hypertension.  Currently, has been feeling well and asymptomatic without any headaches, vision changes, cough, chest pain, shortness of breath, swelling, focal neurologic deficit, memory loss or syncope.  Has been taking the medications regularly and adherent with the regimen amlodipine-benazepril 10-20 and propranolol LA 80 mg.  Denies medication side effects and no significant interval events.       Follow-up for cholesterol.  Currently, has been feeling well without any myalgias, muscle aches, weakness, numbness, chest pain, short of breath or other issues.  Currently, is adherent with medication regimen of rosuvastatin 20 mg and denies medication side effects. Is due for lab follow-up.    The following portions of the patient's history were reviewed and updated as appropriate: allergies, current medications, past family history, past medical history, past social history, past surgical history and problem list.    Depression Screen:  PHQ-2/PHQ-9 Depression Screening 5/18/2022   Retired Total Score -   Little Interest or Pleasure in Doing Things 1-->several days   Feeling Down, Depressed or Hopeless 0-->not at all   PHQ-9: Brief Depression Severity Measure Score 1       Past Medical History:   Diagnosis Date   • Abnormal stress echocardiogram 07/20/2017    Patient had borderline EKG changes 5 minutes into walking and Dr. Cali Spencer recommended the patient not to have that same type of stress test in the future   • Adhesive capsulitis of right shoulder    • Age-related osteoporosis without fracture    • APC (atrial premature contractions) 7/20/2017   • Arthritis    • Carpal tunnel syndrome    • Cataract    • Chronic hip pain    • Fatigue    • Frequent episodes of pneumonia     PT STATES SEVERAL TIMES   • Heart murmur 7/20/2017   •  Left knee pain    • LVH (left ventricular hypertrophy) 7/20/2017   • Mixed hyperlipidemia 7/20/2017   • Non-rheumatic mitral regurgitation 07/20/2017    Mild to moderate per 2-D echocardiogram   • Non-rheumatic tricuspid valve insufficiency 07/20/2017    Mild to moderate per 2-D echocardiogram   • Osteoarthritis    • Osteoarthritis of both knees    • Osteoporosis    • Shoulder pain    • Stroke (HCC)    • TIA (transient ischemic attack) 10/15/2013    numbness on right side of face and hand; went to [facility]       Past Surgical History:   Procedure Laterality Date   • APPENDECTOMY     • CARDIAC CATHETERIZATION N/A 10/20/2017    Procedure: Right Heart Cath;  Surgeon: Christopher Kan MD;  Location: [facility] INVASIVE LOCATION;  Service:    • COLONOSCOPY     • REPLACEMENT TOTAL KNEE Left 06/12/2020   • SUBTOTAL HYSTERECTOMY     • TOTAL KNEE ARTHROPLASTY Left 6/12/2020    Procedure: TOTAL KNEE ARTHROPLASTY;  Surgeon: Jose Leary MD;  Location: [facility] OR;  Service: Orthopedics;  Laterality: Left;       Family History   Problem Relation Age of Onset   • Heart disease Mother    • Hypertension Mother    • Stroke Mother    • Arthritis Mother    • Heart disease Maternal Aunt    • Hypertension Maternal Aunt    • Hypertension Maternal Uncle    • Cancer Sister    • Cancer Other    • Breast cancer Cousin    • Malig Hyperthermia Neg Hx        Social History     Socioeconomic History   • Marital status:    Tobacco Use   • Smoking status: Never   • Smokeless tobacco: Never   Vaping Use   • Vaping Use: Never used   Substance and Sexual Activity   • Alcohol use: No     Comment: rarely   • Drug use: No   • Sexual activity: Defer       Current Outpatient Medications   Medication Sig Dispense Refill   • amLODIPine-benazepril (LOTREL) 10-20 MG per capsule Take 1 capsule by mouth Daily. 90 capsule 1   • cetirizine (zyrTEC) 10 MG tablet Take 10 mg by mouth Daily As Needed.  4   • cyclobenzaprine (FLEXERIL) 10 MG  tablet 1/2 to 1 tab every 8 hours as needed. 90 tablet 2   • fluticasone (FLONASE) 50 MCG/ACT nasal spray 2 sprays into the nostril(s) as directed by provider Daily As Needed.     • gabapentin (NEURONTIN) 300 MG capsule Take 1 capsule by mouth Every Night. 90 capsule 1   • HYDROcodone-acetaminophen (NORCO) 7.5-325 MG per tablet      • ibandronate (Boniva) 150 MG tablet Take 1 tablet by mouth Every 30 (Thirty) Days. 3 tablet 1   • meloxicam (MOBIC) 15 MG tablet Take 1 tablet by mouth Daily As Needed for Moderate Pain. 90 tablet 1   • pantoprazole (PROTONIX) 40 MG EC tablet Take 1 tablet by mouth Daily. 90 tablet 1   • propranolol LA (INDERAL LA) 80 MG 24 hr capsule TAKE 1 CAPSULE DAILY 90 capsule 1   • rosuvastatin (CRESTOR) 20 MG tablet Take 1 tablet by mouth Daily. 90 tablet 1     Current Facility-Administered Medications   Medication Dose Route Frequency Provider Last Rate Last Admin   • cyanocobalamin injection 1,000 mcg  1,000 mcg Intramuscular Weekly Cordell Holland MD   1,000 mcg at 02/01/23 1431       Review of Systems   Constitutional: Negative for activity change, appetite change, fatigue, fever, unexpected weight gain and unexpected weight loss.   HENT: Negative for nosebleeds, rhinorrhea, trouble swallowing and voice change.    Eyes: Negative for visual disturbance.   Respiratory: Negative for cough, chest tightness, shortness of breath and wheezing.    Cardiovascular: Negative for chest pain, palpitations and leg swelling.   Gastrointestinal: Negative for abdominal pain, blood in stool, constipation, diarrhea, nausea, vomiting, GERD and indigestion.   Genitourinary: Negative for dysuria, frequency and hematuria.   Musculoskeletal: Negative for arthralgias, back pain and myalgias.   Skin: Negative for rash and wound.   Neurological: Negative for dizziness, tremors, weakness, light-headedness, numbness, headache and memory problem.   Hematological: Negative for adenopathy. Does not bruise/bleed easily.  "  Psychiatric/Behavioral: Negative for sleep disturbance and depressed mood. The patient is not nervous/anxious.        Objective   /76 (BP Location: Right arm, Patient Position: Sitting, Cuff Size: Adult)   Pulse 57   Ht 152.4 cm (60\")   Wt 67.6 kg (149 lb)   SpO2 97%   BMI 29.10 kg/m²     Physical Exam  Vitals and nursing note reviewed.   Constitutional:       General: She is not in acute distress.     Appearance: She is well-developed. She is not diaphoretic.   HENT:      Head: Normocephalic and atraumatic.      Right Ear: External ear normal.      Left Ear: External ear normal.      Nose: Nose normal.   Eyes:      Conjunctiva/sclera: Conjunctivae normal.      Pupils: Pupils are equal, round, and reactive to light.   Neck:      Thyroid: No thyromegaly.      Trachea: No tracheal deviation.   Cardiovascular:      Rate and Rhythm: Normal rate and regular rhythm.      Heart sounds: Normal heart sounds. No murmur heard.    No friction rub. No gallop.   Pulmonary:      Effort: Pulmonary effort is normal. No respiratory distress.      Breath sounds: Normal breath sounds.   Abdominal:      General: Bowel sounds are normal.      Palpations: Abdomen is soft. There is no mass.      Tenderness: There is no abdominal tenderness. There is no guarding.   Musculoskeletal:         General: Normal range of motion.      Cervical back: Normal range of motion and neck supple.   Lymphadenopathy:      Cervical: No cervical adenopathy.   Skin:     General: Skin is warm and dry.      Capillary Refill: Capillary refill takes less than 2 seconds.      Findings: No rash.   Neurological:      Mental Status: She is alert and oriented to person, place, and time.      Motor: No abnormal muscle tone.      Deep Tendon Reflexes: Reflexes normal.   Psychiatric:         Behavior: Behavior normal.         Thought Content: Thought content normal.         Judgment: Judgment normal.       No results found for this or any previous visit (from " the past 2016 hour(s)).  Assessment & Plan   Diagnoses and all orders for this visit:    1. Encounter for medication monitoring (Primary)  -     ToxASSURE Select 13 (MW) - Urine, Clean Catch    2. Primary osteoarthritis involving multiple joints  -     gabapentin (NEURONTIN) 300 MG capsule; Take 1 capsule by mouth Every Night.  Dispense: 90 capsule; Refill: 1  -     meloxicam (MOBIC) 15 MG tablet; Take 1 tablet by mouth Daily As Needed for Moderate Pain.  Dispense: 90 tablet; Refill: 1    3. Essential hypertension  -     amLODIPine-benazepril (LOTREL) 10-20 MG per capsule; Take 1 capsule by mouth Daily.  Dispense: 90 capsule; Refill: 1    4. Mixed hyperlipidemia  -     rosuvastatin (CRESTOR) 20 MG tablet; Take 1 tablet by mouth Daily.  Dispense: 90 tablet; Refill: 1    5. Gastroesophageal reflux disease  -     pantoprazole (PROTONIX) 40 MG EC tablet; Take 1 tablet by mouth Daily.  Dispense: 90 tablet; Refill: 1    Hypertension continues to be controlled.  Continue the current medications unchanged.  GERD is stable meloxicam without difficulties and will continue to refill.  SONAL run and reviewed.  Risks of the medication gabapentin include but are not limited to fatigue, somnolence, increased risk of falls, allergic reaction, dependence, and addiction.  Patient is a follow-up in 6 months and we will do the Medicare wellness at that time along with all of her baseline labs.  Controlled substance agreement was reviewed from 2019 and tox screen obtained today.           · COVID-19 Precautions - Patient was compliant in wearing a mask. When I saw the patient, I used appropriate personal protective equipment (PPE) including mask and eye shield (standard procedure).  Additionally, I used gown and gloves if indicated.  Hand hygiene was completed before and after seeing the patient.  · Dictated utilizing Dragon Dictation

## 2023-02-15 ENCOUNTER — CLINICAL SUPPORT (OUTPATIENT)
Dept: FAMILY MEDICINE CLINIC | Facility: CLINIC | Age: 78
End: 2023-02-15
Payer: MEDICARE

## 2023-02-15 DIAGNOSIS — E53.8 B12 DEFICIENCY: ICD-10-CM

## 2023-02-15 LAB — DRUGS UR: NORMAL

## 2023-02-15 PROCEDURE — 96372 THER/PROPH/DIAG INJ SC/IM: CPT | Performed by: INTERNAL MEDICINE

## 2023-02-15 RX ADMIN — CYANOCOBALAMIN 1000 MCG: 1000 INJECTION, SOLUTION INTRAMUSCULAR; SUBCUTANEOUS at 14:22

## 2023-02-22 ENCOUNTER — CLINICAL SUPPORT (OUTPATIENT)
Dept: FAMILY MEDICINE CLINIC | Facility: CLINIC | Age: 78
End: 2023-02-22
Payer: MEDICARE

## 2023-02-22 DIAGNOSIS — E53.8 B12 DEFICIENCY: ICD-10-CM

## 2023-02-22 PROCEDURE — 96372 THER/PROPH/DIAG INJ SC/IM: CPT | Performed by: INTERNAL MEDICINE

## 2023-02-22 RX ADMIN — CYANOCOBALAMIN 1000 MCG: 1000 INJECTION, SOLUTION INTRAMUSCULAR; SUBCUTANEOUS at 14:19

## 2023-02-22 NOTE — PROGRESS NOTES
Injection  Injection performed in right deltoid by Marquita Tam MA. Patient tolerated the procedure well without complications.  02/22/23   Marquita Tam MA

## 2023-03-01 ENCOUNTER — CLINICAL SUPPORT (OUTPATIENT)
Dept: FAMILY MEDICINE CLINIC | Facility: CLINIC | Age: 78
End: 2023-03-01
Payer: MEDICARE

## 2023-03-01 DIAGNOSIS — E53.8 B12 DEFICIENCY: ICD-10-CM

## 2023-03-01 PROCEDURE — 96372 THER/PROPH/DIAG INJ SC/IM: CPT | Performed by: INTERNAL MEDICINE

## 2023-03-01 RX ADMIN — CYANOCOBALAMIN 1000 MCG: 1000 INJECTION, SOLUTION INTRAMUSCULAR; SUBCUTANEOUS at 14:36

## 2023-03-01 NOTE — PROGRESS NOTES
Injection  Injection performed in right deltoid by Marquita Tam MA. Patient tolerated the procedure well without complications.  03/01/23   Marquita Tam MA

## 2023-03-08 ENCOUNTER — OFFICE VISIT (OUTPATIENT)
Dept: FAMILY MEDICINE CLINIC | Facility: CLINIC | Age: 78
End: 2023-03-08
Payer: MEDICARE

## 2023-03-08 VITALS
WEIGHT: 151 LBS | HEIGHT: 60 IN | BODY MASS INDEX: 29.64 KG/M2 | HEART RATE: 53 BPM | DIASTOLIC BLOOD PRESSURE: 76 MMHG | SYSTOLIC BLOOD PRESSURE: 148 MMHG | OXYGEN SATURATION: 95 %

## 2023-03-08 DIAGNOSIS — I10 ESSENTIAL HYPERTENSION: Primary | Chronic | ICD-10-CM

## 2023-03-08 DIAGNOSIS — R06.02 SHORT OF BREATH ON EXERTION: ICD-10-CM

## 2023-03-08 DIAGNOSIS — E53.8 B12 DEFICIENCY: ICD-10-CM

## 2023-03-08 DIAGNOSIS — R53.83 FATIGUE, UNSPECIFIED TYPE: ICD-10-CM

## 2023-03-08 DIAGNOSIS — D50.9 IRON DEFICIENCY ANEMIA, UNSPECIFIED IRON DEFICIENCY ANEMIA TYPE: ICD-10-CM

## 2023-03-08 DIAGNOSIS — E78.2 MIXED HYPERLIPIDEMIA: ICD-10-CM

## 2023-03-08 DIAGNOSIS — M81.0 OSTEOPOROSIS OF LUMBAR SPINE: ICD-10-CM

## 2023-03-08 PROCEDURE — 99214 OFFICE O/P EST MOD 30 MIN: CPT | Performed by: INTERNAL MEDICINE

## 2023-03-08 PROCEDURE — 93000 ELECTROCARDIOGRAM COMPLETE: CPT | Performed by: INTERNAL MEDICINE

## 2023-03-08 PROCEDURE — 96372 THER/PROPH/DIAG INJ SC/IM: CPT | Performed by: INTERNAL MEDICINE

## 2023-03-08 RX ORDER — IBANDRONATE SODIUM 150 MG/1
150 TABLET, FILM COATED ORAL
Qty: 3 TABLET | Refills: 3 | Status: SHIPPED | OUTPATIENT
Start: 2023-03-08

## 2023-03-08 RX ADMIN — CYANOCOBALAMIN 1000 MCG: 1000 INJECTION, SOLUTION INTRAMUSCULAR; SUBCUTANEOUS at 16:39

## 2023-03-08 NOTE — PROGRESS NOTES
Subjective   Isbael Carlson is a 77 y.o. female.     Chief Complaint   Patient presents with   • Foot Swelling     Recommendation for a foot doctor   • Shortness of Breath     No chest pain, when she goes up stairs   • Fatigue       History of Present Illness   Having swelling in the feet and ankles.  Used to resolve overnight when asleep but is now staying swollen for the last month that is new.  No pain, drainage, redness, palpitations, CP, N, V.  Is having short of breath with walking and has to stop after 1 mile and after 9-10 steps.  No wheezing or noises.  Feels fatigued all the time and not sleeping well with not fall asleep and awakens with mind racing.  Can lay flat on bed and no SOA.    Has concerns of the feeling like cardboard crunching in the distal aspect of the plantar surface of her foot.  No rashes sores or pain just a strange sensation and wants to see a podiatrist.    The following portions of the patient's history were reviewed and updated as appropriate: allergies, current medications, past family history, past medical history, past social history, past surgical history and problem list.    Depression Screen:  PHQ-2/PHQ-9 Depression Screening 5/18/2022   Retired Total Score -   Little Interest or Pleasure in Doing Things 1-->several days   Feeling Down, Depressed or Hopeless 0-->not at all   PHQ-9: Brief Depression Severity Measure Score 1       Past Medical History:   Diagnosis Date   • Abnormal stress echocardiogram 07/20/2017    Patient had borderline EKG changes 5 minutes into walking and Dr. Cali Spencer recommended the patient not to have that same type of stress test in the future   • Adhesive capsulitis of right shoulder    • Age-related osteoporosis without fracture    • APC (atrial premature contractions) 7/20/2017   • Arthritis    • Carpal tunnel syndrome    • Cataract    • Chronic hip pain    • Fatigue    • Frequent episodes of pneumonia     PT STATES SEVERAL TIMES   • Heart murmur  7/20/2017   • Left knee pain    • LVH (left ventricular hypertrophy) 7/20/2017   • Mixed hyperlipidemia 7/20/2017   • Non-rheumatic mitral regurgitation 07/20/2017    Mild to moderate per 2-D echocardiogram   • Non-rheumatic tricuspid valve insufficiency 07/20/2017    Mild to moderate per 2-D echocardiogram   • Osteoarthritis    • Osteoarthritis of both knees    • Osteoporosis    • Shoulder pain    • Stroke (HCC)    • TIA (transient ischemic attack) 10/15/2013    numbness on right side of face and hand; went to [facility] [place], operated by [facility]       Past Surgical History:   Procedure Laterality Date   • APPENDECTOMY     • CARDIAC CATHETERIZATION N/A 10/20/2017    Procedure: Right Heart Cath;  Surgeon: Christopher Kan MD;  Location: [facility] CATH INVASIVE LOCATION;  Service:    • COLONOSCOPY     • REPLACEMENT TOTAL KNEE Left 06/12/2020   • SUBTOTAL HYSTERECTOMY     • TOTAL KNEE ARTHROPLASTY Left 6/12/2020    Procedure: TOTAL KNEE ARTHROPLASTY;  Surgeon: Jose Leary MD;  Location: [facility] OR;  Service: Orthopedics;  Laterality: Left;       Family History   Problem Relation Age of Onset   • Heart disease Mother    • Hypertension Mother    • Stroke Mother    • Arthritis Mother    • Heart disease Maternal Aunt    • Hypertension Maternal Aunt    • Hypertension Maternal Uncle    • Cancer Sister    • Cancer Other    • Breast cancer Cousin    • Malig Hyperthermia Neg Hx        Social History     Socioeconomic History   • Marital status:    Tobacco Use   • Smoking status: Never   • Smokeless tobacco: Never   Vaping Use   • Vaping Use: Never used   Substance and Sexual Activity   • Alcohol use: No     Comment: rarely   • Drug use: No   • Sexual activity: Defer       Current Outpatient Medications   Medication Sig Dispense Refill   • amLODIPine-benazepril (LOTREL) 10-20 MG per capsule Take 1 capsule by mouth Daily. 90 capsule 1   • cetirizine (zyrTEC) 10 MG tablet Take 1 tablet by mouth Daily As Needed.  4   • fluticasone  (FLONASE) 50 MCG/ACT nasal spray 2 sprays into the nostril(s) as directed by provider Daily As Needed.     • gabapentin (NEURONTIN) 300 MG capsule Take 1 capsule by mouth Every Night. 90 capsule 1   • ibandronate (Boniva) 150 MG tablet Take 1 tablet by mouth Every 30 (Thirty) Days. 3 tablet 3   • meloxicam (MOBIC) 15 MG tablet Take 1 tablet by mouth Daily As Needed for Moderate Pain. 90 tablet 1   • pantoprazole (PROTONIX) 40 MG EC tablet Take 1 tablet by mouth Daily. 90 tablet 1   • propranolol LA (INDERAL LA) 80 MG 24 hr capsule TAKE 1 CAPSULE DAILY 90 capsule 1   • rosuvastatin (CRESTOR) 20 MG tablet Take 1 tablet by mouth Daily. 90 tablet 1   • cyclobenzaprine (FLEXERIL) 10 MG tablet 1/2 to 1 tab every 8 hours as needed. 90 tablet 2   • HYDROcodone-acetaminophen (NORCO) 7.5-325 MG per tablet        Current Facility-Administered Medications   Medication Dose Route Frequency Provider Last Rate Last Admin   • cyanocobalamin injection 1,000 mcg  1,000 mcg Intramuscular Weekly SkylerCordell MD   1,000 mcg at 03/08/23 1639       Review of Systems   Constitutional: Positive for fatigue. Negative for activity change, appetite change, fever, unexpected weight gain and unexpected weight loss.   HENT: Negative for nosebleeds, rhinorrhea, trouble swallowing and voice change.    Eyes: Negative for visual disturbance.   Respiratory: Positive for shortness of breath. Negative for cough, chest tightness and wheezing.    Cardiovascular: Negative for chest pain, palpitations and leg swelling.        Foot and leg swelling bilateral.   Gastrointestinal: Negative for abdominal pain, blood in stool, constipation, diarrhea, nausea, vomiting, GERD and indigestion.   Genitourinary: Negative for dysuria, frequency and hematuria.   Musculoskeletal: Negative for arthralgias, back pain and myalgias.   Skin: Negative for rash and wound.   Neurological: Negative for dizziness, tremors, weakness, light-headedness, numbness, headache and  "memory problem.   Hematological: Negative for adenopathy. Does not bruise/bleed easily.   Psychiatric/Behavioral: Negative for sleep disturbance and depressed mood. The patient is not nervous/anxious.        Objective   /76 (BP Location: Right arm, Patient Position: Sitting, Cuff Size: Adult)   Pulse 53   Ht 152.4 cm (60\")   Wt 68.5 kg (151 lb)   SpO2 95%   BMI 29.49 kg/m²     Physical Exam  Vitals and nursing note reviewed.   Constitutional:       General: She is not in acute distress.     Appearance: She is well-developed. She is not diaphoretic.   HENT:      Head: Normocephalic and atraumatic.      Right Ear: External ear normal.      Left Ear: External ear normal.      Nose: Nose normal.   Eyes:      Conjunctiva/sclera: Conjunctivae normal.      Pupils: Pupils are equal, round, and reactive to light.   Neck:      Thyroid: No thyromegaly.      Trachea: No tracheal deviation.   Cardiovascular:      Rate and Rhythm: Normal rate and regular rhythm.      Heart sounds: Normal heart sounds. No murmur heard.    No friction rub. No gallop.   Pulmonary:      Effort: Pulmonary effort is normal. No respiratory distress.      Breath sounds: Normal breath sounds.   Abdominal:      General: Bowel sounds are normal.      Palpations: Abdomen is soft. There is no mass.      Tenderness: There is no abdominal tenderness. There is no guarding.   Musculoskeletal:         General: Normal range of motion.      Cervical back: Normal range of motion and neck supple.   Lymphadenopathy:      Cervical: No cervical adenopathy.   Skin:     General: Skin is warm and dry.      Capillary Refill: Capillary refill takes less than 2 seconds.      Findings: No rash.   Neurological:      Mental Status: She is alert and oriented to person, place, and time.      Motor: No abnormal muscle tone.      Deep Tendon Reflexes: Reflexes normal.   Psychiatric:         Behavior: Behavior normal.         Thought Content: Thought content normal.         " Judgment: Judgment normal.         Recent Results (from the past 2016 hour(s))   ToxASSURE Select 13 (MW) - Urine, Clean Catch    Collection Time: 02/08/23  3:54 PM    Specimen: Urine, Clean Catch   Result Value Ref Range    Report Summary FINAL      ECG 12 Lead    Date/Time: 3/8/2023 3:31 PM  Performed by: Cordell Holland MD  Authorized by: Cordell Holland MD   Comparison: compared with previous ECG from 6/2/2020  Comparison to previous ECG: Sinus bradycardia that is new otherwise unchanged with persistent LVH and Q waves in the V1 and V2 likely secondary to LVH.  Rhythm: sinus bradycardia  Q waves: V1 and V2    ST Segments: ST segments normal  T Waves: T waves normal  Other findings: left ventricular hypertrophy    Clinical impression: abnormal EKG          Assessment & Plan   Diagnoses and all orders for this visit:    1. Essential hypertension (Primary)  -     Comprehensive Metabolic Panel  -     Lipid Panel    2. Mixed hyperlipidemia  -     Comprehensive Metabolic Panel  -     Lipid Panel    3. B12 deficiency  -     Vitamin B12    4. Iron deficiency anemia, unspecified iron deficiency anemia type  -     CBC & Differential    5. Fatigue, unspecified type  -     ECG 12 Lead  -     CBC & Differential  -     TSH Rfx On Abnormal To Free T4  -     Vitamin B12    6. Short of breath on exertion  -     ECG 12 Lead  -     CBC & Differential    7. Osteoporosis of lumbar spine  -     ibandronate (Boniva) 150 MG tablet; Take 1 tablet by mouth Every 30 (Thirty) Days.  Dispense: 3 tablet; Refill: 3    Hypertension with some borderline elevated systolic blood pressure today.  We will check the labs as noted above.  We will also check the B12 levels and CBC with the fatigue B12 deficiency and iron deficiency.  She has shortness of breath with an EKG findings of sinus bradycardia but is unchanged otherwise compared to 6/2/2020.  There is persistent LVH and Q waves in V1 and V2 that may be secondary to the LVH.  If all labs  are negative and persisting shortness of breath then will refer to cardiology for further evaluation.  If she is having chest pain or worsening problems is to go emergency room.           · COVID-19 Precautions - Patient was compliant in wearing a mask. When I saw the patient, I used appropriate personal protective equipment (PPE) including mask and eye shield (standard procedure).  Additionally, I used gown and gloves if indicated.  Hand hygiene was completed before and after seeing the patient.  · Dictated utilizing Dragon Dictation

## 2023-03-09 DIAGNOSIS — R94.31 ABNORMAL EKG: Primary | ICD-10-CM

## 2023-03-09 DIAGNOSIS — R06.02 SHORTNESS OF BREATH: ICD-10-CM

## 2023-03-09 DIAGNOSIS — R53.83 OTHER FATIGUE: ICD-10-CM

## 2023-03-09 LAB
ALBUMIN SERPL-MCNC: 4.4 G/DL (ref 3.5–5.2)
ALBUMIN/GLOB SERPL: 1.6 G/DL
ALP SERPL-CCNC: 129 U/L (ref 39–117)
ALT SERPL-CCNC: 13 U/L (ref 1–33)
AST SERPL-CCNC: 17 U/L (ref 1–32)
BASOPHILS # BLD AUTO: 0.02 10*3/MM3 (ref 0–0.2)
BASOPHILS NFR BLD AUTO: 0.3 % (ref 0–1.5)
BILIRUB SERPL-MCNC: 0.6 MG/DL (ref 0–1.2)
BUN SERPL-MCNC: 17 MG/DL (ref 8–23)
BUN/CREAT SERPL: 23.9 (ref 7–25)
CALCIUM SERPL-MCNC: 9.5 MG/DL (ref 8.6–10.5)
CHLORIDE SERPL-SCNC: 106 MMOL/L (ref 98–107)
CHOLEST SERPL-MCNC: 143 MG/DL (ref 0–200)
CO2 SERPL-SCNC: 24.8 MMOL/L (ref 22–29)
CREAT SERPL-MCNC: 0.71 MG/DL (ref 0.57–1)
EGFRCR SERPLBLD CKD-EPI 2021: 86.6 ML/MIN/1.73
EOSINOPHIL # BLD AUTO: 0.09 10*3/MM3 (ref 0–0.4)
EOSINOPHIL NFR BLD AUTO: 1.4 % (ref 0.3–6.2)
ERYTHROCYTE [DISTWIDTH] IN BLOOD BY AUTOMATED COUNT: 12.6 % (ref 12.3–15.4)
GLOBULIN SER CALC-MCNC: 2.7 GM/DL
GLUCOSE SERPL-MCNC: 82 MG/DL (ref 65–99)
HCT VFR BLD AUTO: 36.5 % (ref 34–46.6)
HDLC SERPL-MCNC: 70 MG/DL (ref 40–60)
HGB BLD-MCNC: 12.2 G/DL (ref 12–15.9)
IMM GRANULOCYTES # BLD AUTO: 0.01 10*3/MM3 (ref 0–0.05)
IMM GRANULOCYTES NFR BLD AUTO: 0.2 % (ref 0–0.5)
LDLC SERPL CALC-MCNC: 62 MG/DL (ref 0–100)
LYMPHOCYTES # BLD AUTO: 1.84 10*3/MM3 (ref 0.7–3.1)
LYMPHOCYTES NFR BLD AUTO: 28 % (ref 19.6–45.3)
MCH RBC QN AUTO: 29.6 PG (ref 26.6–33)
MCHC RBC AUTO-ENTMCNC: 33.4 G/DL (ref 31.5–35.7)
MCV RBC AUTO: 88.6 FL (ref 79–97)
MONOCYTES # BLD AUTO: 0.43 10*3/MM3 (ref 0.1–0.9)
MONOCYTES NFR BLD AUTO: 6.6 % (ref 5–12)
NEUTROPHILS # BLD AUTO: 4.17 10*3/MM3 (ref 1.7–7)
NEUTROPHILS NFR BLD AUTO: 63.5 % (ref 42.7–76)
NRBC BLD AUTO-RTO: 0 /100 WBC (ref 0–0.2)
PLATELET # BLD AUTO: 259 10*3/MM3 (ref 140–450)
POTASSIUM SERPL-SCNC: 4.5 MMOL/L (ref 3.5–5.2)
PROT SERPL-MCNC: 7.1 G/DL (ref 6–8.5)
RBC # BLD AUTO: 4.12 10*6/MM3 (ref 3.77–5.28)
SODIUM SERPL-SCNC: 142 MMOL/L (ref 136–145)
T4 FREE SERPL-MCNC: 1.22 NG/DL (ref 0.93–1.7)
TRIGL SERPL-MCNC: 49 MG/DL (ref 0–150)
TSH SERPL DL<=0.005 MIU/L-ACNC: 0.38 UIU/ML (ref 0.27–4.2)
VIT B12 SERPL-MCNC: >2000 PG/ML (ref 211–946)
VLDLC SERPL CALC-MCNC: 11 MG/DL (ref 5–40)
WBC # BLD AUTO: 6.56 10*3/MM3 (ref 3.4–10.8)

## 2023-03-13 ENCOUNTER — TELEPHONE (OUTPATIENT)
Dept: FAMILY MEDICINE CLINIC | Facility: CLINIC | Age: 78
End: 2023-03-13

## 2023-03-13 NOTE — TELEPHONE ENCOUNTER
Caller: Isabel Carlson    Relationship: Self    Best call back number: [phone]    What is the best time to reach you: ANYTIME    Who are you requesting to speak with (clinical staff, provider,  specific staff member): CLINICAL    What was the call regarding: PATIENT STATES THE ibandronate (Boniva) 150 MG tablet MEDICATION HAS A LOT OF SIDE EFFECTS AND WOULD LIKE TO SPEAK WITH DR. GOLDMAN.     PLEASE CALL PATIENT BACK.

## 2023-03-16 ENCOUNTER — CLINICAL SUPPORT (OUTPATIENT)
Dept: FAMILY MEDICINE CLINIC | Facility: CLINIC | Age: 78
End: 2023-03-16
Payer: MEDICARE

## 2023-03-16 DIAGNOSIS — E53.8 B12 DEFICIENCY: ICD-10-CM

## 2023-03-16 PROCEDURE — 96372 THER/PROPH/DIAG INJ SC/IM: CPT | Performed by: INTERNAL MEDICINE

## 2023-03-16 RX ADMIN — CYANOCOBALAMIN 1000 MCG: 1000 INJECTION, SOLUTION INTRAMUSCULAR; SUBCUTANEOUS at 14:10

## 2023-03-19 ENCOUNTER — APPOINTMENT (OUTPATIENT)
Dept: GENERAL RADIOLOGY | Facility: HOSPITAL | Age: 78
End: 2023-03-19
Payer: MEDICARE

## 2023-03-19 ENCOUNTER — HOSPITAL ENCOUNTER (EMERGENCY)
Facility: HOSPITAL | Age: 78
Discharge: HOME | End: 2023-03-19
Attending: EMERGENCY MEDICINE | Admitting: EMERGENCY MEDICINE
Payer: MEDICARE

## 2023-03-19 VITALS
OXYGEN SATURATION: 98 % | HEART RATE: 76 BPM | RESPIRATION RATE: 16 BRPM | BODY MASS INDEX: 29.64 KG/M2 | HEIGHT: 60 IN | TEMPERATURE: 97.4 F | DIASTOLIC BLOOD PRESSURE: 84 MMHG | SYSTOLIC BLOOD PRESSURE: 147 MMHG | WEIGHT: 151 LBS

## 2023-03-19 DIAGNOSIS — J20.6 ACUTE BRONCHITIS DUE TO RHINOVIRUS: Primary | ICD-10-CM

## 2023-03-19 LAB
ALBUMIN SERPL-MCNC: 3.7 G/DL (ref 3.5–5.2)
ALBUMIN/GLOB SERPL: 1.1 G/DL
ALP SERPL-CCNC: 170 U/L (ref 39–117)
ALT SERPL W P-5'-P-CCNC: 57 U/L (ref 1–33)
ANION GAP SERPL CALCULATED.3IONS-SCNC: 12.3 MMOL/L (ref 5–15)
AST SERPL-CCNC: 37 U/L (ref 1–32)
B PARAPERT DNA SPEC QL NAA+PROBE: NOT DETECTED
B PERT DNA SPEC QL NAA+PROBE: NOT DETECTED
BASOPHILS # BLD AUTO: 0.02 10*3/MM3 (ref 0–0.2)
BASOPHILS NFR BLD AUTO: 0.5 % (ref 0–1.5)
BILIRUB SERPL-MCNC: 0.6 MG/DL (ref 0–1.2)
BUN SERPL-MCNC: 8 MG/DL (ref 8–23)
BUN/CREAT SERPL: 13.6 (ref 7–25)
C PNEUM DNA NPH QL NAA+NON-PROBE: NOT DETECTED
CALCIUM SPEC-SCNC: 9.2 MG/DL (ref 8.6–10.5)
CHLORIDE SERPL-SCNC: 107 MMOL/L (ref 98–107)
CO2 SERPL-SCNC: 24.7 MMOL/L (ref 22–29)
CREAT SERPL-MCNC: 0.59 MG/DL (ref 0.57–1)
DEPRECATED RDW RBC AUTO: 44.8 FL (ref 37–54)
EGFRCR SERPLBLD CKD-EPI 2021: 91.8 ML/MIN/1.73
EOSINOPHIL # BLD AUTO: 0.03 10*3/MM3 (ref 0–0.4)
EOSINOPHIL NFR BLD AUTO: 0.7 % (ref 0.3–6.2)
ERYTHROCYTE [DISTWIDTH] IN BLOOD BY AUTOMATED COUNT: 13.2 % (ref 12.3–15.4)
FLUAV SUBTYP SPEC NAA+PROBE: NOT DETECTED
FLUBV RNA ISLT QL NAA+PROBE: NOT DETECTED
GLOBULIN UR ELPH-MCNC: 3.4 GM/DL
GLUCOSE SERPL-MCNC: 102 MG/DL (ref 65–99)
HADV DNA SPEC NAA+PROBE: NOT DETECTED
HCOV 229E RNA SPEC QL NAA+PROBE: NOT DETECTED
HCOV HKU1 RNA SPEC QL NAA+PROBE: NOT DETECTED
HCOV NL63 RNA SPEC QL NAA+PROBE: NOT DETECTED
HCOV OC43 RNA SPEC QL NAA+PROBE: NOT DETECTED
HCT VFR BLD AUTO: 39.1 % (ref 34–46.6)
HGB BLD-MCNC: 12.5 G/DL (ref 12–15.9)
HMPV RNA NPH QL NAA+NON-PROBE: NOT DETECTED
HPIV1 RNA ISLT QL NAA+PROBE: NOT DETECTED
HPIV2 RNA SPEC QL NAA+PROBE: NOT DETECTED
HPIV3 RNA NPH QL NAA+PROBE: NOT DETECTED
HPIV4 P GENE NPH QL NAA+PROBE: NOT DETECTED
IMM GRANULOCYTES # BLD AUTO: 0.01 10*3/MM3 (ref 0–0.05)
IMM GRANULOCYTES NFR BLD AUTO: 0.2 % (ref 0–0.5)
LYMPHOCYTES # BLD AUTO: 1.17 10*3/MM3 (ref 0.7–3.1)
LYMPHOCYTES NFR BLD AUTO: 28.6 % (ref 19.6–45.3)
M PNEUMO IGG SER IA-ACNC: NOT DETECTED
MCH RBC QN AUTO: 29.2 PG (ref 26.6–33)
MCHC RBC AUTO-ENTMCNC: 32 G/DL (ref 31.5–35.7)
MCV RBC AUTO: 91.4 FL (ref 79–97)
MONOCYTES # BLD AUTO: 0.61 10*3/MM3 (ref 0.1–0.9)
MONOCYTES NFR BLD AUTO: 14.9 % (ref 5–12)
NEUTROPHILS NFR BLD AUTO: 2.25 10*3/MM3 (ref 1.7–7)
NEUTROPHILS NFR BLD AUTO: 55.1 % (ref 42.7–76)
NRBC BLD AUTO-RTO: 0 /100 WBC (ref 0–0.2)
NT-PROBNP SERPL-MCNC: 278 PG/ML (ref 0–1800)
PLATELET # BLD AUTO: 227 10*3/MM3 (ref 140–450)
PMV BLD AUTO: 11.1 FL (ref 6–12)
POTASSIUM SERPL-SCNC: 4 MMOL/L (ref 3.5–5.2)
PROT SERPL-MCNC: 7.1 G/DL (ref 6–8.5)
QT INTERVAL: 402 MS
RBC # BLD AUTO: 4.28 10*6/MM3 (ref 3.77–5.28)
RHINOVIRUS RNA SPEC NAA+PROBE: DETECTED
RSV RNA NPH QL NAA+NON-PROBE: NOT DETECTED
SARS-COV-2 RNA NPH QL NAA+NON-PROBE: NOT DETECTED
SODIUM SERPL-SCNC: 144 MMOL/L (ref 136–145)
TROPONIN T SERPL HS-MCNC: 6 NG/L
WBC NRBC COR # BLD: 4.09 10*3/MM3 (ref 3.4–10.8)

## 2023-03-19 PROCEDURE — 71045 X-RAY EXAM CHEST 1 VIEW: CPT

## 2023-03-19 PROCEDURE — 83880 ASSAY OF NATRIURETIC PEPTIDE: CPT | Performed by: EMERGENCY MEDICINE

## 2023-03-19 PROCEDURE — 0202U NFCT DS 22 TRGT SARS-COV-2: CPT | Performed by: EMERGENCY MEDICINE

## 2023-03-19 PROCEDURE — 93010 ELECTROCARDIOGRAM REPORT: CPT | Performed by: INTERNAL MEDICINE

## 2023-03-19 PROCEDURE — 36415 COLL VENOUS BLD VENIPUNCTURE: CPT

## 2023-03-19 PROCEDURE — 99283 EMERGENCY DEPT VISIT LOW MDM: CPT

## 2023-03-19 PROCEDURE — 25010000002 FUROSEMIDE PER 20 MG: Performed by: EMERGENCY MEDICINE

## 2023-03-19 PROCEDURE — 96374 THER/PROPH/DIAG INJ IV PUSH: CPT

## 2023-03-19 PROCEDURE — 80053 COMPREHEN METABOLIC PANEL: CPT | Performed by: EMERGENCY MEDICINE

## 2023-03-19 PROCEDURE — 93005 ELECTROCARDIOGRAM TRACING: CPT | Performed by: EMERGENCY MEDICINE

## 2023-03-19 PROCEDURE — 84484 ASSAY OF TROPONIN QUANT: CPT | Performed by: EMERGENCY MEDICINE

## 2023-03-19 PROCEDURE — 85025 COMPLETE CBC W/AUTO DIFF WBC: CPT | Performed by: EMERGENCY MEDICINE

## 2023-03-19 RX ORDER — SODIUM CHLORIDE 0.9 % (FLUSH) 0.9 %
10 SYRINGE (ML) INJECTION AS NEEDED
Status: DISCONTINUED | OUTPATIENT
Start: 2023-03-19 | End: 2023-03-19 | Stop reason: HOSPADM

## 2023-03-19 RX ORDER — FUROSEMIDE 10 MG/ML
40 INJECTION INTRAMUSCULAR; INTRAVENOUS ONCE
Status: COMPLETED | OUTPATIENT
Start: 2023-03-19 | End: 2023-03-19

## 2023-03-19 RX ORDER — METHYLPREDNISOLONE 4 MG/1
TABLET ORAL
Qty: 21 EACH | Refills: 0 | Status: SHIPPED | OUTPATIENT
Start: 2023-03-19

## 2023-03-19 RX ORDER — GUAIFENESIN DEXTROMETHORPHAN HYDROBROMIDE ORAL SOLUTION 10; 100 MG/5ML; MG/5ML
5 SOLUTION ORAL EVERY 12 HOURS
Qty: 180 ML | Refills: 0 | Status: SHIPPED | OUTPATIENT
Start: 2023-03-19

## 2023-03-19 RX ADMIN — FUROSEMIDE 40 MG: 10 INJECTION, SOLUTION INTRAMUSCULAR; INTRAVENOUS at 15:01

## 2023-03-19 NOTE — ED PROVIDER NOTES
EMERGENCY DEPARTMENT ENCOUNTER    Room Number:  36/36  Date seen:  3/19/2023  PCP: Cordell Holland MD  Historian: Patient      HPI:  Chief Complaint: Cough    Context: Isabel Carlson is a 77 y.o. female who presents to the ED c/o 2 days of cough with shortness of breath, fevers and chills and chest soreness with coughing.  The patient states that her fever has now resolved but she continues to have a dry cough.  The patient denies nausea, radiation of pain, lower extremity swelling or lower extremity pain.  The patient denies any known ill contacts.  She states she had a negative COVID test at home.      PAST MEDICAL HISTORY  Active Ambulatory Problems     Diagnosis Date Noted   • Heart murmur 07/20/2017   • Abnormal EKG 07/20/2017   • APC (atrial premature contractions) 07/20/2017   • Non-rheumatic mitral regurgitation 07/20/2017   • Non-rheumatic tricuspid valve insufficiency 07/20/2017   • LVH (left ventricular hypertrophy) 07/20/2017   • Gastroesophageal reflux disease 07/20/2017   • Essential hypertension 07/20/2017   • Mixed hyperlipidemia 07/20/2017   • Osteoporosis of lumbar spine 07/31/2019   • Menopause 07/31/2019   • Osteoarthritis 11/20/2019   • Lip laceration 03/24/2020   • Status post left knee replacement 06/12/2020   • Fatigue 07/14/2020   • Iron deficiency anemia 07/14/2020   • Abnormal serum thyroid stimulating hormone (TSH) level 07/14/2020   • Other headache syndrome 11/03/2020   • Medicare annual wellness visit, subsequent 02/02/2021   • Upper respiratory tract infection due to COVID-19 virus 01/10/2022   • Herpes zoster vaccination declined 05/18/2022   • Vitreomacular adhesion of left eye 08/12/2021   • Pseudophakia 12/06/2021   • Posterior vitreous detachment 11/19/2020   • Iridocyclitis 05/14/2021   • Epiretinal membrane (ERM) of left eye 01/15/2021   • Cystoid macular edema of left eye 08/12/2021   • B12 deficiency 02/08/2023     Resolved Ambulatory Problems     Diagnosis Date Noted    • Pulmonary HTN (HCC) 10/09/2017   • Primary osteoarthritis of left knee 05/20/2020   • Primary osteoarthritis of left knee 06/13/2020     Past Medical History:   Diagnosis Date   • Abnormal stress echocardiogram 07/20/2017   • Adhesive capsulitis of right shoulder    • Age-related osteoporosis without fracture    • Arthritis    • Carpal tunnel syndrome    • Cataract    • Chronic hip pain    • Frequent episodes of pneumonia    • Left knee pain    • Osteoarthritis of both knees    • Osteoporosis    • Shoulder pain    • Stroke (HCC)    • TIA (transient ischemic attack) 10/15/2013         REVIEW OF SYSTEMS  All systems reviewed and negative except for those discussed in HPI.       PAST SURGICAL HISTORY  Past Surgical History:   Procedure Laterality Date   • APPENDECTOMY     • CARDIAC CATHETERIZATION N/A 10/20/2017    Procedure: Right Heart Cath;  Surgeon: Christopher Kan MD;  Location: [facility] INVASIVE LOCATION;  Service:    • COLONOSCOPY     • REPLACEMENT TOTAL KNEE Left 06/12/2020   • SUBTOTAL HYSTERECTOMY     • TOTAL KNEE ARTHROPLASTY Left 6/12/2020    Procedure: TOTAL KNEE ARTHROPLASTY;  Surgeon: Jose Leary MD;  Location: [facility] OR;  Service: Orthopedics;  Laterality: Left;         FAMILY HISTORY  Family History   Problem Relation Age of Onset   • Heart disease Mother    • Hypertension Mother    • Stroke Mother    • Arthritis Mother    • Heart disease Maternal Aunt    • Hypertension Maternal Aunt    • Hypertension Maternal Uncle    • Cancer Sister    • Cancer Other    • Breast cancer Cousin    • Malig Hyperthermia Neg Hx          SOCIAL HISTORY  Social History     Socioeconomic History   • Marital status:    Tobacco Use   • Smoking status: Never   • Smokeless tobacco: Never   Vaping Use   • Vaping Use: Never used   Substance and Sexual Activity   • Alcohol use: No     Comment: rarely   • Drug use: No   • Sexual activity: Defer         ALLERGIES  Patient has no known  allergies.      PHYSICAL EXAM  ED Triage Vitals [03/19/23 1303]   Temp Heart Rate Resp BP SpO2   97.4 °F (36.3 °C) 77 16 -- 93 %      Temp src Heart Rate Source Patient Position BP Location FiO2 (%)   Tympanic -- -- -- --       Physical Exam      GENERAL: 77-year-old female in no acute distress  HENT: NCAT: nares patent: Neck supple  EYES: no scleral icterus  CV: regular rhythm, normal rate: Mild reproducible chest wall tenderness  RESPIRATORY: normal effort  ABDOMEN: soft, NTND: Bowel sounds positive  MUSCULOSKELETAL: no deformity  NEURO: alert with nonfocal neuro exam  PSYCH:  calm, cooperative  SKIN: warm, dry    Vital signs and nursing notes reviewed.    PPE pt does not present with symptoms for COVID19; however, I was wearing a N95 mask and goggles throughout all patient interaction.    LAB RESULTS  Recent Results (from the past 24 hour(s))   ECG 12 Lead Chest Pain    Collection Time: 03/19/23  1:09 PM   Result Value Ref Range    QT Interval 402 ms   Respiratory Panel PCR w/COVID-19(SARS-CoV-2) [facility] In-House, NP Swab in UTM/VTM, 3-4 HR TAT - Swab, Nasopharynx    Collection Time: 03/19/23  1:49 PM    Specimen: Nasopharynx; Swab   Result Value Ref Range    ADENOVIRUS, PCR Not Detected Not Detected    Coronavirus 229E Not Detected Not Detected    Coronavirus HKU1 Not Detected Not Detected    Coronavirus NL63 Not Detected Not Detected    Coronavirus OC43 Not Detected Not Detected    COVID19 Not Detected Not Detected - Ref. Range    Human Metapneumovirus Not Detected Not Detected    Human Rhinovirus/Enterovirus Detected (A) Not Detected    Influenza A PCR Not Detected Not Detected    Influenza B PCR Not Detected Not Detected    Parainfluenza Virus 1 Not Detected Not Detected    Parainfluenza Virus 2 Not Detected Not Detected    Parainfluenza Virus 3 Not Detected Not Detected    Parainfluenza Virus 4 Not Detected Not Detected    RSV, PCR Not Detected Not Detected    Bordetella pertussis pcr Not  Detected Not Detected    Bordetella parapertussis PCR Not Detected Not Detected    Chlamydophila pneumoniae PCR Not Detected Not Detected    Mycoplasma pneumo by PCR Not Detected Not Detected   Comprehensive Metabolic Panel    Collection Time: 03/19/23  1:50 PM    Specimen: Blood   Result Value Ref Range    Glucose 102 (H) 65 - 99 mg/dL    BUN 8 8 - 23 mg/dL    Creatinine 0.59 0.57 - 1.00 mg/dL    Sodium 144 136 - 145 mmol/L    Potassium 4.0 3.5 - 5.2 mmol/L    Chloride 107 98 - 107 mmol/L    CO2 24.7 22.0 - 29.0 mmol/L    Calcium 9.2 8.6 - 10.5 mg/dL    Total Protein 7.1 6.0 - 8.5 g/dL    Albumin 3.7 3.5 - 5.2 g/dL    ALT (SGPT) 57 (H) 1 - 33 U/L    AST (SGOT) 37 (H) 1 - 32 U/L    Alkaline Phosphatase 170 (H) 39 - 117 U/L    Total Bilirubin 0.6 0.0 - 1.2 mg/dL    Globulin 3.4 gm/dL    A/G Ratio 1.1 g/dL    BUN/Creatinine Ratio 13.6 7.0 - 25.0    Anion Gap 12.3 5.0 - 15.0 mmol/L    eGFR 91.8 >60.0 mL/min/1.73   Single High Sensitivity Troponin T    Collection Time: 03/19/23  1:50 PM    Specimen: Blood   Result Value Ref Range    HS Troponin T 6 <10 ng/L   CBC Auto Differential    Collection Time: 03/19/23  1:50 PM    Specimen: Blood   Result Value Ref Range    WBC 4.09 3.40 - 10.80 10*3/mm3    RBC 4.28 3.77 - 5.28 10*6/mm3    Hemoglobin 12.5 12.0 - 15.9 g/dL    Hematocrit 39.1 34.0 - 46.6 %    MCV 91.4 79.0 - 97.0 fL    MCH 29.2 26.6 - 33.0 pg    MCHC 32.0 31.5 - 35.7 g/dL    RDW 13.2 12.3 - 15.4 %    RDW-SD 44.8 37.0 - 54.0 fl    MPV 11.1 6.0 - 12.0 fL    Platelets 227 140 - 450 10*3/mm3    Neutrophil % 55.1 42.7 - 76.0 %    Lymphocyte % 28.6 19.6 - 45.3 %    Monocyte % 14.9 (H) 5.0 - 12.0 %    Eosinophil % 0.7 0.3 - 6.2 %    Basophil % 0.5 0.0 - 1.5 %    Immature Grans % 0.2 0.0 - 0.5 %    Neutrophils, Absolute 2.25 1.70 - 7.00 10*3/mm3    Lymphocytes, Absolute 1.17 0.70 - 3.10 10*3/mm3    Monocytes, Absolute 0.61 0.10 - 0.90 10*3/mm3    Eosinophils, Absolute 0.03 0.00 - 0.40 10*3/mm3    Basophils, Absolute 0.02  0.00 - 0.20 10*3/mm3    Immature Grans, Absolute 0.01 0.00 - 0.05 10*3/mm3    nRBC 0.0 0.0 - 0.2 /100 WBC   BNP    Collection Time: 03/19/23  1:50 PM    Specimen: Blood   Result Value Ref Range    proBNP 278.0 0.0 - 1,800.0 pg/mL       Ordered the above labs and reviewed the results.        RADIOLOGY  XR Chest 1 View    Result Date: 3/19/2023  PORTABLE CHEST X-RAY  HISTORY: Cough.  TECHNIQUE: Portable chest x-ray correlated chest x-ray 06/02/2020.  FINDINGS: The cardiac silhouette appears abnormally enlarged today, a new finding. Pulmonary vasculature is engorged. The lungs are clear and the costophrenic sulci are dry. Degenerative change at the left glenohumeral joint.      New cardiomegaly with pulmonary vascular engorgement.  This report was finalized on 3/19/2023 2:43 PM by Dr. Nazario Jones M.D.        Ordered the above noted radiological studies. Reviewed by me in PACS.            PROCEDURES  Procedures          MEDICATIONS GIVEN IN ER  Medications   sodium chloride 0.9 % flush 10 mL (has no administration in time range)   furosemide (LASIX) injection 40 mg (40 mg Intravenous Given 3/19/23 1501)             MEDICAL DECISION MAKING, PROGRESS, and CONSULTS    All labs have been independently reviewed by me.  All radiology studies have been reviewed by me and I have also reviewed the radiology report.   EKG's independently viewed and interpreted by me.  Discussion below represents my analysis of pertinent findings related to patient's condition, differential diagnosis, treatment plan and final disposition.      Additional sources:      - External (non-ED) record review: I reviewed the patient's last office note from earlier this month with her PCP where she was seen for swelling in the feet and ankles.    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    - Shared decision making: After shared decision-making discussion the patient  agreed she is stable for discharge and outpatient follow-up      Orders placed during this visit:  Orders Placed This Encounter   Procedures   • Respiratory Panel PCR w/COVID-19(SARS-CoV-2) [facility] In-House, NP Swab in UTM/VTM, 3-4 HR TAT - Swab, Nasopharynx   • XR Chest 1 View   • Comprehensive Metabolic Panel   • Single High Sensitivity Troponin T   • CBC Auto Differential   • BNP   • Monitor Blood Pressure   • Cardiac Monitoring   • Pulse Oximetry, Continuous   • ECG 12 Lead Chest Pain   • Insert Peripheral IV   • CBC & Differential         Differential diagnosis:  My differential diagnosis includes but is not limited to pneumonia, congestive heart failure, pulmonary embolism, pleural effusion, acute coronary syndrome, chronic obstructive pulmonary disease exacerbation, or pneumothorax.      Independent interpretation of labs, radiology studies, and discussions with consultants:  ED Course as of 03/19/23 1623   Sun Mar 19, 2023   1321 EKG    EKG time: 1309  Rhythm/Rate: Normal sinus at 63  No Acute Ischemia  Non-Specific ST-T changes    Similar compared to prior on 6/2/2020    Interpreted Contemporaneously by me.  Independently viewed by me     [GP]   1321 Check an EKG, chest x-ray, labs and respiratory viral panel while watching the patient on the monitor. [GP]   1441 My interpretation the patient's chest x-ray is mild cardiomegaly with vascular engorgement [GP]   1442 The patient's troponin is negative.  I will add a BNP.  Her LFTs are elevated.  I will give her a dose of Lasix because they could be elevated secondary to passive congestion after review of her chest x-ray. [GP]   1509 The patient is human rhinovirus positive. [GP]   1512 The patient's BNP is normal.  Thus I believe the abnormal chest x-ray is likely secondary to her rhinovirus infection. [GP]   1540 On repeat evaluation the patient's vital signs are stable.  Room air saturations are 96%.  At this time the patient feels stable  for discharge home and outpatient follow-up.  I will discharge her with a short course of steroids and cough medicine. [GP]      ED Course User Index  [GP] Brandon Martini MD               DIAGNOSIS  Final diagnoses:   Acute bronchitis due to Rhinovirus         DISPOSITION  DISCHARGE    Patient discharged in stable condition.    Reviewed implications of results, diagnosis, meds, responsibility to follow up, warning signs and symptoms of possible worsening, potential complications and reasons to return to ER, including fever, chest pain or worsening shortness of breath.    Patient/Family voiced understanding of above instructions.    Discussed plan for discharge, as there is no emergent indication for admission.  Pt/family is agreeable and understands need for follow up and repeat testing.  Pt is aware that discharge does not mean that nothing is wrong but it indicates no emergency is present and they must continue care with follow-up as given below or physician of their choice.     FOLLOW-UP  Cordell Holland MD  [id number] [facility] [address]  [place] [address]  [phone]    In 3 days  If symptoms worsen                  Latest Documented Vital Signs:  As of 16:23 EDT  BP- 147/84 HR- 76 Temp- 97.4 °F (36.3 °C) (Tympanic) O2 sat- 98%--      --------------------  Please note that portions of this were completed with a voice recognition program.       Note Disclaimer: At [facility], we believe that sharing information builds trust and better relationships. You are receiving this note because you are receiving care at [facility] or recently visited. It is possible you will see health information before a provider has talked with you about it. This kind of information can be easy to misunderstand. To help you fully understand what it means for your health, we urge you to discuss this note with your provider.           Brandon Martini MD  03/19/23 1200

## 2023-03-28 ENCOUNTER — PATIENT OUTREACH (OUTPATIENT)
Dept: CASE MANAGEMENT | Facility: OTHER | Age: 78
End: 2023-03-28
Payer: MEDICARE

## 2023-03-28 NOTE — OUTREACH NOTE
AMBULATORY CASE MANAGEMENT NOTE    Name and Relationship of Patient/Support Person: Isabel Carlson D - Self    CCM Interim Update    Spoke with patient via phone. Introduced self and case management program. States she is feeling better since ED visit, has completed steroids. Pt states she went to ED for weakness but has since improved. Pt declines PCP appt at this time. Chart review shows patient has ortho surgeon appt on 3/30 and cardiology appt on 3/31. Pt verbalized no needs at this time.     Plan: F/U 4/24  Chart review   BLE swelling  ACP  Shingles vaccine  SDOH      Education Documentation  No documentation found.        Anahy HAHN  Ambulatory Case Management    3/28/2023, 14:58 EDT

## 2023-03-29 ENCOUNTER — CLINICAL SUPPORT (OUTPATIENT)
Dept: FAMILY MEDICINE CLINIC | Facility: CLINIC | Age: 78
End: 2023-03-29
Payer: MEDICARE

## 2023-03-29 DIAGNOSIS — E53.8 B12 DEFICIENCY: ICD-10-CM

## 2023-03-29 PROCEDURE — 96372 THER/PROPH/DIAG INJ SC/IM: CPT | Performed by: INTERNAL MEDICINE

## 2023-03-29 RX ADMIN — CYANOCOBALAMIN 1000 MCG: 1000 INJECTION, SOLUTION INTRAMUSCULAR; SUBCUTANEOUS at 14:13

## 2023-04-05 ENCOUNTER — CLINICAL SUPPORT (OUTPATIENT)
Dept: FAMILY MEDICINE CLINIC | Facility: CLINIC | Age: 78
End: 2023-04-05
Payer: MEDICARE

## 2023-04-05 DIAGNOSIS — E53.8 B12 DEFICIENCY: ICD-10-CM

## 2023-04-05 PROCEDURE — 96372 THER/PROPH/DIAG INJ SC/IM: CPT | Performed by: INTERNAL MEDICINE

## 2023-04-05 RX ADMIN — CYANOCOBALAMIN 1000 MCG: 1000 INJECTION, SOLUTION INTRAMUSCULAR; SUBCUTANEOUS at 09:59

## 2023-04-05 NOTE — PROGRESS NOTES
Injection  Injection performed in left deltoid by Marquita Tam MA. Patient tolerated the procedure well without complications.  04/05/23   Marquita Tam MA

## 2023-04-12 ENCOUNTER — CLINICAL SUPPORT (OUTPATIENT)
Dept: FAMILY MEDICINE CLINIC | Facility: CLINIC | Age: 78
End: 2023-04-12
Payer: MEDICARE

## 2023-04-12 DIAGNOSIS — E53.8 B12 DEFICIENCY: ICD-10-CM

## 2023-04-12 PROCEDURE — 96372 THER/PROPH/DIAG INJ SC/IM: CPT | Performed by: INTERNAL MEDICINE

## 2023-04-12 RX ADMIN — CYANOCOBALAMIN 1000 MCG: 1000 INJECTION, SOLUTION INTRAMUSCULAR; SUBCUTANEOUS at 14:23

## 2023-04-19 ENCOUNTER — OFFICE VISIT (OUTPATIENT)
Dept: CARDIOLOGY | Facility: CLINIC | Age: 78
End: 2023-04-19
Payer: MEDICARE

## 2023-04-19 ENCOUNTER — CLINICAL SUPPORT (OUTPATIENT)
Dept: FAMILY MEDICINE CLINIC | Facility: CLINIC | Age: 78
End: 2023-04-19
Payer: MEDICARE

## 2023-04-19 VITALS
DIASTOLIC BLOOD PRESSURE: 68 MMHG | HEART RATE: 61 BPM | WEIGHT: 147 LBS | HEIGHT: 60 IN | SYSTOLIC BLOOD PRESSURE: 116 MMHG | BODY MASS INDEX: 28.86 KG/M2

## 2023-04-19 DIAGNOSIS — I34.0 NON-RHEUMATIC MITRAL REGURGITATION: Chronic | ICD-10-CM

## 2023-04-19 DIAGNOSIS — E53.8 B12 DEFICIENCY: ICD-10-CM

## 2023-04-19 DIAGNOSIS — I51.7 LVH (LEFT VENTRICULAR HYPERTROPHY): Primary | ICD-10-CM

## 2023-04-19 PROCEDURE — 99204 OFFICE O/P NEW MOD 45 MIN: CPT | Performed by: INTERNAL MEDICINE

## 2023-04-19 PROCEDURE — 93000 ELECTROCARDIOGRAM COMPLETE: CPT | Performed by: INTERNAL MEDICINE

## 2023-04-19 PROCEDURE — 3074F SYST BP LT 130 MM HG: CPT | Performed by: INTERNAL MEDICINE

## 2023-04-19 PROCEDURE — 96372 THER/PROPH/DIAG INJ SC/IM: CPT | Performed by: INTERNAL MEDICINE

## 2023-04-19 PROCEDURE — 3078F DIAST BP <80 MM HG: CPT | Performed by: INTERNAL MEDICINE

## 2023-04-19 RX ADMIN — CYANOCOBALAMIN 1000 MCG: 1000 INJECTION, SOLUTION INTRAMUSCULAR; SUBCUTANEOUS at 14:29

## 2023-04-19 NOTE — PROGRESS NOTES
Isabel Carlson  1945  Date of Office Visit: 04/19/23  Encounter Provider: Modesto Silverman MD  Place of Service: [facility] CARDIOLOGY      CHIEF COMPLAINT:  Chest pain: resolved  Recent bronchitis  Essential hypertension  Lower extremity edema    HISTORY OF PRESENT ILLNESS:  77-year-old female with a medical history of PACs, essential hypertension, LVH, and hyperlipidemia who presents to me secondary to chest discomfort, lower extremity edema and report of an abnormal electrocardiogram.  I reviewed the EKG and I do not find it to be abnormal.  It looks like she is in a normal sinus rhythm with borderline LVH but no change from prior.  She stated that she had bronchitis and cough which was associated with chest discomfort that she stated was moderate in intensity.  It increased with deep inspiration.     She stated that she intermittently has lower extremity edema that worsens throughout the day but does improve before she wakes up.  She denies any recent issues with chest pain since recovery from bronchitis she has no orthopnea or PND.    Review of Systems   Constitutional: Negative for fever and malaise/fatigue.   HENT: Negative for nosebleeds and sore throat.    Eyes: Negative for blurred vision and double vision.   Cardiovascular: Negative for chest pain, claudication, palpitations and syncope.   Respiratory: Negative for cough, shortness of breath and snoring.    Endocrine: Negative for cold intolerance, heat intolerance and polydipsia.   Skin: Negative for itching, poor wound healing and rash.   Musculoskeletal: Negative for joint pain, joint swelling, muscle weakness and myalgias.   Gastrointestinal: Negative for abdominal pain, melena, nausea and vomiting.   Neurological: Negative for light-headedness, loss of balance, seizures, vertigo and weakness.   Psychiatric/Behavioral: Negative for altered mental status and depression.       Past Medical History:   Diagnosis  Date   • Abnormal stress echocardiogram 07/20/2017    Patient had borderline EKG changes 5 minutes into walking and Dr. Cali Spencer recommended the patient not to have that same type of stress test in the future   • Adhesive capsulitis of right shoulder    • Age-related osteoporosis without fracture    • APC (atrial premature contractions) 7/20/2017   • Arthritis    • Carpal tunnel syndrome    • Cataract    • Chronic hip pain    • Fatigue    • Frequent episodes of pneumonia     PT STATES SEVERAL TIMES   • Heart murmur 7/20/2017   • Left knee pain    • LVH (left ventricular hypertrophy) 7/20/2017   • Mixed hyperlipidemia 7/20/2017   • Non-rheumatic mitral regurgitation 07/20/2017    Mild to moderate per 2-D echocardiogram   • Non-rheumatic tricuspid valve insufficiency 07/20/2017    Mild to moderate per 2-D echocardiogram   • Osteoarthritis    • Osteoarthritis of both knees    • Osteoporosis    • Shoulder pain    • Stroke    • TIA (transient ischemic attack) 10/15/2013    numbness on right side of face and hand; went to [employer]       The following portions of the patient's history were reviewed and updated as appropriate: Social history , Family history and Surgical history     Current Outpatient Medications on File Prior to Visit   Medication Sig Dispense Refill   • amLODIPine-benazepril (LOTREL) 10-20 MG per capsule Take 1 capsule by mouth Daily. 90 capsule 1   • cetirizine (zyrTEC) 10 MG tablet Take 1 tablet by mouth Daily As Needed.  4   • cyclobenzaprine (FLEXERIL) 10 MG tablet 1/2 to 1 tab every 8 hours as needed. 90 tablet 2   • fluticasone (FLONASE) 50 MCG/ACT nasal spray 2 sprays into the nostril(s) as directed by provider Daily As Needed.     • gabapentin (NEURONTIN) 300 MG capsule Take 1 capsule by mouth Every Night. 90 capsule 1   • meloxicam (MOBIC) 15 MG tablet Take 1 tablet by mouth Daily As Needed for Moderate Pain. 90 tablet 1   • pantoprazole (PROTONIX) 40 MG EC tablet Take 1 tablet by mouth Daily.  90 tablet 1   • propranolol LA (INDERAL LA) 80 MG 24 hr capsule TAKE 1 CAPSULE DAILY 90 capsule 1   • rosuvastatin (CRESTOR) 20 MG tablet Take 1 tablet by mouth Daily. 90 tablet 1   • [DISCONTINUED] dextromethorphan-guaifenesin (ROBITUSSIN-DM)  MG/5ML liquid Take 5 mL by mouth Every 12 (Twelve) Hours. 180 mL 0   • [DISCONTINUED] HYDROcodone-acetaminophen (NORCO) 7.5-325 MG per tablet      • [DISCONTINUED] ibandronate (Boniva) 150 MG tablet Take 1 tablet by mouth Every 30 (Thirty) Days. 3 tablet 3   • [DISCONTINUED] methylPREDNISolone (MEDROL) 4 MG dose pack Take as directed on package instructions. 21 each 0     Current Facility-Administered Medications on File Prior to Visit   Medication Dose Route Frequency Provider Last Rate Last Admin   • cyanocobalamin injection 1,000 mcg  1,000 mcg Intramuscular Weekly Skyler, Cordell ZALDIVAR MD   1,000 mcg at 04/12/23 1423       No Known Allergies    There were no vitals filed for this visit.  There is no height or weight on file to calculate BMI.   Constitutional:       Appearance: Well-developed.   Eyes:      General: No scleral icterus.     Conjunctiva/sclera: Conjunctivae normal.   HENT:      Head: Normocephalic and atraumatic.   Neck:      Thyroid: No thyromegaly.      Vascular: Normal carotid pulses. No carotid bruit, hepatojugular reflux or JVD.      Trachea: No tracheal deviation.   Pulmonary:      Effort: No respiratory distress.      Breath sounds: Normal breath sounds. No decreased breath sounds. No wheezing. No rhonchi. No rales.   Chest:      Chest wall: Not tender to palpatation.   Cardiovascular:      Normal rate. Regular rhythm.      No gallop.   Pulses:     Carotid: 2+ bilaterally.     Radial: 2+ bilaterally.     Femoral: 2+ bilaterally.     Dorsalis pedis: 2+ bilaterally.     Posterior tibial: 2+ bilaterally.  Edema:     Peripheral edema absent.   Abdominal:      General: Bowel sounds are normal. There is no distension.      Palpations: Abdomen is soft.       Tenderness: There is no abdominal tenderness.   Musculoskeletal:         General: No deformity.      Cervical back: Normal range of motion and neck supple. Skin:     Findings: No erythema or rash.   Neurological:      Mental Status: Alert and oriented to person, place, and time.      Sensory: No sensory deficit.   Psychiatric:         Behavior: Behavior normal.            Lab Results   Component Value Date    WBC 4.09 03/19/2023    HGB 12.5 03/19/2023    HCT 39.1 03/19/2023    MCV 91.4 03/19/2023     03/19/2023       Lab Results   Component Value Date    GLUCOSE 102 (H) 03/19/2023    BUN 8 03/19/2023    CREATININE 0.59 03/19/2023    EGFRRESULT 86.6 03/08/2023    EGFR 91.8 03/19/2023    BCR 13.6 03/19/2023    K 4.0 03/19/2023    CO2 24.7 03/19/2023    CALCIUM 9.2 03/19/2023    PROTENTOTREF 7.1 03/08/2023    ALBUMIN 3.7 03/19/2023    BILITOT 0.6 03/19/2023    AST 37 (H) 03/19/2023    ALT 57 (H) 03/19/2023       Lab Results   Component Value Date    GLUCOSE 102 (H) 03/19/2023    CALCIUM 9.2 03/19/2023     03/19/2023    K 4.0 03/19/2023    CO2 24.7 03/19/2023     03/19/2023    BUN 8 03/19/2023    CREATININE 0.59 03/19/2023    EGFRRESULT 86.6 03/08/2023    EGFR 91.8 03/19/2023    BCR 13.6 03/19/2023    ANIONGAP 12.3 03/19/2023       Lab Results   Component Value Date    CHOL 110 06/14/2020    CHLPL 143 03/08/2023    TRIG 49 03/08/2023    HDL 70 (H) 03/08/2023    LDL 62 03/08/2023         ECG 12 Lead    Date/Time: 4/19/2023 1:28 PM  Performed by: Modesto Silverman MD  Authorized by: Modesto Silverman MD   Comparison: compared with previous ECG from 3/19/2023  Similar to previous ECG  Rhythm: sinus rhythm  Rate: normal  QRS axis: normal    Clinical impression: normal ECG             10/20/2017 RH  Hemodynamics:  1.  Right atrium: A wave 8, V wave 11, mean 7  2.  Right ventricle: 38/4  3.  Pulmonary artery: 38/15, mean 23  4.  PCW: A-wave 15 V wave 9, mean 8  5.  Cardiac output (Vinh): 3.12  L/m  6.  Cardiac index (Vinh) 1.93 L/m/m²      Results for orders placed during the hospital encounter of 07/18/17    Adult Stress Echo With Color & Doppler    Interpretation Summary  · Calculated EF = 60%  · Mild-to-moderate mitral valve regurgitation is present with a centrally-directed jet noted.  · Mild to moderate tricuspid valve regurgitation is present.  · Estimated right ventricular systolic pressure from tricuspid regurgitation is markedly elevated (>55 mmHg). Calculated right ventricular systolic pressure from tricuspid regurgitation is 56.4 mmHg.  · Right heart structures borderline enlarged  · STRESS ECHO  · Segment augmentation had a normal response to stress. Cavity size behaved normally in response to stress. Left ventricular function is increased hyperdynamic (>70%).  · Normal stress echo with no significant echocardiographic evidence for myocardial ischemia.  · Only walked 5 min with borderline ecg chnages      DISCUSSION/SUMMARY  77-year-old female with a medical history of hypertension, hyperlipidemia, LVH and PACs who presents to me secondary to the reported abnormal electrocardiogram, and chest pain.  Her chest pain has resolved since the bronchitis has passed.  She has no discomfort at this point.  She described it as mostly atypical and it does not sound to be cardiac in etiology.  Her blood pressure is reasonably well controlled.  She does complain of mild bilateral lower extremity edema that worsens with ambulation.  This does not sound to be secondary to heart failure.  She has a normal proBNP previously document    1.  Essential hypertension: Continue amlodipine-benazepril and propranolol therapy.  I do not think need to come off of the amlodipine although I do think it may contribute some to her lower extremity edema.  It sounds pretty mild.  - No hyperkalemia or renal insufficiency documented.  - Transthoracic echocardiogram ordered in the setting of elevated blood pressure and  questionable LVH.    2.  Hyperlipidemia: Continue Crestor therapy at current dose.  Last LDL in March 20 23-62.  Well-controlled.  No myalgias.  No elevation in transaminases    I will see her back in 1 year.

## 2023-04-20 ENCOUNTER — OFFICE VISIT (OUTPATIENT)
Dept: ORTHOPEDIC SURGERY | Facility: CLINIC | Age: 78
End: 2023-04-20
Payer: MEDICARE

## 2023-04-20 VITALS — HEIGHT: 60 IN | BODY MASS INDEX: 28.86 KG/M2 | WEIGHT: 147 LBS

## 2023-04-20 DIAGNOSIS — M77.41 METATARSALGIA OF BOTH FEET: ICD-10-CM

## 2023-04-20 DIAGNOSIS — M20.42 HAMMER TOES OF BOTH FEET: ICD-10-CM

## 2023-04-20 DIAGNOSIS — M77.42 METATARSALGIA OF BOTH FEET: ICD-10-CM

## 2023-04-20 DIAGNOSIS — M19.071 ARTHRITIS OF FIRST METATARSOPHALANGEAL (MTP) JOINT OF RIGHT FOOT: ICD-10-CM

## 2023-04-20 DIAGNOSIS — B35.1 ONYCHOMYCOSIS: Primary | ICD-10-CM

## 2023-04-20 DIAGNOSIS — M20.41 HAMMER TOES OF BOTH FEET: ICD-10-CM

## 2023-04-20 DIAGNOSIS — M20.11 HALLUX VALGUS OF RIGHT FOOT: ICD-10-CM

## 2023-04-20 DIAGNOSIS — M19.072 ARTHRITIS OF FIRST METATARSOPHALANGEAL (MTP) JOINT OF LEFT FOOT: ICD-10-CM

## 2023-04-20 NOTE — PROGRESS NOTES
"   New Patient Complaint      Patient: Isabel Carlson  YOB: 1945 77 y.o. female  Medical Record Number: [id number]    Chief Complaints: \"My feet feel crunchy\"    History of Present Illness: Patient reports a chronic history of \"crunchy feeling\" in her forefeet and by that she means that they feel tight and somewhat swollen with some intermittent discomfort but denies any mayank popping locking or catching.  The main area that she indicates is at the plantar aspect of the forefoot along the metatarsal heads but is not had any ulceration or callus.  She is using Voltaren gel which seems to help         HPI    Allergies: No Known Allergies    Medications:   Current Outpatient Medications on File Prior to Visit   Medication Sig   • amLODIPine-benazepril (LOTREL) 10-20 MG per capsule Take 1 capsule by mouth Daily.   • cetirizine (zyrTEC) 10 MG tablet Take 1 tablet by mouth Daily As Needed.   • cyclobenzaprine (FLEXERIL) 10 MG tablet 1/2 to 1 tab every 8 hours as needed.   • fluticasone (FLONASE) 50 MCG/ACT nasal spray 2 sprays into the nostril(s) as directed by provider Daily As Needed.   • gabapentin (NEURONTIN) 300 MG capsule Take 1 capsule by mouth Every Night.   • meloxicam (MOBIC) 15 MG tablet Take 1 tablet by mouth Daily As Needed for Moderate Pain.   • pantoprazole (PROTONIX) 40 MG EC tablet Take 1 tablet by mouth Daily.   • propranolol LA (INDERAL LA) 80 MG 24 hr capsule TAKE 1 CAPSULE DAILY   • rosuvastatin (CRESTOR) 20 MG tablet Take 1 tablet by mouth Daily.     Current Facility-Administered Medications on File Prior to Visit   Medication   • cyanocobalamin injection 1,000 mcg       Past Medical History:   Diagnosis Date   • Abnormal stress echocardiogram 07/20/2017    Patient had borderline EKG changes 5 minutes into walking and Dr. Cali Spencer recommended the patient not to have that same type of stress test in the future   • Adhesive capsulitis of right shoulder    • Age-related osteoporosis " without fracture    • APC (atrial premature contractions) 7/20/2017   • Arthritis    • Carpal tunnel syndrome    • Cataract    • Chronic hip pain    • Fatigue    • Frequent episodes of pneumonia     PT STATES SEVERAL TIMES   • Heart murmur 7/20/2017   • Left knee pain    • LVH (left ventricular hypertrophy) 7/20/2017   • Mixed hyperlipidemia 7/20/2017   • Non-rheumatic mitral regurgitation 07/20/2017    Mild to moderate per 2-D echocardiogram   • Non-rheumatic tricuspid valve insufficiency 07/20/2017    Mild to moderate per 2-D echocardiogram   • Osteoarthritis    • Osteoarthritis of both knees    • Osteoporosis    • Shoulder pain    • Stroke    • TIA (transient ischemic attack) 10/15/2013    numbness on right side of face and hand; went to [facility]     Past Surgical History:   Procedure Laterality Date   • ANKLE OPEN REDUCTION INTERNAL FIXATION Right 10/2022   • APPENDECTOMY     • CARDIAC CATHETERIZATION N/A 10/20/2017    Procedure: Right Heart Cath;  Surgeon: Christopher Kan MD;  Location: [facility] INVASIVE LOCATION;  Service:    • COLONOSCOPY     • REPLACEMENT TOTAL KNEE Left 06/12/2020   • SUBTOTAL HYSTERECTOMY     • TOTAL KNEE ARTHROPLASTY Left 06/12/2020    Procedure: TOTAL KNEE ARTHROPLASTY;  Surgeon: Jose Leary MD;  Location: [facility] OR;  Service: Orthopedics;  Laterality: Left;     Social History     Occupational History   • Not on file   Tobacco Use   • Smoking status: Never   • Smokeless tobacco: Never   Vaping Use   • Vaping Use: Never used   Substance and Sexual Activity   • Alcohol use: No     Comment: rarely   • Drug use: No   • Sexual activity: Defer      Social History     Social History Narrative   • Not on file     Family History   Problem Relation Age of Onset   • Heart disease Mother    • Hypertension Mother    • Stroke Mother    • Arthritis Mother    • No Known Problems Father    • Heart disease Sister    • Cancer Sister    • Heart disease Maternal Aunt    • Hypertension  "Maternal Aunt    • Hypertension Maternal Uncle    • Breast cancer Cousin    • Cancer Other    • Malig Hyperthermia Neg Hx        Review of Systems: 14 point review of systems performed, positive pertinent findings identified in HPI. All remaining systems negative     Review of Systems      Physical Exam:   Vitals:    04/20/23 1510   Weight: 66.7 kg (147 lb)   Height: 152.4 cm (60\")   PainSc:   6     Physical Exam   Constitutional: pleasant, well developed   Eyes: sclera non icteric  Hearing : adequate for exam  Cardiovascular: palpable pulses in bilateral feet, bilateral calf/ thigh NT without sign of DVT  Respiratoy: breathing unlabored   Neurological: grossly sensate to LT throughout bilateral LE  Psychiatric: oriented with normal mood and affect.   Lymphatic: No palpable popliteal lymphadenopathy bilateral LE  Skin: intact throughout bilateral leg/foot  Musculoskeletal: On exam she has neutral dorsiflexion of the heel cord bilaterally.    On the left foot she has onychomycotic changes of the first nail but no sign of infection and neutral alignment to the toe with some recurvatum.  There are some mild hammering of the lesser toes with minimal discomfort beneath the forefoot under the metatarsal heads but no callus or ulceration.  She was able to flex and extend the toes relatively well    Right foot shows hallux valgus deformity with 70 degrees dorsiflexion 4 degrees plantarflexion with mild discomfort.  There is some mild hammering of the lesser toes with vague discomfort in the forefoot but ulceration or callus  Physical Exam  Ortho Exam    Radiology: 3 views of the both feet ordered evaluate pain reviewed and no prior x-rays available for comparison.  On the left foot there is some mild arthritic change of the first MTP joint and some to the first toe IP joint with some mild valgus to the IP joint.  I do not see any obvious malalignment to the midfoot or forefoot.  There is some very mild arthritis of the " "second TMT joint and limited degenerative change in the fifth MTP joint.  There is some relative elongation of the second and third metatarsals with no obvious fracture.    X-rays of the right foot foot show hallux valgus deformity with first MTP arthritis.  Some relative elongation of the second third metatarsal but no obvious fracture.    Assessment/Plan: 1.  Right hallux valgus with first MTP arthritis  2.  Bilateral forefoot metatarsalgia with some possible intermetatarsal bursitis and mild hammertoes  3.  Left first toenail onychomycotic changes    We reviewed treatment going forward and nothing is bothering her bad enough to consider surgical treatment nor what I recommended given her relative overall lack localizing symptoms.  She would also need to have right first MTP fusion    She was uninterested in pursuing any surgical treatment which I think is certainly reasonable given her age and activity level.  We discussed injection of the right first MTP joint which she declined.    She can use some hammertoe splints and was also instructed on heel cord stretching exercises    She was interested in some type of \"medicine\" to her feet and put in an Rx alternatives prescription for diclofenac 4%/lidocaine 5% to apply the area several times daily    We will also send her to see Dr. Salinas for the onychomycotic changes of her left first toenail.  She is provided with his name and number and referral was placed in epic    At this point I do not have anything further to offer him we will see her back as needed      "

## 2023-04-27 ENCOUNTER — CLINICAL SUPPORT (OUTPATIENT)
Dept: FAMILY MEDICINE CLINIC | Facility: CLINIC | Age: 78
End: 2023-04-27
Payer: MEDICARE

## 2023-04-27 DIAGNOSIS — E53.8 B12 DEFICIENCY: ICD-10-CM

## 2023-04-27 PROCEDURE — 96372 THER/PROPH/DIAG INJ SC/IM: CPT | Performed by: INTERNAL MEDICINE

## 2023-04-27 RX ADMIN — CYANOCOBALAMIN 1000 MCG: 1000 INJECTION, SOLUTION INTRAMUSCULAR; SUBCUTANEOUS at 14:47

## 2023-05-02 ENCOUNTER — TELEPHONE (OUTPATIENT)
Dept: CASE MANAGEMENT | Facility: OTHER | Age: 78
End: 2023-05-02
Payer: MEDICARE

## 2023-05-03 ENCOUNTER — CLINICAL SUPPORT (OUTPATIENT)
Dept: FAMILY MEDICINE CLINIC | Facility: CLINIC | Age: 78
End: 2023-05-03
Payer: MEDICARE

## 2023-05-03 DIAGNOSIS — E53.8 B12 DEFICIENCY: ICD-10-CM

## 2023-05-03 PROCEDURE — 96372 THER/PROPH/DIAG INJ SC/IM: CPT | Performed by: INTERNAL MEDICINE

## 2023-05-03 RX ADMIN — CYANOCOBALAMIN 1000 MCG: 1000 INJECTION, SOLUTION INTRAMUSCULAR; SUBCUTANEOUS at 14:13

## 2023-05-03 NOTE — PROGRESS NOTES
Injection  Injection performed in left deltoid by Marquita Tam MA. Patient tolerated the procedure well without complications.  05/03/23   Marquita Tam MA

## 2023-05-04 ENCOUNTER — HOSPITAL ENCOUNTER (OUTPATIENT)
Dept: CARDIOLOGY | Facility: HOSPITAL | Age: 78
Discharge: HOME | End: 2023-05-04
Admitting: INTERNAL MEDICINE
Payer: MEDICARE

## 2023-05-04 VITALS
DIASTOLIC BLOOD PRESSURE: 72 MMHG | SYSTOLIC BLOOD PRESSURE: 144 MMHG | OXYGEN SATURATION: 96 % | HEART RATE: 72 BPM | BODY MASS INDEX: 28.86 KG/M2 | HEIGHT: 60 IN | WEIGHT: 147 LBS

## 2023-05-04 DIAGNOSIS — I51.7 LVH (LEFT VENTRICULAR HYPERTROPHY): ICD-10-CM

## 2023-05-04 DIAGNOSIS — I34.0 NON-RHEUMATIC MITRAL REGURGITATION: Chronic | ICD-10-CM

## 2023-05-04 LAB
AORTIC ARCH: 2.2 CM
AORTIC DIMENSIONLESS INDEX: 0.6 (DI)
ASCENDING AORTA: 3.6 CM
BH CV ECHO LEFT VENTRICLE GLOBAL LONGITUDINAL STRAIN: -22.1 %
BH CV ECHO MEAS - ACS: 1.87 CM
BH CV ECHO MEAS - AO MAX PG: 19.4 MMHG
BH CV ECHO MEAS - AO MEAN PG: 9 MMHG
BH CV ECHO MEAS - AO ROOT DIAM: 3 CM
BH CV ECHO MEAS - AO V2 MAX: 220.3 CM/SEC
BH CV ECHO MEAS - AO V2 VTI: 51.7 CM
BH CV ECHO MEAS - AVA(I,D): 1.59 CM2
BH CV ECHO MEAS - EDV(CUBED): 56 ML
BH CV ECHO MEAS - EDV(MOD-SP2): 51 ML
BH CV ECHO MEAS - EDV(MOD-SP4): 49 ML
BH CV ECHO MEAS - EF(MOD-BP): 74.2 %
BH CV ECHO MEAS - EF(MOD-SP2): 78.4 %
BH CV ECHO MEAS - EF(MOD-SP4): 71.4 %
BH CV ECHO MEAS - EF_3D-VOL: 73 %
BH CV ECHO MEAS - ESV(CUBED): 22 ML
BH CV ECHO MEAS - ESV(MOD-SP2): 11 ML
BH CV ECHO MEAS - ESV(MOD-SP4): 14 ML
BH CV ECHO MEAS - FS: 26.8 %
BH CV ECHO MEAS - IVS/LVPW: 0.81 CM
BH CV ECHO MEAS - IVSD: 1.06 CM
BH CV ECHO MEAS - LA 3D VOL INDEX: 39
BH CV ECHO MEAS - LAT PEAK E' VEL: 8.9 CM/SEC
BH CV ECHO MEAS - LV DIASTOLIC VOL/BSA (35-75): 29.9 CM2
BH CV ECHO MEAS - LV MASS(C)D: 152.8 GRAMS
BH CV ECHO MEAS - LV MAX PG: 8.9 MMHG
BH CV ECHO MEAS - LV MEAN PG: 5.1 MMHG
BH CV ECHO MEAS - LV SYSTOLIC VOL/BSA (12-30): 8.5 CM2
BH CV ECHO MEAS - LV V1 MAX: 149.5 CM/SEC
BH CV ECHO MEAS - LV V1 VTI: 32 CM
BH CV ECHO MEAS - LVIDD: 3.8 CM
BH CV ECHO MEAS - LVIDS: 2.8 CM
BH CV ECHO MEAS - LVOT AREA: 2.6 CM2
BH CV ECHO MEAS - LVOT DIAM: 1.81 CM
BH CV ECHO MEAS - LVPWD: 1.32 CM
BH CV ECHO MEAS - MED PEAK E' VEL: 6.5 CM/SEC
BH CV ECHO MEAS - MR MAX PG: 145.8 MMHG
BH CV ECHO MEAS - MR MAX VEL: 603.8 CM/SEC
BH CV ECHO MEAS - MV A DUR: 0.11 SEC
BH CV ECHO MEAS - MV A MAX VEL: 97.4 CM/SEC
BH CV ECHO MEAS - MV DEC SLOPE: 333.7 CM/SEC2
BH CV ECHO MEAS - MV DEC TIME: 0.18 MSEC
BH CV ECHO MEAS - MV E MAX VEL: 77.1 CM/SEC
BH CV ECHO MEAS - MV E/A: 0.79
BH CV ECHO MEAS - MV MAX PG: 4.6 MMHG
BH CV ECHO MEAS - MV MEAN PG: 2.07 MMHG
BH CV ECHO MEAS - MV P1/2T: 71.5 MSEC
BH CV ECHO MEAS - MV V2 VTI: 39.7 CM
BH CV ECHO MEAS - MVA(P1/2T): 3.1 CM2
BH CV ECHO MEAS - MVA(VTI): 2.07 CM2
BH CV ECHO MEAS - PA ACC TIME: 0.09 SEC
BH CV ECHO MEAS - PA PR(ACCEL): 39.4 MMHG
BH CV ECHO MEAS - PA V2 MAX: 121.6 CM/SEC
BH CV ECHO MEAS - PAPD(PI EDV): 1.9 MMHG
BH CV ECHO MEAS - PI END-D VEL: 69.5 CM/SEC
BH CV ECHO MEAS - PV RVPEPC: 9 SEC
BH CV ECHO MEAS - QP/QS: 0.65
BH CV ECHO MEAS - RAP SYSTOLE: 3 MMHG
BH CV ECHO MEAS - RV MAX PG: 2.5 MMHG
BH CV ECHO MEAS - RV V1 MAX: 79.7 CM/SEC
BH CV ECHO MEAS - RV V1 VTI: 19.2 CM
BH CV ECHO MEAS - RVOT DIAM: 1.88 CM
BH CV ECHO MEAS - RVSP: 42 MMHG
BH CV ECHO MEAS - SI(MOD-SP2): 24.4 ML/M2
BH CV ECHO MEAS - SI(MOD-SP4): 21.4 ML/M2
BH CV ECHO MEAS - SUP REN AO DIAM: 2.2 CM
BH CV ECHO MEAS - SV(LVOT): 82 ML
BH CV ECHO MEAS - SV(MOD-SP2): 40 ML
BH CV ECHO MEAS - SV(MOD-SP4): 35 ML
BH CV ECHO MEAS - SV(RVOT): 53.3 ML
BH CV ECHO MEAS - TAPSE (>1.6): 2.19 CM
BH CV ECHO MEAS - TR MAX PG: 39.1 MMHG
BH CV ECHO MEAS - TR MAX VEL: 312.7 CM/SEC
BH CV ECHO MEASUREMENTS AVERAGE E/E' RATIO: 10.01
BH CV VAS BP LEFT ARM: NORMAL MMHG
BH CV XLRA - RV BASE: 2.9 CM
BH CV XLRA - RV LENGTH: 7 CM
BH CV XLRA - RV MID: 3 CM
BH CV XLRA - TDI S': 13.4 CM/SEC
LEFT ATRIUM VOLUME INDEX: 28.2 ML/M2
MAXIMAL PREDICTED HEART RATE: 141 BPM
SINUS: 2.9 CM
STJ: 3.2 CM
STRESS TARGET HR: 120 BPM

## 2023-05-04 PROCEDURE — 93356 MYOCRD STRAIN IMG SPCKL TRCK: CPT

## 2023-05-04 PROCEDURE — 93306 TTE W/DOPPLER COMPLETE: CPT | Performed by: INTERNAL MEDICINE

## 2023-05-04 PROCEDURE — 93356 MYOCRD STRAIN IMG SPCKL TRCK: CPT | Performed by: INTERNAL MEDICINE

## 2023-05-04 PROCEDURE — 93306 TTE W/DOPPLER COMPLETE: CPT

## 2023-05-09 ENCOUNTER — TELEPHONE (OUTPATIENT)
Dept: CARDIOLOGY | Facility: CLINIC | Age: 78
End: 2023-05-09
Payer: MEDICARE

## 2023-05-09 NOTE — TELEPHONE ENCOUNTER
Called pt and left a detailed message with Dr. Silverman's interpretation and instructions.    I asked her to call us back with any questions.    Archana

## 2023-05-09 NOTE — TELEPHONE ENCOUNTER
----- Message from Modesto Silverman MD sent at 5/8/2023 11:04 AM EDT -----  Please let the patient know that her heart function is normal.  She does have evidence of mild LVH in the setting of her high blood pressure.  We will continue to work on her blood pressure.  Mild to moderate leakiness of 1 valve that does not need any additional evaluation at this time.  Thanks

## 2023-05-09 NOTE — TELEPHONE ENCOUNTER
Pt called back. Went over results. Answered pt's questions. She verbalized understanding.    Thank you,    Jamilah Shay RN  [place] [facility]  05/09/23 13:57 EDT

## 2023-05-10 ENCOUNTER — CLINICAL SUPPORT (OUTPATIENT)
Dept: FAMILY MEDICINE CLINIC | Facility: CLINIC | Age: 78
End: 2023-05-10
Payer: MEDICARE

## 2023-05-10 DIAGNOSIS — E53.8 B12 DEFICIENCY: ICD-10-CM

## 2023-05-10 PROCEDURE — 96372 THER/PROPH/DIAG INJ SC/IM: CPT | Performed by: INTERNAL MEDICINE

## 2023-05-10 RX ADMIN — CYANOCOBALAMIN 1000 MCG: 1000 INJECTION, SOLUTION INTRAMUSCULAR; SUBCUTANEOUS at 14:21

## 2023-05-10 NOTE — PROGRESS NOTES
Injection  Injection performed in RIGHT DELTOID by Marquita Tam MA. Patient tolerated the procedure well without complications.  05/10/23   Marquita Tam MA

## 2023-05-16 ENCOUNTER — PATIENT OUTREACH (OUTPATIENT)
Dept: CASE MANAGEMENT | Facility: OTHER | Age: 78
End: 2023-05-16
Payer: MEDICARE

## 2023-05-16 NOTE — OUTREACH NOTE
AMBULATORY CASE MANAGEMENT NOTE    Name and Relationship of Patient/Support Person: Isabel Carlson - Self    Patient Outreach    Chart review performed. Pt had f/u with cardiologist - no changes to medications, does have a slightly leaky heart valve and recommends good BP control. Pt also seen by Dr. Dsouza for left foot pain - no surgical treatment recommended, referred to Dr. Salinas for toenail fungus. Pt reports she cancelled today's appt with Dr. Salinas due to weather, but it has been rescheduled for 6/6/2023.      Pt reports she continues to have some BLE edema that increases as the day goes on. She does have compression stockings - encouraged pt to use daily. Discussed limiting salt intake as well, but pt states she does not use a lot of salt.     Encouraged pt to monitor BP daily with home BP cuff - discussed timing and technique. Pt agreeable. Discussed care gaps - pt declines shingles vaccine for now. Pt is interested in ACP - mailed [place] Living Will Packet and [facility] ACP Information Booklet to patient. Pt agreeable to look over and will address on next outreach.     Plan: F/U 6/19  ACP  Chart review  Needs?    Education Documentation  No documentation found.        Anahy HAHN  Ambulatory Case Management    5/16/2023, 16:06 EDT

## 2023-05-17 ENCOUNTER — CLINICAL SUPPORT (OUTPATIENT)
Dept: FAMILY MEDICINE CLINIC | Facility: CLINIC | Age: 78
End: 2023-05-17
Payer: MEDICARE

## 2023-05-17 DIAGNOSIS — E53.8 B12 DEFICIENCY: ICD-10-CM

## 2023-05-17 PROCEDURE — 96372 THER/PROPH/DIAG INJ SC/IM: CPT | Performed by: INTERNAL MEDICINE

## 2023-05-17 RX ADMIN — CYANOCOBALAMIN 1000 MCG: 1000 INJECTION, SOLUTION INTRAMUSCULAR; SUBCUTANEOUS at 14:32

## 2023-05-17 NOTE — PROGRESS NOTES
Injection  Injection performed in left deltoid by Marquita Tam MA. Patient tolerated the procedure well without complications.  05/17/23   Marquita Tam MA

## 2023-05-21 DIAGNOSIS — I10 ESSENTIAL HYPERTENSION: Chronic | ICD-10-CM

## 2023-05-22 RX ORDER — PROPRANOLOL HYDROCHLORIDE 80 MG/1
CAPSULE, EXTENDED RELEASE ORAL
Qty: 90 CAPSULE | Refills: 1 | Status: SHIPPED | OUTPATIENT
Start: 2023-05-22

## 2023-05-24 ENCOUNTER — CLINICAL SUPPORT (OUTPATIENT)
Dept: FAMILY MEDICINE CLINIC | Facility: CLINIC | Age: 78
End: 2023-05-24
Payer: MEDICARE

## 2023-05-24 DIAGNOSIS — E53.8 B12 DEFICIENCY: ICD-10-CM

## 2023-05-24 PROCEDURE — 96372 THER/PROPH/DIAG INJ SC/IM: CPT | Performed by: INTERNAL MEDICINE

## 2023-05-24 RX ADMIN — CYANOCOBALAMIN 1000 MCG: 1000 INJECTION, SOLUTION INTRAMUSCULAR; SUBCUTANEOUS at 13:54

## 2023-05-31 ENCOUNTER — CLINICAL SUPPORT (OUTPATIENT)
Dept: FAMILY MEDICINE CLINIC | Facility: CLINIC | Age: 78
End: 2023-05-31

## 2023-05-31 DIAGNOSIS — E53.8 B12 DEFICIENCY: ICD-10-CM

## 2023-05-31 PROCEDURE — 96372 THER/PROPH/DIAG INJ SC/IM: CPT | Performed by: INTERNAL MEDICINE

## 2023-05-31 RX ADMIN — CYANOCOBALAMIN 1000 MCG: 1000 INJECTION, SOLUTION INTRAMUSCULAR; SUBCUTANEOUS at 14:15

## 2023-05-31 NOTE — PROGRESS NOTES
.  Immunization  Immunization performed in  by Momo Martinez MA. Patient tolerated the procedure well without complications.  05/31/23   Momo Martinez MA

## 2023-06-07 ENCOUNTER — CLINICAL SUPPORT (OUTPATIENT)
Dept: FAMILY MEDICINE CLINIC | Facility: CLINIC | Age: 78
End: 2023-06-07
Payer: MEDICARE

## 2023-06-07 DIAGNOSIS — E53.8 B12 DEFICIENCY: Primary | ICD-10-CM

## 2023-06-07 PROCEDURE — 96372 THER/PROPH/DIAG INJ SC/IM: CPT | Performed by: INTERNAL MEDICINE

## 2023-06-07 RX ADMIN — CYANOCOBALAMIN 1000 MCG: 1000 INJECTION, SOLUTION INTRAMUSCULAR; SUBCUTANEOUS at 14:32

## 2023-08-16 DIAGNOSIS — M15.9 PRIMARY OSTEOARTHRITIS INVOLVING MULTIPLE JOINTS: ICD-10-CM

## 2023-08-16 RX ORDER — GABAPENTIN 300 MG/1
CAPSULE ORAL
Qty: 30 CAPSULE | Refills: 0 | Status: SHIPPED | OUTPATIENT
Start: 2023-08-16

## 2023-09-19 DIAGNOSIS — M15.9 PRIMARY OSTEOARTHRITIS INVOLVING MULTIPLE JOINTS: ICD-10-CM

## 2023-09-20 RX ORDER — IBANDRONATE SODIUM 150 MG/1
150 TABLET, FILM COATED ORAL
COMMUNITY
Start: 2023-09-19

## 2023-09-20 RX ORDER — GABAPENTIN 300 MG/1
CAPSULE ORAL
Qty: 30 CAPSULE | Refills: 0 | Status: SHIPPED | OUTPATIENT
Start: 2023-09-20

## 2023-09-20 NOTE — TELEPHONE ENCOUNTER
LOV             3/8/2023   NOV            10/11/2023  CANCELLED 10 APPT  7/19/23 - 9/13/23  Last RF       8/16/23  Protocol          Controlled Substance Med Protocol Failed     Recent Appt with me in Past 3 Months    Medication not refilled in past 28 days           Terri Curiel, SHUKRIA/LMR

## 2023-10-11 ENCOUNTER — OFFICE VISIT (OUTPATIENT)
Dept: FAMILY MEDICINE CLINIC | Facility: CLINIC | Age: 78
End: 2023-10-11
Payer: MEDICARE

## 2023-10-11 VITALS
SYSTOLIC BLOOD PRESSURE: 140 MMHG | HEART RATE: 60 BPM | BODY MASS INDEX: 29.96 KG/M2 | HEIGHT: 59 IN | DIASTOLIC BLOOD PRESSURE: 82 MMHG | TEMPERATURE: 96.8 F | WEIGHT: 148.6 LBS | OXYGEN SATURATION: 98 %

## 2023-10-11 DIAGNOSIS — M81.0 OSTEOPOROSIS OF LUMBAR SPINE: ICD-10-CM

## 2023-10-11 DIAGNOSIS — E78.2 MIXED HYPERLIPIDEMIA: ICD-10-CM

## 2023-10-11 DIAGNOSIS — D50.9 IRON DEFICIENCY ANEMIA, UNSPECIFIED IRON DEFICIENCY ANEMIA TYPE: ICD-10-CM

## 2023-10-11 DIAGNOSIS — E66.3 OVERWEIGHT (BMI 25.0-29.9): ICD-10-CM

## 2023-10-11 DIAGNOSIS — Z00.00 MEDICARE ANNUAL WELLNESS VISIT, SUBSEQUENT: Primary | ICD-10-CM

## 2023-10-11 DIAGNOSIS — E53.8 B12 DEFICIENCY: ICD-10-CM

## 2023-10-11 DIAGNOSIS — I10 ESSENTIAL HYPERTENSION: Chronic | ICD-10-CM

## 2023-10-11 DIAGNOSIS — K21.9 GASTROESOPHAGEAL REFLUX DISEASE: ICD-10-CM

## 2023-10-11 DIAGNOSIS — M15.9 PRIMARY OSTEOARTHRITIS INVOLVING MULTIPLE JOINTS: ICD-10-CM

## 2023-10-11 PROBLEM — H04.123 DRY EYE SYNDROME OF BILATERAL LACRIMAL GLANDS: Status: ACTIVE | Noted: 2021-12-06

## 2023-10-11 PROBLEM — M19.012 OSTEOARTHRITIS OF LEFT GLENOHUMERAL JOINT: Status: ACTIVE | Noted: 2023-09-19

## 2023-10-11 PROBLEM — M20.21 ACQUIRED HALLUX RIGIDUS OF RIGHT FOOT: Status: ACTIVE | Noted: 2022-07-19

## 2023-10-11 RX ORDER — ROSUVASTATIN CALCIUM 20 MG/1
20 TABLET, COATED ORAL DAILY
Qty: 90 TABLET | Refills: 1 | Status: SHIPPED | OUTPATIENT
Start: 2023-10-11

## 2023-10-11 RX ORDER — GABAPENTIN 300 MG/1
300 CAPSULE ORAL NIGHTLY
Qty: 90 CAPSULE | Refills: 1 | Status: SHIPPED | OUTPATIENT
Start: 2023-10-11

## 2023-10-11 RX ORDER — PANTOPRAZOLE SODIUM 40 MG/1
40 TABLET, DELAYED RELEASE ORAL DAILY
Qty: 90 TABLET | Refills: 1 | Status: SHIPPED | OUTPATIENT
Start: 2023-10-11

## 2023-10-11 RX ORDER — MELOXICAM 15 MG/1
15 TABLET ORAL DAILY PRN
Qty: 90 TABLET | Refills: 1 | Status: SHIPPED | OUTPATIENT
Start: 2023-10-11

## 2023-10-11 RX ORDER — AMLODIPINE BESYLATE AND BENAZEPRIL HYDROCHLORIDE 10; 20 MG/1; MG/1
1 CAPSULE ORAL DAILY
Qty: 90 CAPSULE | Refills: 1 | Status: SHIPPED | OUTPATIENT
Start: 2023-10-11

## 2023-10-11 RX ORDER — PROPRANOLOL HYDROCHLORIDE 80 MG/1
80 CAPSULE, EXTENDED RELEASE ORAL DAILY
Qty: 90 CAPSULE | Refills: 1 | Status: SHIPPED | OUTPATIENT
Start: 2023-10-11

## 2023-10-11 RX ORDER — IBANDRONATE SODIUM 150 MG/1
150 TABLET, FILM COATED ORAL
Qty: 3 TABLET | Refills: 3 | Status: SHIPPED | OUTPATIENT
Start: 2023-10-11

## 2023-10-11 NOTE — PROGRESS NOTES
The ABCs of the Annual Wellness Visit  Subsequent Medicare Wellness Visit    Subjective    Isabel Carlson is a 78 y.o. female who presents for a Subsequent Medicare Wellness Visit.  78-year-old female with history of hypertension with hyperlipidemia and nonrheumatic tricuspid and mitral insufficiency, osteoporosis lumbar spine, osteoarthritis, iron deficiency anemia, B12 deficiency, arthritis in the feet, and GERD.    Follow-up for hypertension.  Currently, has been feeling well and asymptomatic without any headaches, vision changes, cough, chest pain, shortness of breath, swelling, focal neurologic deficit, memory loss or syncope.  Has been taking the medications regularly and adherent with the regimen of amlodipine-benazepril to 10-20 daily and propranolol LA 80 mg daily.  Denies medication side effects and no significant interval events.      Follow-up for cholesterol.  Currently, has been feeling well without any myalgias, muscle aches, weakness, numbness, chest pain, short of breath or other issues.  Currently, is adherent with medication regimen of rosuvastatin 20 mg daily and denies medication side effects. Is due for lab follow-up.    The following portions of the patient's history were reviewed and   updated as appropriate: allergies, current medications, past family history, past medical history, past social history, past surgical history, and problem list.    Compared to one year ago, the patient feels her physical   health is the same.    Compared to one year ago, the patient feels her mental   health is the same.    Recent Hospitalizations:  She was not admitted to the hospital during the last year.     Current Medical Providers:  Patient Care Team:  Cordell Holland MD as PCP - General (Internal Medicine)  Jose Leary MD as Consulting Physician (Orthopedic Surgery)  Rodrigue Brady DO as Consulting Physician (Neurology)  Eddie Thrasher MD as Consulting Physician  (Allergy)    Outpatient Medications Prior to Visit   Medication Sig Dispense Refill    fluticasone (FLONASE) 50 MCG/ACT nasal spray 2 sprays into the nostril(s) as directed by provider Daily As Needed.      amLODIPine-benazepril (LOTREL) 10-20 MG per capsule Take 1 capsule by mouth Daily. 90 capsule 1    cetirizine (zyrTEC) 10 MG tablet Take 1 tablet by mouth Daily As Needed.  4    cyclobenzaprine (FLEXERIL) 10 MG tablet 1/2 to 1 tab every 8 hours as needed. 90 tablet 2    Diclofenac Sodium 4 %, Lidocaine HCl 5 % Apply 1-2 g topically to the appropriate area as directed 3 (Three) to 4 (Four) times daily. 90 g 1    gabapentin (NEURONTIN) 300 MG capsule TAKE 1 CAPSULE NIGHTLY 30 capsule 0    ibandronate (BONIVA) 150 MG tablet Take 1 tablet by mouth Every 30 (Thirty) Days.      meloxicam (MOBIC) 15 MG tablet Take 1 tablet by mouth Daily As Needed for Moderate Pain. 90 tablet 1    ondansetron ODT (ZOFRAN-ODT) 4 MG disintegrating tablet Place 1 tablet on the tongue 4 (Four) Times a Day As Needed for Nausea or Vomiting. 20 tablet 0    pantoprazole (PROTONIX) 40 MG EC tablet TAKE 1 TABLET DAILY 90 tablet 1    propranolol LA (INDERAL LA) 80 MG 24 hr capsule TAKE 1 CAPSULE DAILY 90 capsule 1    rosuvastatin (CRESTOR) 20 MG tablet Take 1 tablet by mouth Daily. 90 tablet 1    traMADol (ULTRAM) 50 MG tablet Take 1 tablet by mouth Every 6 (Six) Hours As Needed for Moderate Pain. 12 tablet 0     Facility-Administered Medications Prior to Visit   Medication Dose Route Frequency Provider Last Rate Last Admin    cyanocobalamin injection 1,000 mcg  1,000 mcg Intramuscular Q30 Days Skyler, Cordell ZALDIVAR MD           No opioid medication identified on active medication list. I have reviewed chart for other potential  high risk medication/s and harmful drug interactions in the elderly.      Aspirin is not on active medication list.  Aspirin use is not indicated based on review of current medical condition/s. Risk of harm outweighs  "potential benefits.  .    Patient Active Problem List   Diagnosis    Heart murmur    Abnormal EKG    APC (atrial premature contractions)    Non-rheumatic mitral regurgitation    Non-rheumatic tricuspid valve insufficiency    LVH (left ventricular hypertrophy)    Gastroesophageal reflux disease    Essential hypertension    Mixed hyperlipidemia    Osteoporosis of lumbar spine    Menopause    Osteoarthritis    Lip laceration    Status post left knee replacement    Fatigue    Iron deficiency anemia    Abnormal serum thyroid stimulating hormone (TSH) level    Other headache syndrome    Medicare annual wellness visit, subsequent    Upper respiratory tract infection due to COVID-19 virus    Herpes zoster vaccination declined    Vitreomacular adhesion of left eye    Pseudophakia    Posterior vitreous detachment    Iridocyclitis    Epiretinal membrane (ERM) of left eye    Cystoid macular edema of left eye    B12 deficiency    Onychomycosis    Arthritis of first metatarsophalangeal (MTP) joint of left foot    Hammer toes of both feet    Hallux valgus of right foot    Arthritis of first metatarsophalangeal (MTP) joint of right foot    Overweight (BMI 25.0-29.9)    Dry eye syndrome of bilateral lacrimal glands    Acquired hallux rigidus of right foot    Osteoarthritis of left glenohumeral joint     Advance Care Planning   Advance Care Planning     Advance Directive is not on file.  ACP discussion was held with the patient during this visit. Patient does not have an advance directive, declines further assistance.     Objective    Vitals:    10/11/23 1118   BP: 140/82   BP Location: Left arm   Patient Position: Sitting   Cuff Size: Adult   Pulse: 60   Temp: 96.8 øF (36 øC)   TempSrc: Temporal   SpO2: 98%   Weight: 67.4 kg (148 lb 9.6 oz)   Height: 151 cm (59.45\")     Estimated body mass index is 29.56 kg/mý as calculated from the following:    Height as of this encounter: 151 cm (59.45\").    Weight as of this encounter: 67.4 kg " (148 lb 9.6 oz).    BMI is >= 25 and <30. (Overweight) The following options were offered after discussion;: weight loss educational material (shared in after visit summary), exercise counseling/recommendations, and nutrition counseling/recommendations    Does the patient have evidence of cognitive impairment? No        HEALTH RISK ASSESSMENT    Smoking Status:  Social History     Tobacco Use   Smoking Status Never   Smokeless Tobacco Never     Alcohol Consumption:  Social History     Substance and Sexual Activity   Alcohol Use No    Comment: rarely     Fall Risk Screen:    JEREMYADI Fall Risk Assessment was completed, and patient is at HIGH risk for falls. Assessment completed on:10/11/2023    Depression Screening:      10/11/2023    11:21 AM   PHQ-2/PHQ-9 Depression Screening   Little Interest or Pleasure in Doing Things 0-->not at all   Feeling Down, Depressed or Hopeless 0-->not at all   PHQ-9: Brief Depression Severity Measure Score 0     Health Habits and Functional and Cognitive Screening:      10/11/2023    11:19 AM   Functional & Cognitive Status   Do you have difficulty preparing food and eating? No   Do you have difficulty bathing yourself, getting dressed or grooming yourself? No   Do you have difficulty using the toilet? No   Do you have difficulty moving around from place to place? No   Do you have trouble with steps or getting out of a bed or a chair? No   Current Diet Well Balanced Diet   Dental Exam Up to date   Eye Exam Up to date   Exercise (times per week) 0 times per week   Current Exercises Include No Regular Exercise   Do you need help using the phone?  No   Are you deaf or do you have serious difficulty hearing?  No   Do you need help to go to places out of walking distance? No   Do you need help shopping? No   Do you need help preparing meals?  No   Do you need help with housework?  No   Do you need help with laundry? No   Do you need help taking your medications? No   Do you need help managing  money? No   Do you ever drive or ride in a car without wearing a seat belt? No   Have you felt unusual stress, anger or loneliness in the last month? No   Who do you live with? Alone   If you need help, do you have trouble finding someone available to you? No   Have you been bothered in the last four weeks by sexual problems? No   Do you have difficulty concentrating, remembering or making decisions? Yes     Age-appropriate Screening Schedule:  Refer to the list below for future screening recommendations based on patient's age, sex and/or medical conditions. Orders for these recommended tests are listed in the plan section. The patient has been provided with a written plan.    Health Maintenance   Topic Date Due    COVID-19 Vaccine (6 - 2023-24 season) 09/01/2023    ZOSTER VACCINE (1 of 2) 05/17/2024 (Originally 8/14/1993)    LIPID PANEL  03/08/2024    ANNUAL WELLNESS VISIT  10/11/2024    BMI FOLLOWUP  10/11/2024    DXA SCAN  11/29/2024    TDAP/TD VACCINES (2 - Td or Tdap) 03/03/2030    INFLUENZA VACCINE  Completed    Pneumococcal Vaccine 65+  Completed    COLORECTAL CANCER SCREENING  Discontinued        CMS Preventative Services Quick Reference  Risk Factors Identified During Encounter  Immunizations Discussed/Encouraged: Influenza and COVID19  The above risks/problems have been discussed with the patient.  Pertinent information has been shared with the patient in the After Visit Summary.  An After Visit Summary and PPPS were made available to the patient.    Diagnoses and all orders for this visit:    1. Medicare annual wellness visit, subsequent (Primary)    2. Essential hypertension  -     Comprehensive Metabolic Panel  -     Lipid Panel  -     amLODIPine-benazepril (LOTREL) 10-20 MG per capsule; Take 1 capsule by mouth Daily.  Dispense: 90 capsule; Refill: 1  -     propranolol LA (INDERAL LA) 80 MG 24 hr capsule; Take 1 capsule by mouth Daily.  Dispense: 90 capsule; Refill: 1    3. Mixed hyperlipidemia  -      Comprehensive Metabolic Panel  -     Lipid Panel  -     rosuvastatin (CRESTOR) 20 MG tablet; Take 1 tablet by mouth Daily.  Dispense: 90 tablet; Refill: 1    4. Iron deficiency anemia, unspecified iron deficiency anemia type  -     CBC & Differential    5. Osteoporosis of lumbar spine  -     ibandronate (BONIVA) 150 MG tablet; Take 1 tablet by mouth Every 30 (Thirty) Days.  Dispense: 3 tablet; Refill: 3    6. B12 deficiency  -     Vitamin B12    7. Primary osteoarthritis involving multiple joints  -     gabapentin (NEURONTIN) 300 MG capsule; Take 1 capsule by mouth Every Night.  Dispense: 90 capsule; Refill: 1  -     meloxicam (MOBIC) 15 MG tablet; Take 1 tablet by mouth Daily As Needed for Moderate Pain.  Dispense: 90 tablet; Refill: 1    8. Gastroesophageal reflux disease  -     pantoprazole (PROTONIX) 40 MG EC tablet; Take 1 tablet by mouth Daily.  Dispense: 90 tablet; Refill: 1    9. Overweight (BMI 25.0-29.9)    Other orders  -     Fluzone High-Dose 65+yrs (0499-1152)     Reviewed history and annual wellness visit with patient during office time.  Medications reviewed as appropriate.  Discussed advanced directives and living will.  Patient has living will: Living will: no and information packet given to patient to complete at home and bring copy to office check she has the information at home still.  Discussed fall risk and precautions encourage removing throw rugs and using grab bars within the home and bathroom.  Will check the labs as ordered above to evaluate the blood sugars, kidney, liver, cholesterol for screening.  Discussed flu shot recommended to get the high-dose influenza vaccine annually in the fall.  The patient has started, but not completed, their COVID-19 vaccination series..  Prevnar-13 and pneumovax-23 up to date and appropriate.  Covid booster, RSV, influenza and Shingrix vaccination series recommended.  Encourage follow-up with the eye doctor on annual basis for glaucoma evaluation.   Discussed weight and encouraged exercise as tolerated while following a healthy diet.  Follow-up with specialists as scheduled.  Restart the boniva.      Follow Up:   Next Medicare Wellness visit to be scheduled in 1 year.

## 2023-10-11 NOTE — PATIENT INSTRUCTIONS
Medicare Wellness  Personal Prevention Plan of Service     Date of Office Visit:    Encounter Provider:  Cordell Holland MD  Place of Service:  [facility]  Patient Name: Isabel Carlson  :  1945    As part of the Medicare Wellness portion of your visit today, we are providing you with this personalized preventive plan of services (PPPS). This plan is based upon recommendations of the [place] Preventive Services Task Force (USPSTF) and the Advisory Committee on Immunization Practices (ACIP).    This lists the preventive care services that should be considered, and provides dates of when you are due. Items listed as completed are up-to-date and do not require any further intervention.    Health Maintenance   Topic Date Due    BMI FOLLOWUP  Never done    INFLUENZA VACCINE  2023    COVID-19 Vaccine (2023- season) 2023    ZOSTER VACCINE (1 of 2) 2024 (Originally 1993)    LIPID PANEL  2024    ANNUAL WELLNESS VISIT  10/11/2024    DXA SCAN  2024    TDAP/TD VACCINES (2 - Td or Tdap) 2030    Pneumococcal Vaccine 65+  Completed    COLORECTAL CANCER SCREENING  Discontinued       Orders Placed This Encounter   Procedures    Fluzone High-Dose 65+yrs (2271-4302)    Comprehensive Metabolic Panel     Order Specific Question:   Release to patient     Answer:   Routine Release [[id number]]    Lipid Panel     Order Specific Question:   Release to patient     Answer:   Routine Release [[id number]]    Vitamin B12     Order Specific Question:   Release to patient     Answer:   Routine Release [[id number]]    CBC & Differential     Order Specific Question:   Manual Differential     Answer:   No     Order Specific Question:   Release to patient     Answer:   Routine Release [[id number]]       Return in about 6 months (around 2024) for Next scheduled follow up.

## 2023-10-12 LAB
ALBUMIN SERPL-MCNC: 5 G/DL (ref 3.5–5.2)
ALBUMIN/GLOB SERPL: 2.2 G/DL
ALP SERPL-CCNC: 111 U/L (ref 39–117)
ALT SERPL-CCNC: 10 U/L (ref 1–33)
AST SERPL-CCNC: 12 U/L (ref 1–32)
BASOPHILS # BLD AUTO: 0.02 10*3/MM3 (ref 0–0.2)
BASOPHILS NFR BLD AUTO: 0.5 % (ref 0–1.5)
BILIRUB SERPL-MCNC: 0.9 MG/DL (ref 0–1.2)
BUN SERPL-MCNC: 10 MG/DL (ref 8–23)
BUN/CREAT SERPL: 16.7 (ref 7–25)
CALCIUM SERPL-MCNC: 9.9 MG/DL (ref 8.6–10.5)
CHLORIDE SERPL-SCNC: 105 MMOL/L (ref 98–107)
CHOLEST SERPL-MCNC: 141 MG/DL (ref 0–200)
CO2 SERPL-SCNC: 26.7 MMOL/L (ref 22–29)
CREAT SERPL-MCNC: 0.6 MG/DL (ref 0.57–1)
EGFRCR SERPLBLD CKD-EPI 2021: 90.9 ML/MIN/1.73
EOSINOPHIL # BLD AUTO: 0.04 10*3/MM3 (ref 0–0.4)
EOSINOPHIL NFR BLD AUTO: 1 % (ref 0.3–6.2)
ERYTHROCYTE [DISTWIDTH] IN BLOOD BY AUTOMATED COUNT: 12.7 % (ref 12.3–15.4)
GLOBULIN SER CALC-MCNC: 2.3 GM/DL
GLUCOSE SERPL-MCNC: 94 MG/DL (ref 65–99)
HCT VFR BLD AUTO: 39.2 % (ref 34–46.6)
HDLC SERPL-MCNC: 72 MG/DL (ref 40–60)
HGB BLD-MCNC: 13 G/DL (ref 12–15.9)
IMM GRANULOCYTES # BLD AUTO: 0.01 10*3/MM3 (ref 0–0.05)
IMM GRANULOCYTES NFR BLD AUTO: 0.3 % (ref 0–0.5)
LDLC SERPL CALC-MCNC: 58 MG/DL (ref 0–100)
LYMPHOCYTES # BLD AUTO: 1.66 10*3/MM3 (ref 0.7–3.1)
LYMPHOCYTES NFR BLD AUTO: 42.9 % (ref 19.6–45.3)
MCH RBC QN AUTO: 29.3 PG (ref 26.6–33)
MCHC RBC AUTO-ENTMCNC: 33.2 G/DL (ref 31.5–35.7)
MCV RBC AUTO: 88.3 FL (ref 79–97)
MONOCYTES # BLD AUTO: 0.36 10*3/MM3 (ref 0.1–0.9)
MONOCYTES NFR BLD AUTO: 9.3 % (ref 5–12)
NEUTROPHILS # BLD AUTO: 1.78 10*3/MM3 (ref 1.7–7)
NEUTROPHILS NFR BLD AUTO: 46 % (ref 42.7–76)
NRBC BLD AUTO-RTO: 0 /100 WBC (ref 0–0.2)
PLATELET # BLD AUTO: 274 10*3/MM3 (ref 140–450)
POTASSIUM SERPL-SCNC: 3.8 MMOL/L (ref 3.5–5.2)
PROT SERPL-MCNC: 7.3 G/DL (ref 6–8.5)
RBC # BLD AUTO: 4.44 10*6/MM3 (ref 3.77–5.28)
SODIUM SERPL-SCNC: 144 MMOL/L (ref 136–145)
TRIGL SERPL-MCNC: 48 MG/DL (ref 0–150)
VIT B12 SERPL-MCNC: >2000 PG/ML (ref 211–946)
VLDLC SERPL CALC-MCNC: 11 MG/DL (ref 5–40)
WBC # BLD AUTO: 3.87 10*3/MM3 (ref 3.4–10.8)

## 2024-02-25 NOTE — TELEPHONE ENCOUNTER
"Chief Complaint  Follow-up (Htn, palpitations )    Subjective    History of Present Illness {CC  Problem List  Visit  Diagnosis   Encounters  Notes  Medications  Labs  Result Review Imaging  Media :23}       Hypertension  Associated symptoms include palpitations.   Palpitations      You have chosen to receive care through the use of telemedicine. Telemedicine enables health care providers at different locations to provide safe, effective, and convenient care through the use of technology. As with any health care service, there are risks associated with the use of telemedicine, including equipment failure, poor connections, and  issues.    Do you understand the risks and benefits of telemedicine as I have explained them to you? Yes  Have your questions regarding telemedicine been answered? Yes  Do you consent to the use of telemedicine in your medical care today? Yes     44 year old female presents for telemedicine today  for ongoing evaluation of her PARSONS, atypical chest pain and her elevated blood pressure readings. Notes that her bp has been 120-130s mostly but she occasionally notes that bp is 145-150. Notes that she is currently under a lot of stress due to a divorce. Does report that palpitations have worsened recently.had been prescribed propranolol 10 mg bid by Neurology due to tremors and would like to start it.   Objective     Vital Signs:   Vitals:    02/22/24 0915   Weight: 109 kg (241 lb)   Height: 162.6 cm (64\")       Body mass index is 41.37 kg/m².  Physical Exam  Vitals and nursing note reviewed.   Constitutional:       Appearance: Normal appearance.   HENT:      Head: Normocephalic.   Pulmonary:      Effort: Pulmonary effort is normal.   Neurological:      Mental Status: She is alert and oriented to person, place, and time.   Psychiatric:         Mood and Affect: Mood normal.         Behavior: Behavior normal.         Thought Content: Thought content normal.      " Patient should not be taking antiinflammatories prior to surgery that is scheduled in 10 days.           Result Review  Data Reviewed:{ Labs  Result Review  Imaging  Med Tab  Media :23}   Adult Transthoracic Echo Complete W/ Cont if Necessary Per Protocol (02/10/2023 08:32)  ECG 12 Lead Chest Pain (02/01/2023 04:48)  ECG 12 Lead ED Triage Standing Order; SOA (02/01/2023 02:07)  CBC & Differential (02/01/2023 02:42)  Comprehensive Metabolic Panel (02/01/2023 02:42)  BNP (02/01/2023 02:42)  Troponin (02/01/2023 02:42)  Lactate Dehydrogenase (02/01/2023 02:42)  Procalcitonin (02/01/2023 02:42)  D-dimer, Quantitative (02/01/2023 02:42)  C-reactive Protein (02/01/2023 02:42)  Lactic Acid, Plasma (02/01/2023 02:42)  COVID PRE-OP / PRE-PROCEDURE SCREENING ORDER (NO ISOLATION) - Swab, Nasopharynx (02/01/2023 02:47)  Troponin (02/01/2023 04:28)  Echo February 2023  Left ventricular systolic function is normal. Calculated left ventricular EF = 65% Left ventricular ejection fraction appears to be 56 - 60%.    Cannot exclude the presence of a patent foramen ovale.           Assessment and Plan {CC Problem List  Visit Diagnosis  ROS  Review (Popup)  Health Maintenance  Quality  BestPractice  Medications  SmartSets  SnapShot Encounters  Media :23}   1. Primary hypertension  stable on chlorthalidone  Continue monitor blood pressure closely  2. Palpitations  Begin propranolol 10 mg twice daily  Follow Up {Instructions Charge Capture  Follow-up Communications :23}   Return in about 4 weeks (around 3/21/2024) for Telemedicine visit, HTN, tachycardia .    Patient was given instructions and counseling regarding her condition or for health maintenance advice. Please see specific information pulled into the AVS if appropriate.  Patient was instructed to call the [facility] with any questions, concerns, or worsening symptoms.

## 2024-03-01 ENCOUNTER — OFFICE VISIT (OUTPATIENT)
Dept: OBSTETRICS AND GYNECOLOGY | Age: 79
End: 2024-03-01
Payer: MEDICARE

## 2024-03-01 VITALS
BODY MASS INDEX: 29.45 KG/M2 | WEIGHT: 150 LBS | SYSTOLIC BLOOD PRESSURE: 144 MMHG | DIASTOLIC BLOOD PRESSURE: 84 MMHG | HEIGHT: 60 IN

## 2024-03-01 DIAGNOSIS — Z01.419 WELL WOMAN EXAM WITH ROUTINE GYNECOLOGICAL EXAM: Primary | ICD-10-CM

## 2024-03-01 DIAGNOSIS — N89.8 VAGINAL DISCHARGE: ICD-10-CM

## 2024-03-01 DIAGNOSIS — M81.0 OSTEOPOROSIS OF LUMBAR SPINE: ICD-10-CM

## 2024-03-01 DIAGNOSIS — Z90.711 S/P SUBTOTAL HYSTERECTOMY: ICD-10-CM

## 2024-03-01 RX ORDER — IBANDRONATE SODIUM 150 MG/1
150 TABLET, FILM COATED ORAL
Qty: 3 TABLET | Refills: 3 | Status: SHIPPED | OUTPATIENT
Start: 2024-03-01

## 2024-03-01 NOTE — PROGRESS NOTES
Subjective     Chief Complaint   Patient presents with    Gynecologic Exam     New GYN, last pap 2019 neg, dexa 2022, mg , csy   CC:  vaginal discharge       History of Present Illness    Isabel Carlson is a 78 y.o.  who presents for annual exam.    New GYN  Hx of hysterectomy in her 30's and one ovary removed.   C/o vaginal discharge for about a month. She states the discharge that is brown and has an odor. Denies itching or burning. Denies urinary issues. Bowel movements are normal. As of the last few days she has not noticed the discharge any more. Denies vaginal dryness or s/s atrophy. She has not been SA in many years.  Dexa ordered by PCP in  - showed osteoporosis. Needs boniva refilled.  Mammo UTD and ordered by PCP  She has no other GYN concerns or complaints today    Obstetric History:  OB History          2    Para   2    Term   2            AB        Living             SAB        IAB        Ectopic        Molar        Multiple        Live Births                   Menstrual History:     No LMP recorded. Patient has had a hysterectomy.         Current contraception: status post hysterectomy  History of abnormal Pap smear: no  Received Gardasil immunization: no  Perform regular self breast exam: yes - .  Family history of uterine or ovarian cancer: no  Family History of colon cancer: no  Family history of breast cancer: yes - cousin    Mammogram: up to date.  Colonoscopy: up to date.  DEXA: up to date.    Exercise: moderately active  Calcium/Vitamin D: inadequate intake    The following portions of the patient's history were reviewed and updated as appropriate: allergies, current medications, past family history, past medical history, past social history, past surgical history, and problem list.    Review of Systems   Constitutional: Negative.    Respiratory: Negative.     Cardiovascular: Negative.    Gastrointestinal: Negative.    Genitourinary: Negative.     Skin: Negative.    Psychiatric/Behavioral: Negative.             Objective   Physical Exam  Constitutional:       General: She is awake.      Appearance: Normal appearance. She is well-developed.   HENT:      Head: Normocephalic and atraumatic.      Nose: Nose normal.   Neck:      Thyroid: No thyroid mass, thyromegaly or thyroid tenderness.   Cardiovascular:      Rate and Rhythm: Normal rate and regular rhythm.      Pulses: Normal pulses.      Heart sounds: Normal heart sounds.   Pulmonary:      Effort: Pulmonary effort is normal.      Breath sounds: Normal breath sounds.   Chest:   Breasts:     Breasts are symmetrical.      Right: Normal. No swelling, bleeding, inverted nipple, mass, nipple discharge, skin change or tenderness.      Left: Normal. No swelling, bleeding, inverted nipple, mass, nipple discharge, skin change or tenderness.   Abdominal:      General: Abdomen is flat. Bowel sounds are normal.      Palpations: Abdomen is soft.      Tenderness: There is no abdominal tenderness.   Genitourinary:     General: Normal vulva.      Labia:         Right: No rash, tenderness, lesion or injury.         Left: No rash, tenderness, lesion or injury.       Urethra: No prolapse, urethral pain, urethral swelling or urethral lesion.      Vagina: Normal. No signs of injury. No vaginal discharge, erythema, tenderness, bleeding, lesions or prolapsed vaginal walls.      Adnexa: Right adnexa normal and left adnexa normal.        Right: No mass, tenderness or fullness.          Left: No mass, tenderness or fullness.        Rectum: Normal. No mass.      Comments: Uterus and cervix absent  Musculoskeletal:      Cervical back: Normal range of motion and neck supple.   Lymphadenopathy:      Upper Body:      Right upper body: No supraclavicular adenopathy.      Left upper body: No supraclavicular adenopathy.   Skin:     General: Skin is warm and dry.   Neurological:      General: No focal deficit present.      Mental Status: She  "is alert and oriented to person, place, and time.   Psychiatric:         Mood and Affect: Mood normal.         Behavior: Behavior normal. Behavior is cooperative.         Thought Content: Thought content normal.         Judgment: Judgment normal.         /84   Ht 152.4 cm (60\")   Wt 68 kg (150 lb)   BMI 29.29 kg/m²     Assessment & Plan   Diagnoses and all orders for this visit:    1. Well woman exam with routine gynecological exam (Primary)    2. S/P subtotal hysterectomy    3. Osteoporosis of lumbar spine  -     ibandronate (BONIVA) 150 MG tablet; Take 1 tablet by mouth Every 30 (Thirty) Days.  Dispense: 3 tablet; Refill: 3    4. Vaginal discharge  -     NuSwab BV & Candida - Swab, Vagina        All questions answered.  Breast self exam technique reviewed and patient encouraged to perform self-exam monthly.  Discussed healthy lifestyle modifications.  Recommended 30 minutes of aerobic exercise five times per week.  Discussed calcium needs to prevent osteoporosis.    -Pap not indicated  -No discharge seen on exam but swab collected for BV/yeast.                "

## 2024-03-04 LAB
A VAGINAE DNA VAG QL NAA+PROBE: NORMAL SCORE
BVAB2 DNA VAG QL NAA+PROBE: NORMAL SCORE
C ALBICANS DNA VAG QL NAA+PROBE: NEGATIVE
C GLABRATA DNA VAG QL NAA+PROBE: NEGATIVE
MEGA1 DNA VAG QL NAA+PROBE: NORMAL SCORE

## 2024-03-27 ENCOUNTER — OFFICE VISIT (OUTPATIENT)
Dept: FAMILY MEDICINE CLINIC | Facility: CLINIC | Age: 79
End: 2024-03-27
Payer: MEDICARE

## 2024-03-27 VITALS
WEIGHT: 148 LBS | HEIGHT: 60 IN | BODY MASS INDEX: 29.06 KG/M2 | DIASTOLIC BLOOD PRESSURE: 78 MMHG | SYSTOLIC BLOOD PRESSURE: 154 MMHG | HEART RATE: 61 BPM | OXYGEN SATURATION: 97 % | TEMPERATURE: 97.8 F

## 2024-03-27 DIAGNOSIS — K21.9 GASTROESOPHAGEAL REFLUX DISEASE: ICD-10-CM

## 2024-03-27 DIAGNOSIS — I20.89 ANGINA OF EFFORT: ICD-10-CM

## 2024-03-27 DIAGNOSIS — I10 ESSENTIAL HYPERTENSION: Chronic | ICD-10-CM

## 2024-03-27 DIAGNOSIS — M15.9 PRIMARY OSTEOARTHRITIS INVOLVING MULTIPLE JOINTS: ICD-10-CM

## 2024-03-27 DIAGNOSIS — R07.9 CHEST PAIN, UNSPECIFIED TYPE: Primary | ICD-10-CM

## 2024-03-27 DIAGNOSIS — R06.02 SHORTNESS OF BREATH ON EXERTION: ICD-10-CM

## 2024-03-27 PROCEDURE — 93000 ELECTROCARDIOGRAM COMPLETE: CPT | Performed by: INTERNAL MEDICINE

## 2024-03-27 PROCEDURE — 1160F RVW MEDS BY RX/DR IN RCRD: CPT | Performed by: INTERNAL MEDICINE

## 2024-03-27 PROCEDURE — 1159F MED LIST DOCD IN RCRD: CPT | Performed by: INTERNAL MEDICINE

## 2024-03-27 PROCEDURE — 3078F DIAST BP <80 MM HG: CPT | Performed by: INTERNAL MEDICINE

## 2024-03-27 PROCEDURE — 99214 OFFICE O/P EST MOD 30 MIN: CPT | Performed by: INTERNAL MEDICINE

## 2024-03-27 PROCEDURE — 3077F SYST BP >= 140 MM HG: CPT | Performed by: INTERNAL MEDICINE

## 2024-03-27 RX ORDER — AMLODIPINE BESYLATE AND BENAZEPRIL HYDROCHLORIDE 10; 20 MG/1; MG/1
1 CAPSULE ORAL DAILY
Qty: 90 CAPSULE | Refills: 1 | Status: SHIPPED | OUTPATIENT
Start: 2024-03-27

## 2024-03-27 RX ORDER — MELOXICAM 15 MG/1
15 TABLET ORAL DAILY PRN
Qty: 90 TABLET | Refills: 1 | Status: SHIPPED | OUTPATIENT
Start: 2024-03-27

## 2024-03-27 RX ORDER — PROPRANOLOL HYDROCHLORIDE 80 MG/1
80 CAPSULE, EXTENDED RELEASE ORAL DAILY
Qty: 90 CAPSULE | Refills: 1 | Status: SHIPPED | OUTPATIENT
Start: 2024-03-27

## 2024-03-27 RX ORDER — GABAPENTIN 300 MG/1
300 CAPSULE ORAL NIGHTLY
Qty: 90 CAPSULE | Refills: 1 | Status: SHIPPED | OUTPATIENT
Start: 2024-03-27

## 2024-03-27 RX ORDER — PANTOPRAZOLE SODIUM 40 MG/1
40 TABLET, DELAYED RELEASE ORAL DAILY
Qty: 90 TABLET | Refills: 1 | Status: SHIPPED | OUTPATIENT
Start: 2024-03-27

## 2024-03-27 NOTE — PROGRESS NOTES
Subjective   Isabel Carlson is a 78 y.o. female.     Chief Complaint   Patient presents with    Shortness of Breath     She has been noticing some shortness of breath upon exertion.     Pain     She has been having some left shoulder pain       Shortness of Breath  Associated symptoms include chest pain. Pertinent negatives include no abdominal pain, fever, leg swelling, rash, rhinorrhea, vomiting or wheezing.   Pain  Associated symptoms include chest pain and fatigue. Pertinent negatives include no abdominal pain, arthralgias, coughing, fever, myalgias, nausea, numbness, rash, vomiting or weakness.      Having 1 month of burning sensation in the chest with walking  yards especially if walk fast.  No numbness or weakness but does get light headed but has not passed out.  Pain does not radiate but has tightness in the chest.  Denies palpitations or swelling.    The following portions of the patient's history were reviewed and updated as appropriate: allergies, current medications, past family history, past medical history, past social history, past surgical history and problem list.    Depression Screen:      10/11/2023    11:21 AM   PHQ-2/PHQ-9 Depression Screening   Little Interest or Pleasure in Doing Things 0-->not at all   Feeling Down, Depressed or Hopeless 0-->not at all   PHQ-9: Brief Depression Severity Measure Score 0       Past Medical History:   Diagnosis Date    Abnormal stress echocardiogram 07/20/2017    Patient had borderline EKG changes 5 minutes into walking and Dr. Cali Spencer recommended the patient not to have that same type of stress test in the future    Adhesive capsulitis of right shoulder     Age-related osteoporosis without fracture     APC (atrial premature contractions) 07/20/2017    Arthritis     Broken toe 2023    Carpal tunnel syndrome     Cataract     Chronic hip pain     Fatigue     Frequent episodes of pneumonia     PT STATES SEVERAL TIMES    Heart murmur 07/20/2017    Left  knee pain     LVH (left ventricular hypertrophy) 07/20/2017    Medicare annual wellness visit, subsequent 02/02/2021    Mixed hyperlipidemia 07/20/2017    Non-rheumatic mitral regurgitation 07/20/2017    Mild to moderate per 2-D echocardiogram    Non-rheumatic tricuspid valve insufficiency 07/20/2017    Mild to moderate per 2-D echocardiogram    Osteoarthritis     Osteoarthritis of both knees     Osteoporosis     Shoulder pain     Stroke     TIA (transient ischemic attack) 10/15/2013    numbness on right side of face and hand; went to [facility]       Past Surgical History:   Procedure Laterality Date    ANKLE OPEN REDUCTION INTERNAL FIXATION Right 10/2022    APPENDECTOMY      CARDIAC CATHETERIZATION N/A 10/20/2017    Procedure: Right Heart Cath;  Surgeon: Christopher Kan MD;  Location: [facility] CATH INVASIVE LOCATION;  Service:     COLONOSCOPY      REPLACEMENT TOTAL KNEE Left 06/12/2020    SUBTOTAL HYSTERECTOMY      TOTAL KNEE ARTHROPLASTY Left 06/12/2020    Procedure: TOTAL KNEE ARTHROPLASTY;  Surgeon: Jose Leary MD;  Location: [facility] MAIN OR;  Service: Orthopedics;  Laterality: Left;       Family History   Problem Relation Age of Onset    Heart disease Mother     Hypertension Mother     Stroke Mother     Arthritis Mother     No Known Problems Father     Heart disease Sister     Cancer Sister     Heart disease Maternal Aunt     Hypertension Maternal Aunt     Hypertension Maternal Uncle     Breast cancer Cousin     Cancer Other     Malig Hyperthermia Neg Hx        Social History     Socioeconomic History    Marital status:    Tobacco Use    Smoking status: Never    Smokeless tobacco: Never   Vaping Use    Vaping status: Never Used   Substance and Sexual Activity    Alcohol use: No     Comment: rarely    Drug use: No    Sexual activity: Defer       Current Outpatient Medications   Medication Sig Dispense Refill    amLODIPine-benazepril (LOTREL) 10-20 MG per capsule Take 1 capsule by mouth Daily.  90 capsule 1    fluticasone (FLONASE) 50 MCG/ACT nasal spray 2 sprays into the nostril(s) as directed by provider Daily As Needed.      gabapentin (NEURONTIN) 300 MG capsule Take 1 capsule by mouth Every Night. 90 capsule 1    ibandronate (BONIVA) 150 MG tablet Take 1 tablet by mouth Every 30 (Thirty) Days. 3 tablet 3    meloxicam (MOBIC) 15 MG tablet Take 1 tablet by mouth Daily As Needed for Moderate Pain. 90 tablet 1    pantoprazole (PROTONIX) 40 MG EC tablet Take 1 tablet by mouth Daily. 90 tablet 1    propranolol LA (INDERAL LA) 80 MG 24 hr capsule Take 1 capsule by mouth Daily. 90 capsule 1     No current facility-administered medications for this visit.       Review of Systems   Constitutional:  Positive for fatigue. Negative for activity change, appetite change, fever, unexpected weight gain and unexpected weight loss.   HENT:  Negative for nosebleeds, rhinorrhea, trouble swallowing and voice change.    Eyes:  Negative for visual disturbance.   Respiratory:  Positive for shortness of breath. Negative for cough, chest tightness and wheezing.    Cardiovascular:  Positive for chest pain. Negative for palpitations and leg swelling.   Gastrointestinal:  Negative for abdominal pain, blood in stool, constipation, diarrhea, nausea, vomiting, GERD and indigestion.   Genitourinary:  Negative for dysuria, frequency and hematuria.   Musculoskeletal:  Negative for arthralgias, back pain and myalgias.        Some left shoulder pain though not regularly   Skin:  Negative for rash and wound.   Neurological:  Positive for light-headedness. Negative for dizziness, tremors, weakness, numbness, headache and memory problem.   Hematological:  Negative for adenopathy. Does not bruise/bleed easily.   Psychiatric/Behavioral:  Negative for sleep disturbance and depressed mood. The patient is not nervous/anxious.        Objective   /78 (BP Location: Left arm, Patient Position: Sitting, Cuff Size: Adult)   Pulse 61   Temp 97.8  "°F (36.6 °C) (Temporal)   Ht 152.4 cm (60\")   Wt 67.1 kg (148 lb)   SpO2 97%   BMI 28.90 kg/m²     Physical Exam  Vitals and nursing note reviewed.   Constitutional:       General: She is not in acute distress.     Appearance: She is well-developed. She is not diaphoretic.   HENT:      Head: Normocephalic and atraumatic.      Right Ear: External ear normal.      Left Ear: External ear normal.      Nose: Nose normal.   Eyes:      Conjunctiva/sclera: Conjunctivae normal.      Pupils: Pupils are equal, round, and reactive to light.   Neck:      Thyroid: No thyromegaly.      Trachea: No tracheal deviation.   Cardiovascular:      Rate and Rhythm: Normal rate and regular rhythm.      Heart sounds: Normal heart sounds. No murmur heard.     No friction rub. No gallop.   Pulmonary:      Effort: Pulmonary effort is normal. No respiratory distress.      Breath sounds: Normal breath sounds.   Abdominal:      General: Bowel sounds are normal.      Palpations: Abdomen is soft. There is no mass.      Tenderness: There is no abdominal tenderness. There is no guarding.   Musculoskeletal:         General: Normal range of motion.      Cervical back: Normal range of motion and neck supple.   Lymphadenopathy:      Cervical: No cervical adenopathy.   Skin:     General: Skin is warm and dry.      Capillary Refill: Capillary refill takes less than 2 seconds.      Findings: No rash.   Neurological:      Mental Status: She is alert and oriented to person, place, and time.      Motor: No abnormal muscle tone.      Deep Tendon Reflexes: Reflexes normal.   Psychiatric:         Behavior: Behavior normal.         Thought Content: Thought content normal.         Judgment: Judgment normal.         Recent Results (from the past 2016 hour(s))   Nuab BV & Candida - Swab, Vagina    Collection Time: 03/01/24  3:13 PM    Specimen: Vagina; Swab    VA   Result Value Ref Range    Atopobium Vaginae Low - 0 Score    BVAB 2 Low - 0 Score    Megasphaera 1 " Low - 0 Score    Candida Albicans, CHRISTIANO Negative Negative    Candida Glabrata, CHRISTIANO Negative Negative     ECG 12 Lead    Date/Time: 3/27/2024 12:00 PM  Performed by: Cordell Holland MD    Authorized by: Cordell Holland MD  Comparison: compared with previous ECG from 4/19/2023  Similar to previous ECG  Comparison to previous ECG: Unchanged EKG with some possible Q waves in V1 V2 but is unchanged from previous.  Rhythm: sinus rhythm  Rate: bradycardic  Q waves: V1 and V2    ST Segments: ST segments normal  T Waves: T waves normal  QRS axis: normal  Other: no other findings    Clinical impression: abnormal EKG    Assessment & Plan   Diagnoses and all orders for this visit:    1. Chest pain, unspecified type (Primary)  -     ECG 12 Lead    2. Angina of effort  -     ECG 12 Lead    3. Shortness of breath on exertion    Patient chest pain shortness of breath with possible stable angina.  I do believe she needs to have further evaluation with cardiology.  Referrals been entered try and get her in urgently for further evaluation.  She has any chest pain that is persistent not resolving or is at rest is to go emergency room.  She understands I have a strong concern for cardiac origin of the issue but she does appear to have a stable angina unless things change.  She will likely be having a stress test evaluation.           COVID-19 Precautions - Patient was compliant in wearing a mask. When I saw the patient, I used appropriate personal protective equipment (PPE) including mask and eye shield (standard procedure).  Additionally, I used gown and gloves if indicated.  Hand hygiene was completed before and after seeing the patient.  Dictated utilizing Dragon Dictation

## 2024-04-12 ENCOUNTER — OFFICE VISIT (OUTPATIENT)
Dept: CARDIOLOGY | Facility: CLINIC | Age: 79
End: 2024-04-12
Payer: MEDICARE

## 2024-04-12 VITALS
HEART RATE: 62 BPM | HEIGHT: 60 IN | BODY MASS INDEX: 28.9 KG/M2 | DIASTOLIC BLOOD PRESSURE: 80 MMHG | SYSTOLIC BLOOD PRESSURE: 142 MMHG | WEIGHT: 147.2 LBS | OXYGEN SATURATION: 98 %

## 2024-04-12 DIAGNOSIS — I51.7 LVH (LEFT VENTRICULAR HYPERTROPHY): ICD-10-CM

## 2024-04-12 DIAGNOSIS — I10 ESSENTIAL HYPERTENSION: Chronic | ICD-10-CM

## 2024-04-12 DIAGNOSIS — R07.9 CHRONIC CHEST PAIN WITH LOW TO MODERATE RISK FOR CAD: Primary | ICD-10-CM

## 2024-04-12 DIAGNOSIS — I34.0 NON-RHEUMATIC MITRAL REGURGITATION: Chronic | ICD-10-CM

## 2024-04-12 DIAGNOSIS — E78.2 MIXED HYPERLIPIDEMIA: ICD-10-CM

## 2024-04-12 DIAGNOSIS — Z91.89 CHRONIC CHEST PAIN WITH LOW TO MODERATE RISK FOR CAD: Primary | ICD-10-CM

## 2024-04-12 DIAGNOSIS — I20.89 STABLE ANGINA PECTORIS: ICD-10-CM

## 2024-04-12 DIAGNOSIS — I36.1 NONRHEUMATIC TRICUSPID VALVE REGURGITATION: ICD-10-CM

## 2024-04-12 DIAGNOSIS — G89.29 CHRONIC CHEST PAIN WITH LOW TO MODERATE RISK FOR CAD: Primary | ICD-10-CM

## 2024-04-12 RX ORDER — ROSUVASTATIN CALCIUM 20 MG/1
20 TABLET, COATED ORAL DAILY
COMMUNITY
Start: 2024-03-29

## 2024-04-12 NOTE — PROGRESS NOTES
Date of Office Visit: 2024  Encounter Provider: FRANK Uriostegui  Place of Service: [facility] CARDIOLOGY  Patient Name: Isabel Carlson  :1945    No chief complaint on file.  : exertional chest pain    HPI: Isabel Carlson is a 78 y.o. female who is a patient of  Dr. Silverman.  She is new to me today and presents for 1 year office follow-up.  She has a history of hypertension, hyperlipidemia and PVCs.  She presented to Dr. Silverman last year secondary to chest discomfort, lower extremity edema and abnormal EKG.  Evaluation of EKG did not appear to be abnormal.  Patient noted intermittent lower extremity edema that worsens throughout the day but improves before she wakes up.  Denied any recent issues with chest pain, orthopnea or PND.    Echocardiogram 2023 shows hyperdynamic LV function, septal asymmetric hypertrophy, moderate tricuspid regurgitation and mild to moderate atrial regurgitation.    Ms. Carlson presents today with complaints of exertional chest discomfort with shortness of breath.  Midsternal running sensation and does not radiate.  Relieves with rest.  No nausea, lightheadedness, apnea or PND.  She does have dependent edema relieves elevation.  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  EG is stable from previous EKG.  Blood pressure controlled.  She states that her blood pressure is usually 130s to 140s systolically at home.  Lipid panel is in target range.  She does not have diabetes.  She does not have family history of coronary artery disease.    Previous testing and notes have been reviewed by me.   Past Medical History:   Diagnosis Date    Abnormal stress echocardiogram 2017    Patient had borderline EKG changes 5 minutes into walking and Dr. Cali Spencer recommended the patient not to have that same type of stress test in the future    Adhesive capsulitis of right shoulder      Age-related osteoporosis without fracture     APC (atrial premature contractions) 07/20/2017    Arthritis     Broken toe 2023    Carpal tunnel syndrome     Cataract     Chronic hip pain     Fatigue     Frequent episodes of pneumonia     PT STATES SEVERAL TIMES    Heart murmur 07/20/2017    Left knee pain     LVH (left ventricular hypertrophy) 07/20/2017    Medicare annual wellness visit, subsequent 02/02/2021    Mixed hyperlipidemia 07/20/2017    Non-rheumatic mitral regurgitation 07/20/2017    Mild to moderate per 2-D echocardiogram    Non-rheumatic tricuspid valve insufficiency 07/20/2017    Mild to moderate per 2-D echocardiogram    Osteoarthritis     Osteoarthritis of both knees     Osteoporosis     Shoulder pain     Stroke     TIA (transient ischemic attack) 10/15/2013    numbness on right side of face and hand; went to [facility]       Past Surgical History:   Procedure Laterality Date    ANKLE OPEN REDUCTION INTERNAL FIXATION Right 10/2022    APPENDECTOMY      CARDIAC CATHETERIZATION N/A 10/20/2017    Procedure: Right Heart Cath;  Surgeon: Christopher Kan MD;  Location: [facility] CATH INVASIVE LOCATION;  Service:     COLONOSCOPY      REPLACEMENT TOTAL KNEE Left 06/12/2020    SUBTOTAL HYSTERECTOMY      TOTAL KNEE ARTHROPLASTY Left 06/12/2020    Procedure: TOTAL KNEE ARTHROPLASTY;  Surgeon: Jose Leary MD;  Location: [facility] MAIN OR;  Service: Orthopedics;  Laterality: Left;       Social History     Socioeconomic History    Marital status:    Tobacco Use    Smoking status: Never     Passive exposure: Never    Smokeless tobacco: Never   Vaping Use    Vaping status: Never Used   Substance and Sexual Activity    Alcohol use: No     Comment: rarely    Drug use: No    Sexual activity: Defer       Family History   Problem Relation Age of Onset    Heart disease Mother     Hypertension Mother     Stroke Mother     Arthritis Mother     No Known Problems Father     Heart disease Sister     Cancer Sister  "    Heart disease Maternal Aunt     Hypertension Maternal Aunt     Hypertension Maternal Uncle     Breast cancer Cousin     Cancer Other     Malig Hyperthermia Neg Hx        Review of Systems   Constitutional: Negative.   HENT: Negative.     Eyes: Negative.    Cardiovascular:  Positive for chest pain.   Respiratory: Negative.     Endocrine: Negative.    Hematologic/Lymphatic: Negative.    Skin: Negative.    Musculoskeletal: Negative.    Gastrointestinal: Negative.    Genitourinary: Negative.    Neurological: Negative.    Psychiatric/Behavioral: Negative.     Allergic/Immunologic: Negative.        No Known Allergies      Current Outpatient Medications:     amLODIPine-benazepril (LOTREL) 10-20 MG per capsule, Take 1 capsule by mouth Daily., Disp: 90 capsule, Rfl: 1    fluticasone (FLONASE) 50 MCG/ACT nasal spray, 2 sprays into the nostril(s) as directed by provider Daily As Needed., Disp: , Rfl:     gabapentin (NEURONTIN) 300 MG capsule, Take 1 capsule by mouth Every Night., Disp: 90 capsule, Rfl: 1    ibandronate (BONIVA) 150 MG tablet, Take 1 tablet by mouth Every 30 (Thirty) Days., Disp: 3 tablet, Rfl: 3    meloxicam (MOBIC) 15 MG tablet, Take 1 tablet by mouth Daily As Needed for Moderate Pain., Disp: 90 tablet, Rfl: 1    pantoprazole (PROTONIX) 40 MG EC tablet, Take 1 tablet by mouth Daily., Disp: 90 tablet, Rfl: 1    propranolol LA (INDERAL LA) 80 MG 24 hr capsule, Take 1 capsule by mouth Daily., Disp: 90 capsule, Rfl: 1    rosuvastatin (CRESTOR) 20 MG tablet, Take 1 tablet by mouth Daily. (Patient not taking: Reported on 4/12/2024), Disp: , Rfl:       Objective:     Vitals:    04/12/24 1342   BP: 142/80   BP Location: Left arm   Patient Position: Sitting   Cuff Size: Adult   Pulse: 62   SpO2: 98%   Weight: 66.8 kg (147 lb 3.2 oz)   Height: 152.4 cm (60\")     Body mass index is 28.75 kg/m².    2D Echocardiogram 05/04/2024:    Left ventricular systolic function is hyperdynamic (EF > 70%). Left ventricular " ejection fraction appears to be greater than 70%.    Left ventricular wall thickness is consistent with mild septal asymmetric hypertrophy.    Left ventricular diastolic function is consistent with (grade I) impaired relaxation.    Moderate tricuspid valve regurgitation is present.    Estimated right ventricular systolic pressure from tricuspid regurgitation is mildly elevated (35-45 mmHg).    Mild to moderate mitral regurgitation noted.    Right Heart Cath 10/20/2017:  1.  Right atrium: A wave 8, V wave 11, mean 7  2.  Right ventricle: 38/4  3.  Pulmonary artery: 38/15, mean 23  4.  PCW: A-wave 15 V wave 9, mean 8  5.  Cardiac output (Vinh): 3.12 L/m  6.  Cardiac index (Vinh) 1.93 L/m/m²    Stress Echo 07/18/2017:  Calculated EF = 60%  Mild-to-moderate mitral valve regurgitation is present with a centrally-directed jet noted.  Mild to moderate tricuspid valve regurgitation is present.  Estimated right ventricular systolic pressure from tricuspid regurgitation is markedly elevated (>55 mmHg). Calculated right ventricular systolic pressure from tricuspid regurgitation is 56.4 mmHg.  Right heart structures borderline enlarged  STRESS ECHO  Segment augmentation had a normal response to stress. Cavity size behaved normally in response to stress. Left ventricular function is increased hyperdynamic (>70%).  Normal stress echo with no significant echocardiographic evidence for myocardial ischemia.  Only walked 5 min with borderline ecg chnages    PHYSICAL EXAM:    Constitutional:       Appearance: Healthy appearance. Not in distress.   Neck:      Vascular: No JVR. JVD normal.   Pulmonary:      Effort: Pulmonary effort is normal.      Breath sounds: Normal breath sounds. No wheezing. No rhonchi. No rales.   Chest:      Chest wall: Not tender to palpatation.   Cardiovascular:      PMI at left midclavicular line. Normal rate. Regular rhythm. Normal S1. Normal S2.       Murmurs: There is a systolic murmur.      No gallop.  No  click. No rub.   Pulses:     Intact distal pulses.   Edema:     Peripheral edema absent.   Abdominal:      General: Bowel sounds are normal.      Palpations: Abdomen is soft.      Tenderness: There is no abdominal tenderness.   Musculoskeletal: Normal range of motion.         General: No tenderness. Skin:     General: Skin is warm and dry.   Neurological:      General: No focal deficit present.      Mental Status: Alert and oriented to person, place and time.           ECG 12 Lead    Date/Time: 4/12/2024 2:31 PM  Performed by: Yohana Mina APRN    Authorized by: Yhoana Mina APRN  Comparison: compared with previous ECG from 3/27/2024  Similar to previous ECG  Rhythm: sinus rhythm  BPM: 62  T inversion: V1  Other findings: left ventricular hypertrophy            Assessment:       Diagnosis Plan   1. Chronic chest pain with low to moderate risk for CAD  Stress Test With Myocardial Perfusion One Day      2. Non-rheumatic mitral regurgitation        3. Essential hypertension        4. Mixed hyperlipidemia        5. LVH (left ventricular hypertrophy)        6. Nonrheumatic tricuspid valve regurgitation        7. Stable angina pectoris          Orders Placed This Encounter   Procedures    Stress Test With Myocardial Perfusion One Day     Standing Status:   Future     Standing Expiration Date:   4/12/2025     Order Specific Question:   What stress agent will be used?     Answer:   Exercise with possible pharmacologic     Order Specific Question:   Reason for exam?     Answer:   Chest Pain     Order Specific Question:   Reason for exam?     Answer:   Angina     Order Specific Question:   Release to patient     Answer:   Routine Release [[id number]]          Plan:       Chest pain: Exertional shortness of breath.  Relieved with rest.  Stable EKG.  Rest perfusion study ordered.  Hypertension: Controlled.  Mild LVH.  130s to 140s systolically at home  Hyperlipidemia goal LDL less than 100: Lipid panel in target  range on statin therapy.   Mitral regurgitation: mild to moderate  5.  Tricuspid regurgitation: Moderate.      Ms. Carlson will follow-up with me in 6 months, Dr. Silverman in 1 year.  I will call her with stress test results.  Call sooner for any questions or concerns.         Your medication list            Accurate as of April 12, 2024  2:30 PM. If you have any questions, ask your nurse or doctor.                CONTINUE taking these medications        Instructions Last Dose Given Next Dose Due   amLODIPine-benazepril 10-20 MG per capsule  Commonly known as: LOTREL      Take 1 capsule by mouth Daily.       fluticasone 50 MCG/ACT nasal spray  Commonly known as: FLONASE      2 sprays into the nostril(s) as directed by provider Daily As Needed.       gabapentin 300 MG capsule  Commonly known as: NEURONTIN      Take 1 capsule by mouth Every Night.       ibandronate 150 MG tablet  Commonly known as: BONIVA      Take 1 tablet by mouth Every 30 (Thirty) Days.       meloxicam 15 MG tablet  Commonly known as: MOBIC      Take 1 tablet by mouth Daily As Needed for Moderate Pain.       pantoprazole 40 MG EC tablet  Commonly known as: PROTONIX      Take 1 tablet by mouth Daily.       propranolol LA 80 MG 24 hr capsule  Commonly known as: INDERAL LA      Take 1 capsule by mouth Daily.       rosuvastatin 20 MG tablet  Commonly known as: CRESTOR      Take 1 tablet by mouth Daily.                  As always, it has been a pleasure to participate in your patient's care.      Sincerely,       FRANK Talley

## 2024-04-16 ENCOUNTER — TELEPHONE (OUTPATIENT)
Dept: CARDIOLOGY | Facility: CLINIC | Age: 79
End: 2024-04-16
Payer: MEDICARE

## 2024-04-17 ENCOUNTER — TELEPHONE (OUTPATIENT)
Dept: CARDIOLOGY | Facility: CLINIC | Age: 79
End: 2024-04-17
Payer: MEDICARE

## 2024-04-17 ENCOUNTER — HOSPITAL ENCOUNTER (OUTPATIENT)
Dept: CARDIOLOGY | Facility: HOSPITAL | Age: 79
Discharge: HOME | End: 2024-04-17
Admitting: NURSE PRACTITIONER
Payer: MEDICARE

## 2024-04-17 VITALS — WEIGHT: 147.71 LBS | HEIGHT: 60 IN | BODY MASS INDEX: 29 KG/M2

## 2024-04-17 DIAGNOSIS — R07.9 CHRONIC CHEST PAIN WITH LOW TO MODERATE RISK FOR CAD: ICD-10-CM

## 2024-04-17 DIAGNOSIS — G89.29 CHRONIC CHEST PAIN WITH LOW TO MODERATE RISK FOR CAD: ICD-10-CM

## 2024-04-17 DIAGNOSIS — Z91.89 CHRONIC CHEST PAIN WITH LOW TO MODERATE RISK FOR CAD: ICD-10-CM

## 2024-04-17 LAB
BH CV NUCLEAR PRIOR STUDY: 2
BH CV REST NUCLEAR ISOTOPE DOSE: 10.4 MCI
BH CV STRESS BP STAGE 1: NORMAL
BH CV STRESS COMMENTS STAGE 1: NORMAL
BH CV STRESS DOSE REGADENOSON STAGE 1: 0.4
BH CV STRESS DURATION MIN STAGE 1: 0
BH CV STRESS DURATION SEC STAGE 1: 10
BH CV STRESS HR STAGE 1: 92
BH CV STRESS NUCLEAR ISOTOPE DOSE: 33.5 MCI
BH CV STRESS PROTOCOL 1: NORMAL
BH CV STRESS RECOVERY BP: NORMAL MMHG
BH CV STRESS RECOVERY HR: 65 BPM
BH CV STRESS STAGE 1: 1
LV EF NUC BP: 74 %
MAXIMAL PREDICTED HEART RATE: 140 BPM
PERCENT MAX PREDICTED HR: 65.71 %
STRESS BASELINE BP: NORMAL MMHG
STRESS BASELINE HR: 64 BPM
STRESS PERCENT HR: 77 %
STRESS POST EXERCISE DUR SEC: 10 SEC
STRESS POST PEAK BP: NORMAL MMHG
STRESS POST PEAK HR: 92 BPM
STRESS TARGET HR: 119 BPM

## 2024-04-17 PROCEDURE — 78452 HT MUSCLE IMAGE SPECT MULT: CPT

## 2024-04-17 PROCEDURE — A9502 TC99M TETROFOSMIN: HCPCS | Performed by: NURSE PRACTITIONER

## 2024-04-17 PROCEDURE — 0 TECHNETIUM TETROFOSMIN KIT: Performed by: NURSE PRACTITIONER

## 2024-04-17 PROCEDURE — 25010000002 AMINOPHYLLINE PER 250 MG: Performed by: NURSE PRACTITIONER

## 2024-04-17 PROCEDURE — 25010000002 REGADENOSON 0.4 MG/5ML SOLUTION: Performed by: NURSE PRACTITIONER

## 2024-04-17 PROCEDURE — 93017 CV STRESS TEST TRACING ONLY: CPT

## 2024-04-17 RX ORDER — REGADENOSON 0.08 MG/ML
0.4 INJECTION, SOLUTION INTRAVENOUS
Status: COMPLETED | OUTPATIENT
Start: 2024-04-17 | End: 2024-04-17

## 2024-04-17 RX ORDER — AMINOPHYLLINE 25 MG/ML
125 INJECTION, SOLUTION INTRAVENOUS ONCE AS NEEDED
Status: COMPLETED | OUTPATIENT
Start: 2024-04-17 | End: 2024-04-17

## 2024-04-17 RX ADMIN — TETROFOSMIN 1 DOSE: 1.38 INJECTION, POWDER, LYOPHILIZED, FOR SOLUTION INTRAVENOUS at 14:10

## 2024-04-17 RX ADMIN — TETROFOSMIN 1 DOSE: 1.38 INJECTION, POWDER, LYOPHILIZED, FOR SOLUTION INTRAVENOUS at 13:12

## 2024-04-17 RX ADMIN — AMINOPHYLLINE 125 MG: 25 INJECTION, SOLUTION INTRAVENOUS at 14:14

## 2024-04-17 RX ADMIN — REGADENOSON 0.4 MG: 0.08 INJECTION, SOLUTION INTRAVENOUS at 14:10

## 2024-04-17 NOTE — TELEPHONE ENCOUNTER
----- Message from FRANK Uriostegui sent at 4/17/2024  4:00 PM EDT -----  Could someone please follow up with a call to this patient and let her know stress test was normal.  Tried calling and had to leave voicemail.

## 2024-04-17 NOTE — TELEPHONE ENCOUNTER
Called and left message on voicemail regarding stress test.  I have asked triage to call patient tomorrow with results as well.

## 2024-04-17 NOTE — PROGRESS NOTES
Could someone please follow up with a call to this patient and let her know stress test was normal.  Tried calling and had to leave voicemail.

## 2024-04-17 NOTE — TELEPHONE ENCOUNTER
I spoke with Isabel Carlson and updated pt on results from provider.  They verbalized understanding and have no further questions at this time.    Thank you,    Amada JOHNSON, RN  Triage [facility]  04/17/24 16:03 EDT

## 2024-06-10 DIAGNOSIS — K21.9 GASTROESOPHAGEAL REFLUX DISEASE: ICD-10-CM

## 2024-06-10 DIAGNOSIS — M15.9 PRIMARY OSTEOARTHRITIS INVOLVING MULTIPLE JOINTS: ICD-10-CM

## 2024-06-10 DIAGNOSIS — I10 ESSENTIAL HYPERTENSION: Chronic | ICD-10-CM

## 2024-06-10 RX ORDER — PANTOPRAZOLE SODIUM 40 MG/1
40 TABLET, DELAYED RELEASE ORAL DAILY
Qty: 90 TABLET | Refills: 3 | OUTPATIENT
Start: 2024-06-10

## 2024-06-10 RX ORDER — MELOXICAM 15 MG/1
15 TABLET ORAL DAILY PRN
Qty: 90 TABLET | Refills: 3 | OUTPATIENT
Start: 2024-06-10

## 2024-06-10 RX ORDER — AMLODIPINE BESYLATE AND BENAZEPRIL HYDROCHLORIDE 10; 20 MG/1; MG/1
1 CAPSULE ORAL DAILY
Qty: 90 CAPSULE | Refills: 3 | OUTPATIENT
Start: 2024-06-10

## 2024-06-10 RX ORDER — ROSUVASTATIN CALCIUM 20 MG/1
20 TABLET, COATED ORAL DAILY
Qty: 90 TABLET | Refills: 3 | OUTPATIENT
Start: 2024-06-10

## 2024-07-31 ENCOUNTER — OFFICE VISIT (OUTPATIENT)
Dept: FAMILY MEDICINE CLINIC | Facility: CLINIC | Age: 79
End: 2024-07-31
Payer: MEDICARE

## 2024-07-31 VITALS
DIASTOLIC BLOOD PRESSURE: 74 MMHG | WEIGHT: 149.4 LBS | BODY MASS INDEX: 29.33 KG/M2 | SYSTOLIC BLOOD PRESSURE: 132 MMHG | HEIGHT: 60 IN

## 2024-07-31 DIAGNOSIS — F02.A0 MILD LATE ONSET ALZHEIMER'S DEMENTIA WITHOUT BEHAVIORAL DISTURBANCE, PSYCHOTIC DISTURBANCE, MOOD DISTURBANCE, OR ANXIETY: Primary | ICD-10-CM

## 2024-07-31 DIAGNOSIS — I10 ESSENTIAL HYPERTENSION: Chronic | ICD-10-CM

## 2024-07-31 DIAGNOSIS — K21.9 GASTROESOPHAGEAL REFLUX DISEASE: ICD-10-CM

## 2024-07-31 DIAGNOSIS — G30.1 MILD LATE ONSET ALZHEIMER'S DEMENTIA WITHOUT BEHAVIORAL DISTURBANCE, PSYCHOTIC DISTURBANCE, MOOD DISTURBANCE, OR ANXIETY: Primary | ICD-10-CM

## 2024-07-31 DIAGNOSIS — S39.011A STRAIN OF ABDOMINAL MUSCLE, INITIAL ENCOUNTER: ICD-10-CM

## 2024-07-31 DIAGNOSIS — M15.9 PRIMARY OSTEOARTHRITIS INVOLVING MULTIPLE JOINTS: ICD-10-CM

## 2024-07-31 DIAGNOSIS — E78.2 MIXED HYPERLIPIDEMIA: ICD-10-CM

## 2024-07-31 DIAGNOSIS — H90.0 CONDUCTIVE HEARING LOSS, BILATERAL: ICD-10-CM

## 2024-07-31 PROCEDURE — 99214 OFFICE O/P EST MOD 30 MIN: CPT | Performed by: INTERNAL MEDICINE

## 2024-07-31 PROCEDURE — 1160F RVW MEDS BY RX/DR IN RCRD: CPT | Performed by: INTERNAL MEDICINE

## 2024-07-31 PROCEDURE — 1125F AMNT PAIN NOTED PAIN PRSNT: CPT | Performed by: INTERNAL MEDICINE

## 2024-07-31 PROCEDURE — 1159F MED LIST DOCD IN RCRD: CPT | Performed by: INTERNAL MEDICINE

## 2024-07-31 PROCEDURE — 3075F SYST BP GE 130 - 139MM HG: CPT | Performed by: INTERNAL MEDICINE

## 2024-07-31 PROCEDURE — G2211 COMPLEX E/M VISIT ADD ON: HCPCS | Performed by: INTERNAL MEDICINE

## 2024-07-31 PROCEDURE — 3078F DIAST BP <80 MM HG: CPT | Performed by: INTERNAL MEDICINE

## 2024-07-31 RX ORDER — ROSUVASTATIN CALCIUM 20 MG/1
20 TABLET, COATED ORAL DAILY
Qty: 90 TABLET | Refills: 1 | Status: SHIPPED | OUTPATIENT
Start: 2024-07-31

## 2024-07-31 RX ORDER — PANTOPRAZOLE SODIUM 40 MG/1
40 TABLET, DELAYED RELEASE ORAL DAILY
Qty: 90 TABLET | Refills: 1 | Status: SHIPPED | OUTPATIENT
Start: 2024-07-31

## 2024-07-31 RX ORDER — AMLODIPINE BESYLATE AND BENAZEPRIL HYDROCHLORIDE 10; 20 MG/1; MG/1
1 CAPSULE ORAL DAILY
Qty: 90 CAPSULE | Refills: 1 | Status: SHIPPED | OUTPATIENT
Start: 2024-07-31

## 2024-07-31 RX ORDER — GUAIFENESIN 200 MG/1
400 TABLET ORAL EVERY 8 HOURS PRN
Qty: 18 TABLET | Refills: 1 | Status: SHIPPED | OUTPATIENT
Start: 2024-07-31

## 2024-07-31 RX ORDER — PROPRANOLOL HYDROCHLORIDE 80 MG/1
80 CAPSULE, EXTENDED RELEASE ORAL DAILY
Qty: 90 CAPSULE | Refills: 1 | Status: SHIPPED | OUTPATIENT
Start: 2024-07-31

## 2024-07-31 RX ORDER — DONEPEZIL HYDROCHLORIDE 5 MG/1
5 TABLET, FILM COATED ORAL NIGHTLY
Qty: 90 TABLET | Refills: 1 | Status: SHIPPED | OUTPATIENT
Start: 2024-07-31 | End: 2024-08-02 | Stop reason: SDUPTHER

## 2024-07-31 RX ORDER — GABAPENTIN 300 MG/1
300 CAPSULE ORAL NIGHTLY
Qty: 90 CAPSULE | Refills: 1 | Status: SHIPPED | OUTPATIENT
Start: 2024-07-31

## 2024-07-31 NOTE — PROGRESS NOTES
Subjective   Isabel Carlson is a 78 y.o. female.     Chief Complaint   Patient presents with    Nasal Congestion     She is having phlegm in her throat and she said she has had it for about three or four months    Flank Pain     She said she sometimes has pain in her side.    Memory Loss     She is having problems finding the words she wants to say. She knows what she wants to say but can't find the words to do it    Hearing Problem     She can hear sound but can't make out the words well       History of Present Illness   78-year-old female with history of hypertension, hyperlipidemia, nonrheumatic mitral and tricuspid regurgitation, LVH, heart murmur, osteoporosis, osteoarthritis, B12 deficiency, and iron deficiency anemia.      Noted some left flank pain in her side off-and-on.   Worse on turning the last 3 weeks.  No trauma, fall or injury.    Patient been having phlegm in her throat with congestion for the last 3 to 4 months.  Is not changing.  Can not really get it up and seems to be in the back of the throat with no nasal congestion, fever, chills, blood or headache.  No difficulty swallowing or breathing.    Has concerns about finding words when she wants to talk about things she knows what she wants to say she just has a hard time finding words and is concerned about her memory.  Having issues for a year or more.  Past B12 levels high and MRI head no significant issues.  She does have some hearing loss and that she is having difficulty understanding words.    Follow-up for hypertension.  Currently, has been feeling well and asymptomatic without any headaches, vision changes, cough, chest pain, shortness of breath, swelling, focal neurologic deficit, memory loss or syncope.  Has been taking the medications regularly and adherent with the regimen amlodipine-benazepril 10/20 daily and propranolol LA 80 mg daily.  Denies medication side effects and no significant interval events.      Follow-up for  cholesterol.  Currently, has been feeling well without any myalgias, muscle aches, weakness, numbness, chest pain, short of breath or other issues.  Currently, is adherent with medication regimen of rosuvastatin 20 mg daily and denies medication side effects. Is due for lab follow-up.      The following portions of the patient's history were reviewed and updated as appropriate: allergies, current medications, past family history, past medical history, past social history, past surgical history and problem list.    Depression Screen:      10/11/2023    11:21 AM   PHQ-2/PHQ-9 Depression Screening   Little Interest or Pleasure in Doing Things 0-->not at all   Feeling Down, Depressed or Hopeless 0-->not at all   PHQ-9: Brief Depression Severity Measure Score 0       Past Medical History:   Diagnosis Date    Abnormal stress echocardiogram 07/20/2017    Patient had borderline EKG changes 5 minutes into walking and Dr. Cali Spencer recommended the patient not to have that same type of stress test in the future    Adhesive capsulitis of right shoulder     Age-related osteoporosis without fracture     APC (atrial premature contractions) 07/20/2017    Arthritis     Broken toe 2023    Carpal tunnel syndrome     Cataract     Chronic hip pain     Fatigue     Frequent episodes of pneumonia     PT STATES SEVERAL TIMES    Heart murmur 07/20/2017    Left knee pain     LVH (left ventricular hypertrophy) 07/20/2017    Medicare annual wellness visit, subsequent 02/02/2021    Mixed hyperlipidemia 07/20/2017    Non-rheumatic mitral regurgitation 07/20/2017    Mild to moderate per 2-D echocardiogram    Non-rheumatic tricuspid valve insufficiency 07/20/2017    Mild to moderate per 2-D echocardiogram    Osteoarthritis     Osteoarthritis of both knees     Osteoporosis     Shoulder pain     Stroke     TIA (transient ischemic attack) 10/15/2013    numbness on right side of face and hand; went to [facility]       Past Surgical History:   Procedure  Laterality Date    ANKLE OPEN REDUCTION INTERNAL FIXATION Right 10/2022    APPENDECTOMY      CARDIAC CATHETERIZATION N/A 10/20/2017    Procedure: Right Heart Cath;  Surgeon: Christopher Kan MD;  Location:  [facility];  Service:     COLONOSCOPY      REPLACEMENT TOTAL KNEE Left 06/12/2020    SUBTOTAL HYSTERECTOMY      TOTAL KNEE ARTHROPLASTY Left 06/12/2020    Procedure: TOTAL KNEE ARTHROPLASTY;  Surgeon: Jose Leary MD;  Location:  [facility];  Service: Orthopedics;  Laterality: Left;       Family History   Problem Relation Age of Onset    Heart disease Mother     Hypertension Mother     Stroke Mother     Arthritis Mother     No Known Problems Father     Heart disease Sister     Cancer Sister     Heart disease Maternal Aunt     Hypertension Maternal Aunt     Hypertension Maternal Uncle     Breast cancer Cousin     Cancer Other     Malig Hyperthermia Neg Hx        Social History     Socioeconomic History    Marital status:    Tobacco Use    Smoking status: Never     Passive exposure: Never    Smokeless tobacco: Never   Vaping Use    Vaping status: Never Used   Substance and Sexual Activity    Alcohol use: No     Comment: rarely    Drug use: No    Sexual activity: Defer       Current Outpatient Medications   Medication Sig Dispense Refill    amLODIPine-benazepril (LOTREL) 10-20 MG per capsule Take 1 capsule by mouth Daily. 90 capsule 1    fluticasone (FLONASE) 50 MCG/ACT nasal spray 2 sprays into the nostril(s) as directed by provider Daily As Needed.      gabapentin (NEURONTIN) 300 MG capsule Take 1 capsule by mouth Every Night. 90 capsule 1    ibandronate (BONIVA) 150 MG tablet Take 1 tablet by mouth Every 30 (Thirty) Days. 3 tablet 3    meloxicam (MOBIC) 15 MG tablet Take 1 tablet by mouth Daily As Needed for Moderate Pain. 90 tablet 1    pantoprazole (PROTONIX) 40 MG EC tablet Take 1 tablet by mouth Daily. 90 tablet 1    propranolol LA (INDERAL LA) 80 MG 24 hr capsule  "Take 1 capsule by mouth Daily. 90 capsule 1    rosuvastatin (CRESTOR) 20 MG tablet Take 1 tablet by mouth Daily. 90 tablet 1    donepezil (Aricept) 5 MG tablet Take 1 tablet by mouth Every Night. 90 tablet 1    guaiFENesin 200 MG tablet Take 2 tablets by mouth Every 8 (Eight) Hours As Needed for Cough. 18 tablet 1     No current facility-administered medications for this visit.       Review of Systems   Constitutional:  Negative for activity change, appetite change, fatigue, fever, unexpected weight gain and unexpected weight loss.   HENT:  Positive for congestion and hearing loss. Negative for nosebleeds, rhinorrhea, trouble swallowing and voice change.    Eyes:  Negative for visual disturbance.   Respiratory:  Negative for cough, chest tightness, shortness of breath and wheezing.    Cardiovascular:  Negative for chest pain, palpitations and leg swelling.   Gastrointestinal:  Negative for blood in stool, constipation, diarrhea, nausea, vomiting, GERD and indigestion.        Flank pain   Genitourinary:  Negative for dysuria, frequency and hematuria.   Musculoskeletal:  Negative for arthralgias, back pain and myalgias.   Skin:  Negative for rash and wound.   Neurological:  Positive for memory problem. Negative for dizziness, tremors, weakness, light-headedness, numbness and headache.   Hematological:  Negative for adenopathy. Does not bruise/bleed easily.   Psychiatric/Behavioral:  Negative for sleep disturbance and depressed mood. The patient is not nervous/anxious.        Objective   /74 (BP Location: Left arm, Patient Position: Sitting, Cuff Size: Large Adult)   Ht 152.4 cm (60\")   Wt 67.8 kg (149 lb 6.4 oz)   BMI 29.18 kg/m²     Physical Exam  Vitals and nursing note reviewed.   Constitutional:       General: She is not in acute distress.     Appearance: She is well-developed. She is not diaphoretic.   HENT:      Head: Normocephalic and atraumatic.      Right Ear: External ear normal.      Left Ear: " External ear normal.      Nose: Nose normal.   Eyes:      Conjunctiva/sclera: Conjunctivae normal.      Pupils: Pupils are equal, round, and reactive to light.   Neck:      Thyroid: No thyromegaly.      Trachea: No tracheal deviation.   Cardiovascular:      Rate and Rhythm: Normal rate and regular rhythm.      Heart sounds: Normal heart sounds. No murmur heard.     No friction rub. No gallop.   Pulmonary:      Effort: Pulmonary effort is normal. No respiratory distress.      Breath sounds: Normal breath sounds.   Abdominal:      General: Bowel sounds are normal.      Palpations: Abdomen is soft. There is no mass.      Tenderness: There is no abdominal tenderness. There is no guarding.   Musculoskeletal:         General: Normal range of motion.      Cervical back: Normal range of motion and neck supple.   Lymphadenopathy:      Cervical: No cervical adenopathy.   Skin:     General: Skin is warm and dry.      Capillary Refill: Capillary refill takes less than 2 seconds.      Findings: No rash.   Neurological:      Mental Status: She is alert and oriented to person, place, and time.      Motor: No abnormal muscle tone.      Deep Tendon Reflexes: Reflexes normal.   Psychiatric:         Behavior: Behavior normal.         Thought Content: Thought content normal.         Judgment: Judgment normal.       Memory screening with the [facility] examination scored 20 out of 30  No results found for this or any previous visit (from the past 2016 hour(s)).  Assessment & Plan   Diagnoses and all orders for this visit:    1. Mild late onset Alzheimer's dementia without behavioral disturbance, psychotic disturbance, mood disturbance, or anxiety (Primary)  -     donepezil (Aricept) 5 MG tablet; Take 1 tablet by mouth Every Night.  Dispense: 90 tablet; Refill: 1    2. Conductive hearing loss, bilateral  -     Ambulatory Referral to Audiology    3. Essential hypertension  -     amLODIPine-benazepril (LOTREL) 10-20 MG per capsule; Take 1  capsule by mouth Daily.  Dispense: 90 capsule; Refill: 1  -     propranolol LA (INDERAL LA) 80 MG 24 hr capsule; Take 1 capsule by mouth Daily.  Dispense: 90 capsule; Refill: 1    4. Strain of abdominal muscle, initial encounter    5. Primary osteoarthritis involving multiple joints  -     gabapentin (NEURONTIN) 300 MG capsule; Take 1 capsule by mouth Every Night.  Dispense: 90 capsule; Refill: 1    6. Gastroesophageal reflux disease  -     pantoprazole (PROTONIX) 40 MG EC tablet; Take 1 tablet by mouth Daily.  Dispense: 90 tablet; Refill: 1    7. Mixed hyperlipidemia  -     rosuvastatin (CRESTOR) 20 MG tablet; Take 1 tablet by mouth Daily.  Dispense: 90 tablet; Refill: 1    Other orders  -     guaiFENesin 200 MG tablet; Take 2 tablets by mouth Every 8 (Eight) Hours As Needed for Cough.  Dispense: 18 tablet; Refill: 1        Patient with some evidence of some mild neurocognitive disorder versus dementia.  This may be exacerbated by the hearing difficulties.  Recommend hearing evaluation with audiology and consider CT of the head.  Noted labs in the past noted a very high B12 level.  Continue the current medication close including the gabapentin.  SONAL run and reviewed.  Risks of the medication include but are not limited to fatigue, somnolence, increased risk of falls, constipation, allergic reaction, dependence, and addiction.         Dictated utilizing Dragon Dictation

## 2024-08-02 ENCOUNTER — TELEPHONE (OUTPATIENT)
Dept: FAMILY MEDICINE CLINIC | Facility: CLINIC | Age: 79
End: 2024-08-02
Payer: MEDICARE

## 2024-08-02 DIAGNOSIS — G30.1 MILD LATE ONSET ALZHEIMER'S DEMENTIA WITHOUT BEHAVIORAL DISTURBANCE, PSYCHOTIC DISTURBANCE, MOOD DISTURBANCE, OR ANXIETY: ICD-10-CM

## 2024-08-02 DIAGNOSIS — F02.A0 MILD LATE ONSET ALZHEIMER'S DEMENTIA WITHOUT BEHAVIORAL DISTURBANCE, PSYCHOTIC DISTURBANCE, MOOD DISTURBANCE, OR ANXIETY: ICD-10-CM

## 2024-08-02 RX ORDER — DONEPEZIL HYDROCHLORIDE 5 MG/1
5 TABLET, FILM COATED ORAL NIGHTLY
Qty: 90 TABLET | Refills: 1 | Status: SHIPPED | OUTPATIENT
Start: 2024-08-02

## 2024-08-02 NOTE — TELEPHONE ENCOUNTER
Caller: Isabel Carlson    Relationship: Self    Best call back number:[phone]   Which medication are you concerned about: MEDICINE FOR ALZHEIMER     Who prescribed you this medication: SUSI    When did you start taking this medication: HAS NOT START YET    What are your concerns: PATIENT GAVE WRONG PHARMACY TO PROVIDER  PATIENT NEEDS ALL MEDICINES TO GO TO:    [facility] - [place], [address] [phone] [facility] [phone] [place]     PLEASE CALL PATIENT WHEN SCRIPTS ARE SENT

## 2024-08-26 ENCOUNTER — TELEPHONE (OUTPATIENT)
Dept: FAMILY MEDICINE CLINIC | Facility: CLINIC | Age: 79
End: 2024-08-26
Payer: MEDICARE

## 2024-08-26 NOTE — TELEPHONE ENCOUNTER
I called and spoke with the patient and let her know that on 7/31/2024 it looks like this medicine was sent to her mail service and that it looks like it was confirmed received. I let her know she should contact them to see if they have this and can fill it and I let her know if something happens they didn't to let us know and we could try and resend it. She verbalized understanding and will call back if needed.

## 2024-08-26 NOTE — TELEPHONE ENCOUNTER
Caller: Isabel Carlson    Relationship: Self    Best call back number: [phone]     Which medication are you concerned about: MEDICATION FOR MUCUS LUMP IN THROAT    Who prescribed you this medication: DR GOLDMAN    What are your concerns: PATIENT STATED SHE WAS PRESCRIBED A MEDICATION FOR MEMORY, AND WOULD LIKE TO KNOW IF THERE WAS ANOTHER MEDICATION PRESCRIBED BY DR GOLDMAN FOR THE LUMP IN HER THROAT, OR IF HE ADVISED HER TO GET AN OVER THE COUNTER MEDICATION SUCH AS MUCINEX.    PLEASE CONTACT PATIENT TO DISCUSS AND ADVISE.

## 2024-08-27 ENCOUNTER — TELEPHONE (OUTPATIENT)
Dept: FAMILY MEDICINE CLINIC | Facility: CLINIC | Age: 79
End: 2024-08-27

## 2024-09-16 ENCOUNTER — HOSPITAL ENCOUNTER (OUTPATIENT)
Facility: HOSPITAL | Age: 79
Setting detail: OBSERVATION
Discharge: HOME | End: 2024-09-17
Attending: EMERGENCY MEDICINE | Admitting: HOSPITALIST
Payer: MEDICARE

## 2024-09-16 ENCOUNTER — APPOINTMENT (OUTPATIENT)
Dept: CT IMAGING | Facility: HOSPITAL | Age: 79
End: 2024-09-16
Payer: MEDICARE

## 2024-09-16 DIAGNOSIS — R10.9 ACUTE ABDOMINAL PAIN: Primary | ICD-10-CM

## 2024-09-16 DIAGNOSIS — E86.9 VOLUME DEPLETION: ICD-10-CM

## 2024-09-16 PROBLEM — R11.2 NAUSEA AND VOMITING: Status: ACTIVE | Noted: 2024-09-16

## 2024-09-16 LAB
ALBUMIN SERPL-MCNC: 4.9 G/DL (ref 3.5–5.2)
ALBUMIN/GLOB SERPL: 1.6 G/DL
ALP SERPL-CCNC: 130 U/L (ref 39–117)
ALT SERPL W P-5'-P-CCNC: 13 U/L (ref 1–33)
ANION GAP SERPL CALCULATED.3IONS-SCNC: 13 MMOL/L (ref 5–15)
AST SERPL-CCNC: 13 U/L (ref 1–32)
BACTERIA UR QL AUTO: NORMAL /HPF
BASOPHILS # BLD AUTO: 0.01 10*3/MM3 (ref 0–0.2)
BASOPHILS NFR BLD AUTO: 0.1 % (ref 0–1.5)
BILIRUB SERPL-MCNC: 0.7 MG/DL (ref 0–1.2)
BILIRUB UR QL STRIP: NEGATIVE
BUN SERPL-MCNC: 14 MG/DL (ref 8–23)
BUN/CREAT SERPL: 21.5 (ref 7–25)
CALCIUM SPEC-SCNC: 10.4 MG/DL (ref 8.6–10.5)
CHLORIDE SERPL-SCNC: 103 MMOL/L (ref 98–107)
CLARITY UR: CLEAR
CO2 SERPL-SCNC: 26 MMOL/L (ref 22–29)
COLOR UR: YELLOW
CREAT SERPL-MCNC: 0.65 MG/DL (ref 0.57–1)
D-LACTATE SERPL-SCNC: 1.4 MMOL/L (ref 0.5–2)
DEPRECATED RDW RBC AUTO: 40.7 FL (ref 37–54)
EGFRCR SERPLBLD CKD-EPI 2021: 88.6 ML/MIN/1.73
EOSINOPHIL # BLD AUTO: 0 10*3/MM3 (ref 0–0.4)
EOSINOPHIL NFR BLD AUTO: 0 % (ref 0.3–6.2)
ERYTHROCYTE [DISTWIDTH] IN BLOOD BY AUTOMATED COUNT: 12.5 % (ref 12.3–15.4)
GLOBULIN UR ELPH-MCNC: 3 GM/DL
GLUCOSE SERPL-MCNC: 115 MG/DL (ref 65–99)
GLUCOSE UR STRIP-MCNC: NEGATIVE MG/DL
HCT VFR BLD AUTO: 41.6 % (ref 34–46.6)
HGB BLD-MCNC: 14 G/DL (ref 12–15.9)
HGB UR QL STRIP.AUTO: NEGATIVE
HOLD SPECIMEN: NORMAL
HOLD SPECIMEN: NORMAL
HYALINE CASTS UR QL AUTO: NORMAL /LPF
IMM GRANULOCYTES # BLD AUTO: 0.01 10*3/MM3 (ref 0–0.05)
IMM GRANULOCYTES NFR BLD AUTO: 0.1 % (ref 0–0.5)
KETONES UR QL STRIP: ABNORMAL
LEUKOCYTE ESTERASE UR QL STRIP.AUTO: ABNORMAL
LIPASE SERPL-CCNC: 8 U/L (ref 13–60)
LYMPHOCYTES # BLD AUTO: 0.69 10*3/MM3 (ref 0.7–3.1)
LYMPHOCYTES NFR BLD AUTO: 10.3 % (ref 19.6–45.3)
MCH RBC QN AUTO: 30 PG (ref 26.6–33)
MCHC RBC AUTO-ENTMCNC: 33.7 G/DL (ref 31.5–35.7)
MCV RBC AUTO: 89.3 FL (ref 79–97)
MONOCYTES # BLD AUTO: 0.15 10*3/MM3 (ref 0.1–0.9)
MONOCYTES NFR BLD AUTO: 2.2 % (ref 5–12)
NEUTROPHILS NFR BLD AUTO: 5.83 10*3/MM3 (ref 1.7–7)
NEUTROPHILS NFR BLD AUTO: 87.3 % (ref 42.7–76)
NITRITE UR QL STRIP: NEGATIVE
NRBC BLD AUTO-RTO: 0 /100 WBC (ref 0–0.2)
PH UR STRIP.AUTO: 6 [PH] (ref 5–8)
PLATELET # BLD AUTO: 287 10*3/MM3 (ref 140–450)
PMV BLD AUTO: 10.1 FL (ref 6–12)
POTASSIUM SERPL-SCNC: 3.9 MMOL/L (ref 3.5–5.2)
PROT SERPL-MCNC: 7.9 G/DL (ref 6–8.5)
PROT UR QL STRIP: ABNORMAL
RBC # BLD AUTO: 4.66 10*6/MM3 (ref 3.77–5.28)
RBC # UR STRIP: NORMAL /HPF
REF LAB TEST METHOD: NORMAL
SODIUM SERPL-SCNC: 142 MMOL/L (ref 136–145)
SP GR UR STRIP: >1.03 (ref 1–1.03)
SQUAMOUS #/AREA URNS HPF: NORMAL /HPF
TROPONIN T SERPL HS-MCNC: 6 NG/L
UROBILINOGEN UR QL STRIP: ABNORMAL
WBC # UR STRIP: NORMAL /HPF
WBC NRBC COR # BLD AUTO: 6.69 10*3/MM3 (ref 3.4–10.8)
WHOLE BLOOD HOLD COAG: NORMAL
WHOLE BLOOD HOLD SPECIMEN: NORMAL

## 2024-09-16 PROCEDURE — 93005 ELECTROCARDIOGRAM TRACING: CPT | Performed by: EMERGENCY MEDICINE

## 2024-09-16 PROCEDURE — 93010 ELECTROCARDIOGRAM REPORT: CPT | Performed by: INTERNAL MEDICINE

## 2024-09-16 PROCEDURE — 74177 CT ABD & PELVIS W/CONTRAST: CPT

## 2024-09-16 PROCEDURE — 25010000002 FENTANYL CITRATE (PF) 50 MCG/ML SOLUTION: Performed by: EMERGENCY MEDICINE

## 2024-09-16 PROCEDURE — 81001 URINALYSIS AUTO W/SCOPE: CPT | Performed by: EMERGENCY MEDICINE

## 2024-09-16 PROCEDURE — 85025 COMPLETE CBC W/AUTO DIFF WBC: CPT

## 2024-09-16 PROCEDURE — G0378 HOSPITAL OBSERVATION PER HR: HCPCS

## 2024-09-16 PROCEDURE — 25010000002 MORPHINE PER 10 MG: Performed by: EMERGENCY MEDICINE

## 2024-09-16 PROCEDURE — 25010000002 ONDANSETRON PER 1 MG: Performed by: EMERGENCY MEDICINE

## 2024-09-16 PROCEDURE — 36415 COLL VENOUS BLD VENIPUNCTURE: CPT

## 2024-09-16 PROCEDURE — 96375 TX/PRO/DX INJ NEW DRUG ADDON: CPT

## 2024-09-16 PROCEDURE — 99285 EMERGENCY DEPT VISIT HI MDM: CPT

## 2024-09-16 PROCEDURE — 84484 ASSAY OF TROPONIN QUANT: CPT | Performed by: EMERGENCY MEDICINE

## 2024-09-16 PROCEDURE — 96361 HYDRATE IV INFUSION ADD-ON: CPT

## 2024-09-16 PROCEDURE — 96374 THER/PROPH/DIAG INJ IV PUSH: CPT

## 2024-09-16 PROCEDURE — 25510000001 IOPAMIDOL 61 % SOLUTION: Performed by: EMERGENCY MEDICINE

## 2024-09-16 PROCEDURE — 80053 COMPREHEN METABOLIC PANEL: CPT

## 2024-09-16 PROCEDURE — 25810000003 SODIUM CHLORIDE 0.9 % SOLUTION: Performed by: EMERGENCY MEDICINE

## 2024-09-16 PROCEDURE — 83605 ASSAY OF LACTIC ACID: CPT | Performed by: EMERGENCY MEDICINE

## 2024-09-16 PROCEDURE — 83690 ASSAY OF LIPASE: CPT

## 2024-09-16 RX ORDER — ROSUVASTATIN CALCIUM 20 MG/1
20 TABLET, COATED ORAL NIGHTLY
Status: DISCONTINUED | OUTPATIENT
Start: 2024-09-16 | End: 2024-09-17 | Stop reason: HOSPADM

## 2024-09-16 RX ORDER — IOPAMIDOL 612 MG/ML
100 INJECTION, SOLUTION INTRAVASCULAR
Status: COMPLETED | OUTPATIENT
Start: 2024-09-16 | End: 2024-09-16

## 2024-09-16 RX ORDER — MORPHINE SULFATE 2 MG/ML
2 INJECTION, SOLUTION INTRAMUSCULAR; INTRAVENOUS EVERY 4 HOURS PRN
Status: DISCONTINUED | OUTPATIENT
Start: 2024-09-16 | End: 2024-09-17 | Stop reason: HOSPADM

## 2024-09-16 RX ORDER — AMLODIPINE BESYLATE 10 MG/1
10 TABLET ORAL NIGHTLY
Status: DISCONTINUED | OUTPATIENT
Start: 2024-09-16 | End: 2024-09-17 | Stop reason: HOSPADM

## 2024-09-16 RX ORDER — ONDANSETRON 2 MG/ML
4 INJECTION INTRAMUSCULAR; INTRAVENOUS EVERY 6 HOURS PRN
Status: DISCONTINUED | OUTPATIENT
Start: 2024-09-16 | End: 2024-09-17 | Stop reason: HOSPADM

## 2024-09-16 RX ORDER — MORPHINE SULFATE 2 MG/ML
4 INJECTION, SOLUTION INTRAMUSCULAR; INTRAVENOUS ONCE
Status: COMPLETED | OUTPATIENT
Start: 2024-09-16 | End: 2024-09-16

## 2024-09-16 RX ORDER — SODIUM CHLORIDE 0.9 % (FLUSH) 0.9 %
10 SYRINGE (ML) INJECTION AS NEEDED
Status: DISCONTINUED | OUTPATIENT
Start: 2024-09-16 | End: 2024-09-17 | Stop reason: HOSPADM

## 2024-09-16 RX ORDER — SODIUM CHLORIDE 0.9 % (FLUSH) 0.9 %
10 SYRINGE (ML) INJECTION EVERY 12 HOURS SCHEDULED
Status: DISCONTINUED | OUTPATIENT
Start: 2024-09-16 | End: 2024-09-17 | Stop reason: HOSPADM

## 2024-09-16 RX ORDER — FENTANYL CITRATE 50 UG/ML
50 INJECTION, SOLUTION INTRAMUSCULAR; INTRAVENOUS ONCE
Status: COMPLETED | OUTPATIENT
Start: 2024-09-16 | End: 2024-09-16

## 2024-09-16 RX ORDER — ONDANSETRON 4 MG/1
4 TABLET, ORALLY DISINTEGRATING ORAL EVERY 6 HOURS PRN
Status: DISCONTINUED | OUTPATIENT
Start: 2024-09-16 | End: 2024-09-17 | Stop reason: HOSPADM

## 2024-09-16 RX ORDER — PANTOPRAZOLE SODIUM 40 MG/1
40 TABLET, DELAYED RELEASE ORAL NIGHTLY
Status: DISCONTINUED | OUTPATIENT
Start: 2024-09-16 | End: 2024-09-16

## 2024-09-16 RX ORDER — PROPRANOLOL HYDROCHLORIDE 40 MG/1
40 TABLET ORAL EVERY 12 HOURS SCHEDULED
Status: DISCONTINUED | OUTPATIENT
Start: 2024-09-16 | End: 2024-09-17

## 2024-09-16 RX ORDER — LISINOPRIL 20 MG/1
20 TABLET ORAL NIGHTLY
Status: DISCONTINUED | OUTPATIENT
Start: 2024-09-16 | End: 2024-09-17 | Stop reason: HOSPADM

## 2024-09-16 RX ORDER — PANTOPRAZOLE SODIUM 40 MG/10ML
40 INJECTION, POWDER, LYOPHILIZED, FOR SOLUTION INTRAVENOUS
Status: DISCONTINUED | OUTPATIENT
Start: 2024-09-16 | End: 2024-09-17 | Stop reason: HOSPADM

## 2024-09-16 RX ORDER — SODIUM CHLORIDE 9 MG/ML
40 INJECTION, SOLUTION INTRAVENOUS AS NEEDED
Status: DISCONTINUED | OUTPATIENT
Start: 2024-09-16 | End: 2024-09-17 | Stop reason: HOSPADM

## 2024-09-16 RX ORDER — ONDANSETRON 2 MG/ML
4 INJECTION INTRAMUSCULAR; INTRAVENOUS ONCE
Status: COMPLETED | OUTPATIENT
Start: 2024-09-16 | End: 2024-09-16

## 2024-09-16 RX ORDER — SODIUM CHLORIDE 9 MG/ML
125 INJECTION, SOLUTION INTRAVENOUS CONTINUOUS
Status: DISCONTINUED | OUTPATIENT
Start: 2024-09-16 | End: 2024-09-17

## 2024-09-16 RX ORDER — GABAPENTIN 300 MG/1
300 CAPSULE ORAL NIGHTLY
Status: DISCONTINUED | OUTPATIENT
Start: 2024-09-16 | End: 2024-09-17 | Stop reason: HOSPADM

## 2024-09-16 RX ORDER — DONEPEZIL HYDROCHLORIDE 5 MG/1
5 TABLET, FILM COATED ORAL NIGHTLY
Status: DISCONTINUED | OUTPATIENT
Start: 2024-09-16 | End: 2024-09-17 | Stop reason: HOSPADM

## 2024-09-16 RX ADMIN — FENTANYL CITRATE 50 MCG: 50 INJECTION, SOLUTION INTRAMUSCULAR; INTRAVENOUS at 17:42

## 2024-09-16 RX ADMIN — SODIUM CHLORIDE 125 ML/HR: 9 INJECTION, SOLUTION INTRAVENOUS at 17:46

## 2024-09-16 RX ADMIN — ONDANSETRON 4 MG: 2 INJECTION, SOLUTION INTRAMUSCULAR; INTRAVENOUS at 17:43

## 2024-09-16 RX ADMIN — AMLODIPINE BESYLATE 10 MG: 5 TABLET ORAL at 22:47

## 2024-09-16 RX ADMIN — ROSUVASTATIN CALCIUM 20 MG: 20 TABLET, FILM COATED ORAL at 22:48

## 2024-09-16 RX ADMIN — DONEPEZIL HYDROCHLORIDE 5 MG: 5 TABLET, FILM COATED ORAL at 22:48

## 2024-09-16 RX ADMIN — PROPRANOLOL HYDROCHLORIDE 40 MG: 40 TABLET ORAL at 22:48

## 2024-09-16 RX ADMIN — PANTOPRAZOLE SODIUM 40 MG: 40 INJECTION, POWDER, FOR SOLUTION INTRAVENOUS at 22:48

## 2024-09-16 RX ADMIN — LISINOPRIL 20 MG: 20 TABLET ORAL at 22:48

## 2024-09-16 RX ADMIN — SODIUM CHLORIDE 500 ML: 9 INJECTION, SOLUTION INTRAVENOUS at 17:46

## 2024-09-16 RX ADMIN — MORPHINE SULFATE 4 MG: 2 INJECTION, SOLUTION INTRAMUSCULAR; INTRAVENOUS at 18:18

## 2024-09-16 RX ADMIN — Medication 10 ML: at 22:48

## 2024-09-16 RX ADMIN — GABAPENTIN 300 MG: 300 CAPSULE ORAL at 22:49

## 2024-09-16 RX ADMIN — IOPAMIDOL 85 ML: 612 INJECTION, SOLUTION INTRAVENOUS at 18:36

## 2024-09-17 ENCOUNTER — READMISSION MANAGEMENT (OUTPATIENT)
Dept: CALL CENTER | Facility: HOSPITAL | Age: 79
End: 2024-09-17
Payer: MEDICARE

## 2024-09-17 VITALS
HEIGHT: 60 IN | WEIGHT: 149.03 LBS | DIASTOLIC BLOOD PRESSURE: 68 MMHG | TEMPERATURE: 97.9 F | BODY MASS INDEX: 29.26 KG/M2 | OXYGEN SATURATION: 99 % | SYSTOLIC BLOOD PRESSURE: 152 MMHG | RESPIRATION RATE: 19 BRPM | HEART RATE: 59 BPM

## 2024-09-17 LAB
ANION GAP SERPL CALCULATED.3IONS-SCNC: 11.2 MMOL/L (ref 5–15)
BUN SERPL-MCNC: 14 MG/DL (ref 8–23)
BUN/CREAT SERPL: 19.2 (ref 7–25)
CALCIUM SPEC-SCNC: 9.2 MG/DL (ref 8.6–10.5)
CHLORIDE SERPL-SCNC: 107 MMOL/L (ref 98–107)
CO2 SERPL-SCNC: 21.8 MMOL/L (ref 22–29)
CREAT SERPL-MCNC: 0.73 MG/DL (ref 0.57–1)
DEPRECATED RDW RBC AUTO: 42.4 FL (ref 37–54)
EGFRCR SERPLBLD CKD-EPI 2021: 82.7 ML/MIN/1.73
ERYTHROCYTE [DISTWIDTH] IN BLOOD BY AUTOMATED COUNT: 12.4 % (ref 12.3–15.4)
GLUCOSE SERPL-MCNC: 105 MG/DL (ref 65–99)
HCT VFR BLD AUTO: 40.2 % (ref 34–46.6)
HGB BLD-MCNC: 12.7 G/DL (ref 12–15.9)
MCH RBC QN AUTO: 30.2 PG (ref 26.6–33)
MCHC RBC AUTO-ENTMCNC: 31.6 G/DL (ref 31.5–35.7)
MCV RBC AUTO: 95.5 FL (ref 79–97)
PLATELET # BLD AUTO: 222 10*3/MM3 (ref 140–450)
PMV BLD AUTO: 10.4 FL (ref 6–12)
POTASSIUM SERPL-SCNC: 4.1 MMOL/L (ref 3.5–5.2)
QT INTERVAL: 441 MS
QT INTERVAL: 525 MS
QTC INTERVAL: 399 MS
QTC INTERVAL: 595 MS
RBC # BLD AUTO: 4.21 10*6/MM3 (ref 3.77–5.28)
SODIUM SERPL-SCNC: 140 MMOL/L (ref 136–145)
TSH SERPL DL<=0.05 MIU/L-ACNC: 0.14 UIU/ML (ref 0.27–4.2)
WBC NRBC COR # BLD AUTO: 4.53 10*3/MM3 (ref 3.4–10.8)

## 2024-09-17 PROCEDURE — 93010 ELECTROCARDIOGRAM REPORT: CPT | Performed by: INTERNAL MEDICINE

## 2024-09-17 PROCEDURE — G0378 HOSPITAL OBSERVATION PER HR: HCPCS

## 2024-09-17 PROCEDURE — 99214 OFFICE O/P EST MOD 30 MIN: CPT | Performed by: NURSE PRACTITIONER

## 2024-09-17 PROCEDURE — 84443 ASSAY THYROID STIM HORMONE: CPT | Performed by: NURSE PRACTITIONER

## 2024-09-17 PROCEDURE — 93005 ELECTROCARDIOGRAM TRACING: CPT | Performed by: HOSPITALIST

## 2024-09-17 PROCEDURE — 80048 BASIC METABOLIC PNL TOTAL CA: CPT | Performed by: STUDENT IN AN ORGANIZED HEALTH CARE EDUCATION/TRAINING PROGRAM

## 2024-09-17 PROCEDURE — 96376 TX/PRO/DX INJ SAME DRUG ADON: CPT

## 2024-09-17 PROCEDURE — 85027 COMPLETE CBC AUTOMATED: CPT | Performed by: STUDENT IN AN ORGANIZED HEALTH CARE EDUCATION/TRAINING PROGRAM

## 2024-09-17 PROCEDURE — 36415 COLL VENOUS BLD VENIPUNCTURE: CPT | Performed by: STUDENT IN AN ORGANIZED HEALTH CARE EDUCATION/TRAINING PROGRAM

## 2024-09-17 PROCEDURE — 99203 OFFICE O/P NEW LOW 30 MIN: CPT | Performed by: STUDENT IN AN ORGANIZED HEALTH CARE EDUCATION/TRAINING PROGRAM

## 2024-09-17 RX ORDER — PROPRANOLOL HYDROCHLORIDE 20 MG/1
20 TABLET ORAL EVERY 12 HOURS SCHEDULED
Status: DISCONTINUED | OUTPATIENT
Start: 2024-09-17 | End: 2024-09-17 | Stop reason: HOSPADM

## 2024-09-17 RX ADMIN — PANTOPRAZOLE SODIUM 40 MG: 40 INJECTION, POWDER, FOR SOLUTION INTRAVENOUS at 06:52

## 2024-09-17 RX ADMIN — Medication 10 ML: at 08:42

## 2024-09-18 ENCOUNTER — TRANSITIONAL CARE MANAGEMENT TELEPHONE ENCOUNTER (OUTPATIENT)
Dept: CALL CENTER | Facility: HOSPITAL | Age: 79
End: 2024-09-18
Payer: MEDICARE

## 2024-09-25 ENCOUNTER — OFFICE VISIT (OUTPATIENT)
Dept: CARDIOLOGY | Facility: CLINIC | Age: 79
End: 2024-09-25
Payer: MEDICARE

## 2024-09-25 ENCOUNTER — OFFICE VISIT (OUTPATIENT)
Dept: FAMILY MEDICINE CLINIC | Facility: CLINIC | Age: 79
End: 2024-09-25
Payer: MEDICARE

## 2024-09-25 VITALS
WEIGHT: 149 LBS | HEIGHT: 60 IN | DIASTOLIC BLOOD PRESSURE: 60 MMHG | BODY MASS INDEX: 29.25 KG/M2 | SYSTOLIC BLOOD PRESSURE: 112 MMHG | HEART RATE: 54 BPM

## 2024-09-25 VITALS
OXYGEN SATURATION: 96 % | TEMPERATURE: 97.8 F | HEART RATE: 56 BPM | DIASTOLIC BLOOD PRESSURE: 60 MMHG | HEIGHT: 60 IN | BODY MASS INDEX: 29.45 KG/M2 | SYSTOLIC BLOOD PRESSURE: 110 MMHG | WEIGHT: 150 LBS

## 2024-09-25 DIAGNOSIS — I10 ESSENTIAL HYPERTENSION: Chronic | ICD-10-CM

## 2024-09-25 DIAGNOSIS — R00.1 BRADYCARDIA, SINUS: ICD-10-CM

## 2024-09-25 DIAGNOSIS — Z09 HOSPITAL DISCHARGE FOLLOW-UP: Primary | ICD-10-CM

## 2024-09-25 DIAGNOSIS — R10.84 GENERALIZED ABDOMINAL PAIN: ICD-10-CM

## 2024-09-25 DIAGNOSIS — R18.8 OTHER ASCITES: ICD-10-CM

## 2024-09-25 DIAGNOSIS — R01.1 HEART MURMUR: ICD-10-CM

## 2024-09-25 DIAGNOSIS — I10 ESSENTIAL HYPERTENSION: Primary | Chronic | ICD-10-CM

## 2024-09-25 DIAGNOSIS — I34.0 NON-RHEUMATIC MITRAL REGURGITATION: Chronic | ICD-10-CM

## 2024-09-25 DIAGNOSIS — M15.9 PRIMARY OSTEOARTHRITIS INVOLVING MULTIPLE JOINTS: ICD-10-CM

## 2024-09-25 PROCEDURE — 1125F AMNT PAIN NOTED PAIN PRSNT: CPT | Performed by: INTERNAL MEDICINE

## 2024-09-25 PROCEDURE — 3074F SYST BP LT 130 MM HG: CPT | Performed by: INTERNAL MEDICINE

## 2024-09-25 PROCEDURE — 3074F SYST BP LT 130 MM HG: CPT | Performed by: NURSE PRACTITIONER

## 2024-09-25 PROCEDURE — 1160F RVW MEDS BY RX/DR IN RCRD: CPT | Performed by: INTERNAL MEDICINE

## 2024-09-25 PROCEDURE — 1159F MED LIST DOCD IN RCRD: CPT | Performed by: INTERNAL MEDICINE

## 2024-09-25 PROCEDURE — 3078F DIAST BP <80 MM HG: CPT | Performed by: INTERNAL MEDICINE

## 2024-09-25 PROCEDURE — 1111F DSCHRG MED/CURRENT MED MERGE: CPT | Performed by: INTERNAL MEDICINE

## 2024-09-25 PROCEDURE — 93000 ELECTROCARDIOGRAM COMPLETE: CPT | Performed by: NURSE PRACTITIONER

## 2024-09-25 PROCEDURE — 99214 OFFICE O/P EST MOD 30 MIN: CPT | Performed by: NURSE PRACTITIONER

## 2024-09-25 PROCEDURE — 3078F DIAST BP <80 MM HG: CPT | Performed by: NURSE PRACTITIONER

## 2024-09-25 PROCEDURE — 99495 TRANSJ CARE MGMT MOD F2F 14D: CPT | Performed by: INTERNAL MEDICINE

## 2024-09-25 RX ORDER — GABAPENTIN 300 MG/1
300 CAPSULE ORAL NIGHTLY
Qty: 90 CAPSULE | Refills: 1 | Status: SHIPPED | OUTPATIENT
Start: 2024-09-25

## 2024-09-25 RX ORDER — AMLODIPINE AND BENAZEPRIL HYDROCHLORIDE 10; 20 MG/1; MG/1
1 CAPSULE ORAL DAILY
Qty: 90 CAPSULE | Refills: 1 | Status: SHIPPED | OUTPATIENT
Start: 2024-09-25

## 2024-11-05 ENCOUNTER — OFFICE VISIT (OUTPATIENT)
Dept: FAMILY MEDICINE CLINIC | Facility: CLINIC | Age: 79
End: 2024-11-05
Payer: MEDICARE

## 2024-11-05 VITALS
BODY MASS INDEX: 29.25 KG/M2 | HEIGHT: 60 IN | DIASTOLIC BLOOD PRESSURE: 60 MMHG | WEIGHT: 149 LBS | OXYGEN SATURATION: 94 % | HEART RATE: 56 BPM | SYSTOLIC BLOOD PRESSURE: 130 MMHG

## 2024-11-05 DIAGNOSIS — I10 ESSENTIAL HYPERTENSION: Chronic | ICD-10-CM

## 2024-11-05 DIAGNOSIS — E78.2 MIXED HYPERLIPIDEMIA: ICD-10-CM

## 2024-11-05 DIAGNOSIS — Z23 IMMUNIZATION DUE: Primary | ICD-10-CM

## 2024-11-05 DIAGNOSIS — Z00.00 MEDICARE ANNUAL WELLNESS VISIT, SUBSEQUENT: ICD-10-CM

## 2024-11-05 PROCEDURE — 3075F SYST BP GE 130 - 139MM HG: CPT | Performed by: INTERNAL MEDICINE

## 2024-11-05 PROCEDURE — 90662 IIV NO PRSV INCREASED AG IM: CPT | Performed by: INTERNAL MEDICINE

## 2024-11-05 PROCEDURE — 1160F RVW MEDS BY RX/DR IN RCRD: CPT | Performed by: INTERNAL MEDICINE

## 2024-11-05 PROCEDURE — 1170F FXNL STATUS ASSESSED: CPT | Performed by: INTERNAL MEDICINE

## 2024-11-05 PROCEDURE — G0008 ADMIN INFLUENZA VIRUS VAC: HCPCS | Performed by: INTERNAL MEDICINE

## 2024-11-05 PROCEDURE — 1126F AMNT PAIN NOTED NONE PRSNT: CPT | Performed by: INTERNAL MEDICINE

## 2024-11-05 PROCEDURE — 3078F DIAST BP <80 MM HG: CPT | Performed by: INTERNAL MEDICINE

## 2024-11-05 PROCEDURE — G0439 PPPS, SUBSEQ VISIT: HCPCS | Performed by: INTERNAL MEDICINE

## 2024-11-05 PROCEDURE — 1159F MED LIST DOCD IN RCRD: CPT | Performed by: INTERNAL MEDICINE

## 2024-11-05 NOTE — PATIENT INSTRUCTIONS
Medicare Wellness  Personal Prevention Plan of Service     Date of Office Visit:    Encounter Provider:  Cordell Holland MD  Place of Service:  [facility]  Patient Name: Isabel Carlson  :  1945    As part of the Medicare Wellness portion of your visit today, we are providing you with this personalized preventive plan of services (PPPS). This plan is based upon recommendations of the [place] Preventive Services Task Force (USPSTF) and the Advisory Committee on Immunization Practices (ACIP).    This lists the preventive care services that should be considered, and provides dates of when you are due. Items listed as completed are up-to-date and do not require any further intervention.    Health Maintenance   Topic Date Due    ZOSTER VACCINE (1 of 2) Never done    RSV Vaccine - Adults (1 - 1-dose 75+ series) Never done    MAMMOGRAM  10/29/2023    COVID-19 Vaccine ( - - season) 2024    LIPID PANEL  10/11/2024    DXA SCAN  2024    ANNUAL WELLNESS VISIT  2025    BMI FOLLOWUP  2025    TDAP/TD VACCINES (2 - Td or Tdap) 2030    INFLUENZA VACCINE  Completed    Pneumococcal Vaccine 65+  Completed    COLORECTAL CANCER SCREENING  Discontinued       Orders Placed This Encounter   Procedures    Fluzone High-Dose 65+yrs ([id number])    Comprehensive Metabolic Panel     Order Specific Question:   Release to patient     Answer:   Routine Release [[id number]]    Lipid Panel     Order Specific Question:   Release to patient     Answer:   Routine Release [[id number]]       Return in about 6 months (around 2025) for Next scheduled follow up.

## 2024-11-05 NOTE — PROGRESS NOTES
Subjective   The ABCs of the Annual Wellness Visit  Medicare Wellness Visit      Isabel Carlson is a 79 y.o. patient who presents for a Medicare Wellness Visit.  79-year-old female with history of heart murmur with nonrheumatic mitral valve regurgitation and tricuspid valve regurgitation, hypertension, hyperlipidemia, osteoarthritis, GERD, B12 deficiency, iron anemia deficiency and known mild dementia.  Still has episodes of chest discomfort but negative workup with cardiology.    The following portions of the patient's history were reviewed and updated as appropriate: allergies, current medications, past family history, past medical history, past social history, past surgical history, and problem list.  Compared to one year ago, the patient's physical health is the same.  Compared to one year ago, the patient's mental health is the same.    Recent Hospitalizations:  This patient has had a [facility] admission record on file within the last 365 days.  [facility] on 9/16/24.  Current Medical Providers:  Patient Care Team:  Cordell Holland MD as PCP - General (Internal Medicine)  Jose Leary MD as Consulting Physician (Orthopedic Surgery)  Rodrigue Brady DO as Consulting Physician (Neurology)  Eddie Thrasher MD as Consulting Physician (Allergy)  Modesto Silverman MD as Consulting Physician (Cardiology)  Dhruv Dsouza MD as Surgeon (Orthopedic Surgery)  January Daniel APRN as Nurse Practitioner (Obstetrics and Gynecology)    Outpatient Medications Prior to Visit   Medication Sig Dispense Refill    amLODIPine-benazepril (LOTREL) 10-20 MG per capsule Take 1 capsule by mouth Daily. 90 capsule 1    donepezil (Aricept) 5 MG tablet Take 1 tablet by mouth Every Night. 90 tablet 1    fluticasone (FLONASE) 50 MCG/ACT nasal spray Administer 2 sprays into the nostril(s) as directed by provider Daily As Needed.      gabapentin (NEURONTIN) 300 MG capsule Take 1 capsule by  mouth Every Night. 90 capsule 1    pantoprazole (PROTONIX) 40 MG EC tablet Take 1 tablet by mouth Daily. 90 tablet 1    rosuvastatin (CRESTOR) 20 MG tablet Take 1 tablet by mouth Daily. 90 tablet 1     No facility-administered medications prior to visit.     No opioid medication identified on active medication list. I have reviewed chart for other potential  high risk medication/s and harmful drug interactions in the elderly.      Aspirin is not on active medication list.  Aspirin use is indicated based on review of current medical condition/s. Pros and cons of this therapy have been discussed with this patient. Benefits of this medication outweigh potential harm.  Patient has been instructed to start taking this medication..    Patient Active Problem List   Diagnosis    Heart murmur    Abnormal EKG    APC (atrial premature contractions)    Non-rheumatic mitral regurgitation    Nonrheumatic tricuspid valve regurgitation    LVH (left ventricular hypertrophy)    Gastroesophageal reflux disease    Essential hypertension    Mixed hyperlipidemia    Osteoporosis of lumbar spine    Menopause    Osteoarthritis    Lip laceration    Status post left knee replacement    Fatigue    Iron deficiency anemia    Abnormal serum thyroid stimulating hormone (TSH) level    Other headache syndrome    Medicare annual wellness visit, subsequent    Upper respiratory tract infection due to COVID-19 virus    Herpes zoster vaccination declined    Vitreomacular adhesion of left eye    Pseudophakia    Posterior vitreous detachment    Iridocyclitis    Epiretinal membrane (ERM) of left eye    Cystoid macular edema of left eye    B12 deficiency    Onychomycosis    Arthritis of first metatarsophalangeal (MTP) joint of left foot    Hammer toes of both feet    Hallux valgus of right foot    Arthritis of first metatarsophalangeal (MTP) joint of right foot    Overweight (BMI 25.0-29.9)    Dry eye syndrome of bilateral lacrimal glands    Acquired hallux  "rigidus of right foot    Osteoarthritis of left glenohumeral joint    Chest pain    Conductive hearing loss, bilateral    Mild late onset Alzheimer's dementia without behavioral disturbance, psychotic disturbance, mood disturbance, or anxiety    Acute abdominal pain    Nausea and vomiting    Bradycardia, sinus     Advance Care Planning Advance Directive is not on file.  ACP discussion was declined by the patient. Patient does not have an advance directive, declines further assistance.      Objective   Vitals:    24 1429   BP: 130/60   BP Location: Left arm   Patient Position: Sitting   Cuff Size: Adult   Pulse: 56   TempSrc: Temporal   SpO2: 94%   Weight: 67.6 kg (149 lb)   Height: 152.4 cm (60\")   PainSc: 0-No pain       Estimated body mass index is 29.1 kg/m² as calculated from the following:    Height as of this encounter: 152.4 cm (60\").    Weight as of this encounter: 67.6 kg (149 lb).    BMI is >= 25 and <30. (Overweight) The following options were offered after discussion;: weight loss educational material (shared in after visit summary), exercise counseling/recommendations, and nutrition counseling/recommendations     Does the patient have evidence of cognitive impairment? Yes patient has known mild dementia and mini cog today is scored at 3 out of 5.                                                                                              Health  Risk Assessment    Smoking Status:  Social History     Tobacco Use   Smoking Status Never    Passive exposure: Never   Smokeless Tobacco Never     Alcohol Consumption:  Social History     Substance and Sexual Activity   Alcohol Use No    Comment: rarely       Fall Risk Screen  STEADI Fall Risk Assessment was completed, and patient is at HIGH risk for falls. Assessment completed on:2024    Depression Screenin/5/2024     2:29 PM   PHQ-2/PHQ-9 Depression Screening   Little interest or pleasure in doing things Not at all   Feeling down, " depressed, or hopeless Not at all     Health Habits and Functional and Cognitive Screenin/5/2024     2:32 PM   Functional & Cognitive Status   Do you have difficulty preparing food and eating? No   Do you have difficulty bathing yourself, getting dressed or grooming yourself? No   Do you have difficulty using the toilet? No   Do you have difficulty moving around from place to place? No   Do you have trouble with steps or getting out of a bed or a chair? No   Current Diet Well Balanced Diet   Dental Exam Up to date   Eye Exam Up to date   Exercise (times per week) 3 times per week   Current Exercises Include Walking   Do you need help using the phone?  No   Are you deaf or do you have serious difficulty hearing?  No   Do you need help to go to places out of walking distance? No   Do you need help shopping? No   Do you need help preparing meals?  No   Do you need help with housework?  No   Do you need help with laundry? No   Do you need help taking your medications? No   Do you need help managing money? No   Do you ever drive or ride in a car without wearing a seat belt? No   Have you felt unusual stress, anger or loneliness in the last month? No   Who do you live with? Alone   If you need help, do you have trouble finding someone available to you? No   Have you been bothered in the last four weeks by sexual problems? No   Do you have difficulty concentrating, remembering or making decisions? No           Age-appropriate Screening Schedule:  Refer to the list below for future screening recommendations based on patient's age, sex and/or medical conditions. Orders for these recommended tests are listed in the plan section. The patient has been provided with a written plan.    Health Maintenance List  Health Maintenance   Topic Date Due    ZOSTER VACCINE (1 of 2) Never done    RSV Vaccine - Adults (1 - 1-dose 75+ series) Never done    MAMMOGRAM  10/29/2023    COVID-19 Vaccine (2024- season) 2024     LIPID PANEL  10/11/2024    DXA SCAN  11/29/2024    ANNUAL WELLNESS VISIT  11/05/2025    BMI FOLLOWUP  11/05/2025    TDAP/TD VACCINES (2 - Td or Tdap) 03/03/2030    INFLUENZA VACCINE  Completed    Pneumococcal Vaccine 65+  Completed    COLORECTAL CANCER SCREENING  Discontinued                                                                                                                                                CMS Preventative Services Quick Reference  Risk Factors Identified During Encounter  Fall Risk-High or Moderate: Discussed Fall Prevention in the home, Information on Fall Prevention Shared in After Visit Summary, and Sit to Stand Exercise Information Shared in After Visit Summary  Immunizations Discussed/Encouraged: Influenza, Shingrix, COVID19, and RSV (Respiratory Syncytial Virus)  Diagnoses and all orders for this visit:    1. Immunization due (Primary)  -     Fluzone High-Dose 65+yrs ([id number])    2. Medicare annual wellness visit, subsequent    3. Mixed hyperlipidemia  -     Comprehensive Metabolic Panel  -     Lipid Panel    4. Essential hypertension  -     Comprehensive Metabolic Panel  -     Lipid Panel      Reviewed history and annual wellness visit with patient during office time.  Medications reviewed as appropriate.  Discussed advanced directives and living will.  Patient has living will: Living will: Patient refused.  Discussed fall risk and precautions encourage removing throw rugs and using grab bars within the home and bathroom.  Will check the labs as ordered above to evaluate the blood sugars, kidney, liver, cholesterol for screening.  Discussed flu shot recommended to get the high-dose influenza vaccine annually in the fall.  The patient has started, but not completed, their COVID-19 vaccination series.  Prevnar-13 and pneumovax-23 up to date and appropriate.  RSV, Covid booster, hep B series and Shingrix vaccination series recommended.  Influenza vaccination was given today.   Patient will consider covid booster but declines all others at this time.  Encourage follow-up with the eye doctor on annual basis for glaucoma evaluation.  Discussed weight and encouraged exercise as tolerated while following a healthy diet. Follow-up with gynecology and specialists as scheduled.      The above risks/problems have been discussed with the patient.  Pertinent information has been shared with the patient in the After Visit Summary.  An After Visit Summary and PPPS were made available to the patient.    Follow Up:   Next Medicare Wellness visit to be scheduled in 1 year.

## 2024-11-06 LAB
ALBUMIN SERPL-MCNC: 4.4 G/DL (ref 3.7–4.7)
ALP SERPL-CCNC: 105 IU/L (ref 44–121)
ALT SERPL-CCNC: 20 IU/L (ref 0–32)
AST SERPL-CCNC: 17 IU/L (ref 0–40)
BILIRUB SERPL-MCNC: 0.7 MG/DL (ref 0–1.2)
BUN SERPL-MCNC: 11 MG/DL (ref 8–27)
BUN/CREAT SERPL: 15 (ref 12–28)
CALCIUM SERPL-MCNC: 8.9 MG/DL (ref 8.7–10.3)
CHLORIDE SERPL-SCNC: 106 MMOL/L (ref 96–106)
CHOLEST SERPL-MCNC: 136 MG/DL (ref 100–199)
CO2 SERPL-SCNC: 25 MMOL/L (ref 20–29)
CREAT SERPL-MCNC: 0.71 MG/DL (ref 0.57–1)
EGFRCR SERPLBLD CKD-EPI 2021: 85 ML/MIN/1.73
GLOBULIN SER CALC-MCNC: 2.4 G/DL (ref 1.5–4.5)
GLUCOSE SERPL-MCNC: 86 MG/DL (ref 70–99)
HDLC SERPL-MCNC: 73 MG/DL
LDLC SERPL CALC-MCNC: 51 MG/DL (ref 0–99)
POTASSIUM SERPL-SCNC: 4.4 MMOL/L (ref 3.5–5.2)
PROT SERPL-MCNC: 6.8 G/DL (ref 6–8.5)
SODIUM SERPL-SCNC: 142 MMOL/L (ref 134–144)
TRIGL SERPL-MCNC: 53 MG/DL (ref 0–149)
VLDLC SERPL CALC-MCNC: 12 MG/DL (ref 5–40)

## 2024-12-10 ENCOUNTER — HOSPITAL ENCOUNTER (OUTPATIENT)
Dept: CT IMAGING | Facility: HOSPITAL | Age: 79
Discharge: HOME | End: 2024-12-10
Admitting: INTERNAL MEDICINE
Payer: MEDICARE

## 2024-12-10 DIAGNOSIS — Z09 HOSPITAL DISCHARGE FOLLOW-UP: ICD-10-CM

## 2024-12-10 DIAGNOSIS — R18.8 OTHER ASCITES: ICD-10-CM

## 2024-12-10 DIAGNOSIS — R10.84 GENERALIZED ABDOMINAL PAIN: ICD-10-CM

## 2024-12-10 PROCEDURE — 74177 CT ABD & PELVIS W/CONTRAST: CPT

## 2024-12-10 PROCEDURE — 25510000001 IOPAMIDOL 61 % SOLUTION: Performed by: INTERNAL MEDICINE

## 2024-12-10 RX ORDER — IOPAMIDOL 612 MG/ML
100 INJECTION, SOLUTION INTRAVASCULAR
Status: COMPLETED | OUTPATIENT
Start: 2024-12-10 | End: 2024-12-10

## 2024-12-10 RX ADMIN — IOPAMIDOL 85 ML: 612 INJECTION, SOLUTION INTRAVENOUS at 14:30

## 2024-12-16 DIAGNOSIS — R93.5 ABNORMAL CT OF THE ABDOMEN: Primary | ICD-10-CM

## 2025-01-22 DIAGNOSIS — G30.1 MILD LATE ONSET ALZHEIMER'S DEMENTIA WITHOUT BEHAVIORAL DISTURBANCE, PSYCHOTIC DISTURBANCE, MOOD DISTURBANCE, OR ANXIETY: ICD-10-CM

## 2025-01-22 DIAGNOSIS — F02.A0 MILD LATE ONSET ALZHEIMER'S DEMENTIA WITHOUT BEHAVIORAL DISTURBANCE, PSYCHOTIC DISTURBANCE, MOOD DISTURBANCE, OR ANXIETY: ICD-10-CM

## 2025-01-22 RX ORDER — DONEPEZIL HYDROCHLORIDE 5 MG/1
5 TABLET, FILM COATED ORAL NIGHTLY
Qty: 90 TABLET | Refills: 1 | Status: SHIPPED | OUTPATIENT
Start: 2025-01-22

## 2025-01-31 ENCOUNTER — HOSPITAL ENCOUNTER (OUTPATIENT)
Dept: MRI IMAGING | Facility: HOSPITAL | Age: 80
Discharge: HOME | End: 2025-01-31
Payer: MEDICARE

## 2025-01-31 DIAGNOSIS — R93.5 ABNORMAL CT OF THE ABDOMEN: ICD-10-CM

## 2025-01-31 PROCEDURE — A9577 INJ MULTIHANCE: HCPCS | Performed by: INTERNAL MEDICINE

## 2025-01-31 PROCEDURE — 74183 MRI ABD W/O CNTR FLWD CNTR: CPT

## 2025-01-31 PROCEDURE — 25510000002 GADOBENATE DIMEGLUMINE 529 MG/ML SOLUTION: Performed by: INTERNAL MEDICINE

## 2025-01-31 RX ADMIN — GADOBENATE DIMEGLUMINE 13 ML: 529 INJECTION, SOLUTION INTRAVENOUS at 15:54

## 2025-02-05 ENCOUNTER — TELEPHONE (OUTPATIENT)
Dept: FAMILY MEDICINE CLINIC | Facility: CLINIC | Age: 80
End: 2025-02-05
Payer: MEDICARE

## 2025-02-05 NOTE — TELEPHONE ENCOUNTER
Called pt and let her know that her mri results haven't been resulted yet and she will receive a call when they have. Pt voiced understanding

## 2025-02-05 NOTE — TELEPHONE ENCOUNTER
Caller: Isabel Carlson    Relationship to patient: Self    Best call back number: [phone]      Patient is needing:     PATIENT CALLED IN WANTING TO KNOW HER RESULTS FROM HER MRI ON 1.31.25.     PLEASE ADVISE.

## 2025-02-10 ENCOUNTER — TELEPHONE (OUTPATIENT)
Dept: FAMILY MEDICINE CLINIC | Facility: CLINIC | Age: 80
End: 2025-02-10

## 2025-02-10 NOTE — TELEPHONE ENCOUNTER
Caller: Isabel Carlson    Relationship: Self    Best call back number: [phone]    Caller requesting test results: SELF     What test was performed: MRI     When was the test performed: 1/31/25    Where was the test performed: [facility]     Additional notes: PLEASE CALL

## 2025-02-12 NOTE — TELEPHONE ENCOUNTER
OKAY FOR HUB TO RELAY     Called and left  for pt to call back regarding MO notes below     The MRI demonstrated 2 areas in the liver that are really unchanged compared to 2017 and therefore likely benign and not a problem.  No other findings were noted on the scan of the abdomen.  All the blood vessels the area of concern that was seen on the CT scan was likely 1 of those benign spots that were seen in 2017.  No further imaging is needed.  Are flowing well and no problems were found.

## 2025-02-13 NOTE — TELEPHONE ENCOUNTER
Name: Fern Johnson      Relationship: Emergency Contact      Best Callback Number: [phone]       HUB PROVIDED THE RELAY MESSAGE FROM THE OFFICE      PATIENT: VOICED UNDERSTANDING AND HAS NO FURTHER QUESTIONS AT THIS TIME    ADDITIONAL INFORMATION:

## 2025-02-26 ENCOUNTER — OFFICE VISIT (OUTPATIENT)
Dept: FAMILY MEDICINE CLINIC | Facility: CLINIC | Age: 80
End: 2025-02-26
Payer: MEDICARE

## 2025-02-26 VITALS
BODY MASS INDEX: 30.08 KG/M2 | TEMPERATURE: 97.3 F | HEART RATE: 71 BPM | WEIGHT: 153.2 LBS | DIASTOLIC BLOOD PRESSURE: 70 MMHG | OXYGEN SATURATION: 94 % | HEIGHT: 60 IN | SYSTOLIC BLOOD PRESSURE: 122 MMHG

## 2025-02-26 DIAGNOSIS — G30.1 MILD LATE ONSET ALZHEIMER'S DEMENTIA WITHOUT BEHAVIORAL DISTURBANCE, PSYCHOTIC DISTURBANCE, MOOD DISTURBANCE, OR ANXIETY: ICD-10-CM

## 2025-02-26 DIAGNOSIS — I10 ESSENTIAL HYPERTENSION: Chronic | ICD-10-CM

## 2025-02-26 DIAGNOSIS — F02.A0 MILD LATE ONSET ALZHEIMER'S DEMENTIA WITHOUT BEHAVIORAL DISTURBANCE, PSYCHOTIC DISTURBANCE, MOOD DISTURBANCE, OR ANXIETY: ICD-10-CM

## 2025-02-26 DIAGNOSIS — K21.9 GASTROESOPHAGEAL REFLUX DISEASE: ICD-10-CM

## 2025-02-26 DIAGNOSIS — M15.0 PRIMARY OSTEOARTHRITIS INVOLVING MULTIPLE JOINTS: ICD-10-CM

## 2025-02-26 DIAGNOSIS — E78.2 MIXED HYPERLIPIDEMIA: ICD-10-CM

## 2025-02-26 RX ORDER — PANTOPRAZOLE SODIUM 40 MG/1
40 TABLET, DELAYED RELEASE ORAL DAILY
Qty: 90 TABLET | Refills: 1 | Status: SHIPPED | OUTPATIENT
Start: 2025-02-26

## 2025-02-26 RX ORDER — DONEPEZIL HYDROCHLORIDE 5 MG/1
5 TABLET, FILM COATED ORAL NIGHTLY
Qty: 90 TABLET | Refills: 1 | Status: SHIPPED | OUTPATIENT
Start: 2025-02-26

## 2025-02-26 RX ORDER — ROSUVASTATIN CALCIUM 20 MG/1
20 TABLET, COATED ORAL DAILY
Qty: 90 TABLET | Refills: 1 | Status: SHIPPED | OUTPATIENT
Start: 2025-02-26

## 2025-02-26 RX ORDER — GABAPENTIN 300 MG/1
300 CAPSULE ORAL NIGHTLY
Qty: 90 CAPSULE | Refills: 1 | Status: SHIPPED | OUTPATIENT
Start: 2025-02-26

## 2025-02-26 RX ORDER — AMLODIPINE AND BENAZEPRIL HYDROCHLORIDE 10; 20 MG/1; MG/1
1 CAPSULE ORAL DAILY
Qty: 90 CAPSULE | Refills: 1 | Status: SHIPPED | OUTPATIENT
Start: 2025-02-26

## 2025-02-26 NOTE — PROGRESS NOTES
Subjective   Isabel Carlson is a 79 y.o. female.     Chief Complaint   Patient presents with    Hyperlipidemia    Hypertension    Hospital Follow Up Visit       History of Present Illness   79-year-old female with history of heart murmur with nonrheumatic mitral valve regurgitation and tricuspid valve regurgitation, hypertension, hyperlipidemia, osteoarthritis, GERD, B12 deficiency, iron anemia deficiency and known mild dementia.  Still has episodes of chest discomfort but negative workup with cardiology.     Pateint was seen in ER on 2/13/25 with dizziness.  Labs and CT of head, neck and chest were negative.    Follow-up for hypertension.  Currently, has been feeling well and asymptomatic without any headaches, vision changes, cough, chest pain, shortness of breath, swelling, focal neurologic deficit, memory loss or syncope.  Has been taking the medications regularly and adherent with the regimen of amlodipine-benazepril 10-20 daily.  Denies medication side effects and no significant interval events.      Follow-up for cholesterol.  Currently, has been feeling well without any myalgias, muscle aches, weakness, numbness, chest pain, short of breath or other issues.  Currently, is adherent with medication regimen of rosuvastatin 20 mg daily and denies medication side effects. Is due for lab follow-up.    History of dementia and utilizing the donepezil 5 mg daily currently.    The following portions of the patient's history were reviewed and updated as appropriate: allergies, current medications, past family history, past medical history, past social history, past surgical history and problem list.    Depression Screen:      11/5/2024     2:29 PM   PHQ-2/PHQ-9 Depression Screening   Little interest or pleasure in doing things Not at all   Feeling down, depressed, or hopeless Not at all       Past Medical History:   Diagnosis Date    Abnormal stress echocardiogram 07/20/2017    Patient had borderline EKG changes 5  minutes into walking and Dr. Cali Spencer recommended the patient not to have that same type of stress test in the future    Adhesive capsulitis of right shoulder     Age-related osteoporosis without fracture     APC (atrial premature contractions) 07/20/2017    Arthritis     Broken toe 2023    Carpal tunnel syndrome     Cataract     Chronic hip pain     Fatigue     Frequent episodes of pneumonia     PT STATES SEVERAL TIMES    Heart murmur 07/20/2017    Left knee pain     LVH (left ventricular hypertrophy) 07/20/2017    Medicare annual wellness visit, subsequent 02/02/2021    Mixed hyperlipidemia 07/20/2017    Non-rheumatic mitral regurgitation 07/20/2017    Mild to moderate per 2-D echocardiogram    Non-rheumatic tricuspid valve insufficiency 07/20/2017    Mild to moderate per 2-D echocardiogram    Osteoarthritis     Osteoarthritis of both knees     Osteoporosis     Shoulder pain     Stroke     TIA (transient ischemic attack) 10/15/2013    numbness on right side of face and hand; went to [facility]       Past Surgical History:   Procedure Laterality Date    ANKLE OPEN REDUCTION INTERNAL FIXATION Right 10/2022    APPENDECTOMY      CARDIAC CATHETERIZATION N/A 10/20/2017    Procedure: Right Heart Cath;  Surgeon: Christopher Kan MD;  Location: [facility] INVASIVE LOCATION;  Service:     COLONOSCOPY      REPLACEMENT TOTAL KNEE Left 06/12/2020    SUBTOTAL HYSTERECTOMY      TOTAL KNEE ARTHROPLASTY Left 06/12/2020    Procedure: TOTAL KNEE ARTHROPLASTY;  Surgeon: Jose Leary MD;  Location: [facility] OR;  Service: Orthopedics;  Laterality: Left;       Family History   Problem Relation Age of Onset    Heart disease Mother     Hypertension Mother     Stroke Mother     Arthritis Mother     No Known Problems Father     Heart disease Sister     Cancer Sister     Heart disease Maternal Aunt     Hypertension Maternal Aunt     Hypertension Maternal Uncle     Breast cancer Cousin     Cancer Other     Malig  Hyperthermia Neg Hx        Social History     Socioeconomic History    Marital status:    Tobacco Use    Smoking status: Never     Passive exposure: Never    Smokeless tobacco: Never   Vaping Use    Vaping status: Never Used   Substance and Sexual Activity    Alcohol use: No     Comment: rarely    Drug use: No    Sexual activity: Defer       Current Outpatient Medications   Medication Sig Dispense Refill    amLODIPine-benazepril (LOTREL) 10-20 MG per capsule Take 1 capsule by mouth Daily. 90 capsule 1    donepezil (ARICEPT) 5 MG tablet Take 1 tablet by mouth Every Night. 90 tablet 1    fluticasone (FLONASE) 50 MCG/ACT nasal spray Administer 2 sprays into the nostril(s) as directed by provider Daily As Needed.      gabapentin (NEURONTIN) 300 MG capsule Take 1 capsule by mouth Every Night. 90 capsule 1    pantoprazole (PROTONIX) 40 MG EC tablet Take 1 tablet by mouth Daily. 90 tablet 1    rosuvastatin (CRESTOR) 20 MG tablet Take 1 tablet by mouth Daily. 90 tablet 1     No current facility-administered medications for this visit.       Review of Systems   Constitutional:  Negative for activity change, appetite change, fatigue, fever, unexpected weight gain and unexpected weight loss.   HENT:  Negative for nosebleeds, rhinorrhea, trouble swallowing and voice change.    Eyes:  Negative for visual disturbance.   Respiratory:  Negative for cough, chest tightness, shortness of breath and wheezing.    Cardiovascular:  Negative for chest pain, palpitations and leg swelling.   Gastrointestinal:  Negative for abdominal pain, blood in stool, constipation, diarrhea, nausea, vomiting, GERD and indigestion.   Genitourinary:  Negative for dysuria, frequency and hematuria.   Musculoskeletal:  Negative for arthralgias, back pain and myalgias.   Skin:  Negative for rash and wound.   Neurological:  Negative for dizziness, tremors, weakness, light-headedness, numbness, headache and memory problem.   Hematological:  Negative for  "adenopathy. Does not bruise/bleed easily.   Psychiatric/Behavioral:  Negative for sleep disturbance and depressed mood. The patient is not nervous/anxious.        Objective   /70 (BP Location: Left arm, Patient Position: Sitting, Cuff Size: Large Adult)   Pulse 71   Temp 97.3 °F (36.3 °C) (Temporal)   Ht 152.4 cm (60\")   Wt 69.5 kg (153 lb 3.2 oz)   SpO2 94%   BMI 29.92 kg/m²     Physical Exam  Vitals and nursing note reviewed.   Constitutional:       General: She is not in acute distress.     Appearance: She is well-developed. She is not diaphoretic.   HENT:      Head: Normocephalic and atraumatic.      Right Ear: External ear normal.      Left Ear: External ear normal.      Nose: Nose normal.   Eyes:      Conjunctiva/sclera: Conjunctivae normal.      Pupils: Pupils are equal, round, and reactive to light.   Neck:      Thyroid: No thyromegaly.      Trachea: No tracheal deviation.   Cardiovascular:      Rate and Rhythm: Normal rate and regular rhythm.      Heart sounds: Normal heart sounds. No murmur heard.     No friction rub. No gallop.   Pulmonary:      Effort: Pulmonary effort is normal. No respiratory distress.      Breath sounds: Normal breath sounds.   Abdominal:      General: Bowel sounds are normal.      Palpations: Abdomen is soft. There is no mass.      Tenderness: There is no abdominal tenderness. There is no guarding.   Musculoskeletal:         General: Normal range of motion.      Cervical back: Normal range of motion and neck supple.   Lymphadenopathy:      Cervical: No cervical adenopathy.   Skin:     General: Skin is warm and dry.      Capillary Refill: Capillary refill takes less than 2 seconds.      Findings: No rash.   Neurological:      Mental Status: She is alert and oriented to person, place, and time.      Motor: No abnormal muscle tone.      Deep Tendon Reflexes: Reflexes normal.      Comments: Some mild memory issues but nothing significant or impairing her discussion for her " care.   Psychiatric:         Behavior: Behavior normal.         Thought Content: Thought content normal.         Judgment: Judgment normal.         No results found for this or any previous visit (from the past 12 weeks).  Assessment & Plan   Diagnoses and all orders for this visit:    1. Mixed hyperlipidemia  -     rosuvastatin (CRESTOR) 20 MG tablet; Take 1 tablet by mouth Daily.  Dispense: 90 tablet; Refill: 1    2. Gastroesophageal reflux disease  -     pantoprazole (PROTONIX) 40 MG EC tablet; Take 1 tablet by mouth Daily.  Dispense: 90 tablet; Refill: 1    3. Mild late onset Alzheimer's dementia without behavioral disturbance, psychotic disturbance, mood disturbance, or anxiety  -     donepezil (ARICEPT) 5 MG tablet; Take 1 tablet by mouth Every Night.  Dispense: 90 tablet; Refill: 1    4. Essential hypertension  -     amLODIPine-benazepril (LOTREL) 10-20 MG per capsule; Take 1 capsule by mouth Daily.  Dispense: 90 capsule; Refill: 1    5. Primary osteoarthritis involving multiple joints  -     gabapentin (NEURONTIN) 300 MG capsule; Take 1 capsule by mouth Every Night.  Dispense: 90 capsule; Refill: 1    Discussed and reviewed her history and reviewed labs noted in chart.  Will continue the current medication.  Encourage exercise.  She will follow-up with us in approximately 6 months for regular checkup and labs.           Dictated utilizing Dragon Dictation

## 2025-05-12 ENCOUNTER — OFFICE VISIT (OUTPATIENT)
Age: 80
End: 2025-05-12
Payer: MEDICARE

## 2025-05-12 VITALS
HEIGHT: 60 IN | BODY MASS INDEX: 30.08 KG/M2 | HEART RATE: 55 BPM | WEIGHT: 153.2 LBS | DIASTOLIC BLOOD PRESSURE: 70 MMHG | SYSTOLIC BLOOD PRESSURE: 132 MMHG

## 2025-05-12 DIAGNOSIS — I10 ESSENTIAL HYPERTENSION: Primary | ICD-10-CM

## 2025-05-12 DIAGNOSIS — E78.2 MIXED HYPERLIPIDEMIA: ICD-10-CM

## 2025-05-12 DIAGNOSIS — I34.0 NON-RHEUMATIC MITRAL REGURGITATION: ICD-10-CM

## 2025-05-12 PROCEDURE — 99214 OFFICE O/P EST MOD 30 MIN: CPT | Performed by: INTERNAL MEDICINE

## 2025-05-12 PROCEDURE — 3078F DIAST BP <80 MM HG: CPT | Performed by: INTERNAL MEDICINE

## 2025-05-12 PROCEDURE — 3075F SYST BP GE 130 - 139MM HG: CPT | Performed by: INTERNAL MEDICINE

## 2025-05-12 PROCEDURE — 93000 ELECTROCARDIOGRAM COMPLETE: CPT | Performed by: INTERNAL MEDICINE

## 2025-05-12 RX ORDER — ISOSORBIDE MONONITRATE 30 MG/1
30 TABLET, EXTENDED RELEASE ORAL DAILY
Qty: 30 TABLET | Refills: 11 | Status: SHIPPED | OUTPATIENT
Start: 2025-05-12

## 2025-05-12 NOTE — PROGRESS NOTES
Date of Office Visit: 25    Encounter Provider: Modesto Silverman MD  Place of Service: [facility] CARDIOLOGY  Patient Name: Isabel Carlson  :1945    Chief Complaint   Patient presents with    Hypertension     1 year  f/u       HPI: Isabel Carlson is a 79 y.o. female with a medical history of essential hypertension, hyperlipidemia and PVCs.  Echocardiogram 2023 shows hyperdynamic LV function, septal asymmetric hypertrophy, moderate tricuspid regurgitation and mild to moderate atrial regurgitation.    She had complaints of exertional chest pain in .  Stress perfusion study 2024 shows no evidence of ischemia.  She is doing well as of late and continues to try to stay active.  She does note chest discomfort with activity that comes on when she is walking longer distances.  It would go away very quickly.  Blood pressure has been well-controlled at home.  She occasionally have lower extremity edema that improves with elevation        Previous testing and notes have been reviewed by me.   Past Medical History:   Diagnosis Date    Abnormal stress echocardiogram 2017    Patient had borderline EKG changes 5 minutes into walking and Dr. Cali Spencer recommended the patient not to have that same type of stress test in the future    Adhesive capsulitis of right shoulder     Age-related osteoporosis without fracture     APC (atrial premature contractions) 2017    Arthritis     Broken toe     Carpal tunnel syndrome     Cataract     Chronic hip pain     Fatigue     Frequent episodes of pneumonia     PT STATES SEVERAL TIMES    Heart murmur 2017    Left knee pain     LVH (left ventricular hypertrophy) 2017    Medicare annual wellness visit, subsequent 2021    Mixed hyperlipidemia 2017    Non-rheumatic mitral regurgitation 2017    Mild to moderate per 2-D echocardiogram    Non-rheumatic tricuspid valve insufficiency  07/20/2017    Mild to moderate per 2-D echocardiogram    Osteoarthritis     Osteoarthritis of both knees     Osteoporosis     Shoulder pain     Stroke     TIA (transient ischemic attack) 10/15/2013    numbness on right side of face and hand; went to [facility]       Past Surgical History:   Procedure Laterality Date    ANKLE OPEN REDUCTION INTERNAL FIXATION Right 10/2022    APPENDECTOMY      CARDIAC CATHETERIZATION N/A 10/20/2017    Procedure: Right Heart Cath;  Surgeon: Christopher Kan MD;  Location: [facility] CATH INVASIVE LOCATION;  Service:     COLONOSCOPY      REPLACEMENT TOTAL KNEE Left 06/12/2020    SUBTOTAL HYSTERECTOMY      TOTAL KNEE ARTHROPLASTY Left 06/12/2020    Procedure: TOTAL KNEE ARTHROPLASTY;  Surgeon: Jose Leary MD;  Location: [facility] MAIN OR;  Service: Orthopedics;  Laterality: Left;       Social History     Socioeconomic History    Marital status:    Tobacco Use    Smoking status: Never     Passive exposure: Never    Smokeless tobacco: Never   Vaping Use    Vaping status: Never Used   Substance and Sexual Activity    Alcohol use: No     Comment: rarely    Drug use: No    Sexual activity: Defer       Family History   Problem Relation Age of Onset    Heart disease Mother     Hypertension Mother     Stroke Mother     Arthritis Mother     No Known Problems Father     Heart disease Sister     Cancer Sister     Heart disease Maternal Aunt     Hypertension Maternal Aunt     Hypertension Maternal Uncle     Breast cancer Cousin     Cancer Other     Malig Hyperthermia Neg Hx        Review of Systems   Constitutional: Negative. Negative for fever and malaise/fatigue.   HENT: Negative.  Negative for nosebleeds and sore throat.    Eyes: Negative.  Negative for blurred vision and double vision.   Cardiovascular:  Positive for chest pain. Negative for claudication, palpitations and syncope.   Respiratory: Negative.  Negative for cough, shortness of breath and snoring.    Endocrine: Negative.  " Negative for cold intolerance, heat intolerance and polydipsia.   Hematologic/Lymphatic: Negative.    Skin: Negative.  Negative for itching, poor wound healing and rash.   Musculoskeletal: Negative.  Negative for joint pain, joint swelling, muscle weakness and myalgias.   Gastrointestinal: Negative.  Negative for abdominal pain, melena, nausea and vomiting.   Genitourinary: Negative.    Neurological: Negative.  Negative for light-headedness, loss of balance, seizures, vertigo and weakness.   Psychiatric/Behavioral: Negative.  Negative for altered mental status and depression.    Allergic/Immunologic: Negative.        Allergies   Allergen Reactions    Banana Other (See Comments)     Testing and sinus issues         Current Outpatient Medications:     amLODIPine-benazepril (LOTREL) 10-20 MG per capsule, Take 1 capsule by mouth Daily., Disp: 90 capsule, Rfl: 1    donepezil (ARICEPT) 5 MG tablet, Take 1 tablet by mouth Every Night., Disp: 90 tablet, Rfl: 1    fluticasone (FLONASE) 50 MCG/ACT nasal spray, Administer 2 sprays into the nostril(s) as directed by provider Daily As Needed., Disp: , Rfl:     gabapentin (NEURONTIN) 300 MG capsule, Take 1 capsule by mouth Every Night., Disp: 90 capsule, Rfl: 1    pantoprazole (PROTONIX) 40 MG EC tablet, Take 1 tablet by mouth Daily., Disp: 90 tablet, Rfl: 1    rosuvastatin (CRESTOR) 20 MG tablet, Take 1 tablet by mouth Daily., Disp: 90 tablet, Rfl: 1      Objective:     Vitals:    05/12/25 1115   BP: 132/70   BP Location: Left arm   Patient Position: Sitting   Pulse: 55   Weight: 69.5 kg (153 lb 3.2 oz)   Height: 152.4 cm (60\")     Body mass index is 29.92 kg/m².    PHYSICAL EXAM:    Constitutional:       Appearance: Healthy appearance. Not in distress.   Neck:      Vascular: No JVR. JVD normal.   Pulmonary:      Effort: Pulmonary effort is normal.      Breath sounds: Normal breath sounds. No wheezing. No rhonchi. No rales.   Chest:      Chest wall: Not tender to palpatation. "   Cardiovascular:      PMI at left midclavicular line. Normal rate. Regular rhythm. Normal S1. Normal S2.       Murmurs: There is a systolic murmur.      No gallop.  No click. No rub.   Pulses:     Intact distal pulses.   Edema:     Peripheral edema absent.   Abdominal:      General: Bowel sounds are normal.      Palpations: Abdomen is soft.      Tenderness: There is no abdominal tenderness.   Musculoskeletal: Normal range of motion.         General: No tenderness. Skin:     General: Skin is warm and dry.   Neurological:      General: No focal deficit present.      Mental Status: Alert and oriented to person, place and time.           ECG 12 Lead    Date/Time: 5/12/2025 11:47 AM  Performed by: Modesto Silverman MD    Authorized by: Modesto Silverman MD  Comparison: compared with previous ECG from 9/25/2024  Similar to previous ECG  Rhythm: sinus rhythm  Rate: normal  Other findings: left ventricular hypertrophy          5/4/23    Left ventricular systolic function is hyperdynamic (EF > 70%). Left ventricular ejection fraction appears to be greater than 70%.    Left ventricular wall thickness is consistent with mild septal asymmetric hypertrophy.    Left ventricular diastolic function is consistent with (grade I) impaired relaxation.    Moderate tricuspid valve regurgitation is present.    Estimated right ventricular systolic pressure from tricuspid regurgitation is mildly elevated (35-45 mmHg).    Mild to moderate mitral regurgitation noted.      Assessment:      Plan:       Chest pain: With activity.  Unchanged frequency from prior.  Stress test in 2024 normal.  - Add low-dose Imdur 30 mg p.o. daily deceiving get improvement in the symptoms.  Stable.    Hypertension: Recently well-controlled.  No increase in therapy indicated at this time with her advanced age.  Acceptable.    Hyperlipidemia: Continue Crestor therapy without change.  Tolerating well.  Last LDL 51 in November 2024.     Nonrheumatic mitral  regurgitation: Mild to moderate in 2023.  Repeat echo ordered.         Your medication list            Accurate as of May 12, 2025 11:33 AM. If you have any questions, ask your nurse or doctor.                CONTINUE taking these medications        Instructions Last Dose Given Next Dose Due   amLODIPine-benazepril 10-20 MG per capsule  Commonly known as: LOTREL      Take 1 capsule by mouth Daily.       donepezil 5 MG tablet  Commonly known as: ARICEPT      Take 1 tablet by mouth Every Night.       fluticasone 50 MCG/ACT nasal spray  Commonly known as: FLONASE      Administer 2 sprays into the nostril(s) as directed by provider Daily As Needed.       gabapentin 300 MG capsule  Commonly known as: NEURONTIN      Take 1 capsule by mouth Every Night.       pantoprazole 40 MG EC tablet  Commonly known as: PROTONIX      Take 1 tablet by mouth Daily.       rosuvastatin 20 MG tablet  Commonly known as: CRESTOR      Take 1 tablet by mouth Daily.

## 2025-06-18 ENCOUNTER — OFFICE VISIT (OUTPATIENT)
Dept: FAMILY MEDICINE CLINIC | Facility: CLINIC | Age: 80
End: 2025-06-18
Payer: MEDICARE

## 2025-06-18 VITALS
HEIGHT: 60 IN | WEIGHT: 150.8 LBS | OXYGEN SATURATION: 93 % | HEART RATE: 57 BPM | BODY MASS INDEX: 29.61 KG/M2 | SYSTOLIC BLOOD PRESSURE: 112 MMHG | TEMPERATURE: 97.2 F | DIASTOLIC BLOOD PRESSURE: 70 MMHG

## 2025-06-18 DIAGNOSIS — E78.2 MIXED HYPERLIPIDEMIA: ICD-10-CM

## 2025-06-18 DIAGNOSIS — G30.1 MILD LATE ONSET ALZHEIMER'S DEMENTIA WITHOUT BEHAVIORAL DISTURBANCE, PSYCHOTIC DISTURBANCE, MOOD DISTURBANCE, OR ANXIETY: ICD-10-CM

## 2025-06-18 DIAGNOSIS — R53.83 OTHER FATIGUE: Primary | ICD-10-CM

## 2025-06-18 DIAGNOSIS — M15.0 PRIMARY OSTEOARTHRITIS INVOLVING MULTIPLE JOINTS: ICD-10-CM

## 2025-06-18 DIAGNOSIS — Z51.81 ENCOUNTER FOR MEDICATION MONITORING: ICD-10-CM

## 2025-06-18 DIAGNOSIS — K21.9 GASTROESOPHAGEAL REFLUX DISEASE: ICD-10-CM

## 2025-06-18 DIAGNOSIS — I10 ESSENTIAL HYPERTENSION: Chronic | ICD-10-CM

## 2025-06-18 DIAGNOSIS — F02.A0 MILD LATE ONSET ALZHEIMER'S DEMENTIA WITHOUT BEHAVIORAL DISTURBANCE, PSYCHOTIC DISTURBANCE, MOOD DISTURBANCE, OR ANXIETY: ICD-10-CM

## 2025-06-18 RX ORDER — AMLODIPINE AND BENAZEPRIL HYDROCHLORIDE 10; 20 MG/1; MG/1
1 CAPSULE ORAL DAILY
Qty: 90 CAPSULE | Refills: 1 | Status: SHIPPED | OUTPATIENT
Start: 2025-06-18

## 2025-06-18 RX ORDER — PANTOPRAZOLE SODIUM 40 MG/1
40 TABLET, DELAYED RELEASE ORAL DAILY
Qty: 90 TABLET | Refills: 1 | Status: SHIPPED | OUTPATIENT
Start: 2025-06-18

## 2025-06-18 RX ORDER — DONEPEZIL HYDROCHLORIDE 5 MG/1
5 TABLET, FILM COATED ORAL NIGHTLY
Qty: 90 TABLET | Refills: 1 | Status: SHIPPED | OUTPATIENT
Start: 2025-06-18

## 2025-06-18 RX ORDER — ROSUVASTATIN CALCIUM 20 MG/1
20 TABLET, COATED ORAL DAILY
Qty: 90 TABLET | Refills: 1 | Status: SHIPPED | OUTPATIENT
Start: 2025-06-18

## 2025-06-18 RX ORDER — GABAPENTIN 300 MG/1
300 CAPSULE ORAL NIGHTLY
Qty: 90 CAPSULE | Refills: 1 | Status: SHIPPED | OUTPATIENT
Start: 2025-06-18

## 2025-06-18 NOTE — PROGRESS NOTES
Subjective   Isabel Carlson is a 79 y.o. female.     Chief Complaint   Patient presents with    Edema     In the ankles x years        History of Present Illness   79-year-old female with history of heart murmur with nonrheumatic mitral valve regurgitation and tricuspid valve regurgitation, hypertension, hyperlipidemia, osteoarthritis, GERD, B12 deficiency, iron anemia deficiency and known mild dementia.  Still has episodes of chest discomfort but negative workup with cardiology.      Has chronic swelling in her ankles for many years worsens as the day goes on results that she puts them up and when she lays down at night.  No pain no swelling no new chest pain.    Follow-up for hypertension.  Currently, has been feeling well and asymptomatic without any headaches, vision changes, cough, chest pain, shortness of breath, swelling, focal neurologic deficit, memory loss or syncope.  Has been taking the medications regularly and adherent with the regimen of amlodipine-benazepril 10-20 daily.  Denies medication side effects and no significant interval events.       Follow-up for cholesterol.  Currently, has been feeling well without any myalgias, muscle aches, weakness, numbness, chest pain, short of breath or other issues.  Currently, is adherent with medication regimen of rosuvastatin 20 mg daily and denies medication side effects. Is due for lab follow-up.     History of dementia and utilizing the donepezil 5 mg daily currently.    The following portions of the patient's history were reviewed and updated as appropriate: allergies, current medications, past family history, past medical history, past social history, past surgical history and problem list.    Depression Screen:      6/18/2025    11:34 AM   PHQ-2/PHQ-9 Depression Screening   Little interest or pleasure in doing things Not at all   Feeling down, depressed, or hopeless Not at all   How difficult have these problems made it for you to do your work, take  care of things at home, or get along with other people? Not difficult at all       Past Medical History:   Diagnosis Date    Abnormal stress echocardiogram 07/20/2017    Patient had borderline EKG changes 5 minutes into walking and Dr. Cali Spencer recommended the patient not to have that same type of stress test in the future    Adhesive capsulitis of right shoulder     Age-related osteoporosis without fracture     APC (atrial premature contractions) 07/20/2017    Arthritis     Broken toe 2023    Carpal tunnel syndrome     Cataract     Chronic hip pain     Fatigue     Frequent episodes of pneumonia     PT STATES SEVERAL TIMES    Heart murmur 07/20/2017    Left knee pain     LVH (left ventricular hypertrophy) 07/20/2017    Medicare annual wellness visit, subsequent 02/02/2021    Mixed hyperlipidemia 07/20/2017    Non-rheumatic mitral regurgitation 07/20/2017    Mild to moderate per 2-D echocardiogram    Non-rheumatic tricuspid valve insufficiency 07/20/2017    Mild to moderate per 2-D echocardiogram    Osteoarthritis     Osteoarthritis of both knees     Osteoporosis     Shoulder pain     Stroke     TIA (transient ischemic attack) 10/15/2013    numbness on right side of face and hand; [facility] [employer]       Past Surgical History:   Procedure Laterality Date    ANKLE OPEN REDUCTION INTERNAL FIXATION Right 10/2022    APPENDECTOMY      CARDIAC CATHETERIZATION N/A 10/20/2017    Procedure: Right Heart Cath;  Surgeon: Christopher Kan MD;  Location: [facility] INVASIVE LOCATION;  Service:     COLONOSCOPY      REPLACEMENT TOTAL KNEE Left 06/12/2020    SUBTOTAL HYSTERECTOMY      TOTAL KNEE ARTHROPLASTY Left 06/12/2020    Procedure: TOTAL KNEE ARTHROPLASTY;  Surgeon: Jose Leary MD;  Location: [facility] OR;  Service: Orthopedics;  Laterality: Left;       Family History   Problem Relation Age of Onset    Heart disease Mother     Hypertension Mother     Stroke Mother     Arthritis Mother     No Known Problems  Father     Heart disease Sister     Cancer Sister     Heart disease Maternal Aunt     Hypertension Maternal Aunt     Hypertension Maternal Uncle     Breast cancer Cousin     Cancer Other     Malig Hyperthermia Neg Hx        Social History     Socioeconomic History    Marital status:    Tobacco Use    Smoking status: Never     Passive exposure: Never    Smokeless tobacco: Never   Vaping Use    Vaping status: Never Used   Substance and Sexual Activity    Alcohol use: No     Comment: rarely    Drug use: No    Sexual activity: Defer       Current Outpatient Medications   Medication Sig Dispense Refill    amLODIPine-benazepril (LOTREL) 10-20 MG per capsule Take 1 capsule by mouth Daily. 90 capsule 1    donepezil (ARICEPT) 5 MG tablet Take 1 tablet by mouth Every Night. 90 tablet 1    fluticasone (FLONASE) 50 MCG/ACT nasal spray Administer 2 sprays into the nostril(s) as directed by provider Daily As Needed.      gabapentin (NEURONTIN) 300 MG capsule Take 1 capsule by mouth Every Night. 90 capsule 1    isosorbide mononitrate (IMDUR) 30 MG 24 hr tablet Take 1 tablet by mouth Daily. 30 tablet 11    pantoprazole (PROTONIX) 40 MG EC tablet Take 1 tablet by mouth Daily. 90 tablet 1    rosuvastatin (CRESTOR) 20 MG tablet Take 1 tablet by mouth Daily. 90 tablet 1     No current facility-administered medications for this visit.       Review of Systems   Constitutional:  Negative for activity change, appetite change, fatigue, fever, unexpected weight gain and unexpected weight loss.   HENT:  Negative for nosebleeds, rhinorrhea, trouble swallowing and voice change.    Eyes:  Negative for visual disturbance.   Respiratory:  Negative for cough, chest tightness, shortness of breath and wheezing.    Cardiovascular:  Positive for leg swelling. Negative for chest pain and palpitations.        Has some chest tightness chronically with walking greater than 100 feet.  Unchanged and has been evaluated by cardiology.  "  Gastrointestinal:  Negative for abdominal pain, blood in stool, constipation, diarrhea, nausea, vomiting, GERD and indigestion.   Genitourinary:  Negative for dysuria, frequency and hematuria.   Musculoskeletal:  Negative for arthralgias, back pain and myalgias.   Skin:  Negative for rash and wound.   Neurological:  Negative for dizziness, tremors, weakness, light-headedness, numbness, headache and memory problem.   Hematological:  Negative for adenopathy. Does not bruise/bleed easily.   Psychiatric/Behavioral:  Negative for sleep disturbance and depressed mood. The patient is not nervous/anxious.        Objective   /70 (BP Location: Left arm, Patient Position: Sitting, Cuff Size: Large Adult)   Pulse 57   Temp 97.2 °F (36.2 °C) (Temporal)   Ht 152.4 cm (60\")   Wt 68.4 kg (150 lb 12.8 oz)   SpO2 93%   BMI 29.45 kg/m²     Physical Exam  Vitals and nursing note reviewed.   Constitutional:       General: She is not in acute distress.     Appearance: She is well-developed. She is not diaphoretic.   HENT:      Head: Normocephalic and atraumatic.      Right Ear: External ear normal.      Left Ear: External ear normal.      Nose: Nose normal.   Eyes:      Conjunctiva/sclera: Conjunctivae normal.      Pupils: Pupils are equal, round, and reactive to light.   Neck:      Thyroid: No thyromegaly.      Trachea: No tracheal deviation.   Cardiovascular:      Rate and Rhythm: Normal rate and regular rhythm.      Heart sounds: Normal heart sounds. No murmur heard.     No friction rub. No gallop.   Pulmonary:      Effort: Pulmonary effort is normal. No respiratory distress.      Breath sounds: Normal breath sounds.   Abdominal:      General: Bowel sounds are normal.      Palpations: Abdomen is soft. There is no mass.      Tenderness: There is no abdominal tenderness. There is no guarding.   Musculoskeletal:         General: Normal range of motion.      Cervical back: Normal range of motion and neck supple. "   Lymphadenopathy:      Cervical: No cervical adenopathy.   Skin:     General: Skin is warm and dry.      Capillary Refill: Capillary refill takes less than 2 seconds.      Findings: No rash.   Neurological:      Mental Status: She is alert and oriented to person, place, and time.      Motor: No abnormal muscle tone.      Deep Tendon Reflexes: Reflexes normal.   Psychiatric:         Behavior: Behavior normal.         Thought Content: Thought content normal.         Judgment: Judgment normal.         No results found for this or any previous visit (from the past 12 weeks).  Assessment & Plan   Diagnoses and all orders for this visit:    1. Other fatigue (Primary)  -     CBC & Differential  -     TSH Rfx On Abnormal To Free T4  -     Vitamin B12    2. Mixed hyperlipidemia  -     rosuvastatin (CRESTOR) 20 MG tablet; Take 1 tablet by mouth Daily.  Dispense: 90 tablet; Refill: 1  -     Comprehensive Metabolic Panel  -     Lipid Panel    3. Gastroesophageal reflux disease  -     pantoprazole (PROTONIX) 40 MG EC tablet; Take 1 tablet by mouth Daily.  Dispense: 90 tablet; Refill: 1    4. Mild late onset Alzheimer's dementia without behavioral disturbance, psychotic disturbance, mood disturbance, or anxiety  -     donepezil (ARICEPT) 5 MG tablet; Take 1 tablet by mouth Every Night.  Dispense: 90 tablet; Refill: 1    5. Essential hypertension  -     amLODIPine-benazepril (LOTREL) 10-20 MG per capsule; Take 1 capsule by mouth Daily.  Dispense: 90 capsule; Refill: 1  -     CBC & Differential  -     Comprehensive Metabolic Panel    6. Primary osteoarthritis involving multiple joints  -     gabapentin (NEURONTIN) 300 MG capsule; Take 1 capsule by mouth Every Night.  Dispense: 90 capsule; Refill: 1  -     ToxASSURE Select 13 (MW) - Urine, Clean Catch    7. Encounter for medication monitoring  -     ToxASSURE Select 13 (MW) - Urine, Clean Catch    Discussed with patient swelling appears to be more of a chronic dependent edema.   Stable at this time.  No changes at this time but will check labs secondary to her fatigue and cholesterol.  Blood pressure is very well-controlled.  No changes are needed.  Diffuse pain utilizing gabapentin tolerating well.  SONAL run and reviewed.  Risks of the medication include but are not limited to fatigue, somnolence, increased risk of falls, constipation, allergic reaction, dependence, and addiction  .  Tox assure today and new controlled substance agreement reviewed and signed in office.         Dictated utilizing Dragon Dictation

## 2025-06-26 LAB
ALBUMIN SERPL-MCNC: 4.6 G/DL (ref 3.7–4.7)
ALP SERPL-CCNC: 163 IU/L (ref 44–121)
ALT SERPL-CCNC: 18 IU/L (ref 0–32)
AST SERPL-CCNC: 19 IU/L (ref 0–40)
BASOPHILS # BLD AUTO: 0 X10E3/UL (ref 0–0.2)
BASOPHILS NFR BLD AUTO: 1 %
BILIRUB SERPL-MCNC: 1 MG/DL (ref 0–1.2)
BUN SERPL-MCNC: 15 MG/DL (ref 8–27)
BUN/CREAT SERPL: 22 (ref 12–28)
CALCIUM SERPL-MCNC: 9.3 MG/DL (ref 8.7–10.3)
CHLORIDE SERPL-SCNC: 103 MMOL/L (ref 96–106)
CHOLEST SERPL-MCNC: 142 MG/DL (ref 100–199)
CO2 SERPL-SCNC: 19 MMOL/L (ref 20–29)
CREAT SERPL-MCNC: 0.68 MG/DL (ref 0.57–1)
DRUGS UR: NORMAL
EGFRCR SERPLBLD CKD-EPI 2021: 87 ML/MIN/1.73
EOSINOPHIL # BLD AUTO: 0.1 X10E3/UL (ref 0–0.4)
EOSINOPHIL NFR BLD AUTO: 1 %
ERYTHROCYTE [DISTWIDTH] IN BLOOD BY AUTOMATED COUNT: 13 % (ref 11.7–15.4)
GLOBULIN SER CALC-MCNC: 2.5 G/DL (ref 1.5–4.5)
GLUCOSE SERPL-MCNC: 77 MG/DL (ref 70–99)
HCT VFR BLD AUTO: 43.6 % (ref 34–46.6)
HDLC SERPL-MCNC: 72 MG/DL
HGB BLD-MCNC: 13.7 G/DL (ref 11.1–15.9)
IMM GRANULOCYTES # BLD AUTO: 0 X10E3/UL (ref 0–0.1)
IMM GRANULOCYTES NFR BLD AUTO: 0 %
LDLC SERPL CALC-MCNC: 57 MG/DL (ref 0–99)
LYMPHOCYTES # BLD AUTO: 1.9 X10E3/UL (ref 0.7–3.1)
LYMPHOCYTES NFR BLD AUTO: 35 %
MCH RBC QN AUTO: 29.5 PG (ref 26.6–33)
MCHC RBC AUTO-ENTMCNC: 31.4 G/DL (ref 31.5–35.7)
MCV RBC AUTO: 94 FL (ref 79–97)
MONOCYTES # BLD AUTO: 0.4 X10E3/UL (ref 0.1–0.9)
MONOCYTES NFR BLD AUTO: 7 %
NEUTROPHILS # BLD AUTO: 3 X10E3/UL (ref 1.4–7)
NEUTROPHILS NFR BLD AUTO: 56 %
PLATELET # BLD AUTO: 281 X10E3/UL (ref 150–450)
POTASSIUM SERPL-SCNC: 4.2 MMOL/L (ref 3.5–5.2)
PROT SERPL-MCNC: 7.1 G/DL (ref 6–8.5)
RBC # BLD AUTO: 4.64 X10E6/UL (ref 3.77–5.28)
SODIUM SERPL-SCNC: 142 MMOL/L (ref 134–144)
T4 FREE SERPL-MCNC: 1.16 NG/DL (ref 0.82–1.77)
TRIGL SERPL-MCNC: 66 MG/DL (ref 0–149)
TSH SERPL DL<=0.005 MIU/L-ACNC: 0.16 UIU/ML (ref 0.45–4.5)
VIT B12 SERPL-MCNC: >2000 PG/ML (ref 232–1245)
VLDLC SERPL CALC-MCNC: 13 MG/DL (ref 5–40)
WBC # BLD AUTO: 5.4 X10E3/UL (ref 3.4–10.8)

## 2025-07-07 ENCOUNTER — TELEPHONE (OUTPATIENT)
Dept: FAMILY MEDICINE CLINIC | Facility: CLINIC | Age: 80
End: 2025-07-07
Payer: MEDICARE

## 2025-07-07 NOTE — TELEPHONE ENCOUNTER
Patient called about results. Informed her and was asking I there was anything she could do for her thyroid stimulating hormone that is low. [phone].

## 2025-07-08 ENCOUNTER — TELEPHONE (OUTPATIENT)
Dept: FAMILY MEDICINE CLINIC | Facility: CLINIC | Age: 80
End: 2025-07-08
Payer: MEDICARE

## 2025-07-08 NOTE — TELEPHONE ENCOUNTER
OKAY FOR HUB TO RELAY     LVMTCB-We just monitor again in the future as long as the T4 remains normal.

## 2025-07-08 NOTE — TELEPHONE ENCOUNTER
Name: Isabel Carlson      Relationship: Self      Best Callback Number: [phone]      HUB PROVIDED THE RELAY MESSAGE FROM THE OFFICE      PATIENT: HAS FURTHER QUESTIONS AND WOULD LIKE A CALL BACK AT THE FOLLOWING PHONE NUMBER     ADDITIONAL INFORMATION:  PATIENT QUESTIONED WHY SHE IS STAYING TIRED ALL THE TIME AND WOULD LIKE TO HAVE MORE ENERGY.    PATIENT STATED SHE GETS A TIGHTENING IN HER CHEST AND ITS PAINFUL WHEN SHE WALKS LONG DISTANCES.      PATIENT STATED SHE CAN BARELY WALK 1/4 OF A MILE WITHOUT HAVING TO STOP AND REST FOR A WHILE.  PATIENT IS CONCERNED AS SHE USED TO WALK 4 MILES A DAY.      ITS ONLY BEEN 2 -2 1/2 YEARS SINCE SHE HAS BEEN ABLE TO WALK LONG DISTANCES.

## 2025-07-08 NOTE — TELEPHONE ENCOUNTER
Called and spoke to patient and informed her that Dr Kyle wants her to be seen again regarding this issue so appt was made 7/16 with Dr Holland and also informed her to go to ER if she has chest pains again.  Voiced understanding

## 2025-07-16 ENCOUNTER — OFFICE VISIT (OUTPATIENT)
Dept: FAMILY MEDICINE CLINIC | Facility: CLINIC | Age: 80
End: 2025-07-16
Payer: MEDICARE

## 2025-07-16 VITALS
SYSTOLIC BLOOD PRESSURE: 118 MMHG | TEMPERATURE: 97.5 F | DIASTOLIC BLOOD PRESSURE: 60 MMHG | HEIGHT: 60 IN | WEIGHT: 153.8 LBS | BODY MASS INDEX: 30.19 KG/M2 | HEART RATE: 75 BPM | OXYGEN SATURATION: 95 %

## 2025-07-16 DIAGNOSIS — R53.82 CHRONIC FATIGUE: ICD-10-CM

## 2025-07-16 DIAGNOSIS — R06.02 SHORT OF BREATH ON EXERTION: ICD-10-CM

## 2025-07-16 DIAGNOSIS — I34.0 NON-RHEUMATIC MITRAL REGURGITATION: Primary | ICD-10-CM

## 2025-07-16 DIAGNOSIS — R07.9 CHEST PAIN, UNSPECIFIED TYPE: ICD-10-CM

## 2025-07-16 DIAGNOSIS — I36.1 NONRHEUMATIC TRICUSPID VALVE REGURGITATION: ICD-10-CM

## 2025-07-16 DIAGNOSIS — R60.9 EDEMA, UNSPECIFIED TYPE: Primary | ICD-10-CM

## 2025-07-16 NOTE — PROGRESS NOTES
Subjective   Isabel Carlson is a 79 y.o. female.     Chief Complaint   Patient presents with    Edema     Was seen a month ago for same symptoms     Chest Pain     On the way here at the gas station     Fatigue       Chest Pain   Pertinent negatives include no abdominal pain, back pain, cough, dizziness, fever, nausea, numbness, palpitations, shortness of breath, vomiting or weakness.   Fatigue  Symptoms: chest pain and fatigue    Symptoms: no abdominal pain, no cough, no fever, no myalgias, no nausea, no numbness, no rash, no dysuria, no vomiting and no weakness         79-year-old female with history of heart murmur with nonrheumatic mitral valve regurgitation and tricuspid valve regurgitation, hypertension, hyperlipidemia, osteoarthritis, GERD, B12 deficiency, iron anemia deficiency and known mild dementia.       Has chronic swelling in her ankles for many years worsens as the day goes on results that she puts them up and when she lays down at night.  Has had chest pain.  Episode today when at gas station and walked in with minimal exertion and associated short of breath lasting about 3 minutes.  Symptoms resolved and did not recur walking back to car.  Has had fatigue.  Was seen on 6/18/2025 with similar and labs were obtained demonstrating overall normal labs including thyroid lipid, cholesterol, and blood count.  Still has episodes of chest discomfort but negative workup with cardiology and given trial of imdur 30 mg daily but symptoms persist and unchanged.      History of hypertension.  Currently, has been feeling well and asymptomatic without any headaches, vision changes, cough, chest pain, shortness of breath, swelling, focal neurologic deficit, memory loss or syncope.  Has been taking the medications regularly and adherent with the regimen of amlodipine-benazepril 10-20 daily.  Denies medication side effects and no significant interval events.       History or high cholesterol.  Currently, has been  feeling well without any myalgias, muscle aches, weakness, numbness, chest pain, short of breath or other issues.  Currently, is adherent with medication regimen of rosuvastatin 20 mg daily and denies medication side effects. Is due for lab follow-up.     History of dementia and utilizing the donepezil 5 mg daily currently.    The following portions of the patient's history were reviewed and updated as appropriate: allergies, current medications, past family history, past medical history, past social history, past surgical history and problem list.    Depression Screen:      6/18/2025    11:34 AM   PHQ-2/PHQ-9 Depression Screening   Little interest or pleasure in doing things Not at all   Feeling down, depressed, or hopeless Not at all   How difficult have these problems made it for you to do your work, take care of things at home, or get along with other people? Not difficult at all       Past Medical History:   Diagnosis Date    Abnormal stress echocardiogram 07/20/2017    Patient had borderline EKG changes 5 minutes into walking and Dr. Cali Spencer recommended the patient not to have that same type of stress test in the future    Adhesive capsulitis of right shoulder     Age-related osteoporosis without fracture     APC (atrial premature contractions) 07/20/2017    Arthritis     Broken toe 2023    Carpal tunnel syndrome     Cataract     Chronic hip pain     Fatigue     Frequent episodes of pneumonia     PT STATES SEVERAL TIMES    Heart murmur 07/20/2017    Left knee pain     LVH (left ventricular hypertrophy) 07/20/2017    Medicare annual wellness visit, subsequent 02/02/2021    Mixed hyperlipidemia 07/20/2017    Non-rheumatic mitral regurgitation 07/20/2017    Mild to moderate per 2-D echocardiogram    Non-rheumatic tricuspid valve insufficiency 07/20/2017    Mild to moderate per 2-D echocardiogram    Osteoarthritis     Osteoarthritis of both knees     Osteoporosis     Shoulder pain     Stroke     TIA (transient  ischemic attack) 10/15/2013    numbness on right side of face and hand; went to [facility]       Past Surgical History:   Procedure Laterality Date    ANKLE OPEN REDUCTION INTERNAL FIXATION Right 10/2022    APPENDECTOMY      CARDIAC CATHETERIZATION N/A 10/20/2017    Procedure: Right Heart Cath;  Surgeon: Christopher Kan MD;  Location:  [facility];  Service:     COLONOSCOPY      REPLACEMENT TOTAL KNEE Left 06/12/2020    SUBTOTAL HYSTERECTOMY      TOTAL KNEE ARTHROPLASTY Left 06/12/2020    Procedure: TOTAL KNEE ARTHROPLASTY;  Surgeon: Jose Leary MD;  Location: [facility];  Service: Orthopedics;  Laterality: Left;       Family History   Problem Relation Age of Onset    Heart disease Mother     Hypertension Mother     Stroke Mother     Arthritis Mother     No Known Problems Father     Heart disease Sister     Cancer Sister     Heart disease Maternal Aunt     Hypertension Maternal Aunt     Hypertension Maternal Uncle     Breast cancer Cousin     Cancer Other     Malig Hyperthermia Neg Hx        Social History     Socioeconomic History    Marital status:    Tobacco Use    Smoking status: Never     Passive exposure: Never    Smokeless tobacco: Never   Vaping Use    Vaping status: Never Used   Substance and Sexual Activity    Alcohol use: No     Comment: rarely    Drug use: No    Sexual activity: Defer       Current Outpatient Medications   Medication Sig Dispense Refill    amLODIPine-benazepril (LOTREL) 10-20 MG per capsule Take 1 capsule by mouth Daily. 90 capsule 1    donepezil (ARICEPT) 5 MG tablet Take 1 tablet by mouth Every Night. 90 tablet 1    fluticasone (FLONASE) 50 MCG/ACT nasal spray Administer 2 sprays into the nostril(s) as directed by provider Daily As Needed.      gabapentin (NEURONTIN) 300 MG capsule Take 1 capsule by mouth Every Night. 90 capsule 1    isosorbide mononitrate (IMDUR) 30 MG 24 hr tablet Take 1 tablet by mouth Daily. 30 tablet 11    pantoprazole  "(PROTONIX) 40 MG EC tablet Take 1 tablet by mouth Daily. 90 tablet 1    rosuvastatin (CRESTOR) 20 MG tablet Take 1 tablet by mouth Daily. 90 tablet 1     No current facility-administered medications for this visit.       Review of Systems   Constitutional:  Positive for fatigue. Negative for activity change, appetite change, fever, unexpected weight gain and unexpected weight loss.   HENT:  Negative for nosebleeds, rhinorrhea, trouble swallowing and voice change.    Eyes:  Negative for visual disturbance.   Respiratory:  Negative for cough, chest tightness, shortness of breath and wheezing.    Cardiovascular:  Positive for chest pain and leg swelling. Negative for palpitations.        History of chronic chest tightness with greater than 100 feet walking and has been evaluated by cardiology stable unchanged.   Gastrointestinal:  Negative for abdominal pain, blood in stool, constipation, diarrhea, nausea, vomiting, GERD and indigestion.   Genitourinary:  Negative for dysuria, frequency and hematuria.   Musculoskeletal:  Negative for arthralgias, back pain and myalgias.   Skin:  Negative for rash and wound.   Neurological:  Negative for dizziness, tremors, weakness, light-headedness, numbness, headache and memory problem.   Hematological:  Negative for adenopathy. Does not bruise/bleed easily.   Psychiatric/Behavioral:  Negative for sleep disturbance and depressed mood. The patient is not nervous/anxious.        Objective   /60 (BP Location: Left arm, Patient Position: Sitting, Cuff Size: Large Adult)   Pulse 75   Temp 97.5 °F (36.4 °C) (Temporal)   Ht 152.4 cm (60\")   Wt 69.8 kg (153 lb 12.8 oz)   SpO2 95%   BMI 30.04 kg/m²     Physical Exam    Recent Results (from the past 12 weeks)   CBC & Differential    Collection Time: 06/18/25 12:22 PM    Specimen: Blood   Result Value Ref Range    WBC 5.4 3.4 - 10.8 x10E3/uL    RBC 4.64 3.77 - 5.28 x10E6/uL    Hemoglobin 13.7 11.1 - 15.9 g/dL    Hematocrit 43.6 34.0 " - 46.6 %    MCV 94 79 - 97 fL    MCH 29.5 26.6 - 33.0 pg    MCHC 31.4 (L) 31.5 - 35.7 g/dL    RDW 13.0 11.7 - 15.4 %    Platelets 281 150 - 450 x10E3/uL    Neutrophil Rel % 56 Not Estab. %    Lymphocyte Rel % 35 Not Estab. %    Monocyte Rel % 7 Not Estab. %    Eosinophil Rel % 1 Not Estab. %    Basophil Rel % 1 Not Estab. %    Neutrophils Absolute 3.0 1.4 - 7.0 x10E3/uL    Lymphocytes Absolute 1.9 0.7 - 3.1 x10E3/uL    Monocytes Absolute 0.4 0.1 - 0.9 x10E3/uL    Eosinophils Absolute 0.1 0.0 - 0.4 x10E3/uL    Basophils Absolute 0.0 0.0 - 0.2 x10E3/uL    Immature Granulocyte Rel % 0 Not Estab. %    Immature Grans Absolute 0.0 0.0 - 0.1 x10E3/uL   Comprehensive Metabolic Panel    Collection Time: 06/18/25 12:22 PM    Specimen: Blood   Result Value Ref Range    Glucose 77 70 - 99 mg/dL    BUN 15 8 - 27 mg/dL    Creatinine 0.68 0.57 - 1.00 mg/dL    EGFR Result 87 >59 mL/min/1.73    BUN/Creatinine Ratio 22 12 - 28    Sodium 142 134 - 144 mmol/L    Potassium 4.2 3.5 - 5.2 mmol/L    Chloride 103 96 - 106 mmol/L    Total CO2 19 (L) 20 - 29 mmol/L    Calcium 9.3 8.7 - 10.3 mg/dL    Total Protein 7.1 6.0 - 8.5 g/dL    Albumin 4.6 3.7 - 4.7 g/dL    Globulin 2.5 1.5 - 4.5 g/dL    Total Bilirubin 1.0 0.0 - 1.2 mg/dL    Alkaline Phosphatase 163 (H) 44 - 121 IU/L    AST (SGOT) 19 0 - 40 IU/L    ALT (SGPT) 18 0 - 32 IU/L   Lipid Panel    Collection Time: 06/18/25 12:22 PM    Specimen: Blood   Result Value Ref Range    Total Cholesterol 142 100 - 199 mg/dL    Triglycerides 66 0 - 149 mg/dL    HDL Cholesterol 72 >39 mg/dL    VLDL Cholesterol Bhupinder 13 5 - 40 mg/dL    LDL Chol Calc (NIH) 57 0 - 99 mg/dL   TSH Rfx On Abnormal To Free T4    Collection Time: 06/18/25 12:22 PM    Specimen: Blood   Result Value Ref Range    TSH 0.156 (L) 0.450 - 4.500 uIU/mL   Vitamin B12    Collection Time: 06/18/25 12:22 PM    Specimen: Blood   Result Value Ref Range    Vitamin B-12 >2000 (H) 232 - 1245 pg/mL   ToxASSURE Select 13 (MW) - Urine, Clean Catch     Collection Time: 06/18/25 12:22 PM    Specimen: Urine, Clean Catch   Result Value Ref Range    Report Summary FINAL    T4F    Collection Time: 06/18/25 12:22 PM   Result Value Ref Range    Free T4 1.16 0.82 - 1.77 ng/dL     ECG 12 Lead    Date/Time: 7/16/2025 3:52 PM  Performed by: Cordell Holland MD    Authorized by: Cordell Holland MD  Comparison: compared with previous ECG from 5/12/2025  Similar to previous ECG  Rhythm: sinus rhythm  Rate: normal  Q waves: V1 and V2    ST Segments: ST segments normal  T Waves: T waves normal  QRS axis: normal  Other: no other findings    Clinical impression: abnormal EKG        Assessment & Plan   Diagnoses and all orders for this visit:    1. Edema, unspecified type (Primary)  -     ECG 12 Lead    2. Chest pain, unspecified type  -     ECG 12 Lead    3. Chronic fatigue  -     ECG 12 Lead    4. Short of breath on exertion    Other orders  -     ECG Scan    I discussed with patient her chronic fatigue shortness of breath with even minimal exertion and intermittent chest pain possible angina or concerning.  Reviewed charts from cardiology Dr. Silverman.  Recommend that she follow-up with Dr. Silverman ASAP will likely require echocardiogram and reevaluation for heart disease possibly including cardiac catheterization with her symptoms.  Message was sent to Dr. Silverman today concerning this as well.  Also discussed with the patient that if she has persistent nonrelenting or worsening symptoms then is to go straight to emergency room which she was already understanding of.  She will be following back up with us on a regular basis in approximately 4 months for annual wellness evaluation.    Recommend follow up with cardiology Dr Silverman.  Will need repeat echocardiogram and if persists or issues may need cardiac catheterization.       Dictated utilizing Dragon Dictation

## 2025-07-23 ENCOUNTER — TELEPHONE (OUTPATIENT)
Dept: CARDIOLOGY | Age: 80
End: 2025-07-23
Payer: MEDICARE

## 2025-07-25 ENCOUNTER — HOSPITAL ENCOUNTER (OUTPATIENT)
Dept: CARDIOLOGY | Facility: HOSPITAL | Age: 80
Discharge: HOME | End: 2025-07-25
Payer: MEDICARE

## 2025-07-25 VITALS
BODY MASS INDEX: 29.45 KG/M2 | HEIGHT: 60 IN | DIASTOLIC BLOOD PRESSURE: 82 MMHG | SYSTOLIC BLOOD PRESSURE: 150 MMHG | WEIGHT: 150 LBS | HEART RATE: 70 BPM

## 2025-07-25 DIAGNOSIS — I36.1 NONRHEUMATIC TRICUSPID VALVE REGURGITATION: ICD-10-CM

## 2025-07-25 DIAGNOSIS — I34.0 NON-RHEUMATIC MITRAL REGURGITATION: ICD-10-CM

## 2025-07-25 LAB
AORTIC ARCH: 2.8 CM
AORTIC DIMENSIONLESS INDEX: 0.62 (DI)
ASCENDING AORTA: 3.3 CM
AV MEAN PRESS GRAD SYS DOP V1V2: 8 MMHG
AV VMAX SYS DOP: 204 CM/SEC
BH CV ECHO LEFT VENTRICLE GLOBAL LONGITUDINAL STRAIN: -26.1 %
BH CV ECHO MEAS - ACS: 1.57 CM
BH CV ECHO MEAS - AO MAX PG: 16.6 MMHG
BH CV ECHO MEAS - AO ROOT AREA (BSA CORRECTED): 1.8 CM2
BH CV ECHO MEAS - AO ROOT DIAM: 3 CM
BH CV ECHO MEAS - AO V2 VTI: 46.2 CM
BH CV ECHO MEAS - AVA(I,D): 1.79 CM2
BH CV ECHO MEAS - EDV(CUBED): 74.1 ML
BH CV ECHO MEAS - EDV(MOD-SP2): 74 ML
BH CV ECHO MEAS - EDV(MOD-SP4): 69 ML
BH CV ECHO MEAS - EF(MOD-SP2): 67.6 %
BH CV ECHO MEAS - EF(MOD-SP4): 69.6 %
BH CV ECHO MEAS - ESV(CUBED): 17.3 ML
BH CV ECHO MEAS - ESV(MOD-SP2): 24 ML
BH CV ECHO MEAS - ESV(MOD-SP4): 21 ML
BH CV ECHO MEAS - FS: 38.5 %
BH CV ECHO MEAS - IVS/LVPW: 1.11 CM
BH CV ECHO MEAS - IVSD: 1 CM
BH CV ECHO MEAS - LA 3D VOL INDEX: 39
BH CV ECHO MEAS - LAT PEAK E' VEL: 12.8 CM/SEC
BH CV ECHO MEAS - LV DIASTOLIC VOL/BSA (35-75): 41.8 CM2
BH CV ECHO MEAS - LV MASS(C)D: 127.8 GRAMS
BH CV ECHO MEAS - LV MAX PG: 7.7 MMHG
BH CV ECHO MEAS - LV MEAN PG: 4 MMHG
BH CV ECHO MEAS - LV SYSTOLIC VOL/BSA (12-30): 12.7 CM2
BH CV ECHO MEAS - LV V1 MAX: 139 CM/SEC
BH CV ECHO MEAS - LV V1 VTI: 28.5 CM
BH CV ECHO MEAS - LVIDD: 4.2 CM
BH CV ECHO MEAS - LVIDS: 2.6 CM
BH CV ECHO MEAS - LVOT AREA: 2.9 CM2
BH CV ECHO MEAS - LVOT DIAM: 1.92 CM
BH CV ECHO MEAS - LVPWD: 0.9 CM
BH CV ECHO MEAS - MED PEAK E' VEL: 7 CM/SEC
BH CV ECHO MEAS - MV A DUR: 0.16 SEC
BH CV ECHO MEAS - MV A MAX VEL: 106 CM/SEC
BH CV ECHO MEAS - MV DEC SLOPE: 324.6 CM/SEC2
BH CV ECHO MEAS - MV DEC TIME: 0.2 SEC
BH CV ECHO MEAS - MV E MAX VEL: 78.8 CM/SEC
BH CV ECHO MEAS - MV E/A: 0.74
BH CV ECHO MEAS - MV MAX PG: 5.6 MMHG
BH CV ECHO MEAS - MV MEAN PG: 1.78 MMHG
BH CV ECHO MEAS - MV P1/2T: 79.1 MSEC
BH CV ECHO MEAS - MV V2 VTI: 31.3 CM
BH CV ECHO MEAS - MVA(P1/2T): 2.8 CM2
BH CV ECHO MEAS - MVA(VTI): 2.6 CM2
BH CV ECHO MEAS - PA ACC TIME: 0.15 SEC
BH CV ECHO MEAS - PA V2 MAX: 102.9 CM/SEC
BH CV ECHO MEAS - PULM A REVS DUR: 0.17 SEC
BH CV ECHO MEAS - PULM A REVS VEL: 31.3 CM/SEC
BH CV ECHO MEAS - PULM DIAS VEL: 43.3 CM/SEC
BH CV ECHO MEAS - PULM S/D: 1.45
BH CV ECHO MEAS - PULM SYS VEL: 62.6 CM/SEC
BH CV ECHO MEAS - QP/QS: 0.65
BH CV ECHO MEAS - RAP SYSTOLE: 3 MMHG
BH CV ECHO MEAS - RV MAX PG: 1.28 MMHG
BH CV ECHO MEAS - RV V1 MAX: 56.6 CM/SEC
BH CV ECHO MEAS - RV V1 VTI: 14.1 CM
BH CV ECHO MEAS - RVOT DIAM: 2.21 CM
BH CV ECHO MEAS - RVSP: 47 MMHG
BH CV ECHO MEAS - SUP REN AO DIAM: 2.1 CM
BH CV ECHO MEAS - SV(LVOT): 82.9 ML
BH CV ECHO MEAS - SV(MOD-SP2): 50 ML
BH CV ECHO MEAS - SV(MOD-SP4): 48 ML
BH CV ECHO MEAS - SV(RVOT): 54.2 ML
BH CV ECHO MEAS - SVI(LVOT): 50.2 ML/M2
BH CV ECHO MEAS - SVI(MOD-SP2): 30.3 ML/M2
BH CV ECHO MEAS - SVI(MOD-SP4): 29.1 ML/M2
BH CV ECHO MEAS - TAPSE (>1.6): 2.4 CM
BH CV ECHO MEAS - TR MAX PG: 44 MMHG
BH CV ECHO MEAS - TR MAX VEL: 331.5 CM/SEC
BH CV ECHO MEASUREMENTS AVERAGE E/E' RATIO: 7.96
BH CV XLRA - RV BASE: 4 CM
BH CV XLRA - RV LENGTH: 6.7 CM
BH CV XLRA - RV MID: 3.2 CM
BH CV XLRA - TDI S': 10.9 CM/SEC
LEFT ATRIUM VOLUME INDEX: 21.6 ML/M2
LV EF 3D SEGMENTATION: 69 %
LV EF BIPLANE MOD: 69.2 %
SINUS: 2.8 CM
STJ: 1.84 CM

## 2025-07-25 PROCEDURE — 93306 TTE W/DOPPLER COMPLETE: CPT

## 2025-07-25 PROCEDURE — 93356 MYOCRD STRAIN IMG SPCKL TRCK: CPT

## 2025-08-08 ENCOUNTER — OFFICE VISIT (OUTPATIENT)
Age: 80
End: 2025-08-08
Payer: MEDICARE

## 2025-08-08 VITALS
HEIGHT: 60 IN | WEIGHT: 155 LBS | BODY MASS INDEX: 30.43 KG/M2 | SYSTOLIC BLOOD PRESSURE: 118 MMHG | OXYGEN SATURATION: 100 % | HEART RATE: 68 BPM | DIASTOLIC BLOOD PRESSURE: 60 MMHG

## 2025-08-08 DIAGNOSIS — I36.1 NONRHEUMATIC TRICUSPID VALVE REGURGITATION: ICD-10-CM

## 2025-08-08 DIAGNOSIS — Z91.89 CHRONIC CHEST PAIN WITH LOW TO MODERATE RISK FOR CAD: ICD-10-CM

## 2025-08-08 DIAGNOSIS — R07.9 CHRONIC CHEST PAIN WITH LOW TO MODERATE RISK FOR CAD: ICD-10-CM

## 2025-08-08 DIAGNOSIS — E78.2 MIXED HYPERLIPIDEMIA: ICD-10-CM

## 2025-08-08 DIAGNOSIS — G89.29 CHRONIC CHEST PAIN WITH LOW TO MODERATE RISK FOR CAD: ICD-10-CM

## 2025-08-08 DIAGNOSIS — I10 ESSENTIAL HYPERTENSION: Primary | ICD-10-CM

## 2025-08-08 DIAGNOSIS — I34.0 NON-RHEUMATIC MITRAL REGURGITATION: ICD-10-CM

## 2025-08-08 DIAGNOSIS — I51.7 LVH (LEFT VENTRICULAR HYPERTROPHY): ICD-10-CM

## (undated) DEVICE — TRAP FLD MINIVAC MEGADYNE 100ML

## (undated) DEVICE — PENCL E/S ULTRAVAC TELESCP NOSE HOLSTR 10FT

## (undated) DEVICE — SUT ETHIB 0 CT1 CR8 18IN CX21D

## (undated) DEVICE — KT MANIFLD CARDIAC

## (undated) DEVICE — SYR CONTRL LUERLOK 10CC

## (undated) DEVICE — GLV SURG SIGNATURE ESSENTIAL PF LTX SZ8

## (undated) DEVICE — PK KN TOTL 40

## (undated) DEVICE — BALN PRESS WEDGE 5F 110CM

## (undated) DEVICE — GLIDESHEATH BASIC HYDROPHILIC COATED INTRODUCER SHEATH: Brand: GLIDESHEATH

## (undated) DEVICE — GLV SURG PREMIERPRO ORTHO LTX PF SZ8 BRN

## (undated) DEVICE — APPL CHLORAPREP HI/LITE 26ML ORNG

## (undated) DEVICE — SUT MNCRYL 3/0 PS2 18IN MCP497G

## (undated) DEVICE — NEEDLE, QUINCKE 22GX3.5": Brand: MEDLINE INDUSTRIES, INC.

## (undated) DEVICE — SKIN PREP TRAY W/CHG: Brand: MEDLINE INDUSTRIES, INC.

## (undated) DEVICE — CONTAINER,SPECIMEN,OR STERILE,4OZ: Brand: MEDLINE

## (undated) DEVICE — DRAPE,REIN 53X77,STERILE: Brand: MEDLINE

## (undated) DEVICE — ANTIBACTERIAL UNDYED BRAIDED (POLYGLACTIN 910), SYNTHETIC ABSORBABLE SUTURE: Brand: COATED VICRYL

## (undated) DEVICE — PK CATH CARD 40

## (undated) DEVICE — BNDG ELAS ELITE V/CLOSE 6IN 5YD LF STRL

## (undated) DEVICE — Device

## (undated) DEVICE — GLV SURG BIOGEL LTX PF 8

## (undated) DEVICE — 3M™ STERI-STRIP™ REINFORCED ADHESIVE SKIN CLOSURES, R1547, 1/2 IN X 4 IN (12 MM X 100 MM), 6 STRIPS/ENVELOPE: Brand: 3M™ STERI-STRIP™

## (undated) DEVICE — DUAL CUT SAGITTAL BLADE

## (undated) DEVICE — SOL ISO/ALC RUB 70PCT 4OZ

## (undated) DEVICE — DRSNG WND GEL FIBR OPTICELL AG PLS W/SLV LF 4X5IN  STRL